# Patient Record
Sex: FEMALE | Race: WHITE | NOT HISPANIC OR LATINO | Employment: PART TIME | URBAN - METROPOLITAN AREA
[De-identification: names, ages, dates, MRNs, and addresses within clinical notes are randomized per-mention and may not be internally consistent; named-entity substitution may affect disease eponyms.]

---

## 2017-09-11 ENCOUNTER — HOSPITAL ENCOUNTER (EMERGENCY)
Facility: CLINIC | Age: 37
Discharge: HOME | End: 2017-09-11
Attending: FAMILY MEDICINE | Admitting: FAMILY MEDICINE
Payer: OTHER MISCELLANEOUS

## 2017-09-11 VITALS
DIASTOLIC BLOOD PRESSURE: 81 MMHG | BODY MASS INDEX: 29.79 KG/M2 | RESPIRATION RATE: 14 BRPM | SYSTOLIC BLOOD PRESSURE: 131 MMHG | TEMPERATURE: 97 F | WEIGHT: 179 LBS | OXYGEN SATURATION: 99 %
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
## 2017-09-11 DIAGNOSIS — S61.213A LACERATION OF LEFT MIDDLE FINGER WITHOUT FOREIGN BODY WITHOUT DAMAGE TO NAIL, INITIAL ENCOUNTER: ICD-10-CM

## 2017-09-11 PROCEDURE — 12001 RPR S/N/AX/GEN/TRNK 2.5CM/<: CPT | Performed by: FAMILY MEDICINE

## 2017-09-11 PROCEDURE — 12001 RPR S/N/AX/GEN/TRNK 2.5CM/<: CPT | Mod: Z6 | Performed by: FAMILY MEDICINE

## 2017-09-11 PROCEDURE — 25000125 ZZHC RX 250: Performed by: FAMILY MEDICINE

## 2017-09-11 PROCEDURE — 99282 EMERGENCY DEPT VISIT SF MDM: CPT | Mod: 25 | Performed by: FAMILY MEDICINE

## 2017-09-11 PROCEDURE — 99283 EMERGENCY DEPT VISIT LOW MDM: CPT | Performed by: FAMILY MEDICINE

## 2017-09-11 RX ORDER — LIDOCAINE HYDROCHLORIDE 10 MG/ML
10 INJECTION, SOLUTION INFILTRATION; PERINEURAL ONCE
Status: COMPLETED | OUTPATIENT
Start: 2017-09-11 | End: 2017-09-11

## 2017-09-11 RX ADMIN — LIDOCAINE HYDROCHLORIDE 10 ML: 10 INJECTION, SOLUTION INFILTRATION; PERINEURAL at 10:34

## 2017-09-11 NOTE — ED NOTES
Here laceration to third digit on left hand. Pt cut finger on a knife while working at at [employer]

## 2017-09-11 NOTE — ED AVS SNAPSHOT
[facility] Emergency Department    [address] [place] [address]    Phone:  [phone]    Fax:  [phone]                                       Christina Santana   MRN: [id number]    Department:  [facility] Emergency Department   Date of Visit:  9/11/2017           After Visit Summary Signature Page     I have received my discharge instructions, and my questions have been answered. I have discussed any challenges I see with this plan with the nurse or doctor.    ..........................................................................................................................................  Patient/Patient Representative Signature      ..........................................................................................................................................  Patient Representative Print Name and Relationship to Patient    ..................................................               ................................................  Date                                            Time    ..........................................................................................................................................  Reviewed by Signature/Title    ...................................................              ..............................................  Date                                                            Time

## 2017-09-11 NOTE — ED AVS SNAPSHOT
[facility] Emergency Department    [address] [place] [address]    Phone:  [phone]    Fax:  [phone]                                       Christina Santana   MRN: [id number]    Department:  [facility] Emergency Department   Date of Visit:  9/11/2017           Patient Information     Date Of Birth          1980        Your diagnoses for this visit were:     Laceration of left middle finger without foreign body without damage to nail, initial encounter        You were seen by Eulogio Meyers MD.      Follow-up Information     Follow up with [facility] Emergency Department.    Specialty:  EMERGENCY MEDICINE    Why:  If symptoms worsen    Contact information:    [address]   [place] [address] [phone]    Additional information:    From [address]: Exit at [address] on south side of [place]. Turn right on [address]. Turn left at stoplight on [address]. [facility] will be in view two blocks ahead        Discharge Instructions       Post-procedure notes from Dr Meyers:    1. We covered this with petroleum jelly and a dressing today. Please do not use any topical antibiotic on this. The skin has a balance of bacteria and yeast that is neccesary for skin health and healing, and we do not want to affect this. Please use vaseline and a band-aid on the repair to keep it soft as this heals.    2. Keep the area clean and dry for the next 24 hours.  After that it is okay to get the site wet from swimming, showering or bathing.  I recommend that you avoid using a hot tub for the next 4- 5 days.    3. The area around the stitches may start to get red, and that is not a sign of infection in most cases.  The skin is reacting to the stitches and the redness will go away once the stitches are taken out.    4. Plan to return to get the stitches out in 9 days.  You can have this done in a local [facility] clinic on the RN schedule,  or you can return to your regular physician or to the ER.    Skin takes a full twelve months to remodel, but usually after about three months you can see what you have for life.  Please treat the wound gently and keep it covered from the sun as it heals.    Tetanus status: your last tetanus was June 2012 and we did not update that today.    Thank you for choosing our Emergency Department for your care.     Sincerely,    Dr Beto Meyers M.D.          24 Hour Appointment Hotline       To make an appointment at any [facility] clinic, call [phone] ([phone]). If you don't have a family doctor or clinic, we will help you find one. [facility] clinics are conveniently located to serve the needs of you and your family.             Review of your medicines      Our records show that you are taking the medicines listed below. If these are incorrect, please call your family doctor or clinic.        Dose / Directions Last dose taken    LORazepam 1 MG tablet   Commonly known as:  ATIVAN   Dose:  0.5 mg   Quantity:  4 tablet        Take 0.5 tablets (0.5 mg) by mouth every 8 hours as needed for anxiety or other (or dizziness)   Refills:  0                Orders Needing Specimen Collection     None      Pending Results     No orders found from 9/9/2017 to 9/12/2017.            Pending Culture Results     No orders found from 9/9/2017 to 9/12/2017.            Pending Results Instructions     If you had any lab results that were not finalized at the time of your Discharge, you can call the ED Lab Result RN at [phone]. You will be contacted by this team for any positive Lab results or changes in treatment. The nurses are available 7 days a week from 10A to 6:30P.  You can leave a message 24 hours per day and they will return your call.        Thank you for choosing [facility]       Thank you for choosing [facility] for your care. Our goal is always to provide you with excellent care. Hearing back from our patients is one  "way we can continue to improve our services. Please take a few minutes to complete the written survey that you may receive in the mail after you visit with us. Thank you!        e-SENSharLoveThis Information     [employer] lets you send messages to your doctor, view your test results, renew your prescriptions, schedule appointments and more. To sign up, go to [email or web address] . Click on \"Log in\" on the left side of the screen, which will take you to the Welcome page. Then click on \"Sign up Now\" on the right side of the page.     You will be asked to enter the access code listed below, as well as some personal information. Please follow the directions to create your username and password.     Your access code is: [email or web address]  Expires: 12/10/2017 10:28 AM     Your access code will  in 90 days. If you need help or a new code, please call your [place] clinic or [phone].        Care EveryWhere ID     This is your Care EveryWhere ID. This could be used by other organizations to access your [place] medical records  [id number]        Equal Access to Services     LESLEY PEREA : Hadgillian Castillo, liz mohan, dede michel. So [facility] [phone].    ATENCIÓN: Si habla español, tiene a norman disposición servicios gratuitos de asistencia lingüística. Shelly al [phone].    We comply with applicable federal civil rights laws and [place] laws. We do not discriminate on the basis of race, color, national origin, age, disability sex, sexual orientation or gender identity.            After Visit Summary       This is your record. Keep this with you and show to your community pharmacist(s) and doctor(s) at your next visit.                  "

## 2017-09-11 NOTE — ED PROVIDER NOTES
"  History   No chief complaint on file.    The history is provided by the patient (Her employer is here with her.).     Christina Santana is a 37 year old female who is here with a laceration of her left finger.  She works at [employer] here in [place] and cut her left middle finger while cutting a bagel.  Her last tetanus shot was about 5 years ago.  She has no other concerns.    I have reviewed the Medications, Allergies, Past Medical and Surgical History, and Social History in the Epic system.    Allergies: No Known Allergies      No current facility-administered medications on file prior to encounter.   Current Outpatient Prescriptions on File Prior to Encounter:  LORazepam (ATIVAN) 1 MG tablet Take 0.5 tablets (0.5 mg) by mouth every 8 hours as needed for anxiety or other (or dizziness)       Patient Active Problem List   Diagnosis     S/P LEEP (status post loop electrosurgical excision procedure)       Past Surgical History:   Procedure Laterality Date     APPENDECTOMY         Social History   Substance Use Topics     Smoking status: Never Smoker     Smokeless tobacco: Never Used     Alcohol use No       Most Recent Immunizations   Administered Date(s) Administered     Tdap (Adacel,Boostrix) 04/11/2012       BMI: Estimated body mass index is 29.79 kg/(m^2) as calculated from the following:    Height as of 6/10/15: 1.651 m (5' 5\").    Weight as of this encounter: 81.2 kg (179 lb).      Review of Systems   Skin:        She has a laceration on the left middle finger   All other systems reviewed and are negative.      Physical Exam   BP: 131/81  Heart Rate: 79  Temp: 97  F (36.1  C)  Resp: 14  Weight: 81.2 kg (179 lb)  SpO2: 99 %    Physical Exam   Constitutional: She appears well-developed and well-nourished.   Pulmonary/Chest: No respiratory distress.   Skin:   She has a 1 cm laceration on the lateral edge of the left middle finger.   Psychiatric: She has a normal mood and affect. Her behavior is normal. Judgment " and thought content normal.   Nursing note and vitals reviewed.      ED Course  The laceration was measured to be 1 cm. in length and located on the lateral edge of the left middle finger.    We did review benefits and drawbacks, including pain, bleeding, infection and scar formation. We discussed oral antibiotics and I recommend none.      For analgesia, 3 mL of 1 % Lidocaine without epinephrine was infused at the base of the finger and around the wound.      After sufficient time, the wound was cleansed by the ED staff.     I inspected for foreign body and for tendon or vascular damage, and there is none.  Exam of NVM function prior to repair shows normal function. The wound edges appear even and intact, and did not need revision.    The wound was closed with a running suture of 4-0 Ethilon.     Exam of the NVM function after the repair showed normal function.    The wound was dressed with Vaseline and a dressing today.  Follow up instructions were reviewed, and a printed copy will be provided as well. Tetanus status: her last tetanus was June 2012 and this is a clean laceration. No tetanus update today.    The patient tolerated the procedure well.      Plan follow up in 9 days for suture removal.       ED Course     Procedures    Assessments & Plan (with Medical Decision Making)  Christina is a 37-year-old female who came in with her employer.  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  Her tetanus is up-to-date and we did not give her an updated tetanus shot.  The wound was repaired as noted above.  She was discharged from the ED.       I have reviewed the nursing notes.    I have reviewed the findings, diagnosis, plan and need for follow up with the patient.       New Prescriptions    No medications on file       Final diagnoses:   Laceration of left middle finger without foreign body without damage to nail, initial encounter       9/11/2017    [facility] EMERGENCY DEPARTMENT     Eulogio Meyers MD  09/11/17 [id number]

## 2017-09-11 NOTE — DISCHARGE INSTRUCTIONS
Post-procedure notes from Dr Meyers:    1. We covered this with petroleum jelly and a dressing today. Please do not use any topical antibiotic on this. The skin has a balance of bacteria and yeast that is neccesary for skin health and healing, and we do not want to affect this. Please use vaseline and a band-aid on the repair to keep it soft as this heals.    2. Keep the area clean and dry for the next 24 hours.  After that it is okay to get the site wet from swimming, showering or bathing.  I recommend that you avoid using a hot tub for the next 4- 5 days.    3. The area around the stitches may start to get red, and that is not a sign of infection in most cases.  The skin is reacting to the stitches and the redness will go away once the stitches are taken out.    4. Plan to return to get the stitches out in 9 days.  You can have this done in a local [place] clinic on the RN schedule, or you can return to your regular physician or to the ER.    Skin takes a full twelve months to remodel, but usually after about three months you can see what you have for life.  Please treat the wound gently and keep it covered from the sun as it heals.    Tetanus status: your last tetanus was June 2012 and we did not update that today.    Thank you for choosing our Emergency Department for your care.     Sincerely,    Dr Beto Meyers M.D.

## 2017-09-20 ENCOUNTER — ALLIED HEALTH/NURSE VISIT (OUTPATIENT)
Dept: FAMILY MEDICINE | Facility: CLINIC | Age: 37
End: 2017-09-20

## 2017-09-20 VITALS — TEMPERATURE: 97.3 F

## 2017-09-20 DIAGNOSIS — Z48.02 VISIT FOR SUTURE REMOVAL: Primary | ICD-10-CM

## 2017-09-20 PROCEDURE — 99207 ZZC NO CHARGE NURSE ONLY: CPT

## 2017-09-20 NOTE — MR AVS SNAPSHOT
"              After Visit Summary   2017    Christina Santana    MRN: [id number]           Patient Information     Date Of Birth          1980        Visit Information        Provider Department      2017 8:30 AM NL RN TEAM A, [facility]        Today's Diagnoses     Visit for suture removal    -  1       Follow-ups after your visit        Who to contact     If you have questions or need follow up information about today's clinic visit or your schedule please contact [facility] directly at [phone].  Normal or non-critical lab and imaging results will be communicated to you by MyChart, letter or phone within 4 business days after the clinic has received the results. If you do not hear from us within 7 days, please contact the clinic through [employer] or phone. If you have a critical or abnormal lab result, we will notify you by phone as soon as possible.  Submit refill requests through [employer] or call your pharmacy and they will forward the refill request to us. Please allow 3 business days for your refill to be completed.          Additional Information About Your Visit        MyChart Information     [employer] lets you send messages to your doctor, view your test results, renew your prescriptions, schedule appointments and more. To sign up, go to [email or web address] . Click on \"Log in\" on the left side of the screen, which will take you to the Welcome page. Then click on \"Sign up Now\" on the right side of the page.     You will be asked to enter the access code listed below, as well as some personal information. Please follow the directions to create your username and password.     Your access code is: [email or web address]  Expires: 12/10/2017 10:28 AM     Your access code will  in 90 days. If you need help or a new code, please call your [facility] or [phone].        Care EveryWhere ID     This is your Care EveryWhere ID. This could be used by other " organizations to access your [place] medical records  [id number]        Your Vitals Were     Temperature Last Period                97.3  F (36.3  C) (Temporal) 08/02/2017           Blood Pressure from Last 3 Encounters:   09/11/17 131/81   12/04/16 110/80   03/22/16 100/70    Weight from Last 3 Encounters:   09/11/17 179 lb (81.2 kg)   03/22/16 168 lb (76.2 kg)   06/10/15 179 lb 12.8 oz (81.6 kg)              Today, you had the following     No orders found for display       Primary Care Provider    None       No address on file        Equal Access to Services     LSELEY [facility]: Hadii acosta Castillo, liz mohan, connor kaalmada lucie, dede quintana . So [facility] [phone].    ATENCIÓN: Si habla español, tiene a norman disposición servicios gratuitos de asistencia lingüística. Llame al [phone].    We comply with applicable federal civil rights laws and [place] laws. We do not discriminate on the basis of race, color, national origin, age, disability sex, sexual orientation or gender identity.            Thank you!     Thank you for choosing [facility]  for your care. Our goal is always to provide you with excellent care. Hearing back from our patients is one way we can continue to improve our services. Please take a few minutes to complete the written survey that you may receive in the mail after your visit with us. Thank you!             Your Updated Medication List - Protect others around you: Learn how to safely use, store and throw away your medicines at www.disposemymeds.org.          This list is accurate as of: 9/20/17  9:00 AM.  Always use your most recent med list.                   Brand Name Dispense Instructions for use Diagnosis    LORazepam 1 MG tablet    ATIVAN    4 tablet    Take 0.5 tablets (0.5 mg) by mouth every 8 hours as needed for anxiety or other (or dizziness)

## 2017-09-20 NOTE — NURSING NOTE
".Christina Santana presents to the clinic today for  removal of sutures.  The patient has had the sutures in place for 9 days.    There has been no history of infection or drainage.    O: 1  Running suture is seen on the  Left middle finger.  The wound is healing well with no signs of infection.    Tetanus status is up to date.    A: Suture removal.    P:  All sutures were easily removed today.  Routine wound care discussed.  2 1/4\" X 1.5\" Steri-strips applied. Pt informed they will fall off themselves. NO need to come back in for removal. She may wash her hands and bathe with them off. IT is to offer a few extra days protection. The patient will follow up as needed. ........................MARIAJOSE Leal        "

## 2018-08-30 ENCOUNTER — APPOINTMENT (OUTPATIENT)
Dept: LAB | Facility: CLINIC | Age: 38
End: 2018-08-30
Payer: COMMERCIAL

## 2019-01-01 ENCOUNTER — HOSPITAL ENCOUNTER (EMERGENCY)
Facility: CLINIC | Age: 39
Discharge: HOME | End: 2019-01-02
Attending: FAMILY MEDICINE | Admitting: FAMILY MEDICINE
Payer: COMMERCIAL

## 2019-01-01 ENCOUNTER — APPOINTMENT (OUTPATIENT)
Dept: CT IMAGING | Facility: CLINIC | Age: 39
End: 2019-01-01
Attending: FAMILY MEDICINE
Payer: COMMERCIAL

## 2019-01-01 DIAGNOSIS — E86.0 DEHYDRATION: ICD-10-CM

## 2019-01-01 DIAGNOSIS — R50.9 FEVER, UNSPECIFIED FEVER CAUSE: ICD-10-CM

## 2019-01-01 LAB
ALBUMIN UR-MCNC: NEGATIVE MG/DL
ANION GAP SERPL CALCULATED.3IONS-SCNC: 11 MMOL/L (ref 3–14)
APPEARANCE UR: CLEAR
BILIRUB UR QL STRIP: ABNORMAL
BUN SERPL-MCNC: 6 MG/DL (ref 7–30)
CALCIUM SERPL-MCNC: 8.8 MG/DL (ref 8.5–10.1)
CHLORIDE SERPL-SCNC: 103 MMOL/L (ref 94–109)
CO2 SERPL-SCNC: 21 MMOL/L (ref 20–32)
COLOR UR AUTO: YELLOW
CREAT SERPL-MCNC: 0.68 MG/DL (ref 0.52–1.04)
CRP SERPL-MCNC: 106 MG/L (ref 0–8)
DIFFERENTIAL METHOD BLD: ABNORMAL
EOSINOPHIL # BLD AUTO: 0.2 10E9/L (ref 0–0.7)
EOSINOPHIL NFR BLD AUTO: 1 %
ERYTHROCYTE [DISTWIDTH] IN BLOOD BY AUTOMATED COUNT: 11.9 % (ref 10–15)
ERYTHROCYTE [SEDIMENTATION RATE] IN BLOOD BY WESTERGREN METHOD: 8 MM/H (ref 0–20)
FLUAV+FLUBV AG SPEC QL: NEGATIVE
FLUAV+FLUBV AG SPEC QL: NEGATIVE
GFR SERPL CREATININE-BSD FRML MDRD: >90 ML/MIN/{1.73_M2}
GLUCOSE SERPL-MCNC: 106 MG/DL (ref 70–99)
GLUCOSE UR STRIP-MCNC: NEGATIVE MG/DL
HCT VFR BLD AUTO: 40.7 % (ref 35–47)
HGB BLD-MCNC: 14 G/DL (ref 11.7–15.7)
HGB UR QL STRIP: ABNORMAL
KETONES UR STRIP-MCNC: 40 MG/DL
LACTATE BLD-SCNC: 2.7 MMOL/L (ref 0.7–2)
LEUKOCYTE ESTERASE UR QL STRIP: NEGATIVE
LYMPHOCYTES # BLD AUTO: 0.8 10E9/L (ref 0.8–5.3)
LYMPHOCYTES NFR BLD AUTO: 5 %
MCH RBC QN AUTO: 30.6 PG (ref 26.5–33)
MCHC RBC AUTO-ENTMCNC: 34.4 G/DL (ref 31.5–36.5)
MCV RBC AUTO: 89 FL (ref 78–100)
MONOCYTES # BLD AUTO: 0.6 10E9/L (ref 0–1.3)
MONOCYTES NFR BLD AUTO: 4 %
MUCOUS THREADS #/AREA URNS LPF: PRESENT /LPF
NEUTROPHILS # BLD AUTO: 13.5 10E9/L (ref 1.6–8.3)
NEUTROPHILS NFR BLD AUTO: 90 %
NITRATE UR QL: NEGATIVE
PH UR STRIP: 6 PH (ref 5–7)
PLATELET # BLD AUTO: 186 10E9/L (ref 150–450)
POTASSIUM SERPL-SCNC: 3.9 MMOL/L (ref 3.4–5.3)
RBC # BLD AUTO: 4.57 10E12/L (ref 3.8–5.2)
RBC #/AREA URNS AUTO: 4 /HPF (ref 0–2)
SODIUM SERPL-SCNC: 135 MMOL/L (ref 133–144)
SOURCE: ABNORMAL
SP GR UR STRIP: 1.02 (ref 1–1.03)
SPECIMEN SOURCE: NORMAL
UROBILINOGEN UR STRIP-MCNC: 0.2 MG/DL (ref 0–2)
WBC # BLD AUTO: 15.1 10E9/L (ref 4–11)
WBC #/AREA URNS AUTO: 2 /HPF (ref 0–5)

## 2019-01-01 PROCEDURE — 80076 HEPATIC FUNCTION PANEL: CPT | Performed by: FAMILY MEDICINE

## 2019-01-01 PROCEDURE — 99284 EMERGENCY DEPT VISIT MOD MDM: CPT | Mod: Z6 | Performed by: FAMILY MEDICINE

## 2019-01-01 PROCEDURE — 87040 BLOOD CULTURE FOR BACTERIA: CPT | Performed by: FAMILY MEDICINE

## 2019-01-01 PROCEDURE — 74177 CT ABD & PELVIS W/CONTRAST: CPT

## 2019-01-01 PROCEDURE — 85652 RBC SED RATE AUTOMATED: CPT | Performed by: FAMILY MEDICINE

## 2019-01-01 PROCEDURE — 25000128 H RX IP 250 OP 636: Performed by: FAMILY MEDICINE

## 2019-01-01 PROCEDURE — 86140 C-REACTIVE PROTEIN: CPT | Performed by: FAMILY MEDICINE

## 2019-01-01 PROCEDURE — 96375 TX/PRO/DX INJ NEW DRUG ADDON: CPT | Performed by: FAMILY MEDICINE

## 2019-01-01 PROCEDURE — 96361 HYDRATE IV INFUSION ADD-ON: CPT | Performed by: FAMILY MEDICINE

## 2019-01-01 PROCEDURE — 83605 ASSAY OF LACTIC ACID: CPT | Performed by: FAMILY MEDICINE

## 2019-01-01 PROCEDURE — 85025 COMPLETE CBC W/AUTO DIFF WBC: CPT | Performed by: FAMILY MEDICINE

## 2019-01-01 PROCEDURE — 96365 THER/PROPH/DIAG IV INF INIT: CPT | Performed by: FAMILY MEDICINE

## 2019-01-01 PROCEDURE — 99285 EMERGENCY DEPT VISIT HI MDM: CPT | Mod: 25 | Performed by: FAMILY MEDICINE

## 2019-01-01 PROCEDURE — 81001 URINALYSIS AUTO W/SCOPE: CPT | Performed by: FAMILY MEDICINE

## 2019-01-01 PROCEDURE — 25000125 ZZHC RX 250: Performed by: FAMILY MEDICINE

## 2019-01-01 PROCEDURE — 87804 INFLUENZA ASSAY W/OPTIC: CPT | Performed by: FAMILY MEDICINE

## 2019-01-01 PROCEDURE — 80048 BASIC METABOLIC PNL TOTAL CA: CPT | Performed by: FAMILY MEDICINE

## 2019-01-01 RX ORDER — IOPAMIDOL 755 MG/ML
200 INJECTION, SOLUTION INTRAVASCULAR ONCE
Status: COMPLETED | OUTPATIENT
Start: 2019-01-01 | End: 2019-01-01

## 2019-01-01 RX ORDER — ONDANSETRON 2 MG/ML
4 INJECTION INTRAMUSCULAR; INTRAVENOUS EVERY 30 MIN PRN
Status: DISCONTINUED | OUTPATIENT
Start: 2019-01-01 | End: 2019-01-02 | Stop reason: HOSPADM

## 2019-01-01 RX ORDER — KETOROLAC TROMETHAMINE 30 MG/ML
30 INJECTION, SOLUTION INTRAMUSCULAR; INTRAVENOUS ONCE
Status: COMPLETED | OUTPATIENT
Start: 2019-01-01 | End: 2019-01-01

## 2019-01-01 RX ADMIN — TAZOBACTAM SODIUM AND PIPERACILLIN SODIUM 4.5 G: 500; 4 INJECTION, SOLUTION INTRAVENOUS at 23:34

## 2019-01-01 RX ADMIN — IOPAMIDOL 88 ML: 755 INJECTION, SOLUTION INTRAVENOUS at 23:22

## 2019-01-01 RX ADMIN — ONDANSETRON 4 MG: 2 INJECTION INTRAMUSCULAR; INTRAVENOUS at 21:59

## 2019-01-01 RX ADMIN — SODIUM CHLORIDE 60 ML: 9 INJECTION, SOLUTION INTRAVENOUS at 23:22

## 2019-01-01 RX ADMIN — SODIUM CHLORIDE 1000 ML: 9 INJECTION, SOLUTION INTRAVENOUS at 22:00

## 2019-01-01 RX ADMIN — KETOROLAC TROMETHAMINE 30 MG: 30 INJECTION, SOLUTION INTRAMUSCULAR at 22:03

## 2019-01-01 RX ADMIN — SODIUM CHLORIDE 1000 ML: 9 INJECTION, SOLUTION INTRAVENOUS at 23:00

## 2019-01-02 VITALS
OXYGEN SATURATION: 98 % | WEIGHT: 180 LBS | HEART RATE: 100 BPM | TEMPERATURE: 100.5 F | SYSTOLIC BLOOD PRESSURE: 118 MMHG | DIASTOLIC BLOOD PRESSURE: 62 MMHG | BODY MASS INDEX: 29.95 KG/M2 | RESPIRATION RATE: 18 BRPM

## 2019-01-02 LAB
ALBUMIN SERPL-MCNC: 3.7 G/DL (ref 3.4–5)
ALP SERPL-CCNC: 58 U/L (ref 40–150)
ALT SERPL W P-5'-P-CCNC: 20 U/L (ref 0–50)
AST SERPL W P-5'-P-CCNC: 25 U/L (ref 0–45)
BILIRUB DIRECT SERPL-MCNC: 0.2 MG/DL (ref 0–0.2)
BILIRUB SERPL-MCNC: 0.8 MG/DL (ref 0.2–1.3)
PROT SERPL-MCNC: 7.6 G/DL (ref 6.8–8.8)

## 2019-01-02 NOTE — ED PROVIDER NOTES
[facility] ED Provider Note   Patient: Christina Santaan  MRN #:  [id number]  Date of Visit: January 1, 2019    CC:     Chief Complaint   Patient presents with     Dehydration     HPI:  Christina Santana is a 38 year old female who presented to the emergency department with acute onset fever, body aches, abdominal and low back pain.  Patient recently had repair of her hiatal hernia.  She had a Nissen procedure done at [facility] on December 10.  She states that she was hospitalized overnight, and was then discharged home.  She had been doing quite well, but since yesterday, she has not had much oral intake, has only voided a few times today, feels dehydrated, but also has a fever.  They did not have a working thermometer at home.  She is having some increase abdominal and low back pain.  Abdominal pain is rated 5/10 and back pain is rated 10/10.  She denies any significant sore throat, cough, shortness of breath, vomiting or diarrhea.  She had a formed bowel movement yesterday.  She has not noticed any rash or joint pains.  She has generalized body aches.    Problem List:  Patient Active Problem List    Diagnosis Date Noted     S/P LEEP (status post loop electrosurgical excision procedure) 04/22/2013     Priority: Medium     5/23/11 ASC-H  6/20/11 colp- no high grade lesion noted  8/10/12 ASC-H  4/22/13 pap HSIL  5/1/13 colp. BREEZY 2/3. Plan: LEEP  5/13/13 LEEP. BREEZY 2/3 with + margins  8/28/13 colp- WNL  12/27/13 pap ASCUS + HR HPV  1/29/14 colp- WNL  8/27/14 pap NIL.          Past Medical History:   Diagnosis Date     ASCUS with positive high risk HPV 12/27/13     HSIL (high grade squamous intraepithelial lesion) on Pap smear of cervix 4/22/13     Pap smear of cervix with ASCUS, cannot exclude HGSIL 5/23/11, 8/10/12,        MEDS:     LORazepam (ATIVAN) 1 MG tablet       ALLERGIES:  No Known Allergies    Past Surgical History:   Procedure Laterality  Date     APPENDECTOMY         Social History     Tobacco Use     Smoking status: Never Smoker     Smokeless tobacco: Never Used   Substance Use Topics     Alcohol use: No     Drug use: No         Review of Systems   Except as noted in HPI, all other systems were reviewed and are negative    Physical Exam     Vitals were reviewed  Patient Vitals for the past 12 hrs:   BP Temp Temp src Pulse Heart Rate Resp SpO2 Weight   01/01/19 2311 -- -- -- -- -- -- -- 81.6 kg (180 lb)   01/01/19 2300 115/68 -- -- 100 -- -- 95 % --   01/01/19 2230 121/69 -- -- 107 -- -- 96 % --   01/01/19 2206 117/85 -- -- 106 -- -- 96 % --   01/01/19 2123 133/84 100.5  F (38.1  C) Oral -- 120 19 96 % --     GENERAL APPEARANCE: Alert and oriented x3, no apparent distress  FACE: normal facies  EYES: Pupils are equal  HENT: normal external exam  NECK: no adenopathy or asymmetry  RESP: normal respiratory effort; clear breath sounds bilaterally  CV: regular rate and rhythm; no significant murmurs, gallops or rubs  ABD: soft, epigastric tenderness; no rebound or guarding; bowel sounds are normal  MS: no gross deformities noted; normal muscle tone.  EXT: No calf tenderness or pitting edema  SKIN: no worrisome rash  NEURO: no facial droop; no focal deficits, speech is normal  PSYCH: normal mood and affect      Available Lab/Imaging Results     Results for orders placed or performed during the hospital encounter of 01/01/19 (from the past 24 hour(s))   CBC with platelets differential   Result Value Ref Range    WBC 15.1 (H) 4.0 - 11.0 10e9/L    RBC Count 4.57 3.8 - 5.2 10e12/L    Hemoglobin 14.0 11.7 - 15.7 g/dL    Hematocrit 40.7 35.0 - 47.0 %    MCV 89 78 - 100 fl    MCH 30.6 26.5 - 33.0 pg    MCHC 34.4 31.5 - 36.5 g/dL    RDW 11.9 10.0 - 15.0 %    Platelet Count 186 150 - 450 10e9/L    Diff Method Automated Method     % Neutrophils 90.0 %    % Lymphocytes 5.0 %    % Monocytes 4.0 %    % Eosinophils 1.0 %    Absolute Neutrophil 13.5 (H) 1.6 - 8.3 10e9/L     Absolute Lymphocytes 0.8 0.8 - 5.3 10e9/L    Absolute Monocytes 0.6 0.0 - 1.3 10e9/L    Absolute Eosinophils 0.2 0.0 - 0.7 10e9/L   Basic metabolic panel   Result Value Ref Range    Sodium 135 133 - 144 mmol/L    Potassium 3.9 3.4 - 5.3 mmol/L    Chloride 103 94 - 109 mmol/L    Carbon Dioxide 21 20 - 32 mmol/L    Anion Gap 11 3 - 14 mmol/L    Glucose 106 (H) 70 - 99 mg/dL    Urea Nitrogen 6 (L) 7 - 30 mg/dL    Creatinine 0.68 0.52 - 1.04 mg/dL    GFR Estimate >90 >60 mL/min/[1.73_m2]    GFR Estimate If Black >90 >60 mL/min/[1.73_m2]    Calcium 8.8 8.5 - 10.1 mg/dL   CRP inflammation   Result Value Ref Range    CRP Inflammation 106.0 (H) 0.0 - 8.0 mg/L   Erythrocyte sedimentation rate auto   Result Value Ref Range    Sed Rate 8 0 - 20 mm/h   Lactic acid whole blood   Result Value Ref Range    Lactic Acid 2.7 (H) 0.7 - 2.0 mmol/L   Hepatic panel   Result Value Ref Range    Bilirubin Direct 0.2 0.0 - 0.2 mg/dL    Bilirubin Total 0.8 0.2 - 1.3 mg/dL    Albumin 3.7 3.4 - 5.0 g/dL    Protein Total 7.6 6.8 - 8.8 g/dL    Alkaline Phosphatase 58 40 - 150 U/L    ALT 20 0 - 50 U/L    AST 25 0 - 45 U/L   UA reflex to Microscopic   Result Value Ref Range    Color Urine Yellow     Appearance Urine Clear     Glucose Urine Negative NEG^Negative mg/dL    Bilirubin Urine Small (A) NEG^Negative    Ketones Urine 40 (A) NEG^Negative mg/dL    Specific Gravity Urine 1.020 1.003 - 1.035    Blood Urine Small (A) NEG^Negative    pH Urine 6.0 5.0 - 7.0 pH    Protein Albumin Urine Negative NEG^Negative mg/dL    Urobilinogen mg/dL 0.2 0.0 - 2.0 mg/dL    Nitrite Urine Negative NEG^Negative    Leukocyte Esterase Urine Negative NEG^Negative    Source Midstream Urine    Influenza A/B antigen   Result Value Ref Range    Influenza A/B Agn Specimen Nasopharyngeal     Influenza A Negative NEG^Negative    Influenza B Negative NEG^Negative   Urine Microscopic   Result Value Ref Range    WBC Urine 2 0 - 5 /HPF    RBC Urine 4 (H) 0 - 2 /HPF    Mucous  Urine Present (A) NEG^Negative /LPF   CT ABDOMEN PELVIS W CONTRAST    Narrative    CT ABDOMEN PELVIS W CONTRAST  1/1/2019 11:34 PM     HISTORY: Abdominal pain, fever, abscess suspected.    TECHNIQUE: CT abdomen and pelvis with 88 mL Isovue-370 IV. Radiation  dose for this scan was reduced using automated exposure control,  adjustment of the mA and/or kV according to patient size, or iterative  reconstruction technique.    COMPARISON: None.    FINDINGS:  Abdomen: There is dependent atelectasis at the lung bases. The heart  size is normal. Postoperative changes at the gastroesophageal  junction. There are multiple stones in the gallbladder. The liver,  spleen, pancreas, adrenal glands and kidneys are normal in appearance.  There is no abdominal or pelvic lymph node enlargement.    Pelvis: There is a collapsing left ovarian cyst measuring 1.7 cm. The  uterus and adnexa otherwise appear normal. There is no bowel  obstruction or inflammation. Trace free fluid in the pelvis. No free  intraperitoneal gas.      Impression    IMPRESSION:  1. No bowel obstruction or inflammation. No evidence of abscess.  2. Small collapsing left ovarian cyst.  3. Cholelithiasis.         The Lactic acid level is elevated due to Dehydration, at this time there is no sign of severe sepsis or septic shock.      Impression     Final diagnoses:   Fever   Dehydration         ED Course & Medical Decision Making   Christina Santana is a 38 year old female who presented to the emergency department with onset of fever, body aches, abdominal pain low back pain with decreased oral intake.  Patient reports onset of symptoms in the last 24 hours.  She has voided a few times, but feels dehydrated.  She did not have a thermometer to check her temp but had body aches as well as subjective fever.  Patient was seen shortly after arrival.  Temperature is 100.5, blood pressure 133/84, heart rate of 120, respiratory rate of 19 with 96% oxygen saturation.  Patient was  given IV fluids.  Laboratory workup reveals an elevated lactic acid level of 2.7.  White blood count came back at 15.1 with 90% neutrophils.  Hemoglobin is 14.0.  Basic metabolic panel is unremarkable.  Urinalysis reveals 40 ketones, but no nitrites or leukocyte esterase.  CRP is elevated at 106, but sed rate is 8.  Because of the elevated white blood count, lactic acid level, and CRP, CT scan of the abdomen with IV contrast was ordered to rule out possible infectious complications from her recent surgery.  CT scan reveals postoperative changes at the gastroesophageal junction.  There are multiple stones in the gallbladder, and a collapsing left ovarian cysts.  The patient has not been experiencing any signs of biliary colic.  Her pain improved with Toradol, and she received 2 L of normal saline.  She does not have any focal signs of infection related to her recent surgery or in the abdomen.  Her influenza screen is negative, but she may still have influenza due to the low sensitivity of influenza screening.  At this time, there does not appear to be a serious bacterial infection.  Patient appears clinically improved, and has an appointment with Dr. Harris tomorrow.  Patient was advised to closely monitor for any new or worsening symptoms and to return to the ED if this occurs.  Keep her appointment with her surgeon tomorrow.  Continue to drink plenty of fluids.                 Written after-visit summary and instructions were given at the time of discharge.    Follow up Plan:   Fan Harris MD  [facility]  [id number] [place] [id number]  [phone]    In 1 day        Discharge Medications: None       This note was dictated using electronic voice recognition software and although reviewed, may contain grammatical and spelling errors.        iJng Mullen MD  01/02/19 0043

## 2019-01-02 NOTE — DISCHARGE INSTRUCTIONS
Thank you for giving us the opportunity to see you.  We did not find a worrisome cause for your fever.    Your white blood count was elevated, but your influenza screen, urine, and CT scans were normal.    There is evidence of gallstones, but no obvious signs of inflammation or infection.  You may want to discuss this with Dr. Harris tomorrow.    Continue to drink plenty of fluids.  Take Tylenol as needed for fever.    Keep your appointment with Dr. Cifuentes tomorrow.    If you had lab work, cultures or imaging studies done during your stay, the final results may still be pending. We will call you if your plan of care needs to change.     After discharge, please closely monitor for any new or worsening symptoms. Return to the Emergency Department at any time if your symptoms worsen.

## 2019-01-02 NOTE — ED TRIAGE NOTES
Pt presents with concerns of dehydration.  Pt had hiatal hernia repair about 2 weeks ago.  Pt states for the last 28 hours, has not been able to drink or eat.  Pt feeling dehydrated.

## 2019-01-02 NOTE — ED NOTES
DATE:  1/1/2019   TIME OF RECEIPT FROM LAB:  2202  LAB TEST:  Lactic Acid  LAB VALUE:  2.7  RESULTS GIVEN WITH READ-BACK TO (PROVIDER):  Jing Mullen MD  TIME LAB VALUE REPORTED TO PROVIDER:   2202

## 2019-01-08 LAB
BACTERIA SPEC CULT: NO GROWTH
Lab: NORMAL
SPECIMEN SOURCE: NORMAL

## 2019-01-08 NOTE — RESULT ENCOUNTER NOTE
Final blood culture report is NEGATIVE.    No treatment or change in treatment per [facility] ED Lab Result protocol.

## 2019-01-18 ENCOUNTER — OFFICE VISIT (OUTPATIENT)
Dept: URGENT CARE | Facility: RETAIL CLINIC | Age: 39
End: 2019-01-18
Payer: COMMERCIAL

## 2019-01-18 VITALS — SYSTOLIC BLOOD PRESSURE: 119 MMHG | HEART RATE: 83 BPM | DIASTOLIC BLOOD PRESSURE: 87 MMHG | TEMPERATURE: 98.5 F

## 2019-01-18 DIAGNOSIS — J02.9 ACUTE PHARYNGITIS, UNSPECIFIED ETIOLOGY: Primary | ICD-10-CM

## 2019-01-18 LAB — S PYO AG THROAT QL IA.RAPID: NORMAL

## 2019-01-18 PROCEDURE — 87081 CULTURE SCREEN ONLY: CPT | Performed by: INTERNAL MEDICINE

## 2019-01-18 PROCEDURE — 99213 OFFICE O/P EST LOW 20 MIN: CPT | Performed by: INTERNAL MEDICINE

## 2019-01-18 PROCEDURE — 87880 STREP A ASSAY W/OPTIC: CPT | Mod: QW | Performed by: INTERNAL MEDICINE

## 2019-01-19 NOTE — PROGRESS NOTES
[facility] Care Progress Note        Chaparro Webster MD, MPH  01/18/2019        History:      Christina Santana is a pleasant 38 year old year old female with a chief complaint of nasal congestion and sore throat x 1 day.   No fever or chills.   No dyspnea or wheezing.   No smoking history.   No headache or neck pain.  No GI or  symptoms.   No MSK symptoms.         Assessment and Plan:        - RAPID STREP SCREEN: negaqtive  - BETA STREP GROUP A R/O CULTURE  URI:  Discussed supportive care with the patient  Advised to increase fluid intake and rest.  Patient was advised to use throat lozenges and gargle with salt water for symptomatic relief.  Tylenol 650 mg po for pain q 6 hours prn  F/u w PCP in 4-5 days, earlier if symptoms worsen.                   Physical Exam:      /87   Pulse 83   Temp 98.5  F (36.9  C) (Oral)      Constitutional: Patient is in no distress The patient is pleasant and cooperative.   HEENT: Head:  Head is atraumatic, normocephalic.    Eyes: Pupils are equal, round and reactive to light and accomodation.  Sclera is non-icteric. No conjunctival injection, or exudate noted. Extraocular motion is intact. Visual acuity is intact bilaterally.  Ears:  External acoustic canals are patent and clear.  There is no erythema and bulging( exudate)  of the ( R/L ) tympanic membrane(s ).   Nose:  Nasal congestion w/o drainage or mucosal ulceration is noted.  Throat:  Oral mucosa is moist.  No oral lesions are noted. Posterior pharyngeal hyperemia w/o exudate noted.     Neck Supple.  There is no cervical lymphadenopathy.  No nuchal rigidity noted.  There is no meningismus.     Cardiovascular: Heart is regular to rate and rhythm.  No murmur is noted.     Lungs: Clear in the anterior and posterior pulmonary fields.   Abdomen: Soft and non-tender.    Back No flank tenderness is noted.   Extremeties No edema, no calf tenderness.   Neuro: No focal deficit.   Skin No petechiae or purpura is  noted.  There is no rash.   Mood Normal              Data:      All new lab and imaging data was reviewed.   Results for orders placed or performed in visit on 01/18/19   RAPID STREP SCREEN   Result Value Ref Range    Rapid Strep A Screen NEG neg

## 2019-01-20 ENCOUNTER — TELEPHONE (OUTPATIENT)
Dept: URGENT CARE | Facility: RETAIL CLINIC | Age: 39
End: 2019-01-20

## 2019-01-20 DIAGNOSIS — J02.0 STREPTOCOCCAL PHARYNGITIS: Primary | ICD-10-CM

## 2019-01-20 LAB
BACTERIA SPEC CULT: ABNORMAL
BACTERIA SPEC CULT: ABNORMAL
SPECIMEN SOURCE: ABNORMAL

## 2019-01-20 RX ORDER — PENICILLIN V POTASSIUM 500 MG/1
500 TABLET, FILM COATED ORAL 2 TIMES DAILY
Qty: 20 TABLET | Refills: 0 | Status: SHIPPED | OUTPATIENT
Start: 2019-01-20 | End: 2019-01-30

## 2019-02-05 ENCOUNTER — TELEPHONE (OUTPATIENT)
Dept: FAMILY MEDICINE | Facility: CLINIC | Age: 39
End: 2019-02-05

## 2019-02-05 NOTE — TELEPHONE ENCOUNTER
": 1980  PHONE #'s: [phone] (home)     PRESENTING PROBLEM:  Slipped ane fell on the ice about a half hour ago ,and hit the back of her head.     NURSING ASSESSMENT  Description:  \" My  wanted me to call and talk with a nurse about what I should look for if I have a concussion.\"  Onset/duration:   Today, half an hour ago. Slipped outside at her mother's house.   Precip. factors:   As above.   Assoc. Sx:   Headache and swelling noted back of head. \" It's about the size of half a golf ball, if you cut it in half.\"   Improves/worsens Sx:   same  Pain scale (1-10)   4/10  Sx specific meds:  none  LMP/preg/breast feeding:   NA  Last exam/Tx:   Has NOT been seen for this.     RECOMMENDED DISPOSITION:  Home care advice - Seek Emergency care Immediately if any of the following occur:    * Confusion, disorientation, agitation, or change in vision.    *  Vomiting more than twice and feeling nauseated.     * Decreased level of consciousness or diffiiculty arousing.    * Numbness, tingling, or weakness in an arm or leg.     * Persistent vomiting, severe headache, speech problems, seizures, or      lethargy.    * If person does not recognize family members/caregivers.    * Persistent numbness and tingling    * Blood or clear drainage coming from ears or nose.    Home Care measures for Head Injury:  Apply ice to area of injury for 20 to 30 minutes every 2 hours while awake for swelling and discomfort.  Place a cloth barrier between the ice and the skin. It is normal to feel sleepy after an head injury. OK to sleep, but awaken every 2 hours for the first 24 hours to determine level of alertness and responsiveness. May take Tylenol for discomfort. Do NOT take aspiring or Ibuprofen as these will increase bleeding. Avoid and strenuous activity. Need to rest quietly today with head slightly elevated to decrease  Pressure in your head. Avoid alcohol, sleeping pills or sedatives during the first 24 hours after an " injury like this. Avoid heavy meals as can make you nauseated.     Call if further questions or concerns.  Will comply with recommendation: YES   If further questions/concerns or if Sx do not improve, worsen or new Sx develop, call your PCP or [facility] as soon as possible.    NOTES:  Disposition was determined by the first positive assessment question, therefore all previous assessment questions were negative.  Informed to check provider manual or call insurance company to assure coverage.    Guideline used:Head injury  Telephone Triage Protocols for Nurses, Fifth Edition, Karolina Hernandez RN

## 2019-02-05 NOTE — TELEPHONE ENCOUNTER
Reason for call:  Patient reporting a symptom    Symptom or request: fell on the ice and hit her head. Has a large lump on the back of her head and she feels like she is going to throw up.     Duration (how long have symptoms been present): today    Have you been treated for this before? No    Additional comments:     Phone Number patient can be reached at:  Home number on file [phone] (home)    Best Time:  any    Can we leave a detailed message on this number:  YES    Call taken on 2/5/2019 at 10:59 AM by Kristen Ordaz

## 2019-08-23 ENCOUNTER — OFFICE VISIT (OUTPATIENT)
Dept: URGENT CARE | Facility: RETAIL CLINIC | Age: 39
End: 2019-08-23

## 2019-08-23 VITALS
HEART RATE: 77 BPM | SYSTOLIC BLOOD PRESSURE: 110 MMHG | TEMPERATURE: 97.9 F | OXYGEN SATURATION: 98 % | DIASTOLIC BLOOD PRESSURE: 68 MMHG

## 2019-08-23 DIAGNOSIS — B02.8 HERPES ZOSTER WITH COMPLICATION: Primary | ICD-10-CM

## 2019-08-23 PROCEDURE — 99213 OFFICE O/P EST LOW 20 MIN: CPT | Performed by: NURSE PRACTITIONER

## 2019-08-23 RX ORDER — VALACYCLOVIR HYDROCHLORIDE 1 G/1
1000 TABLET, FILM COATED ORAL 3 TIMES DAILY
Qty: 21 TABLET | Refills: 0 | Status: SHIPPED | OUTPATIENT
Start: 2019-08-23 | End: 2020-12-28

## 2019-08-23 ASSESSMENT — ENCOUNTER SYMPTOMS
ABDOMINAL PAIN: 0
FEVER: 0
NAUSEA: 0
FATIGUE: 1
VOMITING: 0
CHILLS: 0
DIZZINESS: 0
ACTIVITY CHANGE: 0
PARESTHESIAS: 1
NERVOUS/ANXIOUS: 0
DIAPHORESIS: 0
WEAKNESS: 0
APPETITE CHANGE: 0
BACK PAIN: 1

## 2019-08-23 NOTE — PROGRESS NOTES
Chief Complaint   Patient presents with     Derm Problem     rash on left side goes around side it burns and hurts,x 1 week  has had shingles before she says it gabriele feels like that      SUBJECTIVE:  Christina Santana is a 39 year old female who presents to the clinic today with her daughter for a rash.  Onset of rash was 3 days ago with prodrome of achey, tingling, deep back pain starting 4 days ago.  Rash is gradual onset and worsening.   Location of the rash: left flank area wrapping around to abdomen  Quality/symptoms of rash: tingling, painful, dry, red vesicles, some with orange centers following dermatomal pattern  Associated symptoms include: nothing.  Symptoms are moderate and rash seems to be stable.  Previous history of a similar rash? Yes: had shingles 15 years ago  Treatment measures tried include:  none  Recent exposure history: none known  Patient denies new meds, pets, foods, soaps, detergents, lotions, or enviornmental contacts.    Past Medical History:   Diagnosis Date     ASCUS with positive high risk HPV 12/27/13     HSIL (high grade squamous intraepithelial lesion) on Pap smear of cervix 4/22/13     Pap smear of cervix with ASCUS, cannot exclude HGSIL 5/23/11, 8/10/12,        No current outpatient medications on file prior to visit.  No current facility-administered medications on file prior to visit.   Social History     Tobacco Use     Smoking status: Never Smoker     Smokeless tobacco: Never Used   Substance Use Topics     Alcohol use: No     No Known Allergies    Review of Systems   Constitutional: Positive for fatigue. Negative for activity change, appetite change, chills, diaphoresis and fever.   Gastrointestinal: Negative for abdominal pain, nausea and vomiting.   Musculoskeletal: Positive for back pain (deep achey, tingling pain prior to lesions).   Skin: Positive for rash.   Allergic/Immunologic: Negative for environmental allergies.   Neurological: Positive for paresthesias. Negative  for dizziness and weakness.   Psychiatric/Behavioral: The patient is not nervous/anxious (stressed lately).      EXAM:   /68   Pulse 77   Temp 97.9  F (36.6  C) (Oral)   SpO2 98%     Physical Exam   Constitutional: She is oriented to person, place, and time. She appears well-developed and well-nourished. No distress.   HENT:   Head: Normocephalic and atraumatic.   Eyes: Pupils are equal, round, and reactive to light. Conjunctivae and EOM are normal.   Neck: Normal range of motion. Neck supple.   Cardiovascular: Normal rate.   Pulmonary/Chest: Effort normal.   Abdominal: Soft. She exhibits no distension. There is no tenderness. There is no guarding.   Musculoskeletal: Normal range of motion.   Neurological: She is alert and oriented to person, place, and time.   Skin: Skin is warm and dry. Rash noted. She is not diaphoretic.   Patch of erythematous vesicles, yellow centers in some, none have ruptured, following dermatomal pattern along left lower back wrapping to abdomen. There is no edema or warmth.   Psychiatric: She has a normal mood and affect. Her behavior is normal.   Vitals reviewed.    ASSESSMENT:    ICD-10-CM    1. Herpes zoster with complication B02.8 valACYclovir (VALTREX) 1000 mg tablet     PLAN:  Patient Instructions   Likely shingles  Valtrex 1g three times daily for 7 days  Look for secondary bacterial infection - fevers, sweats, chills, worsening redness, swelling, thick drainage, upset stomach - ED if this occurs  Patient Education     Shingles  Shingles is a viral infection caused by the same virus that causes chicken pox. Anyone who has had chicken pox may get shingles later in life. The virus stays in the body, but remains asleep (dormant). Shingles often occurs in older persons or persons with lowered immunity. But it can affect anyone at any age.  Shingles starts as a tingling patch of skin on one side of the body. Small, painful blisters may then appear. The rash rarely spreads to  other parts of the body.  Exposure to shingles can't cause shingles. However, it can cause chicken pox in anyone who has not had chicken pox or has not been vaccinated. The contagious period ends when all blisters have crusted over, generally 1 to 2 weeks after the illness starts.  After the blisters heal, the affected skin may be sensitive or painful for weeks or months, gradually resolving over time. But, sometimes this can last longer and be permanent (called postherpetic neuralgia.)  Shingles vaccines are available. Vaccination can help prevent shingles or make it less painful. It is generally recommended for adults older than 50, even if you've had singles in the past. Talk with your healthcare provider about when to get vaccinated and which vaccine is best for you.  Home care    Medicines may be prescribed to help relieve pain. Take these medicines as directed. Ask your healthcare provider or pharmacist before using over-the-counter medicines for helping treat pain and itching.    In certain cases, antiviral medicines may be prescribed to reduce pain, shorten the illness, and prevent neuralgia. Take these medicines as directed.    Compresses made from a solution of cool water mixed with cornstarch or baking soda may help relieve pain and itching.     Gently wash skin daily with soap and water to help prevent infection. Be certain to rinse off all of the soap, which can be irritating.    Trim fingernails and try not to scratch. Scratching the sores may leave scars.    Stay home from work or school until all blisters have formed a crust and you are no longer contagious.  Follow-up care  Follow up with your healthcare provider, or as directed.  When to seek medical advice    Fever of 100.4 F (38 C) or higher, or as directed by your healthcare provider    Affected skin is on the face or neck and any of the following occur:  ? Headache  ? Eye pain  ? Changes in vision  ? Sores near the eye  ? Weakness of facial  muscles    Blisters occurring on new areas of the body    Pain, redness, or swelling of a joint    Signs of skin infection: colored drainage from the sores, warmth, increasing redness, fever, or increasing pain  Date Last Reviewed: 4/1/2018 2000-2018 [email or web address]. [address] [place] [id number]. All rights reserved. This information is not intended as a substitute for professional medical care. Always follow your healthcare professional's instructions.             Follow up with primary care provider with any problems, questions or concerns or if symptoms worsen or fail to improve. Patient agreed to plan and verbalized understanding.    J CARLOS Richard  [facility]

## 2019-08-23 NOTE — PATIENT INSTRUCTIONS
Likely shingles  Valtrex 1g three times daily for 7 days  Look for secondary bacterial infection - fevers, sweats, chills, worsening redness, swelling, thick drainage, upset stomach - ED if this occurs  Patient Education     Shingles  Shingles is a viral infection caused by the same virus that causes chicken pox. Anyone who has had chicken pox may get shingles later in life. The virus stays in the body, but remains asleep (dormant). Shingles often occurs in older persons or persons with lowered immunity. But it can affect anyone at any age.  Shingles starts as a tingling patch of skin on one side of the body. Small, painful blisters may then appear. The rash rarely spreads to other parts of the body.  Exposure to shingles can't cause shingles. However, it can cause chicken pox in anyone who has not had chicken pox or has not been vaccinated. The contagious period ends when all blisters have crusted over, generally 1 to 2 weeks after the illness starts.  After the blisters heal, the affected skin may be sensitive or painful for weeks or months, gradually resolving over time. But, sometimes this can last longer and be permanent (called postherpetic neuralgia.)  Shingles vaccines are available. Vaccination can help prevent shingles or make it less painful. It is generally recommended for adults older than 50, even if you've had singles in the past. Talk with your healthcare provider about when to get vaccinated and which vaccine is best for you.  Home care    Medicines may be prescribed to help relieve pain. Take these medicines as directed. Ask your healthcare provider or pharmacist before using over-the-counter medicines for helping treat pain and itching.    In certain cases, antiviral medicines may be prescribed to reduce pain, shorten the illness, and prevent neuralgia. Take these medicines as directed.    Compresses made from a solution of cool water mixed with cornstarch or baking soda may help relieve pain and  itching.     Gently wash skin daily with soap and water to help prevent infection. Be certain to rinse off all of the soap, which can be irritating.    Trim fingernails and try not to scratch. Scratching the sores may leave scars.    Stay home from work or school until all blisters have formed a crust and you are no longer contagious.  Follow-up care  Follow up with your healthcare provider, or as directed.  When to seek medical advice    Fever of 100.4 F (38 C) or higher, or as directed by your healthcare provider    Affected skin is on the face or neck and any of the following occur:  ? Headache  ? Eye pain  ? Changes in vision  ? Sores near the eye  ? Weakness of facial muscles    Blisters occurring on new areas of the body    Pain, redness, or swelling of a joint    Signs of skin infection: colored drainage from the sores, warmth, increasing redness, fever, or increasing pain  Date Last Reviewed: 4/1/2018 2000-2018 [employer]. [address] [place] [address], [place] [address]. All rights reserved. This information is not intended as a substitute for professional medical care. Always follow your healthcare professional's instructions.

## 2020-02-21 NOTE — PROGRESS NOTES
Subjective     Christina Santana is a 39 year old female who presents to clinic today for the following health issues:    HPI   Headache  Onset: since jan 1    Description:   Location: headband from forehead around   Character: throbbing pain, sharp pain, squeezing pain  Frequency:  Daily   Duration:  Most of the day long     Intensity: moderate    Progression of Symptoms:  worsening    Accompanying Signs & Symptoms:  Stiff neck: YES- tight  Neck or upper back pain: no   Fever: no   Sinus pressure: YES but not always   Nausea or vomiting: no   Dizziness: YES  Numbness: no   Weakness: YES- occasionally in arms  Visual changes: YES- sometimes     History:   Head trauma: YES- but not recently   Family history of migraines: no   Previous tests for headaches: no   Neurologist evaluations: no   Able to do daily activities: no   Wake with a headaches: YES  Do headaches wake you up: YES- 4 times   Daily pain medication use: YES  Work/school stressors/changes: no     Precipitating factors:   Does light make it worse: no   Does sound make it worse: no     Alleviating factors:  Does sleep help: no     Therapies Tried and outcome: otc     Patient states she has had headache that has waxed and wane in severity over past 2-3 mos. She is not able to sleep well.   She is a  at an [employer] company works about 32-40 hours per week. She is on phone (hands free) and at a sit stand desk. 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 Also calls [employer] for [place] [employer].     She occasionally takes aleve or motrin and is using peppermint.   Also having visual changes states she will have occasional blurred vision on right side mostly that will last 2 hours.   She had eye exam in Dec. 2019 this was normal. She works out with weights and runs she denies symptoms with running,   Also has dizziness that waxes and wanes does not have a time of onset and is not related to position  changes.         Patient Active Problem List   Diagnosis     S/P LEEP (status post loop electrosurgical excision procedure)     BREEZY III (cervical intraepithelial neoplasia grade III) with severe dysplasia     Encounter for insertion of mirena IUD     GERD with stricture     Astigmatism     Myopia     Past Surgical History:   Procedure Laterality Date     APPENDECTOMY         Social History     Tobacco Use     Smoking status: Never Smoker     Smokeless tobacco: Never Used   Substance Use Topics     Alcohol use: No     History reviewed. No pertinent family history.      Current Outpatient Medications   Medication Sig Dispense Refill     valACYclovir (VALTREX) 1000 mg tablet Take 1 tablet (1,000 mg) by mouth 3 times daily for 7 days (Patient not taking: Reported on 2/25/2020) 21 tablet 0     No Known Allergies  Recent Labs   Lab Test 01/01/19  2158 12/04/16  1417 10/28/14 02/06/12   A1C  --   --  5.1  --    ALT 20  --   --   --    CR 0.68 0.66  --  0.41   GFRESTIMATED >90 >90  Non African American GFR Calc    --  >60   GFRESTBLACK >90 >90  African American GFR Calc    --  >60   POTASSIUM 3.9 3.7  --  3.4   TSH  --   --   --  0.89      BP Readings from Last 3 Encounters:   02/25/20 116/72   08/23/19 110/68   01/18/19 119/87    Wt Readings from Last 3 Encounters:   02/25/20 77.6 kg (171 lb)   01/01/19 81.6 kg (180 lb)   09/11/17 81.2 kg (179 lb)                    Reviewed and updated as needed this visit by Provider         Review of Systems   ROS COMP: Constitutional, HEENT, cardiovascular, pulmonary, GI, , musculoskeletal, neuro, skin, endocrine and psych systems are negative, except as otherwise noted.      Objective    /72   Pulse 74   Temp 98.9  F (37.2  C) (Temporal)   Resp 16   Wt 77.6 kg (171 lb)   SpO2 100%   BMI 28.46 kg/m    Body mass index is 28.46 kg/m .  Physical Exam   GENERAL: healthy, alert and no distress  EYES: Eyes grossly normal to inspection, PERRL and conjunctivae and sclerae  normal  HENT: ear canals and TM's normal, nose and mouth without ulcers or lesions  NECK: no adenopathy, no asymmetry, masses, or scars and thyroid normal to palpation  RESP: lungs clear to auscultation - no rales, rhonchi or wheezes  CV: regular rate and rhythm, normal S1 S2, no S3 or S4, no murmur, click or rub, no peripheral edema and peripheral pulses strong  ABDOMEN: soft, nontender, no hepatosplenomegaly, no masses and bowel sounds normal  MS: no gross musculoskeletal defects noted, no edema  NEURO: Normal strength and tone, mentation intact and speech normal  NEURO: Normal strength and tone, sensory exam grossly normal, mentation intact, cranial nerves 2-12 intact, gait normal including heel/toe/tandem walking, gait normal and Romberg normal  BACK: no CVA tenderness, no paralumbar tenderness  PSYCH: mentation appears normal, affect normal/bright  LYMPH: no cervical, supraclavicular, axillary, or inguinal adenopathy    Assessment & Plan     1. Tension headache    - MR Brain w/o Contrast; Future  - NEUROLOGY ADULT REFERRAL  - CBC with platelets and differential  - TSH with free T4 reflex  - Comprehensive metabolic panel    2. Vision changes    - MR Brain w/o Contrast; Future  - NEUROLOGY ADULT REFERRAL  - CBC with platelets and differential  - TSH with free T4 reflex  - Comprehensive metabolic panel    3. Dizziness    - MR Brain w/o Contrast; Future  - NEUROLOGY ADULT REFERRAL  - CBC with platelets and differential  - TSH with free T4 reflex  - Comprehensive metabolic panel     Unclear etiology for symptoms of headache, dizziness, and vision changes she had completed normal neuro exam today and does not have headache today.   Recommend using aleve for headache. Recommend neurology for formal evaluation and imaging as ordered.       SHEYLA Barillas [facility]

## 2020-02-25 ENCOUNTER — OFFICE VISIT (OUTPATIENT)
Dept: FAMILY MEDICINE | Facility: OTHER | Age: 40
End: 2020-02-25
Payer: COMMERCIAL

## 2020-02-25 VITALS
OXYGEN SATURATION: 100 % | DIASTOLIC BLOOD PRESSURE: 72 MMHG | BODY MASS INDEX: 28.46 KG/M2 | HEART RATE: 74 BPM | WEIGHT: 171 LBS | RESPIRATION RATE: 16 BRPM | TEMPERATURE: 98.9 F | SYSTOLIC BLOOD PRESSURE: 116 MMHG

## 2020-02-25 DIAGNOSIS — R42 DIZZINESS: ICD-10-CM

## 2020-02-25 DIAGNOSIS — H53.9 VISION CHANGES: ICD-10-CM

## 2020-02-25 DIAGNOSIS — G44.209 TENSION HEADACHE: Primary | ICD-10-CM

## 2020-02-25 PROBLEM — K21.9 GERD WITH STRICTURE: Status: ACTIVE | Noted: 2020-02-25

## 2020-02-25 PROBLEM — Z30.430 ENCOUNTER FOR INSERTION OF MIRENA IUD: Status: ACTIVE | Noted: 2018-09-13

## 2020-02-25 PROBLEM — K22.2 GERD WITH STRICTURE: Status: ACTIVE | Noted: 2020-02-25

## 2020-02-25 LAB
ALBUMIN SERPL-MCNC: 3.7 G/DL (ref 3.4–5)
ALP SERPL-CCNC: 56 U/L (ref 40–150)
ALT SERPL W P-5'-P-CCNC: 22 U/L (ref 0–50)
ANION GAP SERPL CALCULATED.3IONS-SCNC: 3 MMOL/L (ref 3–14)
AST SERPL W P-5'-P-CCNC: 13 U/L (ref 0–45)
BASOPHILS # BLD AUTO: 0 10E9/L (ref 0–0.2)
BASOPHILS NFR BLD AUTO: 0.2 %
BILIRUB SERPL-MCNC: 0.2 MG/DL (ref 0.2–1.3)
BUN SERPL-MCNC: 13 MG/DL (ref 7–30)
CALCIUM SERPL-MCNC: 8.7 MG/DL (ref 8.5–10.1)
CHLORIDE SERPL-SCNC: 106 MMOL/L (ref 94–109)
CO2 SERPL-SCNC: 28 MMOL/L (ref 20–32)
CREAT SERPL-MCNC: 0.64 MG/DL (ref 0.52–1.04)
DIFFERENTIAL METHOD BLD: NORMAL
EOSINOPHIL # BLD AUTO: 0.3 10E9/L (ref 0–0.7)
EOSINOPHIL NFR BLD AUTO: 3.2 %
ERYTHROCYTE [DISTWIDTH] IN BLOOD BY AUTOMATED COUNT: 12.5 % (ref 10–15)
GFR SERPL CREATININE-BSD FRML MDRD: >90 ML/MIN/{1.73_M2}
GLUCOSE SERPL-MCNC: 88 MG/DL (ref 70–99)
HCT VFR BLD AUTO: 41.6 % (ref 35–47)
HGB BLD-MCNC: 13.5 G/DL (ref 11.7–15.7)
LYMPHOCYTES # BLD AUTO: 3.3 10E9/L (ref 0.8–5.3)
LYMPHOCYTES NFR BLD AUTO: 34.9 %
MCH RBC QN AUTO: 30.3 PG (ref 26.5–33)
MCHC RBC AUTO-ENTMCNC: 32.5 G/DL (ref 31.5–36.5)
MCV RBC AUTO: 93 FL (ref 78–100)
MONOCYTES # BLD AUTO: 0.8 10E9/L (ref 0–1.3)
MONOCYTES NFR BLD AUTO: 8.7 %
NEUTROPHILS # BLD AUTO: 5.1 10E9/L (ref 1.6–8.3)
NEUTROPHILS NFR BLD AUTO: 53 %
PLATELET # BLD AUTO: 210 10E9/L (ref 150–450)
POTASSIUM SERPL-SCNC: 4.2 MMOL/L (ref 3.4–5.3)
PROT SERPL-MCNC: 7.3 G/DL (ref 6.8–8.8)
RBC # BLD AUTO: 4.46 10E12/L (ref 3.8–5.2)
SODIUM SERPL-SCNC: 137 MMOL/L (ref 133–144)
TSH SERPL DL<=0.005 MIU/L-ACNC: 0.9 MU/L (ref 0.4–4)
WBC # BLD AUTO: 9.5 10E9/L (ref 4–11)

## 2020-02-25 PROCEDURE — 36415 COLL VENOUS BLD VENIPUNCTURE: CPT | Performed by: NURSE PRACTITIONER

## 2020-02-25 PROCEDURE — 80050 GENERAL HEALTH PANEL: CPT | Performed by: NURSE PRACTITIONER

## 2020-02-25 PROCEDURE — 99214 OFFICE O/P EST MOD 30 MIN: CPT | Performed by: NURSE PRACTITIONER

## 2020-02-26 ENCOUNTER — TELEPHONE (OUTPATIENT)
Dept: FAMILY MEDICINE | Facility: OTHER | Age: 40
End: 2020-02-26

## 2020-02-26 NOTE — TELEPHONE ENCOUNTER
Unless she can get in somewhere sooner we could help her get on wait list she can also call her insurance to see if there is other entities she can be seen through for neurology specialty.   Thank you  Gwendolyn Soto CNP

## 2020-02-26 NOTE — TELEPHONE ENCOUNTER
Please notify Christina MRI not authorized by her insurance company recommend she see neurologist and if they deem this is necessary may order at that time.     Thank you  Gwendolyn Soto CNP

## 2020-02-26 NOTE — TELEPHONE ENCOUNTER
----- Message from Karolina Spencer sent at 2/26/2020  8:49 AM CST -----  Regarding: denied authorization - requesting peer to peer review  Dr. Soto,    The authorization for procedure MR BRAIN W/O CONTRAST on date of service 2/28/2020 has been denied. We were unsuccessful in obtaining approval through clinical review. A [id number] review can be done by calling the insurance/third party authorization vendor with the following information:    Insurance: YANNICK Wood Vendor:  [employer]  Phone:  [phone]  Due:  2/28/2020    Patient ID: [id number]  Case/Ref #: N/A    Denial Reason: Request for Brain (Includes IACs, Pituitary) - MRI does not meet medical necessity criteria based on the information provided.     Please complete this as soon as you are able and let me know when it is done.    Thank you,    Karolina   [facility]

## 2020-02-26 NOTE — TELEPHONE ENCOUNTER
----- Message from SHEYLA Gage CNP sent at 2020 11:12 AM CST -----  Please advise Christina Santana,  1980, that her lab results were Thyroid hormone normal, normal metabolic panel; electrolytes, glucose, liver and kidney function, normal CBC no sign of anemia or infection. Continue with plan for neurology visit.   [phone] (home)   Thank you  Gwendolyn Soto CNP

## 2020-02-26 NOTE — TELEPHONE ENCOUNTER
Contacted patient and gave results to her, she states that her insurance is not covering the MRI as it was deemed  not medically necessary.  Should she wait until her appointment in April and see Neurology? Please advise.   Roma Schultz MA on 2/26/2020 at 2:42 PM

## 2020-02-26 NOTE — TELEPHONE ENCOUNTER
Patient was already notified and a phone encounter was sent to provider, this is duplicate. Will close encounter.  Roma Schultz MA on 2/26/2020 at 2:45 PM

## 2020-02-26 NOTE — RESULT ENCOUNTER NOTE
Please advise Christina Santana,  1980, that her lab results were Thyroid hormone normal, normal metabolic panel; electrolytes, glucose, liver and kidney function, normal CBC no sign of anemia or infection. Continue with plan for MRI and neurology visit.   [phone] (home)   Thank you  Gwendolyn Soto CNP

## 2020-02-27 NOTE — TELEPHONE ENCOUNTER
Spoke to patient, message below was given. She will call if she needs anything further.   Chloe Mijares CMA ([facility])

## 2020-03-02 NOTE — TELEPHONE ENCOUNTER
[employer] insurance calling to inform us that the PA for the MRI has been denied. If the provider would like to speak to a provider review agent inregards to this they can call [phone] .     James Us,

## 2020-03-04 ENCOUNTER — OFFICE VISIT (OUTPATIENT)
Dept: ALLERGY | Facility: OTHER | Age: 40
End: 2020-03-04
Payer: COMMERCIAL

## 2020-03-04 VITALS
BODY MASS INDEX: 27.32 KG/M2 | SYSTOLIC BLOOD PRESSURE: 102 MMHG | DIASTOLIC BLOOD PRESSURE: 72 MMHG | OXYGEN SATURATION: 99 % | HEART RATE: 74 BPM | HEIGHT: 66 IN | TEMPERATURE: 97.9 F | WEIGHT: 170 LBS

## 2020-03-04 DIAGNOSIS — J34.89 SINUS PRESSURE: ICD-10-CM

## 2020-03-04 DIAGNOSIS — J30.1 SEASONAL ALLERGIC RHINITIS DUE TO POLLEN: ICD-10-CM

## 2020-03-04 DIAGNOSIS — G44.209 TENSION-TYPE HEADACHE, NOT INTRACTABLE, UNSPECIFIED CHRONICITY PATTERN: ICD-10-CM

## 2020-03-04 DIAGNOSIS — J30.81 ALLERGIC RHINITIS DUE TO ANIMAL DANDER: ICD-10-CM

## 2020-03-04 DIAGNOSIS — J30.89 ALLERGIC RHINITIS DUE TO MOLD: ICD-10-CM

## 2020-03-04 DIAGNOSIS — J30.89 ALLERGIC RHINITIS DUE TO DUST MITE: Primary | ICD-10-CM

## 2020-03-04 PROCEDURE — 95004 PERQ TESTS W/ALRGNC XTRCS: CPT | Performed by: ALLERGY & IMMUNOLOGY

## 2020-03-04 PROCEDURE — 99204 OFFICE O/P NEW MOD 45 MIN: CPT | Mod: 25 | Performed by: ALLERGY & IMMUNOLOGY

## 2020-03-04 RX ORDER — TRIAMCINOLONE ACETONIDE 55 UG/1
2 SPRAY, METERED NASAL DAILY
Qty: 1 BOTTLE | Refills: 3 | Status: SHIPPED | OUTPATIENT
Start: 2020-03-04 | End: 2020-12-28

## 2020-03-04 ASSESSMENT — PAIN SCALES - GENERAL: PAINLEVEL: MODERATE PAIN (5)

## 2020-03-04 ASSESSMENT — MIFFLIN-ST. JEOR: SCORE: 1467.86

## 2020-03-04 NOTE — LETTER
3/4/2020         RE: Christina Santana  [address] [facility] [address]        Dear Colleague,    Thank you for referring your patient, Christina Santana, to the [facility]. Please see a copy of my visit note below.    Christina Santana is a 39 year old White female with previous medical history significant for headaches. Christina Santana is being seen today for evaluation of headaches.     Patient reports that over the course of the last 2 months she has developed headaches.  Headaches originate above her eyebrows and wrapped in a bandlike fashion around her skull.  This occurs most days.  Headaches tend to originate in between 8:30 AM and 10 AM.  They tend to resolve by 5:30 PM.  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  Potentially mold in the building.  She will develop tingling down her right arm.  She reports right arm weakness.  She has had intermittent dizzy spells which seem to be non-positional.  Intermittently she has vision changes with headaches.  She reports that she has been adequately hydrated.  An MRI was ordered but not obtained secondary to  insurance denial.  She has been referred to neurology but cannot get in until mid April.  She denies congestion, sneezing, postnasal drainage, ocular itching or watering.  She denies hives, lip angioedema, tongue angioedema.  She does take supplements.  She is on no daily medications.  None of the supplements are new.      ENVIRONMENTAL HISTORY: [place] is heated with a forced air and gas furnace. They do have central air conditioning. The patient's bedroom is furnished with carpeting in bedroom.  Pets inside the house include 1 cat(s). There is no history of cockroach or mice infestation. There is/are 0 smokers in the house.  The house does not have a damp basement.       Past Medical History:   Diagnosis Date     ASCUS with  positive high risk HPV 12/27/13     HSIL (high grade squamous intraepithelial lesion) on Pap smear of cervix 4/22/13     Pap smear of cervix with ASCUS, cannot exclude HGSIL 5/23/11, 8/10/12,      History reviewed. No pertinent family history.  Past Surgical History:   Procedure Laterality Date     APPENDECTOMY         REVIEW OF SYSTEMS:  General: negative for weight gain. negative for weight loss. positive negative for changes in sleep.   Ears: negative for fullness. negative for hearing loss. positive  for dizziness.   Nose: negative for snoring.negative for changes in smell. negative for drainage.   Eyes: negative for eye watering. negative for eye itching. negative for vision changes. negative for eye redness.  Throat: negative for hoarseness. negative for sore throat. negative for trouble swallowing.   Lungs: negative for shortness of breath.negative for wheezing. negative for sputum production.   Cardiovascular: negative for chest pain. negative for swelling of ankles. negative for fast or irregular heartbeat.   Gastrointestinal: negative for nausea. negative for heartburn. negative for acid reflux.   Musculoskeletal: negative for joint pain. negative for joint stiffness. negative for joint swelling.   Neurologic: negative for seizures. negative for fainting. negative for weakness.   Psychiatric: positive  for changes in mood. negative for anxiety.   Endocrine: positive  for cold intolerance. negative for heat intolerance. negative for tremors.   Lymphatic: negative for lower extremity swelling. negative for lymph node swelling.   Hematologic: negative for easy bruising. negative for easy bleeding.  Integumentary: negative for rash. negative for scaling. negative for nail changes.       Current Outpatient Medications:      triamcinolone (NASACORT) 55 MCG/ACT nasal aerosol, Spray 2 sprays into both nostrils daily, Disp: 1 Bottle, Rfl: 3     valACYclovir (VALTREX) 1000 mg tablet, Take 1 tablet (1,000 mg) by  mouth 3 times daily for 7 days (Patient not taking: Reported on 2/25/2020), Disp: 21 tablet, Rfl: 0  Immunization History   Administered Date(s) Administered     Tdap (Adacel,Boostrix) 04/11/2012     No Known Allergies      EXAM:   Constitutional:  Appears well-developed and well-nourished. No distress.   HEENT:   Head: Normocephalic.   No cobblestoning of posterior oropharynx.   Nasal tissue pink and normal appearing.  No rhinorrhea noted.    Eyes: Conjunctivae are non-erythematous   No maxillary or frontal sinus tenderness to palpation.   Cardiovascular: Normal rate, regular rhythm and normal heart sounds. Exam reveals no gallop and no friction rub.   No murmur heard.  Respiratory: Effort normal and breath sounds normal. No respiratory distress. No wheezes. No rales.   Musculoskeletal: Normal range of motion.   Neuro: Oriented to person, place, and time.  Muscle strength intact involving upper extremities and lower extremities.  Sensation to touch intact involving upper and lower extremities.  Cranial nerves intact.  Skin: Skin is warm and dry. No rash noted.   Psychiatric: Normal mood and affect.     Nursing note and vitals reviewed.      WORKUP:   ENVIRONMENTAL PERCUTANEOUS SKIN TESTING: ADULT  [place] [facility] 3/4/2020   Consent Y   Ordering Physician James   Interpreting Physician James   Testing Technician Tara SHAH   Location Back   Time start:  3:09 PM   Time End:  3:24 PM   Positive Control: Histatrol*ALK 1 mg/ml 4/25   Negative Control: 50% Glycerin 0   Cat Hair*ALK (10,000 BAU/ml) 6/30   AP Dog Hair/Dander (1:100 w/v) 5/25   Dust Mite p. 30,000 AU/ml 7/55   Dust Mite f. (30,000 AU/ml) 4/30   Herb (W/F in millimeters) 4/35   Fan Grass (100,000 BAU/mL) 7/40   Red [place] (W/F in millimeters) 0   Maple/Pharr (W/F in millimeters) 0   Hackberry (W/F in millimeters) 0   [place] (W/F in millimeters) 0   [place] *ALK (W/F in millimeters) 0   American Elm (W/F in millimeters) 0   [place] (W/F  "in millimeters) 0   Black [place] (W/F in millimeters) 0   Birch Mix (W/F in millimeters) 8/40   Custer (W/F in millimeters) 0   Oak (W/F in millimeters) 0   Cocklebur (W/F in millimeters) 0/15   Columbus (W/F in millimeters) 0   White Joseph (W/F in millimeters) 0   Careless (W/F in millimeters) 0   Nettle (W/F in millimeters) 0   English Plantain (W/F in millimeters) 0   Kochia (W/F in millimeters) 0   Lamb's Quarter (W/F in millimeters) 0   Marshelder (W/F in millimeters) 0   Ragweed Mix* ALK (W/F in millimeters) 5/38   Russian Thistle (W/F in millimeters) 0   Sagebrush/Mugwort (W/F in millimeters) 8/30   Sheep Sorrel (W/F in millimeters) 0   Feather Mix* ALK (W/F in millimeters) 0   Penicillium Mix (1:10 w/v) 0   Curvularia spicifera (1:10 w/v) 3/3   Epicoccum (1:10 w/v) 0   Aspergillus fumigatus (1:10 w/v): 0   Alternaria tenius (1:10 w/v) 4/20   H. Cladosporium (1:10 w/v) 0   Phoma herbarum (1:10 w/v) 0   Other (W/F in millimeters) dry \"calcium\" x2 (0, 0)   Other (W/F in millimeters) wet \"calcium\" x2 (0, 0)        ASSESSMENT/PLAN:  Problem List Items Addressed This Visit        Nervous and Auditory    Tension type headache       Respiratory    Sinus pressure     Some possible frontal pressure with headache extending like band around head.  No other nasal or ocular symptoms.  No tenderness to palpation of frontal or maxillary sinuses.  Headaches likely secondary to other etiology but patient wanted to rule out allergies.  Discussed with the patient that unlikely secondary to her symptoms.  Frontal pressure could be related with sinus pressure and therefore allergy testing agreed upon.  Additionally headaches were more significant while she was working.    Skin testing:  Positive for cat, dog, dust mite, grass, trees, weeds and molds.    -Allergen avoidance measures were discussed and literature provided.  -Trial of Nasacort 2 sprays per nostril daily.  -Discussed with patient that unclear if allergic inflammation " is contributing to her symptoms or just asymptomatic sensitization.  She will trial nasal corticosteroid for 1 month as noted.  - Agree with neurology evaluation.          Relevant Medications    triamcinolone (NASACORT) 55 MCG/ACT nasal aerosol    Other Relevant Orders    ALLERGY SKIN TESTS,ALLERGENS [61310] (Completed)    Allergic rhinitis due to dust mite - Primary    Relevant Medications    triamcinolone (NASACORT) 55 MCG/ACT nasal aerosol    Other Relevant Orders    ALLERGY SKIN TESTS,ALLERGENS [77748] (Completed)    Seasonal allergic rhinitis due to pollen    Relevant Medications    triamcinolone (NASACORT) 55 MCG/ACT nasal aerosol    Other Relevant Orders    ALLERGY SKIN TESTS,ALLERGENS [86148] (Completed)    Allergic rhinitis due to animal dander    Relevant Medications    triamcinolone (NASACORT) 55 MCG/ACT nasal aerosol    Other Relevant Orders    ALLERGY SKIN TESTS,ALLERGENS [46922] (Completed)    Allergic rhinitis due to mold    Relevant Medications    triamcinolone (NASACORT) 55 MCG/ACT nasal aerosol    Other Relevant Orders    ALLERGY SKIN TESTS,ALLERGENS [71210] (Completed)          Chart documentation with Dragon Voice recognition Software. Although reviewed after completion, some words and grammatical errors may remain.    Connor Ramirez DO FAAAAI  Allergy/Immunology  [place], [place]      Again, thank you for allowing me to participate in the care of your patient.        Sincerely,        Connor Ramirez DO

## 2020-03-04 NOTE — PATIENT INSTRUCTIONS
Allergy Staff Appt Hours Shot Hours Locations    Physician     Connor Ramirez DO       Support Staff     MARIAJOSE Coulter, REGIS  Tuesday:        [place] 7-4:20     Wednesday:        [place]: 7-5 Thursday:                    [place] 7-6:40     Friday:  [place]  7-2:40   [place]        Thursday: 1-5:50        Friday: 7-10:50     [place]        Tuesday: 7- 3:20        Wednesday: 7-4:20     [place] Monday: 7-4:20        Tuesday: 1-6:20         [facility]  [address] [place] [address]  Appt Line: [phone]  Allergy RN:  [phone]    [facility]  [address] [place] [address]  Appt Line: [phone]  Allergy RN:  [phone]       Important Scheduling Information  Aspirin Desensitization: Appt will last 2 clinic days. Please call the Allergy RN line for your clinic to schedule. Discontinue antihistamines 7 days prior to the appointment.     Food Challenges: Appt will last 3-4 hours. Please call the Allergy RN line for your clinic to schedule. Discontinue antihistamines 7 days prior to the appointment.     Penicillin Testing: Appt will last 2-3 hours. Please call the Allergy RN line for your clinic to schedule. Discontinue antihistamines 7 days prior to the appointment.     Skin Testing: Appt will about 40 minutes. Call the appointment line for your clinic to schedule. Discontinue antihistamines 7 days prior to the appointment.     Venom Testing: Appt will last 2-3 hours. Please call the Allergy RN line for your clinic to schedule. Discontinue antihistamines 7 days prior to the appointment.     Thank you for trusting us with your Allergy, Asthma, and Immunology care. Please feel free to contact us with any questions or concerns you may have.      - Nasacort 2 sprays/nostril daily.   AEROALLERGEN AVOIDANCE INSTRUCTIONS  MOLD  Indoors, mold season is year round. Outdoors, most mold prefer seasons with high humidity. Mold prefers damp, dark, warm places. Here  are some tips on how to avoid mold exposure.  1.  Keep humidity inside between 35-50% with air conditioning or dehumidifier. The humidity level can be checked with a meter from a hardware store.   2.  Clean surfaces where mold grows and dry wet areas.  3.  Avoid steam cleaning carpets and discard moldy belongings.  4.  Wear a mask when doing yard work and refrain from walking through uncut fields or playing in leaves.  5.  Minimize use of potted plants and do not keep them indoors.  6.  Consider an allergy cover for the pillow and mattress.  POLLEN  Pollens are the tiny airborne particles given off by trees, weeds, and grasses. They can be the cause of seasonal allergic rhinitis or hay fever symptoms, which include stuffy, itchy, runny nose, redness, swelling and itching of the eyes, and itching of the ears and throat. Here are some tips on how to avoid pollen exposure.  1. .Keep windows closed and use the air conditioner when possible.  2.  Avoid outside exposure in the early morning as pollen counts are highest at that time.  3.  Take a shower and wash hair each night.  4.  Consider wearing a mask when working in the yard and/or garden.  5.  Clean furnace filter monthly with HEPA filters. Consider a HEPA filter vacuum  which will prevent pollen from being reintroduced into the air.   DUST MITES  Dust mites can never be entirely eliminated in the house no matter how clean your house is. Dust mites are attracted to warm, moist areas and feed on dead skin flakes. Here are tips to minimize dust mites in your home.  1.  Encase pillows and mattress/box springs in zippered allergy covers.  2.  Wash bedding in hot water (at least 130 F) every 7-14 days.  3.  Avoid curtains, carpet, and upholstered furniture if possible.  4.  Use HEPA air filters and a HEPA filter vacuum . Change filters monthly. Vacuum weekly.  5.  Keep bedroom simple, avoiding clutter, so it can quickly be dusted.  6.  Cover heating vents  with vent filters.  7.  Keep stuffed toys in a closed container and wash or freeze regularly.  8.  Keep clothing in the closet with the door closed.  PETS  Pets present many problems for people with allergies. Dander from pets is very difficult to remove and also is a food source for dust mites.  1.  If possible, find the pet a new home.  2.  If not possible, keep the pet outdoors. Never allow the pet into the bedroom.  3.  Wash pet weekly in warm water.  4.  Encase mattresses, pillows, and box springs in allergen-proof covers.  5.  Use HEPA air filters and a HEPA filter vacuum . Change filters monthly.

## 2020-03-04 NOTE — ASSESSMENT & PLAN NOTE
Some possible frontal pressure with headache extending like band around head.  No other nasal or ocular symptoms.  No tenderness to palpation of frontal or maxillary sinuses.  Headaches likely secondary to other etiology but patient wanted to rule out allergies.  Discussed with the patient that unlikely secondary to her symptoms.  Frontal pressure could be related with sinus pressure and therefore allergy testing agreed upon.  Additionally headaches were more significant while she was working.    Skin testing:  Positive for cat, dog, dust mite, grass, trees, weeds and molds.    -Allergen avoidance measures were discussed and literature provided.  -Trial of Nasacort 2 sprays per nostril daily.  -Discussed with patient that unclear if allergic inflammation is contributing to her symptoms or just asymptomatic sensitization.  She will trial nasal corticosteroid for 1 month as noted.  - Agree with neurology evaluation.

## 2020-03-04 NOTE — PROGRESS NOTES
Christina Santana is a 39 year old White female with previous medical history significant for headaches. Christina Santana is being seen today for evaluation of headaches.     Patient reports that over the course of the last 2 months she has developed headaches.  Headaches originate above her eyebrows and wrapped in a bandlike fashion around her skull.  This occurs most days.  Headaches tend to originate in between 8:30 AM and 10 AM.  They tend to resolve by 5:30 PM.  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  Potentially mold in the building.  She will develop tingling down her right arm.  She reports right arm weakness.  She has had intermittent dizzy spells which seem to be non-positional.  Intermittently she has vision changes with headaches.  She reports that she has been adequately hydrated.  An MRI was ordered but not obtained secondary to  insurance denial.  She has been referred to neurology but cannot get in until mid April.  She denies congestion, sneezing, postnasal drainage, ocular itching or watering.  She denies hives, lip angioedema, tongue angioedema.  She does take supplements.  She is on no daily medications.  None of the supplements are new.      ENVIRONMENTAL HISTORY: [place] The home is heated with a forced air and gas furnace. They do have central air conditioning. The patient's bedroom is furnished with carpeting in bedroom.  Pets inside the house include 1 cat(s). There is no history of cockroach or mice infestation. There is/are 0 smokers in the house.  The house does not have a damp basement.       Past Medical History:   Diagnosis Date     ASCUS with positive high risk HPV 12/27/13     HSIL (high grade squamous intraepithelial lesion) on Pap smear of cervix 4/22/13     Pap smear of cervix with ASCUS, cannot exclude HGSIL 5/23/11, 8/10/12,      History reviewed. No pertinent family  history.  Past Surgical History:   Procedure Laterality Date     APPENDECTOMY         REVIEW OF SYSTEMS:  General: negative for weight gain. negative for weight loss. positive negative for changes in sleep.   Ears: negative for fullness. negative for hearing loss. positive  for dizziness.   Nose: negative for snoring.negative for changes in smell. negative for drainage.   Eyes: negative for eye watering. negative for eye itching. negative for vision changes. negative for eye redness.  Throat: negative for hoarseness. negative for sore throat. negative for trouble swallowing.   Lungs: negative for shortness of breath.negative for wheezing. negative for sputum production.   Cardiovascular: negative for chest pain. negative for swelling of ankles. negative for fast or irregular heartbeat.   Gastrointestinal: negative for nausea. negative for heartburn. negative for acid reflux.   Musculoskeletal: negative for joint pain. negative for joint stiffness. negative for joint swelling.   Neurologic: negative for seizures. negative for fainting. negative for weakness.   Psychiatric: positive  for changes in mood. negative for anxiety.   Endocrine: positive  for cold intolerance. negative for heat intolerance. negative for tremors.   Lymphatic: negative for lower extremity swelling. negative for lymph node swelling.   Hematologic: negative for easy bruising. negative for easy bleeding.  Integumentary: negative for rash. negative for scaling. negative for nail changes.       Current Outpatient Medications:      triamcinolone (NASACORT) 55 MCG/ACT nasal aerosol, Spray 2 sprays into both nostrils daily, Disp: 1 Bottle, Rfl: 3     valACYclovir (VALTREX) 1000 mg tablet, Take 1 tablet (1,000 mg) by mouth 3 times daily for 7 days (Patient not taking: Reported on 2/25/2020), Disp: 21 tablet, Rfl: 0  Immunization History   Administered Date(s) Administered     Tdap (Adacel,Boostrix) 04/11/2012     No Known Allergies      EXAM:    Constitutional:  Appears well-developed and well-nourished. No distress.   HEENT:   Head: Normocephalic.   No cobblestoning of posterior oropharynx.   Nasal tissue pink and normal appearing.  No rhinorrhea noted.    Eyes: Conjunctivae are non-erythematous   No maxillary or frontal sinus tenderness to palpation.   Cardiovascular: Normal rate, regular rhythm and normal heart sounds. Exam reveals no gallop and no friction rub.   No murmur heard.  Respiratory: Effort normal and breath sounds normal. No respiratory distress. No wheezes. No rales.   Musculoskeletal: Normal range of motion.   Neuro: Oriented to person, place, and time.  Muscle strength intact involving upper extremities and lower extremities.  Sensation to touch intact involving upper and lower extremities.  Cranial nerves intact.  Skin: Skin is warm and dry. No rash noted.   Psychiatric: Normal mood and affect.     Nursing note and vitals reviewed.      WORKUP:   ENVIRONMENTAL PERCUTANEOUS SKIN TESTING: ADULT  [place] [facility] 3/4/2020   Consent Y   Ordering Physician James   Interpreting Physician James   Testing Technician Tara SHAH   Location Back   Time start:  3:09 PM   Time End:  3:24 PM   Positive Control: Histatrol*ALK 1 mg/ml 4/25   Negative Control: 50% Glycerin 0   Cat Hair*ALK (10,000 BAU/ml) 6/30   AP Dog Hair/Dander (1:100 w/v) 5/25   Dust Mite p. 30,000 AU/ml 7/55   Dust Mite f. (30,000 AU/ml) 4/30   Herb (W/F in millimeters) 4/35   Fan Grass (100,000 BAU/mL) 7/40   Red Beryl (W/F in millimeters) 0   Maple/Armada (W/F in millimeters) 0   Hackberry (W/F in millimeters) 0   [place] (W/F in millimeters) 0   Lima *ALK (W/F in millimeters) 0   American Elm (W/F in millimeters) 0   Montrose (W/F in millimeters) 0   Black [place]olin[place] (W/F in millimeters) 0   Birch Mix (W/F in millimeters) 8/40   Baker (W/F in millimeters) 0   Oak (W/F in millimeters) 0   Cocklebur (W/F in millimeters) 0/15   [place] (W/F in millimeters) 0  "  White Joseph (W/F in millimeters) 0   Careless (W/F in millimeters) 0   Nettle (W/F in millimeters) 0   English Plantain (W/F in millimeters) 0   Kochia (W/F in millimeters) 0   Lamb's Quarter (W/F in millimeters) 0   Marshelder (W/F in millimeters) 0   Ragweed Mix* ALK (W/F in millimeters) 5/38   Russian Thistle (W/F in millimeters) 0   Sagebrush/Mugwort (W/F in millimeters) 8/30   Sheep Sorrel (W/F in millimeters) 0   Feather Mix* ALK (W/F in millimeters) 0   Penicillium Mix (1:10 w/v) 0   Curvularia spicifera (1:10 w/v) 3/3   Epicoccum (1:10 w/v) 0   Aspergillus fumigatus (1:10 w/v): 0   Alternaria tenius (1:10 w/v) 4/20   H. Cladosporium (1:10 w/v) 0   Phoma herbarum (1:10 w/v) 0   Other (W/F in millimeters) dry \"calcium\" x2 (0, 0)   Other (W/F in millimeters) wet \"calcium\" x2 (0, 0)        ASSESSMENT/PLAN:  Problem List Items Addressed This Visit        Nervous and Auditory    Tension type headache       Respiratory    Sinus pressure     Some possible frontal pressure with headache extending like band around head.  No other nasal or ocular symptoms.  No tenderness to palpation of frontal or maxillary sinuses.  Headaches likely secondary to other etiology but patient wanted to rule out allergies.  Discussed with the patient that unlikely secondary to her symptoms.  Frontal pressure could be related with sinus pressure and therefore allergy testing agreed upon.  Additionally headaches were more significant while she was working.    Skin testing:  Positive for cat, dog, dust mite, grass, trees, weeds and molds.    -Allergen avoidance measures were discussed and literature provided.  -Trial of Nasacort 2 sprays per nostril daily.  -Discussed with patient that unclear if allergic inflammation is contributing to her symptoms or just asymptomatic sensitization.  She will trial nasal corticosteroid for 1 month as noted.  - Agree with neurology evaluation.          Relevant Medications    triamcinolone (NASACORT) 55 " MCG/ACT nasal aerosol    Other Relevant Orders    ALLERGY SKIN TESTS,ALLERGENS [45813] (Completed)    Allergic rhinitis due to dust mite - Primary    Relevant Medications    triamcinolone (NASACORT) 55 MCG/ACT nasal aerosol    Other Relevant Orders    ALLERGY SKIN TESTS,ALLERGENS [14999] (Completed)    Seasonal allergic rhinitis due to pollen    Relevant Medications    triamcinolone (NASACORT) 55 MCG/ACT nasal aerosol    Other Relevant Orders    ALLERGY SKIN TESTS,ALLERGENS [27738] (Completed)    Allergic rhinitis due to animal dander    Relevant Medications    triamcinolone (NASACORT) 55 MCG/ACT nasal aerosol    Other Relevant Orders    ALLERGY SKIN TESTS,ALLERGENS [[id number]] (Completed)    Allergic rhinitis due to mold    Relevant Medications    triamcinolone (NASACORT) 55 MCG/ACT nasal aerosol    Other Relevant Orders    ALLERGY SKIN TESTS,ALLERGENS [15798] (Completed)          Chart documentation with Dragon Voice recognition Software. Although reviewed after completion, some words and grammatical errors may remain.    Connor Ramirez DO FAAAAI  Allergy/Immunology  [place], [place]

## 2020-03-15 ENCOUNTER — HEALTH MAINTENANCE LETTER (OUTPATIENT)
Age: 40
End: 2020-03-15

## 2020-04-01 ENCOUNTER — TELEPHONE (OUTPATIENT)
Dept: NEUROLOGY | Facility: CLINIC | Age: 40
End: 2020-04-01

## 2020-04-01 NOTE — TELEPHONE ENCOUNTER
I called patient to reschedule her New patient appt to Video appt Patient stated that she would like to reschedule her appt. Instead of doing a video appt- Rescheduled to Suma Wells LPN

## 2020-05-27 NOTE — TELEPHONE ENCOUNTER
I called pt and offered a video NP consult but pt declined again stating she wanted an in person appt.    Breana Wells LPN

## 2020-06-08 ENCOUNTER — THERAPY VISIT (OUTPATIENT)
Dept: CHIROPRACTIC MEDICINE | Facility: CLINIC | Age: 40
End: 2020-06-08
Payer: COMMERCIAL

## 2020-06-08 DIAGNOSIS — M62.838 MUSCLE SPASM: Primary | ICD-10-CM

## 2020-06-08 DIAGNOSIS — M54.2 CERVICALGIA: ICD-10-CM

## 2020-06-08 PROCEDURE — 99203 OFFICE O/P NEW LOW 30 MIN: CPT | Mod: 25 | Performed by: CHIROPRACTOR

## 2020-06-08 PROCEDURE — 97810 ACUP 1/> WO ESTIM 1ST 15 MIN: CPT | Performed by: CHIROPRACTOR
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
## 2020-06-15 ENCOUNTER — THERAPY VISIT (OUTPATIENT)
Dept: CHIROPRACTIC MEDICINE | Facility: CLINIC | Age: 40
End: 2020-06-15
Payer: COMMERCIAL

## 2020-06-15 DIAGNOSIS — M62.838 MUSCLE SPASM: ICD-10-CM

## 2020-06-15 DIAGNOSIS — M54.2 CERVICALGIA: Primary | ICD-10-CM

## 2020-06-15 PROCEDURE — 97810 ACUP 1/> WO ESTIM 1ST 15 MIN: CPT | Performed by: CHIROPRACTOR

## 2020-06-15 NOTE — PROGRESS NOTES
Visit #:  2 of 8 based on treatment plan 6/8/2020    Subjective:  Christina Santana is a 40 year old female who is seen in f/u up for:        Cervicalgia  Muscle spasm.     Since last visit on 6/8/2020,  Christina Santana reports the following changes: Patient presents and states that she has been doing good since her acupuncture. She hasn't been as tight and when she runs she isnt't as tight. Overall, she felt more calm. She notes that her pain is bilateral.        Objective:  The following was observed:    P: palpatory tenderness bilateral upper traps    A: static palpation demonstrates intersegmental asymmetry     R: motion palpation notes restricted motion    T: muscle spasm at level(s): Traps Bilaterally      Assessment:    Segmental spinal dysfunction/restrictions found at:  No adjustment given today - patient sees another chiropractor for adjusting, she is here for acupuncture only.     Diagnoses:      1. Cervicalgia    2. Muscle spasm        Patient's condition:  Patient had restrictions pre-manipulation and Patient had decreased motion prior to manipulation    Treatment effectiveness:  Post manipulation there is better intersegmental movement and Patient claims to feel looser post manipulation      Procedures:  CMT:  NO ADJUSTMENT TODAY.      Modalities:  01067: Acupuncture, for 15 minutes:  Points: upper back and neck protocol - GV20, GV16, LI4, SI15, GB36, GB20, GB39, anton points in upper traps    Therapeutic procedures:  None      Prognosis: Good    Progress towards Goals: Patient is making progress towards the goal of:  Decrease pain in upper back and neck.  Decrease hypertonicity of upper traps.   Decrease Neck Disability from 16% to 8% functional impairment.      Response to Treatment:   Patient tolerated treatment well today.       Recommendations:    Instructions:ice 20 minutes every other hour as needed    Follow-up:  Continue with chiropractor. Return in 1 week for acupuncture.

## 2020-07-01 ENCOUNTER — THERAPY VISIT (OUTPATIENT)
Dept: CHIROPRACTIC MEDICINE | Facility: CLINIC | Age: 40
End: 2020-07-01
Payer: COMMERCIAL

## 2020-07-01 ENCOUNTER — TELEPHONE (OUTPATIENT)
Dept: NEUROLOGY | Facility: CLINIC | Age: 40
End: 2020-07-01

## 2020-07-01 DIAGNOSIS — M54.2 CERVICALGIA: Primary | ICD-10-CM

## 2020-07-01 DIAGNOSIS — M62.838 MUSCLE SPASM: ICD-10-CM

## 2020-07-01 PROCEDURE — 97810 ACUP 1/> WO ESTIM 1ST 15 MIN: CPT | Performed by: CHIROPRACTOR

## 2020-07-01 NOTE — PROGRESS NOTES
Visit #:  3 of 8 based on treatment plan 6/8/2020    Subjective:  Christina Santana is a 40 year old female who is seen in f/u up for:        Cervicalgia  Muscle spasm.     Since last visit on 6/15/2020,  Christina Santana reports the following changes: Patient presents and states that she has been improving since her last treatment. She has a lot of overnight shifts coming up and she doesn't usually do those, so she is anxioux about that. She just got back from a road trip, so she was sleeping in different beds and feels the affect of that.        Objective:  The following was observed:    P: palpatory tenderness bilateral upper traps    A: static palpation demonstrates intersegmental asymmetry     R: motion palpation notes restricted motion    T: muscle spasm at level(s): Traps Bilaterally      Assessment:    Segmental spinal dysfunction/restrictions found at:  No adjustment given today - patient sees another chiropractor for adjusting, she is here for acupuncture only.     Diagnoses:      1. Cervicalgia    2. Muscle spasm        Patient's condition:  Patient had restrictions pre-manipulation and Patient had decreased motion prior to manipulation    Treatment effectiveness:  Post manipulation there is better intersegmental movement and Patient claims to feel looser post manipulation      Procedures:  CMT:  NO ADJUSTMENT TODAY.      Modalities:  33164: Acupuncture, for 15 minutes:  Points: upper back and neck protocol - GV20, GV16, LI4, SI15, GB36, GB20, GB39, anton points in upper traps    Therapeutic procedures:  None      Prognosis: Good    Progress towards Goals: Patient is making progress towards the goal of:  Decrease pain in upper back and neck.  Decrease hypertonicity of upper traps.   Decrease Neck Disability from 16% to 8% functional impairment.      Response to Treatment:   Patient tolerated treatment well today.       Recommendations:    Instructions:ice 20 minutes every other hour as needed    Follow-up:   Continue with chiropractor. Return in 1 week for acupuncture.

## 2020-07-01 NOTE — TELEPHONE ENCOUNTER
Ana Mariar attempted to reach the patient and reschedule the appointment with Dr. Da Silva on 7/14/2020, due to retiring 7/30/2020, to be scheduled with Dr. Burnett.    [facility] TO SCHEDULE WITH DR. ROSAMARIA Cam.    [facility]  Adult Med Spec/Surg Spec   [phone]

## 2020-07-15 ENCOUNTER — THERAPY VISIT (OUTPATIENT)
Dept: CHIROPRACTIC MEDICINE | Facility: CLINIC | Age: 40
End: 2020-07-15
Payer: COMMERCIAL

## 2020-07-15 DIAGNOSIS — M99.02 SEGMENTAL DYSFUNCTION OF THORACIC REGION: ICD-10-CM

## 2020-07-15 DIAGNOSIS — M99.01 SEGMENTAL DYSFUNCTION OF CERVICAL REGION: Primary | ICD-10-CM

## 2020-07-15 DIAGNOSIS — M54.2 CERVICALGIA: ICD-10-CM

## 2020-07-15 DIAGNOSIS — M62.838 MUSCLE SPASM: ICD-10-CM

## 2020-07-15 PROCEDURE — 97810 ACUP 1/> WO ESTIM 1ST 15 MIN: CPT | Performed by: CHIROPRACTOR

## 2020-07-15 PROCEDURE — 98940 CHIROPRACT MANJ 1-2 REGIONS: CPT | Mod: AT | Performed by: CHIROPRACTOR

## 2020-07-15 NOTE — PROGRESS NOTES
Visit #:  4 of 8 based on treatment plan 6/8/2020    Subjective:  Christina Santana is a 40 year old female who is seen in f/u up for:        Cervicalgia  Muscle spasm.     Since last visit on 7/1/2020,  Christina Santana reports the following changes: Patient presents and states that she has been doing overall okay but the last 2 days it has been really tense and tight. She feels like her relief is lasting longer with care. She is requesting to be adjusted today. She feels pain in her left upper back and cervical muscles. She rates her current pain 5/10.        Objective:  The following was observed:    P: palpatory tenderness bilateral upper traps    A: static palpation demonstrates intersegmental asymmetry     R: motion palpation notes restricted motion    T: muscle spasm at level(s): Traps Bilaterally      Assessment:    Segmental spinal dysfunction/restrictions found at:  Patient is requesting an adjustment today.   C5 LR, RRR  T1 RR, LRR  T3 LR, RRR  T7 E, Fr    Diagnoses:      1. Cervicalgia    2. Muscle spasm        Patient's condition:  Patient had restrictions pre-manipulation and Patient had decreased motion prior to manipulation    Treatment effectiveness:  Post manipulation there is better intersegmental movement and Patient claims to feel looser post manipulation      Procedures:  CMT:  Cervical: Diversified, C5, Supine  Thoracic: Diversified, T1, T3, T7, Prone      Modalities:  37128: Acupuncture, for 15 minutes:  Points: upper back and neck protocol - GV20, GV16, LI4, LI11, SI15, GB36, GB20, GB39, anton points in upper traps    Therapeutic procedures:  None      Prognosis: Good    Progress towards Goals: Patient is making progress towards the goal of:  Decrease pain in upper back and neck.  Decrease hypertonicity of upper traps.   Decrease Neck Disability from 16% to 8% functional impairment.      Response to Treatment:   Patient tolerated treatment well today.       Recommendations:    Instructions:ice  20 minutes every other hour as needed    Follow-up:  Continue with treatment plan. We did adjust patient today.

## 2020-08-04 ENCOUNTER — OFFICE VISIT (OUTPATIENT)
Dept: SURGERY | Facility: CLINIC | Age: 40
End: 2020-08-04
Payer: COMMERCIAL

## 2020-08-04 VITALS
BODY MASS INDEX: 26.92 KG/M2 | WEIGHT: 167.5 LBS | HEIGHT: 66 IN | SYSTOLIC BLOOD PRESSURE: 104 MMHG | DIASTOLIC BLOOD PRESSURE: 60 MMHG

## 2020-08-04 DIAGNOSIS — R10.11 RUQ PAIN: Primary | ICD-10-CM

## 2020-08-04 DIAGNOSIS — K80.20 CALCULUS OF GALLBLADDER WITHOUT CHOLECYSTITIS WITHOUT OBSTRUCTION: ICD-10-CM

## 2020-08-04 DIAGNOSIS — R11.0 NAUSEA: ICD-10-CM

## 2020-08-04 PROCEDURE — 99204 OFFICE O/P NEW MOD 45 MIN: CPT | Performed by: SURGERY

## 2020-08-04 ASSESSMENT — MIFFLIN-ST. JEOR: SCORE: 1451.53

## 2020-08-04 NOTE — LETTER
8/4/2020         RE: Christina Santana  [address] [facility] [address]        Dear Colleague,    Thank you for referring your patient, Christina Santana, to the [facility]. Please see a copy of my visit note below.    General Surgery Consultation    Christina Santana MRN# [id number]   Age: 40 year old YOB: 1980     Reason for consult: Abdomina pain with nausea                        Assessment and Plan:   I was asked to see this patient at the request self for evaluation of abdominal pain and nausea.  Christina Santana is a 40 year old female who presented with history, exam, laboratory and imaging most consistent with:        ICD-10-CM    1. RUQ pain  R10.11 US Abdomen Complete     Case Request: CHOLECYSTECTOMY, LAPAROSCOPIC     Case Request: CHOLECYSTECTOMY, LAPAROSCOPIC   2. Nausea  R11.0 US Abdomen Complete     Case Request: CHOLECYSTECTOMY, LAPAROSCOPIC     Case Request: CHOLECYSTECTOMY, LAPAROSCOPIC   3. Calculus of gallbladder without cholecystitis without obstruction  K80.20 Case Request: CHOLECYSTECTOMY, LAPAROSCOPIC     Case Request: CHOLECYSTECTOMY, LAPAROSCOPIC     Patient has the symptoms of biliary colic 2/2 chronic calculus cholecystitis.  It was noted patient has stones on CT abd/pel in 2019.      The patient was thoroughly counseled regarding their RUQ pain [R10.11].     The patient was informed that the proposed procedure or medical intervention involves removal of the gallbladder laparoscopically (with small incisions and camera) possibly open and does offer a very good likelihood of symptom relief.     The patient was made aware of the risks of the procedure, including but not limited to:    bleeding, conversion to open, bile leak, bile duct injury or other adjacent organ injury, retained gallstones, cardiac or pulmonary related complications and anesthesia complications. Also that difficulties may be encountered during recovery to include: wound or deep  intraabdominal infection, wound dehiscence, incisional hernia, bile leak or retained stone requiring ERCP.     In the course of the evaluation we did discuss other therapeutic options with the patient, including antibiotics and/or drainage. The risks and benefits of these options were also discussed which include but are not limited to: recurrent worsening episodes.     Also discussed were possible problems or difficulties the patient may encounter if treatment was not pursued at this time. These include: worsening episodes, cholangitis and/or pancreatitis.     The patient was informed that [facility], DO will be primarily responsible for the procedure. Assistance during the procedure and during hospitalization may also be provided by other physicians, nurses and technicians.     The patient was also informed that if exposure to the patient s blood or body fluids occurs during the procedure, HIV testing of the patient will occur unless they refuse at this time. Risk of blood transfusion is minimal.     The patient will be provided additional education resources by the support staff. If there are ever any questions regarding their diagnosis or the procedure, the patient is encouraged to ask.     All of the patient s or their legal representative s questions have been answered to their satisfaction and they have indicated a clear understanding of this discussion.   Christina expressed understanding of risks, benefits and alternatives and wished to proceed.     All findings, test results, and diagnosis were discussed with the patient. Christina  participated in the decision making process and agreed with the plan of care. Questions were sought and answered.             Chief Complaint:   RUQ pain and nausea     History is obtained from the patient         History of Present Illness:   This patient is a 40 year old  female with a significant past medical history of s/p Nissen for GERD who presents with RUQ and  nausea.    Was told she had gallbladder stone when she had her Nissen with Dr. Harris.  It hasn't been acting up until 3 months ago. RUQ pain and epigastric pain after eating - all sort of foods.  Sometimes has nausea but no vomiting.  Pain episodes usually lasts 1hr.  Pain gets worse after eating consistently.  Lap appy in 2005 and lap Nissen at [facility].  Had EGD by Steven in 2018.  ? Ulcers.  Never been a smoker.  No melena; no hematochezia. Had CT abd/pel in 2019 with noted cholelithiasis.  Never had fevers; no chills; no jaundice; no acolic stool.           Past Medical History:    has a past medical history of ASCUS with positive high risk HPV (12/27/13), HSIL (high grade squamous intraepithelial lesion) on Pap smear of cervix (4/22/13), and Pap smear of cervix with ASCUS, cannot exclude HGSIL (5/23/11, 8/10/12, ).          Past Surgical History:     Past Surgical History:   Procedure Laterality Date     APPENDECTOMY             Medications:   triamcinolone (NASACORT) 55 MCG/ACT nasal aerosol, Spray 2 sprays into both nostrils daily  valACYclovir (VALTREX) 1000 mg tablet, Take 1 tablet (1,000 mg) by mouth 3 times daily for 7 days (Patient not taking: Reported on 2/25/2020)    No current facility-administered medications on file prior to visit.         Allergies:    No Known Allergies         Social History:   Christina Santana  reports that she has never smoked. She has never used smokeless tobacco. She reports that she does not drink alcohol or use drugs.          Family History:   The patient has no family history of any bleeding, clotting or anesthesia problems.          Review of Systems:     Constitutional: Denies fever or chills   Eyes: Denies change in visual acuity   HENT: Denies nasal congestion or sore throat   Respiratory: Denies cough or shortness of breath   Cardiovascular: Denies chest pain or edema   GI: Denies vomiting, bloody stools or diarrhea; +abdominal pain, nausea,   : Denies dysuria  "  Musculoskeletal: Denies back pain or joint pain   Integument: Denies rash   Neurologic: Denies headache, focal weakness or sensory changes   Endocrine: Denies polyuria or polydipsia   Lymphatic: Denies swollen glands   Psychiatric: Denies depression or anxiety          Physical Exam:     Vitals: /60   Ht 1.676 m (5' 6\")   Wt 76 kg (167 lb 8 oz)   BMI 27.04 kg/m    BMI= Body mass index is 27.04 kg/m .  Constitutional: Awake, alert, no acute distress.  Eyes:  No scleral icterus.  Conjunctiva are without injection.  ENMT: Mucous membranes moist, dentition and gums are intact.   Neck: Soft, supple, trachea midline.    Endocrine: n/a  Lymphatic: There is no cervical, submandibular, or supraclavicular adenopathy.  Respiratory: No audible wheezes, no acute distress  Cardiovascular: Regular; S1, S2    Abdomen: Non-distended, non-tender, normoactive bowel sounds present, No masses, neg hernias, or flank tenderness. No hepatosplenomegaly.   Musculoskeletal: No spinal or CVA tenderness. Full range of motion in the upper and lower extremities.    Skin: No skin rashes or lesions to inspection.  No petechia.    Neurologic: Cranial nerves II through XII are grossly intact and symmetric.  Psychiatric: The patient is alert and oriented times 3.  The patient's affect is not blunted and mood is appropriate.          Data:   Vital Signs:  /60   Ht 1.676 m (5' 6\")   Wt 76 kg (167 lb 8 oz)   BMI 27.04 kg/m       WBC -   WBC   Date Value Ref Range Status   02/25/2020 9.5 4.0 - 11.0 10e9/L Final   ], HgB -   Hemoglobin   Date Value Ref Range Status   02/25/2020 13.5 11.7 - 15.7 g/dL Final   ]   Liver Function Studies -   Recent Labs   Lab Test 02/25/20  1426   PROTTOTAL 7.3   ALBUMIN 3.7   BILITOTAL 0.2   ALKPHOS 56   AST 13   ALT 22     No results found for this or any previous visit (from the past 744 hour(s)).     [facility] DO Jessica 8/4/2020 4:12 PM    Disclaimer: This note consists of words and symbols derived from " keyboarding and dictation using voice recognition software.  As a result, there may be errors that have gone undetected.  Please consider this when interpreting information found in this note.       Again, thank you for allowing me to participate in the care of your patient.        Sincerely,        Jemma Lopez MD

## 2020-08-04 NOTE — PROGRESS NOTES
General Surgery Consultation    Christina Santana MRN# [id number]   Age: 40 year old YOB: 1980     Reason for consult: Abdomina pain with nausea                        Assessment and Plan:   I was asked to see this patient at the request self for evaluation of abdominal pain and nausea.  Christina Santana is a 40 year old female who presented with history, exam, laboratory and imaging most consistent with:        ICD-10-CM    1. RUQ pain  R10.11 US Abdomen Complete     Case Request: CHOLECYSTECTOMY, LAPAROSCOPIC     Case Request: CHOLECYSTECTOMY, LAPAROSCOPIC   2. Nausea  R11.0 US Abdomen Complete     Case Request: CHOLECYSTECTOMY, LAPAROSCOPIC     Case Request: CHOLECYSTECTOMY, LAPAROSCOPIC   3. Calculus of gallbladder without cholecystitis without obstruction  K80.20 Case Request: CHOLECYSTECTOMY, LAPAROSCOPIC     Case Request: CHOLECYSTECTOMY, LAPAROSCOPIC     Patient has the symptoms of biliary colic 2/2 chronic calculus cholecystitis.  It was noted patient has stones on CT abd/pel in 2019.      The patient was thoroughly counseled regarding their RUQ pain [R10.11].     The patient was informed that the proposed procedure or medical intervention involves removal of the gallbladder laparoscopically (with small incisions and camera) possibly open and does offer a very good likelihood of symptom relief.     The patient was made aware of the risks of the procedure, including but not limited to:    bleeding, conversion to open, bile leak, bile duct injury or other adjacent organ injury, retained gallstones, cardiac or pulmonary related complications and anesthesia complications. Also that difficulties may be encountered during recovery to include: wound or deep intraabdominal infection, wound dehiscence, incisional hernia, bile leak or retained stone requiring ERCP.     In the course of the evaluation we did discuss other therapeutic options with the patient, including antibiotics and/or drainage. The  risks and benefits of these options were also discussed which include but are not limited to: recurrent worsening episodes.     Also discussed were possible problems or difficulties the patient may encounter if treatment was not pursued at this time. These include: worsening episodes, cholangitis and/or pancreatitis.     The [facility] was [facility] will be primarily responsible for the procedure. Assistance during the procedure and during hospitalization may also be provided by other physicians, nurses and technicians.     The patient was also informed that if exposure to the patient s blood or body fluids occurs during the procedure, HIV testing of the patient will occur unless they refuse at this time. Risk of blood transfusion is minimal.     The patient will be provided additional education resources by the support staff. If there are ever any questions regarding their diagnosis or the procedure, the patient is encouraged to ask.     All of the patient s or their legal representative s questions have been answered to their satisfaction and they have indicated a clear understanding of this discussion.   Christina expressed understanding of risks, benefits and alternatives and wished to proceed.     All findings, test results, and diagnosis were discussed with the patient. Christina  participated in the decision making process and agreed with the plan of care. Questions were sought and answered.             Chief Complaint:   RUQ pain and nausea     History is obtained from the patient         History of Present Illness:   This patient is a 40 year old  female with a significant past medical history of s/p Nissen for GERD who presents with RUQ and nausea.    Was told she had gallbladder stone when she had her Nissen with Dr. Harris.  It hasn't been acting up until 3 months ago. RUQ pain and epigastric pain after eating - all sort of foods.  Sometimes has nausea but no vomiting.  Pain  episodes usually lasts 1hr.  Pain gets worse after eating consistently.  [facility]  Had EGD by Steven in 2018.  ? Ulcers.  Never been a smoker.  No melena; no hematochezia. Had CT abd/pel in 2019 with noted cholelithiasis.  Never had fevers; no chills; no jaundice; no acolic stool.           Past Medical History:    has a past medical history of ASCUS with positive high risk HPV (12/27/13), HSIL (high grade squamous intraepithelial lesion) on Pap smear of cervix (4/22/13), and Pap smear of cervix with ASCUS, cannot exclude HGSIL (5/23/11, 8/10/12, ).          Past Surgical History:     Past Surgical History:   Procedure Laterality Date     APPENDECTOMY             Medications:   triamcinolone (NASACORT) 55 MCG/ACT nasal aerosol, Spray 2 sprays into both nostrils daily  valACYclovir (VALTREX) 1000 mg tablet, Take 1 tablet (1,000 mg) by mouth 3 times daily for 7 days (Patient not taking: Reported on 2/25/2020)    No current facility-administered medications on file prior to visit.         Allergies:    No Known Allergies         Social History:   Christina Santana  reports that she has never smoked. She has never used smokeless tobacco. She reports that she does not drink alcohol or use drugs.          Family History:   The patient has no family history of any bleeding, clotting or anesthesia problems.          Review of Systems:     Constitutional: Denies fever or chills   Eyes: Denies change in visual acuity   HENT: Denies nasal congestion or sore throat   Respiratory: Denies cough or shortness of breath   Cardiovascular: Denies chest pain or edema   GI: Denies vomiting, bloody stools or diarrhea; +abdominal pain, nausea,   : Denies dysuria   Musculoskeletal: Denies back pain or joint pain   Integument: Denies rash   Neurologic: Denies headache, focal weakness or sensory changes   Endocrine: Denies polyuria or polydipsia   Lymphatic: Denies swollen glands   Psychiatric: Denies  "depression or anxiety          Physical Exam:     Vitals: /60   Ht 1.676 m (5' 6\")   Wt 76 kg (167 lb 8 oz)   BMI 27.04 kg/m    BMI= Body mass index is 27.04 kg/m .  Constitutional: Awake, alert, no acute distress.  Eyes:  No scleral icterus.  Conjunctiva are without injection.  ENMT: Mucous membranes moist, dentition and gums are intact.   Neck: Soft, supple, trachea midline.    Endocrine: n/a  Lymphatic: There is no cervical, submandibular, or supraclavicular adenopathy.  Respiratory: No audible wheezes, no acute distress  Cardiovascular: Regular; S1, S2    Abdomen: Non-distended, non-tender, normoactive bowel sounds present, No masses, neg hernias, or flank tenderness. No hepatosplenomegaly.   Musculoskeletal: No spinal or CVA tenderness. Full range of motion in the upper and lower extremities.    Skin: No skin rashes or lesions to inspection.  No petechia.    Neurologic: Cranial nerves II through XII are grossly intact and symmetric.  Psychiatric: The patient is alert and oriented times 3.  The patient's affect is not blunted and mood is appropriate.          Data:   Vital Signs:  /60   Ht 1.676 m (5' 6\")   Wt 76 kg (167 lb 8 oz)   BMI 27.04 kg/m       WBC -   WBC   Date Value Ref Range Status   02/25/2020 9.5 4.0 - 11.0 10e9/L Final   ], HgB -   Hemoglobin   Date Value Ref Range Status   02/25/2020 13.5 11.7 - 15.7 g/dL Final   ]   Liver Function Studies -   Recent Labs   Lab Test 02/25/20  1426   PROTTOTAL 7.3   ALBUMIN 3.7   BILITOTAL 0.2   ALKPHOS 56   AST 13   ALT 22     No results found for this or any previous visit (from the past 744 hour(s)).     [facility] DO Jessica 8/4/2020 4:12 PM    Disclaimer: This note consists of words and symbols derived from keyboarding and dictation using voice recognition software.  As a result, there may be errors that have gone undetected.  Please consider this when interpreting information found in this note.       "

## 2020-08-07 ENCOUNTER — TELEPHONE (OUTPATIENT)
Dept: SURGERY | Facility: CLINIC | Age: 40
End: 2020-08-07

## 2020-08-07 ENCOUNTER — HOSPITAL ENCOUNTER (OUTPATIENT)
Facility: CLINIC | Age: 40
End: 2020-08-07
Attending: SURGERY | Admitting: SURGERY
Payer: COMMERCIAL

## 2020-08-07 DIAGNOSIS — Z11.59 ENCOUNTER FOR SCREENING FOR OTHER VIRAL DISEASES: Primary | ICD-10-CM

## 2020-08-07 DIAGNOSIS — K80.20 CALCULUS OF GALLBLADDER WITHOUT CHOLECYSTITIS WITHOUT OBSTRUCTION: ICD-10-CM

## 2020-08-07 DIAGNOSIS — R10.11 RUQ PAIN: ICD-10-CM

## 2020-08-07 DIAGNOSIS — R11.0 NAUSEA: ICD-10-CM

## 2020-08-07 NOTE — TELEPHONE ENCOUNTER
Type of surgery: CHOLECYSTECTOMY, LAPAROSCOPIC (N/A)   Location of surgery: [facility]  Date and time of surgery: 9/4  Surgeon: Jessica  Pre-Op Appt Date: 8/20  Post-Op Appt Date: 9/15   Packet sent out: Yes  Pre-cert/Authorization completed:  No  Date: na

## 2020-08-28 ENCOUNTER — THERAPY VISIT (OUTPATIENT)
Dept: CHIROPRACTIC MEDICINE | Facility: CLINIC | Age: 40
End: 2020-08-28
Payer: COMMERCIAL

## 2020-08-28 DIAGNOSIS — M62.838 MUSCLE SPASM: ICD-10-CM

## 2020-08-28 DIAGNOSIS — M54.2 CERVICALGIA: Primary | ICD-10-CM

## 2020-08-28 PROCEDURE — 97810 ACUP 1/> WO ESTIM 1ST 15 MIN: CPT | Performed by: CHIROPRACTOR

## 2020-08-28 NOTE — PROGRESS NOTES
Visit #:  5 of 8 based on treatment plan 6/8/2020    Subjective:  Christina Santana is a 40 year old female who is seen in f/u up for:        Cervicalgia  Muscle spasm.     Since last visit on 7/15/2020,  Christina Santana reports the following changes: Patient presents and states that she took a trip 2 weeks ago and got off schedule. She can tell that she hasn't been here for awhile, but it is tolerable. She rates her current pin 3-4/10. She has had a few headaches that are coming more frequently again.     She is here for acupuncture today, only.        Objective:  The following was observed:    P: palpatory tenderness bilateral upper traps    A: static palpation demonstrates intersegmental asymmetry     R: motion palpation notes restricted motion    T: muscle spasm at level(s): Traps Bilaterally      Assessment:    Segmental spinal dysfunction/restrictions found at:  NO CMT today.      Diagnoses:      1. Cervicalgia    2. Muscle spasm        Patient's condition:  Patient had restrictions pre-manipulation and Patient had decreased motion prior to manipulation    Treatment effectiveness:  Post manipulation there is better intersegmental movement and Patient claims to feel looser post manipulation      Procedures:  CMT:  NO CMT - patient has been adjusted at her other clinic.     Modalities:  21514: Acupuncture, for 15 minutes:  Points: upper back and neck protocol - GV20, GV16, LI4, LI11, SI15, GB36, GB20, GB39, anton points in upper traps    Therapeutic procedures:  None      Prognosis: Good    Progress towards Goals: Patient is making progress towards the goal of:  Decrease pain in upper back and neck.  Decrease hypertonicity of upper traps.   Decrease Neck Disability from 16% to 8% functional impairment.      Response to Treatment:   Patient tolerated treatment well today.       Recommendations:    Instructions:ice 20 minutes every other hour as needed    Follow-up:  Continue with treatment plan.

## 2020-09-11 ENCOUNTER — THERAPY VISIT (OUTPATIENT)
Dept: CHIROPRACTIC MEDICINE | Facility: CLINIC | Age: 40
End: 2020-09-11
Payer: COMMERCIAL

## 2020-09-11 DIAGNOSIS — M54.2 CERVICALGIA: Primary | ICD-10-CM

## 2020-09-11 DIAGNOSIS — M62.838 MUSCLE SPASM: ICD-10-CM

## 2020-09-11 PROCEDURE — 97810 ACUP 1/> WO ESTIM 1ST 15 MIN: CPT | Performed by: CHIROPRACTOR

## 2020-09-11 NOTE — PROGRESS NOTES
"Visit #:  6 of 8 based on treatment plan 6/8/2020    Subjective:  Christina Santana is a 40 year old female who is seen in f/u up for:        Cervicalgia  Muscle spasm.     Since last visit on 8/28/2020,  Christina Santana reports the following changes: Patient presents and states that she has been okay, but she feels like her neck is really right. She winces when she tries to rub things out. She feels like she may be \"tough\" today. She rates her current pain 1/10 without touching it, but it is 5/10 when she tries to push on the spots.     She is here for acupuncture today, only.        Objective:  The following was observed:    P: palpatory tenderness bilateral upper traps    A: static palpation demonstrates intersegmental asymmetry     R: motion palpation notes restricted motion    T: muscle spasm at level(s): Traps Bilaterally      Assessment:    Segmental spinal dysfunction/restrictions found at:  NO CMT today.      Diagnoses:      1. Cervicalgia    2. Muscle spasm        Patient's condition:  Patient had restrictions pre-manipulation and Patient had decreased motion prior to manipulation    Treatment effectiveness:  Post manipulation there is better intersegmental movement and Patient claims to feel looser post manipulation      Procedures:  CMT:  NO CMT - patient has been adjusted at her other clinic.     Modalities:  20669: Acupuncture, for 15 minutes:  Points: upper back and neck protocol - GV20, GV16, LI4, LI11, SI15, GB36, GB20, GB39, anton points in upper traps    Therapeutic procedures:  None      Prognosis: Good    Progress towards Goals: Patient is making progress towards the goal of:  Decrease pain in upper back and neck.  Decrease hypertonicity of upper traps.   Decrease Neck Disability from 16% to 8% functional impairment.      Response to Treatment:   Patient tolerated treatment well today.       Recommendations:    Instructions:ice 20 minutes every other hour as needed    Follow-up:  Continue with " treatment plan.

## 2020-09-18 ENCOUNTER — THERAPY VISIT (OUTPATIENT)
Dept: CHIROPRACTIC MEDICINE | Facility: CLINIC | Age: 40
End: 2020-09-18
Payer: COMMERCIAL

## 2020-09-18 DIAGNOSIS — M54.2 CERVICALGIA: ICD-10-CM

## 2020-09-18 DIAGNOSIS — M99.01 SEGMENTAL DYSFUNCTION OF CERVICAL REGION: Primary | ICD-10-CM

## 2020-09-18 DIAGNOSIS — M62.838 MUSCLE SPASM: ICD-10-CM

## 2020-09-18 PROCEDURE — 98940 CHIROPRACT MANJ 1-2 REGIONS: CPT | Mod: AT | Performed by: CHIROPRACTOR

## 2020-09-18 NOTE — PROGRESS NOTES
Visit #:  7 of 8 based on treatment plan 6/8/2020    Subjective:  Christina Santana is a 40 year old female who is seen in f/u up for:        Cervicalgia  Muscle spasm.     Since last visit on 9/11/2020,  Christina Santana reports the following changes: Patient presents and states that her neck and shoulders are tight and sore today. She feels like she needs to be adjusted before the weekend. Her neck is rated 4/10, and her shoulders 2/10.   Patient is requesting to be adjusted today and was worked in.        Objective:  The following was observed:    P: palpatory tenderness bilateral upper traps    A: static palpation demonstrates intersegmental asymmetry     R: motion palpation notes restricted motion    T: muscle spasm at level(s): Traps Bilaterally      Assessment:    Segmental spinal dysfunction/restrictions found at:  C1 RR, LRR  C2 LR, RRR  T1 RR, LRR  T5 E, FR  T10 E, FR      Diagnoses:      1. Cervicalgia    2. Muscle spasm        Patient's condition:  Patient had restrictions pre-manipulation and Patient had decreased motion prior to manipulation    Treatment effectiveness:  Post manipulation there is better intersegmental movement and Patient claims to feel looser post manipulation      Procedures:  CMT:  Diversified, Cervical/Thoracic    Modalities:  06892: Acupuncture, for 15 minutes:  Points: upper back and neck protocol - GV20, GV16, LI4, LI11, SI15, GB36, GB20, GB39, anton points in upper traps - NOT performed today    Therapeutic procedures:  MSTM to affected musculature, 5 min HYHPERVOLT    Prognosis: Good    Progress towards Goals: Patient is making progress towards the goal of:  Decrease pain in upper back and neck.  Decrease hypertonicity of upper traps.   Decrease Neck Disability from 16% to 8% functional impairment.      Response to Treatment:   Patient tolerated treatment well today.       Recommendations:    Instructions:ice 20 minutes every other hour as needed    Follow-up:  Continue with  treatment plan.

## 2020-10-23 ENCOUNTER — THERAPY VISIT (OUTPATIENT)
Dept: CHIROPRACTIC MEDICINE | Facility: CLINIC | Age: 40
End: 2020-10-23
Payer: COMMERCIAL

## 2020-10-23 DIAGNOSIS — M62.838 MUSCLE SPASM: ICD-10-CM

## 2020-10-23 DIAGNOSIS — M54.2 CERVICALGIA: ICD-10-CM

## 2020-10-23 DIAGNOSIS — M99.01 SEGMENTAL DYSFUNCTION OF CERVICAL REGION: Primary | ICD-10-CM

## 2020-10-23 PROCEDURE — 98940 CHIROPRACT MANJ 1-2 REGIONS: CPT | Mod: AT | Performed by: CHIROPRACTOR

## 2020-10-23 NOTE — PROGRESS NOTES
Visit #:  8 of 8 based on treatment plan 6/8/2020    Subjective:  Christina Santana is a 40 year old female who is seen in f/u up for:        Cervicalgia  Muscle spasm.     Since last visit on 9/18/2020,  Christina Santana reports the following changes: Patient presents and states that she chewed carlee yesterday and had pain in her left jaw this morning. When she woke up today, it felt swollen and she has had clicking and popping with opening and closing her mouth. She states that a bit ago, she popped her jaw and it felt better. Patient is requesting to be adjusted today and was worked in.        Objective:  The following was observed:    P: palpatory tenderness Left jaw    A: static palpation demonstrates intersegmental asymmetry     R: motion palpation notes restricted motion    T: muscle spasm at level(s): Left TMJ      Assessment:    Segmental spinal dysfunction/restrictions found at:  C1 LLF, RLLR  C2 RR, LRR  Activator to left TMJ    Diagnoses:      1. Cervicalgia    2. Muscle spasm        Patient's condition:  Patient had restrictions pre-manipulation and Patient had decreased motion prior to manipulation    Treatment effectiveness:  Post manipulation there is better intersegmental movement and Patient claims to feel looser post manipulation      Procedures:  CMT:  Diversified, Cervical/Thoracic  Activator to left TMJ    Modalities:  32607: Acupuncture, for 15 minutes:  Points: upper back and neck protocol - GV20, GV16, LI4, LI11, SI15, GB36, GB20, GB39, anton points in upper traps - NOT performed today    Therapeutic procedures:  MSTM to affected musculature, 5 min HYHPERVOLT  Internal pterygoind work with gloved hand on left side.     Prognosis: Good    Progress towards Goals: Patient is making progress towards the goal of:  Decrease pain in upper back and neck.  Decrease hypertonicity of upper traps.   Decrease Neck Disability from 16% to 8% functional impairment.      Response to Treatment:   Patient tolerated  treatment well today.       Recommendations:    Instructions:ice 20 minutes every other hour as needed    Follow-up:  Continue with treatment plan.

## 2020-11-11 ENCOUNTER — THERAPY VISIT (OUTPATIENT)
Dept: CHIROPRACTIC MEDICINE | Facility: CLINIC | Age: 40
End: 2020-11-11
Payer: COMMERCIAL

## 2020-11-11 DIAGNOSIS — M54.2 CERVICALGIA: ICD-10-CM

## 2020-11-11 DIAGNOSIS — M62.838 MUSCLE SPASM: ICD-10-CM

## 2020-11-11 DIAGNOSIS — M99.03 SEGMENTAL DYSFUNCTION OF LUMBAR REGION: ICD-10-CM

## 2020-11-11 DIAGNOSIS — M99.04 SEGMENTAL DYSFUNCTION OF SACRAL REGION: Primary | ICD-10-CM

## 2020-11-11 PROCEDURE — 98940 CHIROPRACT MANJ 1-2 REGIONS: CPT | Mod: AT | Performed by: CHIROPRACTOR

## 2020-11-11 PROCEDURE — 97810 ACUP 1/> WO ESTIM 1ST 15 MIN: CPT | Performed by: CHIROPRACTOR

## 2020-11-11 NOTE — PROGRESS NOTES
Visit #:  1 of 8 based on treatment plan 6/8/2020    Subjective:  Christina Santana is a 40 year old female who is seen in f/u up for:        Cervicalgia  Muscle spasm.     Since last visit on 10/23/2020,  Christina Santana reports the following changes: Patient presents and states that she would like acupuncture today. She has been feeling tight in her neck. She fell 3 weeks ago and her lower back has been hit or miss.        Objective:  The following was observed:    P: palpatory tenderness upper back and neck, lower back    A: static palpation demonstrates intersegmental asymmetry     R: motion palpation notes restricted motion    T: muscle spasm at level(s): lumbar paraspinals, traps      Assessment:    Segmental spinal dysfunction/restrictions found at:  L5 RR, LRR  Left SI posterior    Diagnoses:      1. Cervicalgia    2. Muscle spasm        Patient's condition:  Patient had restrictions pre-manipulation and Patient had decreased motion prior to manipulation    Treatment effectiveness:  Post manipulation there is better intersegmental movement and Patient claims to feel looser post manipulation      Procedures:  CMT:  Lumbar/Pelvis: Drop assist, L5, Left, Prone    Modalities:  98733: Acupuncture, for 15 minutes:  Points: upper back and neck protocol - GV20, GV16, LI4, LI11, SI15, GB36, GB20, GB39, anton points in upper traps     Therapeutic procedures:  MSTM to affected musculature, 5 min HYHPERVOLT       Prognosis: Good    Progress towards Goals: Patient is making progress towards the goal of:  Decrease pain in upper back and neck.  Decrease hypertonicity of upper traps.   Decrease Neck Disability from 16% to 8% functional impairment.      Response to Treatment:   Patient tolerated treatment well today.       Recommendations:    Instructions:ice 20 minutes every other hour as needed    Follow-up:  Continue with treatment plan.

## 2020-11-19 DIAGNOSIS — Z80.3 FAMILY HISTORY OF MALIGNANT NEOPLASM OF BREAST: Primary | ICD-10-CM

## 2020-12-18 NOTE — PROGRESS NOTES
"12/21/2020    Referring Provider: Leyda Guzman DO    Christina Santana is a 40 year old female who is being evaluated via a billable telephone visit.      The patient has been notified of following:   \"This telephone visit will be conducted via a call between you and your provider. We have found that certain health care needs can be provided without the need for an in-person physical exam.  This service lets us provide the care you need with a telephone conversation. If lab work is needed we can place an order for that and you can then stop by our lab to have the test done at a later time.    Telephone visits are billed at different rates depending on your insurance coverage.  Please reach out to your insurance provider with any questions. If during the course of the call the provider feels a telephone visit is not appropriate, you will not be charged for this service.\"    Patient has given verbal consent for telephone visit? YES  Will anyone else be joining your telephone visit? NO    Telephone-Visit Details  Type of service:  Telephone Visit  Telephone Start Time: 9:00  Telephone End Time (time telephone stopped): 10:00  Originating Location (pt. Location): Home  Distant Location (provider location):  [facility]  Mode of Communication:  Telephone    Presenting Information:   I spoke with Christina Santana today for a telephone telehealth visit for genetic counseling to discuss her family history of breast, ovarian/uterine and bladder cancer. We reviewed her personal and family history, discussed cancer screening recommendations, and went over available genetic testing options.    Personal History:  Christina is a 40 year old female. She does not have any personal history of cancer.      She had her first menstrual period at age 14, her first child at age 21, and is premenopausal.  Christina has her ovaries, fallopian tubes and uterus in place, and she has had ovarian cancer screening in her " past due to a history of ovarian cysts. She has a history of abnormal PAP smears; in 2017 atypia suggestive of low grade squamous intraepithelial lesion (BREEZY 1) was detected and in 2013 she had high grade squamous intraepithelial lesion (HSIL) encompassing moderate and severe dysplasia, CIN2-3/CIS. She reported that a quarter of her cervix was removed and since then her recent PAP smear (2019) has been normal. She reports about 2 years of oral contraceptive use and she has not had hormone replacement therapy.      She has not yet had clinical breast exams and mammograms. Christina has not had a colonoscopy. She had an upper GI in 2018 that found a medium-sized hiatal hernia and benign-appearing esophageal stenosis. She does not regularly do any other cancer screening at this time.     Family History: (Please see scanned pedigree for detailed family history information)    Christina has three sons (19, 13, 5) and three daughters (17, 11, 8). They have no cancer history.     She has a brother (37) and a sister (35) with no cancer history.    Her mother is 63 and has no cancer history. She does have a history of benign fibroids.     One maternal aunt had uterine/ovarian cancer in her 40's with chemotherapy as treatment. She had a hysterectomy in her 50's and is currently 69. She had a prior history of fibroids.     Another maternal aunt had uterine/ovarian cancer in her mid-to-late 40's (possibly early 50's) and had a hysterectomy; she is currently 60. She had a prior history of fibroids.     A third maternal aunt had a hysterectomy at 25 but it is uncertain if she had uterine or ovarian cancer.     Her maternal grandfather had bladder cancer in his 70's that spread to his kidneys and he passed away at 94. He had smoking history as a young man only.     There are no other relatives with known cancer history on her maternal side.    Her father is 63 and has no cancer history.     Her paternal aunt had breast cancer at 54 and  had a bilateral mastectomy; she is currently 58. She reportedly had genetic testing but Christina was unsure if she was positive for a gene mutation.     Her paternal grandmother had breast cancer after 50 and it recurred twice. She is currently 84.    There are no other relatives with known cancer history on her paternal side.    Her maternal ethnicity is Tristanian and [place] sor[place]. Her paternal ethnicity is [place]olan.  There is no known Ashkenazi Mormon ancestry on either side of her family. There is no reported consanguinity.    Discussion:    We reviewed the features of sporadic, familial, and hereditary cancers. In looking at Christina's family history, it is possible that a cancer susceptibility gene is present due to her maternal aunts' uterine/ovarian cancer and her grandfather's bladder cancer as well as her paternal aunt and grandmother's breast cancer.    We discussed the natural history and genetics of Hereditary Breast and Ovarian Cancer (HBOC), which is caused by a mutation in the BRCA1 or BRCA2 gene.  HBOC typically presents with multiple family members diagnosed with breast cancer before age 50 and/or ovarian cancer. Other cancer risks associated with HBOC include male breast cancer, prostate cancer, pancreatic cancer, and melanoma.     We also discussed Reed syndrome, which can be caused by a mutation in one of five genes:  MLH1, MSH2, MSH6, PMS2, and EPCAM. A single mutation in one of the Reed Syndrome genes increases the risk for several cancers, such as colon, endometrial, ovarian, and stomach.  Other cancers that can be seen in some families with Reed Syndrome include pancreatic, bladder, biliary tract, urothelial, small bowel, prostate, breast and brain cancers. Based on her personal and family history, Christina meets current National Comprehensive Cancer Network (NCCN) criteria for genetic testing of MLH1,MSH2, MSH6, PMS2, and EPCAM.     A detailed handout regarding BRCA1/2, Reed syndrome, and the  information we discussed will be provided to Christina in the mail and can be found in the after visit summary. Topics included: inheritance pattern, cancer risks, cancer screening recommendations, and also risks, benefits and limitations of testing.    We discussed that there are additional genes that could cause increased risk for breast, ovarian/uterine and bladder cancer. As many of these genes present with overlapping features in a family and accurate cancer risk cannot always be established based upon the pedigree analysis alone, it would be reasonable for Christina to consider panel genetic testing to analyze multiple genes at once.    We reviewed genetic testing options for the cancers seen in her family history that suggest a hereditary cause: an actionable high/moderate risk gene custom panel and an expanded high and moderate risk custom panel. Christina expressed an interest in learning as much information as possible from the testing and opted for an expanded panel.  Genetic testing is available for 77 genes associated with increased risk for many different cancers: CancerNext-Expanded (AIP, ALK, APC, CARINA, AXIN2, BAP1, BARD1, BLM, BMPR1A, BRCA1, BRCA2, BRIP1, CDC73, CDH1, CDK4, CDKN1B, CDKN2A, CHEK2, CTNNA1, DICER1, EPCAM, EGFR, EGLN1, FANCC, FH, FLCN, GALNT12, GREM1, HOXB13, KIF1B, KIT, LZTR1, MAX, MEN1, MET, MITF, MLH1, MRE11, MSH2, MSH3, MSH6, MUTYH, NBN, NF1, NF2, NTHL1, PALB2, PDGFRA, PHOX2B, PMS2, POLD1, POLE, POT1, VWJTX0Q, PTCH1, PTEN, RAD50, RAD51C, RAD51D, RB1, RECQL, RET, SDHA, SDHAF2, SDHB, SDHC, SDHD, SMAD4, SMARCA4, SMARCB1, SMARCE1, STK11, SUFU, [id number], TP53, TSC1, TSC2, VHL, and XRCC2).  We discussed that many of the genes in the CancerNext-Expanded panel are associated with specific hereditary cancer syndromes and have published management guidelines:  Hereditary Breast and Ovarian Cancer syndrome (BRCA1, BRCA2), Reed syndrome (MLH1, MSH2, MSH6, PMS2, EPCAM), Familial Adenomatous Polyposis  (APC), Hereditary Diffuse Gastric Cancer (CDH1), Familial Atypical Multiple Mole Melanoma syndrome (CDK4, CDKN2A), Juvenile Polyposis syndrome (BMPR1A, SMAD4), Cowden syndrome (PTEN), Li Fraumeni syndrome (TP53), Peutz-Jeghers syndrome (STK11), MUTYH Associated Polyposis (MUTYH), Hereditary Leiomyomatosis and Renal Cell Cancer (FH), Ssqk-Fxmg-Azqw (FLCN), Hereditary Papillary Renal Carcinoma (MET), Hereditary Paraganglioma and Pheochromocytoma syndrome (SDHA, SDHAF2, SDHB, SDHC, SDHD), Multiple Endocrine Neoplasia type 2 (RET), Tuberous sclerosis complex (TSC1, TSC2), Von Hippel-Lindau disease (VHL), Neurofibromatosis type 1 (NF1), Neurofibromatosis type 2 (NF2), Multiple Endocrine Neoplasia type 1 (MEN1), Multiple Endocrine Neoplasia type 4 (CDKN1B), Gorlin syndrome (SUFU, PTCH1), and Ferguson complex (XAPQI9W).  The CARINA, AXIN2, BARD1, BRIP1, CHEK2, GREM1, MSH3, NBN, NTHL1, PALB2, POLD1, POLE, RAD51C, and RAD51D genes are associated with increased cancer risk and have published management guidelines for certain cancers.    The remaining genes (AIP, ALK, BAP1, BLM, CDC73, CTNNA1, DICER1, EGFR, EGLN1, FANCC, GALNT12, HOXB13, KIF1B, KIT, LZTR1, MRE11, MAX, MITF, PDGFRA, PHOX2B, POT1, RAD50, RECQL, RB1, SMARCA4, SMARCB1, SMARCE1, [id number], and XRCC2) are associated with increased cancer risk and may allow us to make medical recommendations when mutations are identified.      Christina gave verbal consent virtually due to COVID-19 restrictions for genetic testing via [employer]. Turnaround time: 4-6 weeks.    Due to COVID-19 restrictions, we now can send saliva kits from [employer] to patients. They will receive a kit directly to their home with directions on how to collect a saliva sample. We discussed that the success of the testing depends on how well she follows the directions and that there is a small chance for sample failure. Christina verbalized understanding of this. Once the sample is collected, she will send  it to [facility] using the return envelope and prepaid shipping label.     Medical Management: For Christina, we reviewed that the information from genetic testing may determine:    additional cancer screening for which Christina may qualify (i.e., mammogram and breast MRI, more frequent colonoscopies, more frequent dermatologic exams, etc.),    options for risk reducing surgeries Christina could consider (i.e., bilateral mastectomy, surgery to remove her ovaries and/or uterus, etc.),      and targeted chemotherapies if she were to develop certain cancers in the future (i.e. immunotherapy for individuals with Reed syndrome, PARP inhibitors, etc.).     These recommendations and possible targeted chemotherapies will be discussed in detail once genetic testing is completed.     Plan:  1) Today Christina elected to proceed with genetic testing using the CancerNext-Expanded panel offered by [employer].  2) A copy of the consent form was emailed and after visit summary will be sent in the mail.   3) Once the saliva sample is received by [employer], results should be available in 4-6 weeks.   4) We will discuss Christina's results virtually due to COVID-19 restrictions.    Time spent on the telephone visit: 60 minutes    Jc Monzon MS, [facility] [place]  Licensed, certified genetic counselor  [phone]                Assessing Cancer Risk  Only about 5-10% of cancers are thought to be due to an inherited cancer susceptibility gene.    These families often have:    Several people with the same or related types of cancer    Cancers diagnosed at a young age (before age 50)    Individuals with more than one primary cancer    Multiple generations of the family affected with cancer    Some people may be candidates for genetic testing of more than one gene.  For these families, genetic testing using a cancer panel may be offered.  These panels will test different genes known to increase the risk for breast, ovarian, uterine, and/or other  cancers. All of the genes discussed below have published clinical management guidelines for individuals who are found to carry a mutation. The purpose of this handout is to serve as a brief summary of the genes analyzed by the panels used to inquire about hereditary breast and gynecologic cancer:  CARINA, BRCA1, BRCA2, BRIP1, CDH1, CHEK2, MLH1, MSH2, MSH6, PMS2, EPCAM, PTEN, PALB2, RAD51C, RAD51D, and TP53.  ______________________________________________________________________________  Hereditary Breast and Ovarian Cancer Syndrome   (BRCA1 and BRCA2)  A single mutation in one of the copies of BRCA1 or BRCA2 increases the risk for breast and ovarian cancer, among others.  The risk for pancreatic cancer and melanoma may also be slightly increased in some families.  The chart below shows the chance that someone with a BRCA mutation would develop cancer in his or her lifetime1,2,3,4.        A person s ethnic background is also important to consider, as individuals of Ashkenazi Buddhist ancestry have a higher chance of having a BRCA gene mutation.  There are three BRCA mutations that occur more frequently in this population.    Reed Syndrome   (MLH1, MSH2, MSH6, PMS2, and EPCAM)  Currently five genes are known to cause Reed Syndrome: MLH1, MSH2, MSH6, PMS2, and EPCAM.  A single mutation in one of the Reed Syndrome genes increases the risk for colon, endometrial, ovarian, and stomach cancers.  Other cancers that occur less commonly in Reed Syndrome include urinary tract, skin, and brain cancers.  The chart below shows the chance that a person with Reed syndrome would develop cancer in his or her lifetime5.      *Cancer risk varies depending on Reed syndrome gene found    Cowden Syndrome   (PTEN)  Cowden syndrome is a hereditary condition that increases the risk for breast, thyroid, endometrial, colon, and kidney cancer.  Cowden syndrome is caused by a mutation in the PTEN gene.  A single mutation in one of the copies of  PTEN causes Cowden syndrome and increases cancer risk.  The chart below shows the chance that someone with a PTEN mutation would develop cancer in their lifetime6,7.  Other benign features seen in some individuals with Cowden syndrome include benign skin lesions (facial papules, keratoses, lipomas), learning disability, autism, thyroid nodules, colon polyps, and larger head size.      *One recent study found breast cancer risk to be increased to 85%    Li-Fraumeni Syndrome   (TP53)  Li-Fraumeni Syndrome (LFS) is a cancer predisposition syndrome caused by a mutation in the TP53 gene. A single mutation in one of the copies of TP53 increases the risk for multiple cancers. Individuals with LFS are at an increased risk for developing cancer at a young age. The lifetime risk for development of a LFS-associated cancer is 50% by age 30 and 90% by age 60.   Core Cancers: Sarcomas, Breast, Brain, Lung, Leukemias/Lymphomas, Adrenocortical carcinomas  Other Cancers: Gastrointestinal, Thyroid, Skin, Genitourinary    Hereditary Diffuse Gastric Cancer   (CDH1)  Currently, one gene is known to cause hereditary diffuse gastric cancer (HDGC): CDH1.  Individuals with HDGC are at increased risk for diffuse gastric cancer and lobular breast cancer. Of people diagnosed with HDGC, 30-50% have a mutation in the CDH1 gene.  This suggests there are likely other genes that may cause HDGC that have not been identified yet.      Lifetime Cancer Risks    General Population HDGC    Diffuse Gastric  <1% ~80%   Breast 12% 39-52%         Additional Genes  CARINA  CARINA is a moderate-risk breast cancer gene. Women who have a mutation in CARINA can have between a 2-4 fold increased risk for breast cancer compared to the general population8. CARINA mutations have also been associated with increased risk for pancreatic cancer, however an estimate of this cancer risk is not well understood9. Individuals who inherit two CARINA mutations have a condition called  ataxia-telangiectasia (AT).  This rare autosomal recessive condition affects the nervous system and immune system, and is associated with progressive cerebellar ataxia beginning in childhood.  Individuals with ataxia-telangiectasia often have a weakened immune system and have an increased risk for childhood cancers.    PALB2  Mutations in PALB2 have been shown to increase the risk of breast cancer up to 33-58% in some families; where individuals fall within this risk range is dependent upon family [email or web address]. PALB2 mutations have also been associated with increased risk for pancreatic cancer, although this risk has not been quantified yet.  Individuals who inherit two PALB2 mutations--one from their mother and one from their father--have a condition called Fanconi Anemia.  This rare autosomal recessive condition is associated with short stature, developmental delay, bone marrow failure, and increased risk for childhood cancers.    CHEK2   CHEK2 is a moderate-risk breast cancer gene.  Women who have a mutation in CHEK2 have around a 2-fold increased risk for breast cancer compared to the general population, and this risk may be higher depending upon family history.11,12,13 Mutations in CHEK2 have also been shown to increase the risk of a number of other cancers, including colon and prostate, however these cancer risks are currently not well understood.    BRIP1, RAD51C and RAD51D  Mutations in BRIP1, RAD51C, and RAD51D have been shown to increase the risk of ovarian cancer and possibly female breast cancer as well14,15 .       Lifetime Cancer Risk    General Population BRIP1 RAD51C RAD51D   Ovarian 1-2% ~5-8% ~5-9% ~7-15%           Inheritance  All of the cancer syndromes reviewed above are inherited in an autosomal dominant pattern.  This means that if a parent has a mutation, each of his or her children will have a 50% chance of inheriting that same mutation.  Therefore, each child--male or female--would have a 50%  chance of being at increased risk for developing cancer.      Image obtained from Genetics Home Reference, 2013     Mutations in some genes can occur de rebecca, which means that a person s mutation occurred for the first time in them and was not inherited from a parent.  Now that they have the mutation, however, it can be passed on to future generations.    Genetic Testing  Genetic testing involves a blood test and will look at the genetic information in the CARINA, BRCA1, BRCA2, BRIP1, CDH1, CHEK2, MLH1, MSH2, MSH6, PMS2, EPCAM, PTEN, PALB2, RAD51C, RAD51D, and TP53 genes for any harmful mutations that are associated with increased cancer risk.  If possible, it is recommended that the person(s) who has had cancer be tested before other family members.  That person will give us the most useful information about whether or not a specific gene is associated with the cancer in the family.    Results  There are three possible results of genetic testing:    Positive--a harmful mutation was identified in one or more of the genes    Negative--no mutation was identified in any of the genes on this panel    Variant of unknown significance--a variation in one of the genes was identified, but it is unclear how this impacts cancer risk in the family    Advantages and Disadvantages   There are advantages and disadvantages to genetic testing.    Advantages    May clarify your cancer risk    Can help you make medical decisions    May explain the cancers in your family    May give useful information to your family members (if you share your results)    Disadvantages    Possible negative emotional impact of learning about inherited cancer risk    Uncertainty in interpreting a negative test result in some situations    Possible genetic discrimination concerns (see below)    Genetic Information Nondiscrimination Act (ADI)  ADI is a federal law that protects individuals from health insurance or employment discrimination based on a genetic  test result alone.  Although rare, there are currently no legal discrimination protections in terms of life insurance, long term care, or disability insurances.  Visit the National Human Genome Research [place] website to learn more.    Reducing Cancer Risk  All of the genes described above have nationally recognized cancer screening guidelines that would be recommended for individuals who test positive.  In addition to increased cancer screening, surgeries may be offered or recommended to reduce cancer risk.  Recommendations are based upon an individual s genetic test result as well as their personal and family history of cancer.    Questions to Think About Regarding Genetic Testing:    What effect will the test result have on me and my relationship with my family members if I have an inherited gene mutation?  If I don t have a gene mutation?    Should I share my test results, and how will my family react to this news, which may also affect them?    Are my children ready to learn new information that may one day affect their own health?    Hereditary Cancer Resources    FORCE: Facing Our Risk of Cancer Empowered facingourrisk.org   Bright Pink bebrightpink.org   Li-Fraumeni Syndrome Association lfsassociation.org   PTEN World PTENworld.com   No stomach for cancer, Inc. nostomachforcancer.org   Stomach cancer relief network Scrnet.org   Collaborative Group of the Americas on Inherited Colorectal Cancer (CGA) cgaicc.com    Cancer Care cancercare.org   American Cancer Society (ACS) cancer.org   National Cancer [place] (NCI) cancer.gov     Please call us if you have any questions or concerns.   Cancer Risk Management Program [phone] ([phone])  ? Candido Moreno, MS, [facility] [phone]  ? July Briones, MS, [facility]  [phone]  ? Emma Evans, MS, [facility]  [phone]  ? Stacie Stern, MS, [facility] [phone]  ? Belen Rao, MS, [facility] [phone]  ? Jc Monzon, MS, [facility]  [phone]  ? Raquel Diamond, MS,  [facility]  [phone]    References  1. Lv WEEMS, Anabelle PDP, Narod S, Risch CALIX, Rashid JE, [place]ul JL, Terri N, Araceli H, Willis O, Renita A, Milton B, Hayley P, Manjessie S, Erin DM, Vu N, Olah E, Maceknzie H, Pool E, Lubinski J, Gronwald J, Mars B, Tulinius H, Thorlacius S, Eerola H, Nevkajalna H, Rony K, Facundo OP. Average risks of breast and ovarian cancer associated with BRCA1 or BRCA2 mutations detected in case series unselected for family history: a combined analysis of 222 studies. Am J Hum Rukhsana. 2003;72:1117-30.  2. Sabi NAJERA, Laurel SÁNCHEZ, Dominique G.  BRCA1 and BRCA2 Hereditary Breast and Ovarian Cancer. Gene Reviews online. 2013.  3. Severiano YC, Iabn S, Adeel G, Arthur S. Breast cancer risk among male BRCA1 and BRCA2 mutation carriers. J Natl Cancer Inst. 2007;99:1811-4.  4. Fredi WILLSON, Theresa I, Bhupinder J, Jatin E, Marlene ER, Enzo F. Risk of breast cancer in male BRCA2 carriers. J Med Rukhsana. 2010;47:710-1.  5. National Comprehensive Cancer Network. Clinical practice guidelines in oncology, colorectal cancer screening. Available online (registration required). 2015.  6. Huber MH, Jeremy J, Lisa J, Lio MAZA, Orkeanu MS, Eng C. Lifetime cancer risks in individuals with germline PTEN mutations. Clin Cancer Res. 2012;18:400-7.  7. Daysi R. Cowden Syndrome: A Critical Review of the Clinical Literature. J Rukhsana . 2009:18:13-27.  8. Zach WEEMS, Lester MALLOY, Tresa S, Evangelina P, Davin T, Yamilet M, Timi B, Romelia H, Tegan R, Quinn K, Domi L, Fredi WILLSON, Erin MALLOY, Ruiz DF, Nneka MR, The Breast Cancer Susceptibility Collaboration (UK) & Loy NAJERA. CARINA mutations that cause ataxia-telangiectasia are breast cancer susceptibility alleles. Nature Genetics. 2006;38:873-875  9. Emmanuel N , Loi Y, Gayle J, Josef L, Alen GM , Amrit ML, Ben S, Romero AG, Hector S, Lanie ML, Becky J , Miri R, Sandoval SZ, Mauro JR, Oleg VE, Mark M, Vomervat B,  Manuela N, Shannon RH, Katiana KW, and Kalie AP. CARINA mutations in patients with hereditary pancreatic cancer. Cancer Discover. 2012;2:41-46  10. Lv WILSON, et al. Breast-Cancer Risk in Families with Mutations in PALB2. NEJM. 2014; 371(6):497-506.  11. CHEK2 Breast Cancer Case-Control Consortium. CHEK2*1100delC and susceptibility to breast cancer: A collaborative analysis involving 10,860 breast cancer cases and 9,065 controls from 10 studies. Am J Hum Rukhsana, 74 (2004), pp. 3257-5494  12. Satish T, Natty S, Jermain K, et al. Spectrum of Mutations in BRCA1, BRCA2, CHEK2, and TP53 in Families at High Risk of Breast Cancer. KANNAN. 2006;295(12):6818-5886.   13. Evangelina C, Be D, Paresh A, et al. Risk of breast cancer in women with a CHEK2 mutation with and without a family history of breast cancer. J Clin Oncol. 2011;29:4098-8694.  14. Song H, Karl E, Ram SJ, et al. Contribution of germline mutations in the RAD51B, RAD51C, and RAD51D genes to ovarian cancer in the population. J Clin Oncol. 2015;33(26):6267-6974. Doi:10.1200/JCO.2015.61.2408.  15. Michael T, Brock DF, Blas P, et al. Mutations in BRIP1 confer high risk of ovarian cancer. Rhona Rukhsana. 2011;43(11):0393-3690. doi:10.1038/ng.955.

## 2020-12-21 ENCOUNTER — VIRTUAL VISIT (OUTPATIENT)
Dept: ONCOLOGY | Facility: CLINIC | Age: 40
End: 2020-12-21
Attending: OBSTETRICS & GYNECOLOGY
Payer: COMMERCIAL

## 2020-12-21 ENCOUNTER — PRE VISIT (OUTPATIENT)
Dept: ONCOLOGY | Facility: CLINIC | Age: 40
End: 2020-12-21

## 2020-12-21 DIAGNOSIS — Z80.3 FAMILY HISTORY OF MALIGNANT NEOPLASM OF BREAST: ICD-10-CM

## 2020-12-21 DIAGNOSIS — Z80.41 FAMILY HISTORY OF OVARIAN CANCER: Primary | ICD-10-CM

## 2020-12-21 DIAGNOSIS — Z80.52 FAMILY HISTORY OF BLADDER CANCER: ICD-10-CM

## 2020-12-21 DIAGNOSIS — Z80.49 FAMILY HISTORY OF UTERINE CANCER: ICD-10-CM

## 2020-12-21 PROCEDURE — 96040 HC GENETIC COUNSELING, EACH 30 MINUTES: CPT | Mod: TEL | Performed by: GENETIC COUNSELOR, MS

## 2020-12-21 NOTE — LETTER
"    12/21/2020         RE: Christina Santana  [address]  [facility] [address]      12/21/2020    Referring Provider: Leyda Guzman DO    Christina Santana is a 40 year old female who is being evaluated via a billable telephone visit.      The patient has been notified of following:   \"This telephone visit will be conducted via a call between you and your provider. We have found that certain health care needs can be provided without the need for an in-person physical exam.  This service lets us provide the care you need with a telephone conversation. If lab work is needed we can place an order for that and you can then stop by our lab to have the test done at a later time.    Telephone visits are billed at different rates depending on your insurance coverage.  Please reach out to your insurance provider with any questions. If during the course of the call the provider feels a telephone visit is not appropriate, you will not be charged for this service.\"    Patient has given verbal consent for telephone visit? YES  Will anyone else be joining your telephone visit? NO    Telephone-Visit Details  Type of service:  Telephone Visit  Telephone Start Time: 9:00  Telephone End Time (time telephone stopped): 10:00  Originating Location (pt. Location): Home  Distant Location (provider location):  [facility]  Mode of Communication:  Telephone    Presenting Information:   I spoke with Christina Santana today for a telephone telehealth visit for genetic counseling to discuss her family history of breast, ovarian/uterine and bladder cancer. We reviewed her personal and family history, discussed cancer screening recommendations, and went over available genetic testing options.    Personal History:  Christina is a 40 year old female. She does not have any personal history of cancer.      She had her first menstrual period at age 14, her first child at age 21, and is premenopausal.  Christina has her ovaries, " fallopian tubes and uterus in place, and she has had ovarian cancer screening in her past due to a history of ovarian cysts. She has a history of abnormal PAP smears; in 2017 atypia suggestive of low grade squamous intraepithelial lesion (BREEZY 1) was detected and in 2013 she had high grade squamous intraepithelial lesion (HSIL) encompassing moderate and severe dysplasia, CIN2-3/CIS. She reported that a quarter of her cervix was removed and since then her recent PAP smear (2019) has been normal. She reports about 2 years of oral contraceptive use and she has not had hormone replacement therapy.      She has not yet had clinical breast exams and mammograms. Christina has not had a colonoscopy. She had an upper GI in 2018 that found a medium-sized hiatal hernia and benign-appearing esophageal stenosis. She does not regularly do any other cancer screening at this time.     Family History: (Please see scanned pedigree for detailed family history information)    Christina has three sons (19, 13, 5) and three daughters (17, 11, 8). They have no cancer history.     She has a brother (37) and a sister (35) with no cancer history.    Her mother is 63 and has no cancer history. She does have a history of benign fibroids.     One maternal aunt had uterine/ovarian cancer in her 40's with chemotherapy as treatment. She had a hysterectomy in her 50's and is currently 69. She had a prior history of fibroids.     Another maternal aunt had uterine/ovarian cancer in her mid-to-late 40's (possibly early 50's) and had a hysterectomy; she is currently 60. She had a prior history of fibroids.     A third maternal aunt had a hysterectomy at 25 but it is uncertain if she had uterine or ovarian cancer.     Her maternal grandfather had bladder cancer in his 70's that spread to his kidneys and he passed away at 94. He had smoking history as a young man only.     There are no other relatives with known cancer history on her maternal side.    Her  father is 63 and has no cancer history.     Her paternal aunt had breast cancer at 54 and had a bilateral mastectomy; she is currently 58. She reportedly had genetic testing but Christina was unsure if she was positive for a gene mutation.     Her paternal grandmother had breast cancer after 50 and it recurred twice. She is currently 84.    There are no other relatives with known cancer history on her paternal side.    Her maternal ethnicity is Kenyan and [place]ian. Her paternal ethnicity is Frisian.  There is no known Ashkenazi Baptism ancestry on either side of her family. There is no reported consanguinity.    Discussion:    We reviewed the features of sporadic, familial, and hereditary cancers. In looking at Christina's family history, it is possible that a cancer susceptibility gene is present due to her maternal aunts' uterine/ovarian cancer and her grandfather's bladder cancer as well as her paternal aunt and grandmother's breast cancer.    We discussed the natural history and genetics of Hereditary Breast and Ovarian Cancer (HBOC), which is caused by a mutation in the BRCA1 or BRCA2 gene.  HBOC typically presents with multiple family members diagnosed with breast cancer before age 50 and/or ovarian cancer. Other cancer risks associated with HBOC include male breast cancer, prostate cancer, pancreatic cancer, and melanoma.     We also discussed Reed syndrome, which can be caused by a mutation in one of five genes:  MLH1, MSH2, MSH6, PMS2, and EPCAM. A single mutation in one of the Reed Syndrome genes increases the risk for several cancers, such as colon, endometrial, ovarian, and stomach.  Other cancers that can be seen in some families with Reed Syndrome include pancreatic, bladder, biliary tract, urothelial, small bowel, prostate, breast and brain cancers. Based on her personal and family history, Christina meets current National Comprehensive Cancer Network (NCCN) criteria for genetic testing of MLH1,MSH2, MSH6,  PMS2, and EPCAM.     A detailed handout regarding BRCA1/2, Reed syndrome, and the information we discussed will be provided to Christina in the mail and can be found in the after visit summary. Topics included: inheritance pattern, cancer risks, cancer screening recommendations, and also risks, benefits and limitations of testing.    We discussed that there are additional genes that could cause increased risk for breast, ovarian/uterine and bladder cancer. As many of these genes present with overlapping features in a family and accurate cancer risk cannot always be established based upon the pedigree analysis alone, it would be reasonable for Christina to consider panel genetic testing to analyze multiple genes at once.    We reviewed genetic testing options for the cancers seen in her family history that suggest a hereditary cause: an actionable high/moderate risk gene custom panel and an expanded high and moderate risk custom panel. Christina expressed an interest in learning as much information as possible from the testing and opted for an expanded panel.  Genetic testing is available for 77 genes associated with increased risk for many different cancers: CancerNext-Expanded (AIP, ALK, APC, CARINA, AXIN2, BAP1, BARD1, BLM, BMPR1A, BRCA1, BRCA2, BRIP1, CDC73, CDH1, CDK4, CDKN1B, CDKN2A, CHEK2, CTNNA1, DICER1, EPCAM, EGFR, EGLN1, FANCC, FH, FLCN, GALNT12, GREM1, HOXB13, KIF1B, KIT, LZTR1, MAX, MEN1, MET, MITF, MLH1, MRE11, MSH2, MSH3, MSH6, MUTYH, NBN, NF1, NF2, NTHL1, PALB2, PDGFRA, PHOX2B, PMS2, POLD1, POLE, POT1, MNUXM7W, PTCH1, PTEN, RAD50, RAD51C, RAD51D, RB1, RECQL, RET, SDHA, SDHAF2, SDHB, SDHC, SDHD, SMAD4, SMARCA4, SMARCB1, SMARCE1, STK11, SUFU, WSMM076, TP53, TSC1, TSC2, VHL, and XRCC2).  We discussed that many of the genes in the CancerNext-Expanded panel are associated with specific hereditary cancer syndromes and have published management guidelines:  Hereditary Breast and Ovarian Cancer syndrome (BRCA1, BRCA2),  Reed syndrome (MLH1, MSH2, MSH6, PMS2, EPCAM), Familial Adenomatous Polyposis (APC), Hereditary Diffuse Gastric Cancer (CDH1), Familial Atypical Multiple Mole Melanoma syndrome (CDK4, CDKN2A), Juvenile Polyposis syndrome (BMPR1A, SMAD4), Cowden syndrome (PTEN), Li Fraumeni syndrome (TP53), Peutz-Jeghers syndrome (STK11), MUTYH Associated Polyposis (MUTYH), Hereditary Leiomyomatosis and Renal Cell Cancer (FH), Nbbb-Yxmv-Bclr (FLCN), Hereditary Papillary Renal Carcinoma (MET), Hereditary Paraganglioma and Pheochromocytoma syndrome (SDHA, SDHAF2, SDHB, SDHC, SDHD), Multiple Endocrine Neoplasia type 2 (RET), Tuberous sclerosis complex (TSC1, TSC2), Von Hippel-Lindau disease (VHL), Neurofibromatosis type 1 (NF1), Neurofibromatosis type 2 (NF2), Multiple Endocrine Neoplasia type 1 (MEN1), Multiple Endocrine Neoplasia type 4 (CDKN1B), Gorlin syndrome (SUFU, PTCH1), and Ferguson complex (BHBHY9U).  The CARINA, AXIN2, BARD1, BRIP1, CHEK2, GREM1, MSH3, NBN, NTHL1, PALB2, POLD1, POLE, RAD51C, and RAD51D genes are associated with increased cancer risk and have published management guidelines for certain cancers.    The remaining genes (AIP, ALK, BAP1, BLM, CDC73, CTNNA1, DICER1, EGFR, EGLN1, FANCC, GALNT12, HOXB13, KIF1B, KIT, LZTR1, MRE11, MAX, MITF, PDGFRA, PHOX2B, POT1, RAD50, RECQL, RB1, SMARCA4, SMARCB1, SMARCE1, HQIB507, and XRCC2) are associated with increased cancer risk and may allow us to make medical recommendations when mutations are identified.      Christina gave verbal consent virtually due to COVID-19 restrictions for genetic testing via [facility]. Turnaround time: 4-6 weeks.    Due to COVID-19 restrictions, we now can send saliva kits from [facility] to patients. They will receive a kit directly to their home with directions on how to collect a saliva sample. We discussed that the success of the testing depends on how well she follows the directions and that there is a small chance for sample failure. Christina  verbalized understanding of this. Once the sample is collected, she will send it to [facility] using the return envelope and prepaid shipping label.     Medical Management: For Christina, we reviewed that the information from genetic testing may determine:    additional cancer screening for which Christina may qualify (i.e., mammogram and breast MRI, more frequent colonoscopies, more frequent dermatologic exams, etc.),    options for risk reducing surgeries Christina could consider (i.e., bilateral mastectomy, surgery to remove her ovaries and/or uterus, etc.),      and targeted chemotherapies if she were to develop certain cancers in the future (i.e. immunotherapy for individuals with Reed syndrome, PARP inhibitors, etc.).     These recommendations and possible targeted chemotherapies will be discussed in detail once genetic testing is completed.     Plan:  1) Today Christina elected to proceed with genetic testing using the CancerNext-Expanded panel offered by [facility].  2) A copy of the consent form was emailed and after visit summary will be sent in the mail.   3) Once the saliva sample is received by [facility], results should be available in 4-6 weeks.   4) We will discuss Christina's results virtually due to COVID-19 restrictions.    Time spent on the telephone visit: 60 minutes    Jc Monzon MS, [facility]  Licensed, certified genetic counselor  [phone]                Assessing Cancer Risk  Only about 5-10% of cancers are thought to be due to an inherited cancer susceptibility gene.    These families often have:    Several people with the same or related types of cancer    Cancers diagnosed at a young age (before age 50)    Individuals with more than one primary cancer    Multiple generations of the family affected with cancer    Some people may be candidates for genetic testing of more than one gene.  For these families, genetic testing using a cancer panel may be offered.  These panels will test different  genes known to increase the risk for breast, ovarian, uterine, and/or other cancers. All of the genes discussed below have published clinical management guidelines for individuals who are found to carry a mutation. The purpose of this handout is to serve as a brief summary of the genes analyzed by the panels used to inquire about hereditary breast and gynecologic cancer:  CARINA, BRCA1, BRCA2, BRIP1, CDH1, CHEK2, MLH1, MSH2, MSH6, PMS2, EPCAM, PTEN, PALB2, RAD51C, RAD51D, and TP53.  ______________________________________________________________________________  Hereditary Breast and Ovarian Cancer Syndrome   (BRCA1 and BRCA2)  A single mutation in one of the copies of BRCA1 or BRCA2 increases the risk for breast and ovarian cancer, among others.  The risk for pancreatic cancer and melanoma may also be slightly increased in some families.  The chart below shows the chance that someone with a BRCA mutation would develop cancer in his or her lifetime1,2,3,4.        A person s ethnic background is also important to consider, as individuals of Ashkenazi Jew ancestry have a higher chance of having a BRCA gene mutation.  There are three BRCA mutations that occur more frequently in this population.    Reed Syndrome   (MLH1, MSH2, MSH6, PMS2, and EPCAM)  Currently five genes are known to cause Reed Syndrome: MLH1, MSH2, MSH6, PMS2, and EPCAM.  A single mutation in one of the Reed Syndrome genes increases the risk for colon, endometrial, ovarian, and stomach cancers.  Other cancers that occur less commonly in Reed Syndrome include urinary tract, skin, and brain cancers.  The chart below shows the chance that a person with Reed syndrome would develop cancer in his or her lifetime5.      *Cancer risk varies depending on Reed syndrome gene found    Cowden Syndrome   (PTEN)  Cowden syndrome is a hereditary condition that increases the risk for breast, thyroid, endometrial, colon, and kidney cancer.  Cowden syndrome is  caused by a mutation in the PTEN gene.  A single mutation in one of the copies of PTEN causes Cowden syndrome and increases cancer risk.  The chart below shows the chance that someone with a PTEN mutation would develop cancer in their lifetime6,7.  Other benign features seen in some individuals with Cowden syndrome include benign skin lesions (facial papules, keratoses, lipomas), learning disability, autism, thyroid nodules, colon polyps, and larger head size.      *One recent study found breast cancer risk to be increased to 85%    Li-Fraumeni Syndrome   (TP53)  Li-Fraumeni Syndrome (LFS) is a cancer predisposition syndrome caused by a mutation in the TP53 gene. A single mutation in one of the copies of TP53 increases the risk for multiple cancers. Individuals with LFS are at an increased risk for developing cancer at a young age. The lifetime risk for development of a LFS-associated cancer is 50% by age 30 and 90% by age 60.   Core Cancers: Sarcomas, Breast, Brain, Lung, Leukemias/Lymphomas, Adrenocortical carcinomas  Other Cancers: Gastrointestinal, Thyroid, Skin, Genitourinary    Hereditary Diffuse Gastric Cancer   (CDH1)  Currently, one gene is known to cause hereditary diffuse gastric cancer (HDGC): CDH1.  Individuals with HDGC are at increased risk for diffuse gastric cancer and lobular breast cancer. Of people diagnosed with HDGC, 30-50% have a mutation in the CDH1 gene.  This suggests there are likely other genes that may cause HDGC that have not been identified yet.      Lifetime Cancer Risks    General Population HDGC    Diffuse Gastric  <1% ~80%   Breast 12% 39-52%         Additional Genes  CARINA  CARINA is a moderate-risk breast cancer gene. Women who have a mutation in CARINA can have between a 2-4 fold increased risk for breast cancer compared to the general population8. CARINA mutations have also been associated with increased risk for pancreatic cancer, however an estimate of this cancer risk is not well  understood9. Individuals who inherit two CARINA mutations have a condition called ataxia-telangiectasia (AT).  This rare autosomal recessive condition affects the nervous system and immune system, and is associated with progressive cerebellar ataxia beginning in childhood.  Individuals with ataxia-telangiectasia often have a weakened immune system and have an increased risk for childhood cancers.    PALB2  Mutations in PALB2 have been shown to increase the risk of breast cancer up to 33-58% in some families; where individuals fall within this risk range is dependent upon family [id number]. PALB2 mutations have also been associated with increased risk for pancreatic cancer, although this risk has not been quantified yet.  Individuals who inherit two PALB2 mutations--one from their mother and one from their father--have a condition called Fanconi Anemia.  This rare autosomal recessive condition is associated with short stature, developmental delay, bone marrow failure, and increased risk for childhood cancers.    CHEK2   CHEK2 is a moderate-risk breast cancer gene.  Women who have a mutation in CHEK2 have around a 2-fold increased risk for breast cancer compared to the general population, and this risk may be higher depending upon family history.11,12,13 Mutations in CHEK2 have also been shown to increase the risk of a number of other cancers, including colon and prostate, however these cancer risks are currently not well understood.    BRIP1, RAD51C and RAD51D  Mutations in BRIP1, RAD51C, and RAD51D have been shown to increase the risk of ovarian cancer and possibly female breast cancer as well14,15 .       Lifetime Cancer Risk    General Population BRIP1 RAD51C RAD51D   Ovarian 1-2% ~5-8% ~5-9% ~7-15%           Inheritance  All of the cancer syndromes reviewed above are inherited in an autosomal dominant pattern.  This means that if a parent has a mutation, each of his or her children will have a 50% chance of  inheriting that same mutation.  Therefore, each child--male or female--would have a 50% chance of being at increased risk for developing cancer.      Image obtained from Genetics Home Reference, 2013     Mutations in some genes can occur de rebecca, which means that a person s mutation occurred for the first time in them and was not inherited from a parent.  Now that they have the mutation, however, it can be passed on to future generations.    Genetic Testing  Genetic testing involves a blood test and will look at the genetic information in the CARINA, BRCA1, BRCA2, BRIP1, CDH1, CHEK2, MLH1, MSH2, MSH6, PMS2, EPCAM, PTEN, PALB2, RAD51C, RAD51D, and TP53 genes for any harmful mutations that are associated with increased cancer risk.  If possible, it is recommended that the person(s) who has had cancer be tested before other family members.  That person will give us the most useful information about whether or not a specific gene is associated with the cancer in the family.    Results  There are three possible results of genetic testing:    Positive--a harmful mutation was identified in one or more of the genes    Negative--no mutation was identified in any of the genes on this panel    Variant of unknown significance--a variation in one of the genes was identified, but it is unclear how this impacts cancer risk in the family    Advantages and Disadvantages   There are advantages and disadvantages to genetic testing.    Advantages    May clarify your cancer risk    Can help you make medical decisions    May explain the cancers in your family    May give useful information to your family members (if you share your results)    Disadvantages    Possible negative emotional impact of learning about inherited cancer risk    Uncertainty in interpreting a negative test result in some situations    Possible genetic discrimination concerns (see below)    Genetic Information Nondiscrimination Act (ADI)  ADI is a federal law that  protects individuals from health insurance or employment discrimination based on a genetic test result alone.  Although rare, there are currently no legal discrimination protections in terms of life insurance, long term care, or disability insurances.  Visit the National [employer] [place] website to learn more.    Reducing Cancer Risk  All of the genes described above have nationally recognized cancer screening guidelines that would be recommended for individuals who test positive.  In addition to increased cancer screening, surgeries may be offered or recommended to reduce cancer risk.  Recommendations are based upon an individual s genetic test result as well as their personal and family history of cancer.    Questions to Think About Regarding Genetic Testing:    What effect will the test result have on me and my relationship with my family members if I have an inherited gene mutation?  If I don t have a gene mutation?    Should I share my test results, and how will my family react to this news, which may also affect them?    Are my children ready to learn new information that may one day affect their own health?    Hereditary Cancer Resources    FORCE: Facing Our Risk of Cancer Empowered facingourrisk.org   Bright Pink bebrightpink.org   Li-Fraumeni Syndrome Association lfsassociation.org   PTEN World PTENworld.[employer]   No stomach for cancer, Inc. nostomachforcancer.org   Stomach cancer relief network Scrnet.org   Collaborative Group of the Americas on Inherited Colorectal Cancer (CGA) cgaicc.com    Cancer Care cancercare.org   American Cancer Society (ACS) cancer.org   National Cancer [place] (NCI) cancer.gov     Please call us if you have any questions or concerns.   Cancer Risk Management Program [phone] ([phone])  ? Candido Moreno, MS, [facility] [phone]  ? July Briones, MS, [facility]  [phone]  ? Emma Evans, MS, [facility]  [phone]  ? Stacie Stern, MS, [facility] [phone]  ? Belen Rao,  MS, [facility] [phone]  ? Jc Monzon, MS, [facility]  [phone]  ? Raquel Diamond, MS, [facility]  [phone]    References  1. Anabelle Mead PDP, Jaimie S, Risvioleta CALIX, Rashid JE, Agnes JL, Terri N, Araceli H, Willis O, Renita A, Pasini B, Radimahesh P, Manjessie S, Erin LISA, Vu N, Asa E, Mackenzie H, Pool E, Omid J, Saskia J, Mars B, Tulinius H, Thorlacius S, Eerola H, Nevlizetlinna H, Rony K, Facundo OP. Average risks of breast and ovarian cancer associated with BRCA1 or BRCA2 mutations detected in case series unselected for family history: a combined analysis of 222 studies. Am J Hum Rukhsana. 2003;72:1117-30.  2. Sabi N, Laurel SÁNCHEZ, Dominique G.  BRCA1 and BRCA2 Hereditary Breast and Ovarian Cancer. Gene Reviews online. 2013.  3. Severiano YC, Iban S, Adeel G, Arthur S. Breast cancer risk among male BRCA1 and BRCA2 mutation carriers. J Natl Cancer Inst. 2007;99:1811-4.  4. Fredi WILLSON, Theresa I, Bhupinder J, Jatin E, Marlene ER, Enzo F. Risk of breast cancer in male BRCA2 carriers. J Med Rukhsana. 2010;47:710-1.  5. National Comprehensive Cancer Network. Clinical practice guidelines in oncology, colorectal cancer screening. Available online (registration required). 2015.  6. Mayo MH, Jeremy J, Lisa J, Lio MAZA, Harvey MS, Eng C. Lifetime cancer risks in individuals with germline PTEN mutations. Clin Cancer Res. 2012;18:400-7.  7. Daysi SHERIDAN. Cowden Syndrome: A Critical Review of the Clinical Literature. J Rukhsana . 2009:18:13-27.  8. Zach WEEMS, Lester MALLOY, Tresa S, Evangelina P, Chagtai T, Yamilet M, Timi B, Romelia H, Tegan R, Quinn K, Domi L, Fredi WILLSON, Erin MALLOY, Ruiz DF, Nneka MR, The Breast Cancer Susceptibility Collaboration (UK) & Loy N. CARINA mutations that cause ataxia-telangiectasia are breast cancer susceptibility alleles. Nature Genetics. 2006;38:873-875  9. Emmanuel N , Loi Y, Gayle J, Josef L, Alen VERA , Amrit ML, Ben S, Heather AG, Hector S, Lanie ML, Becky ROGERS ,  Miri R, Sandoval SZ, Mauro JR, Oleg VE, Mark M, Vogelstein B, Manuela N, Shannon RH, Katiana KW, and Kalie AP. CARINA mutations in patients with hereditary pancreatic cancer. Cancer Discover. 2012;2:41-46  10. Lv WILSON et al. Breast-Cancer Risk in Families with Mutations in PALB2. NEJM. 2014; 371(6):497-506.  11. CHEK2 Breast Cancer Case-Control Consortium. CHEK2*1100delC and susceptibility to breast cancer: A collaborative analysis involving 10,860 breast cancer cases and 9,065 controls from 10 studies. Am J Hum Rukhsana, 74 (2004), pp. 9149-0002  12. Satish T, Natty S, Jermain K, et al. Spectrum of Mutations in BRCA1, BRCA2, CHEK2, and TP53 in Families at High Risk of Breast Cancer. KANNAN. 2006;295(12):4575-7609.   13. Evangelina C, Be D, Paresh WEEMS, et al. Risk of breast cancer in women with a CHEK2 mutation with and without a family history of breast cancer. J Clin Oncol. 2011;29:1001-5791.  14. Yobani H, Karl E, Ram SJ, et al. Contribution of germline mutations in the RAD51B, RAD51C, and RAD51D genes to ovarian cancer in the population. J Clin Oncol. 2015;33(26):2536-3341. Doi:10.1200/JCO.2015.61.2408.  15. Michael T, Brock DF, Blas P, et al. Mutations in BRIP1 confer high risk of ovarian cancer. Rhona Rukhsana. 2011;43(11):2806-2587. doi:10.1038/ng.955.            H. Jc Monzon GC

## 2020-12-28 ENCOUNTER — OFFICE VISIT (OUTPATIENT)
Dept: INTERNAL MEDICINE | Facility: CLINIC | Age: 40
End: 2020-12-28
Payer: COMMERCIAL

## 2020-12-28 VITALS
SYSTOLIC BLOOD PRESSURE: 110 MMHG | HEART RATE: 86 BPM | BODY MASS INDEX: 28.89 KG/M2 | RESPIRATION RATE: 16 BRPM | TEMPERATURE: 98.2 F | OXYGEN SATURATION: 98 % | DIASTOLIC BLOOD PRESSURE: 70 MMHG | WEIGHT: 179 LBS

## 2020-12-28 DIAGNOSIS — R63.5 WEIGHT GAIN: Primary | ICD-10-CM

## 2020-12-28 DIAGNOSIS — R79.89 ELEVATED SERUM FREE T4 LEVEL: ICD-10-CM

## 2020-12-28 DIAGNOSIS — L65.9 HAIR LOSS: ICD-10-CM

## 2020-12-28 DIAGNOSIS — M25.50 MULTIPLE JOINT PAIN: ICD-10-CM

## 2020-12-28 PROCEDURE — 99214 OFFICE O/P EST MOD 30 MIN: CPT | Performed by: INTERNAL MEDICINE

## 2020-12-28 ASSESSMENT — PAIN SCALES - GENERAL: PAINLEVEL: SEVERE PAIN (6)

## 2020-12-28 NOTE — PROGRESS NOTES
Subjective     Christina Santana is a 40 year old female who presents to clinic today for the following health issues:    HPI         Chief Complaint   Patient presents with     Second Opinion     hair loss, all over body aches and gained 15 lbs since 10/14/20-had some lab work done in October       Always trained and always hurts, thought from training.  But wonders about something else    Gained 25 pounds since September 1st, not training but active and 10,000 steps a day.  Was seen at [facility] after a month and 10 pounds gained, losing some hair as well.  Labs were ok then.      Some bloating as well has done many different diets like gluten free, lactose free.      Did gyn consult and genetic testing for family history of gyn cancers.     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      Past Medical History:   Diagnosis Date     ASCUS with positive high risk HPV 12/27/13     HSIL (high grade squamous intraepithelial lesion) on Pap smear of cervix 4/22/13     Pap smear of cervix with ASCUS, cannot exclude HGSIL 5/23/11, 8/10/12,      Past Surgical History:   Procedure Laterality Date     APPENDECTOMY       AS REPAIR PARAESOPHAGEAL HIATAL HERNIA,LAPAROTOMY, W MESH       No current outpatient medications on file.     No current facility-administered medications for this visit.      Social History     Tobacco Use     Smoking status: Never Smoker     Smokeless tobacco: Never Used   Substance Use Topics     Alcohol use: No     Drug use: No         Review of Systems   CONSTITUTIONAL:POSITIVE  for weight gain  INTEGUMENTARY/SKIN: NEGATIVE for worrisome rashes, moles or lesions  EYES: NEGATIVE for vision changes or irritation  ENT/MOUTH: NEGATIVE for ear, mouth and throat problems  RESP: NEGATIVE for significant cough or SOB  BREAST: NEGATIVE for masses, tenderness or discharge  CV: NEGATIVE for chest pain, palpitations or peripheral edema  GI: NEGATIVE for nausea, abdominal pain, heartburn, or change in bowel habits  : NEGATIVE for  frequency, dysuria, or hematuria  MUSCULOSKELETAL:POSITIVE  for joint pain   NEURO: NEGATIVE for weakness, dizziness or paresthesias  ENDOCRINE: NEGATIVE for temperature intolerance, skin/hair changes  HEME: NEGATIVE for bleeding problems  PSYCHIATRIC: NEGATIVE for changes in mood or affect      Objective    /70   Pulse 86   Temp 98.2  F (36.8  C) (Temporal)   Resp 16   Wt 81.2 kg (179 lb)   SpO2 98%   BMI 28.89 kg/m    Body mass index is 28.89 kg/m .  Physical Exam   GENERAL: healthy, alert and no distress  NECK: no adenopathy, no asymmetry, masses, or scars and thyroid normal to palpation  RESP: lungs clear to auscultation - no rales, rhonchi or wheezes  CV: regular rate and rhythm, normal S1 S2, no S3 or S4, no murmur, click or rub, no peripheral edema and peripheral pulses strong  ABDOMEN: soft, nontender, no hepatosplenomegaly, no masses and bowel sounds normal  MS: no gross musculoskeletal defects noted, no edema  SKIN: no suspicious lesions or rashes  NEURO: Normal strength and tone, mentation intact and speech normal  PSYCH: mentation appears normal, affect normal/bright    No results found for this or any previous visit (from the past 24 hour(s)).        Assessment & Plan     Christina was seen today for second opinion.    Diagnoses and all orders for this visit:    Weight gain  -     TSH with free T4 reflex  -     T4, free  -     T3, Free  -     Lyme Disease Clementina with reflex to WB Serum  -     Anti Nuclear Clementina IgG by IFA with Reflex  -     CRP, inflammation  -     ESR: Erythrocyte sedimentation rate    Hair loss  -     TSH with free T4 reflex  -     T4, free  -     T3, Free  -     Lyme Disease Clementina with reflex to WB Serum  -     Anti Nuclear Clementina IgG by IFA with Reflex  -     CRP, inflammation  -     ESR: Erythrocyte sedimentation rate    Multiple joint pain  -     TSH with free T4 reflex  -     T4, free  -     T3, Free  -     Lyme Disease Clementina with reflex to WB Serum  -     Anti Nuclear Clementina IgG by  "IFA with Reflex  -     CRP, inflammation  -     ESR: Erythrocyte sedimentation rate      Patient who has multiple joint pains, hair loss, weight gain over the last 4 months of 25 pounds.  She used to be active with running marathons and triathlons.  She does have 6 children between 6 and 19 and is to do more time with them.  She works as a patient care rep here.  He does not smoke or drink.    We will do a lab work-up to look at her thyroid with a free T4 and T3 and TSH we will check for Lyme's disease BETTY CRP and sed rate.  I do not think she has psoriasis or psoriatic arthritis no rheumatoid arthritis.  We discussed that she may have some fibromyalgia with bilateral tenderness in her shoulders and hips exercise may be the best treatment eventually with an antidepressant to help as well such as Cymbalta we will start off with the labs and then consider other treatments.       BMI:   Estimated body mass index is 28.89 kg/m  as calculated from the following:    Height as of 8/4/20: 1.676 m (5' 6\").    Weight as of this encounter: 81.2 kg (179 lb).   Weight management plan: Discussed healthy diet and exercise guidelines             Return for Follow up this diagnosis, If not better with this diagnosis.    Alphonse Arellano MD  [facility]    "

## 2020-12-29 DIAGNOSIS — M25.50 MULTIPLE JOINT PAIN: ICD-10-CM

## 2020-12-29 DIAGNOSIS — R63.5 WEIGHT GAIN: ICD-10-CM

## 2020-12-29 DIAGNOSIS — L65.9 HAIR LOSS: ICD-10-CM

## 2020-12-29 LAB
CRP SERPL-MCNC: 5.4 MG/L (ref 0–8)
ERYTHROCYTE [SEDIMENTATION RATE] IN BLOOD BY WESTERGREN METHOD: 8 MM/H (ref 0–20)
T3FREE SERPL-MCNC: 4.6 PG/ML (ref 2.3–4.2)
T4 FREE SERPL-MCNC: 1.56 NG/DL (ref 0.76–1.46)
TSH SERPL DL<=0.005 MIU/L-ACNC: 0.63 MU/L (ref 0.4–4)

## 2020-12-29 PROCEDURE — 84439 ASSAY OF FREE THYROXINE: CPT | Performed by: INTERNAL MEDICINE

## 2020-12-29 PROCEDURE — 84481 FREE ASSAY (FT-3): CPT | Performed by: INTERNAL MEDICINE

## 2020-12-29 PROCEDURE — 85652 RBC SED RATE AUTOMATED: CPT | Performed by: INTERNAL MEDICINE

## 2020-12-29 PROCEDURE — 86038 ANTINUCLEAR ANTIBODIES: CPT | Performed by: INTERNAL MEDICINE

## 2020-12-29 PROCEDURE — 36415 COLL VENOUS BLD VENIPUNCTURE: CPT | Performed by: INTERNAL MEDICINE

## 2020-12-29 PROCEDURE — 84443 ASSAY THYROID STIM HORMONE: CPT | Performed by: INTERNAL MEDICINE

## 2020-12-29 PROCEDURE — 86140 C-REACTIVE PROTEIN: CPT | Performed by: INTERNAL MEDICINE

## 2020-12-29 PROCEDURE — 86618 LYME DISEASE ANTIBODY: CPT | Performed by: INTERNAL MEDICINE

## 2020-12-30 LAB
ANA SER QL IF: NEGATIVE
B BURGDOR IGG+IGM SER QL: 0.12 (ref 0–0.89)

## 2021-01-09 ENCOUNTER — HEALTH MAINTENANCE LETTER (OUTPATIENT)
Age: 41
End: 2021-01-09

## 2021-01-11 ENCOUNTER — THERAPY VISIT (OUTPATIENT)
Dept: CHIROPRACTIC MEDICINE | Facility: CLINIC | Age: 41
End: 2021-01-11
Payer: COMMERCIAL

## 2021-01-11 DIAGNOSIS — M62.838 MUSCLE SPASM: ICD-10-CM

## 2021-01-11 DIAGNOSIS — M99.02 SEGMENTAL DYSFUNCTION OF THORACIC REGION: ICD-10-CM

## 2021-01-11 DIAGNOSIS — M99.01 SEGMENTAL DYSFUNCTION OF CERVICAL REGION: Primary | ICD-10-CM

## 2021-01-11 DIAGNOSIS — Z80.3 FAMILY HISTORY OF MALIGNANT NEOPLASM OF BREAST: ICD-10-CM

## 2021-01-11 DIAGNOSIS — M99.03 SEGMENTAL DYSFUNCTION OF LUMBAR REGION: ICD-10-CM

## 2021-01-11 DIAGNOSIS — M54.2 CERVICALGIA: ICD-10-CM

## 2021-01-11 DIAGNOSIS — Z80.49 FAMILY HISTORY OF UTERINE CANCER: ICD-10-CM

## 2021-01-11 DIAGNOSIS — Z80.41 FAMILY HISTORY OF OVARIAN CANCER: ICD-10-CM

## 2021-01-11 DIAGNOSIS — M99.04 SEGMENTAL DYSFUNCTION OF SACRAL REGION: ICD-10-CM

## 2021-01-11 DIAGNOSIS — Z80.52 FAMILY HISTORY OF BLADDER CANCER: ICD-10-CM

## 2021-01-11 PROCEDURE — 98941 CHIROPRACT MANJ 3-4 REGIONS: CPT | Mod: AT | Performed by: CHIROPRACTOR

## 2021-01-11 NOTE — PROGRESS NOTES
Visit #:  2 of 8 based on treatment plan 6/8/2020    Subjective:  Christina Santana is a 40 year old female who is seen in f/u up for:        Cervicalgia  Muscle spasm.     Since last visit on 10/23/2020,  Christina Santana reports the following changes: Patient presents and states that she struggled this weekend with neck, lower back and shoulder pain. She did fall 2 weeks ago on some rocks in [place] while hiking. But also, her Dr. Thinks that she has Fibromyalgia, too. She just feels sore all over. She rates her current pain 6-7/10. Yesterday her pain was 12/10. She typically will alternate ChinaGel and EO with ibuprofen. She does use ice and heat, too, but yesterday nothing was helping. She is having repeat labs at the end of January and then they will make a plan then.        Objective:  The following was observed:    P: palpatory tenderness upper back and neck, lower back    A: static palpation demonstrates intersegmental asymmetry     R: motion palpation notes restricted motion    T: muscle spasm at level(s): lumbar paraspinals, traps      Assessment:    Segmental spinal dysfunction/restrictions found at:  C2 RR, LRR  C5 LR, RRR  T1 RR, LRR  T5 E, FR  T10 E, FR  L5 RR, LRR  Left SI posterior    Diagnoses:      1. Cervicalgia    2. Muscle spasm        Patient's condition:  Patient had restrictions pre-manipulation and Patient had decreased motion prior to manipulation    Treatment effectiveness:  Post manipulation there is better intersegmental movement and Patient claims to feel looser post manipulation      Procedures:  CMT:  Cervical: Diversified, C2, C5, Supine  Thoracic: Diversified, T1, T5, T10, Prone  Lumbar/Pelvis: Drop assist, L5, Left, Prone    Modalities:  72831: Acupuncture, for 15 minutes:  Points: upper back and neck protocol - GV20, GV16, LI4, LI11, SI15, GB36, GB20, GB39, anton points in upper traps - NOT performed today    Therapeutic procedures:  MSTM to affected musculature, 5 min HYHPERVOLT        Prognosis: Good    Progress towards Goals: Patient is making progress towards the goal of:  Decrease pain in upper back and neck.  Decrease hypertonicity of upper traps.   Decrease Neck Disability from 16% to 8% functional impairment.      Response to Treatment:   Patient tolerated treatment well today.       Recommendations:    Instructions:ice 20 minutes every other hour as needed    Follow-up:  Continue with treatment plan PRN as patient works here now. .

## 2021-01-29 DIAGNOSIS — L65.9 HAIR LOSS: ICD-10-CM

## 2021-01-29 DIAGNOSIS — R79.89 ELEVATED SERUM FREE T4 LEVEL: ICD-10-CM

## 2021-01-29 LAB
MISCELLANEOUS TEST: NORMAL
TSH SERPL DL<=0.005 MIU/L-ACNC: 0.78 MU/L (ref 0.4–4)

## 2021-01-29 PROCEDURE — 84443 ASSAY THYROID STIM HORMONE: CPT | Performed by: INTERNAL MEDICINE

## 2021-01-29 PROCEDURE — 36415 COLL VENOUS BLD VENIPUNCTURE: CPT | Performed by: INTERNAL MEDICINE

## 2021-02-08 ENCOUNTER — TELEPHONE (OUTPATIENT)
Dept: FAMILY MEDICINE | Facility: OTHER | Age: 41
End: 2021-02-08

## 2021-02-08 ENCOUNTER — OFFICE VISIT (OUTPATIENT)
Dept: OBGYN | Facility: CLINIC | Age: 41
End: 2021-02-08
Payer: COMMERCIAL

## 2021-02-08 VITALS
HEART RATE: 93 BPM | BODY MASS INDEX: 28.81 KG/M2 | WEIGHT: 172.9 LBS | SYSTOLIC BLOOD PRESSURE: 116 MMHG | DIASTOLIC BLOOD PRESSURE: 70 MMHG | OXYGEN SATURATION: 97 % | HEIGHT: 65 IN | TEMPERATURE: 98.1 F

## 2021-02-08 DIAGNOSIS — Z80.3 FAMILY HISTORY OF MALIGNANT NEOPLASM OF BREAST: ICD-10-CM

## 2021-02-08 DIAGNOSIS — Z15.09 LYNCH SYNDROME: Primary | ICD-10-CM

## 2021-02-08 PROCEDURE — 99204 OFFICE O/P NEW MOD 45 MIN: CPT | Mod: 25 | Performed by: OBSTETRICS & GYNECOLOGY

## 2021-02-08 PROCEDURE — 88305 TISSUE EXAM BY PATHOLOGIST: CPT | Performed by: PATHOLOGY

## 2021-02-08 PROCEDURE — 58100 BIOPSY OF UTERUS LINING: CPT | Performed by: OBSTETRICS & GYNECOLOGY

## 2021-02-08 ASSESSMENT — MIFFLIN-ST. JEOR: SCORE: 1465.76

## 2021-02-08 NOTE — PROGRESS NOTES
Clinic Note    CC:    Chief Complaint   Patient presents with     Consult     ovarian cyst      HPI:  Christina Santana is a 40 year old  who presents for new acute onset  colicky and intermittently severe left adnexal pain though constantly present, as well as some nausea, at times worse than unmedicated child birth. Present for a week. Outside Ultrasound showed a 4cm simple cyst on the left. No vaginal, bladder, nor bowel symptoms, no dyspareunia, no systemic concerns.     Recent genetic screening completed. She meets with genetics this Thursday to review results.  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     Pelvic Ultrasound (): Uterus 9.5x5.3x4.3, EMS normal 9mm, Rt ovary 3.2cm, Lt ovary 3.7cm with 4cm simple cyst. No significant free fluid.     Ob Hx:  s/p  x6  Gyn Hx: Patient's last menstrual period was 2021.     Last pap NIL, HPV- (), remote hx of ASCH, HPV+, colposcopy pathology CIN1   Last mammogram never, ordered today   Using vasectomy for birth control  PMH:   Past Medical History:   Diagnosis Date     ASCUS with positive high risk HPV 13     HSIL (high grade squamous intraepithelial lesion) on Pap smear of cervix 13     Pap smear of cervix with ASCUS, cannot exclude HGSIL 11, 8/10/12,      SurgHx:   Past Surgical History:   Procedure Laterality Date     APPENDECTOMY       AS REPAIR PARAESOPHAGEAL HIATAL HERNIA,LAPAROTOMY, W MESH       FamHx:   Family History   Problem Relation Age of Onset     Bladder Cancer Maternal Grandfather      Kidney Cancer Maternal Grandfather      Breast Cancer Paternal Grandmother      Ovarian Cancer Maternal Aunt      Ovarian Cancer Maternal Aunt       "Breast Cancer Paternal Aunt      SocHx:   Social History     Socioeconomic History     Marital status:      Spouse name: None     Number of children: None     Years of education: None     Highest education level: None   Occupational History     None   Social Needs     Financial resource strain: None     Food insecurity     Worry: None     Inability: None     Transportation needs     Medical: None     Non-medical: None   Tobacco Use     Smoking status: Never Smoker     Smokeless tobacco: Never Used   Substance and Sexual Activity     Alcohol use: No     Drug use: No     Sexual activity: Yes     Partners: Male     Birth control/protection: Male Surgical     Comment: partner vas   Lifestyle     Physical activity     Days per week: None     Minutes per session: None     Stress: None   Relationships     Social connections     Talks on phone: None     Gets together: None     Attends Yazidism service: None     Active member of club or organization: None     Attends meetings of clubs or organizations: None     Relationship status: None     Intimate partner violence     Fear of current or ex partner: None     Emotionally abused: None     Physically abused: None     Forced sexual activity: None   Other Topics Concern     Parent/sibling w/ CABG, MI or angioplasty before 65F 55M? Not Asked   Social History Narrative     None     Allergies:   Patient has no known allergies.  Current Medications:   No current outpatient medications on file.     ROS: 10 point ROS negative other than as noted in the HPI    EXAM:  Vitals:    02/08/21 0749   BP: 116/70   BP Location: Right arm   Patient Position: Chair   Cuff Size: Adult Regular   Pulse: 93   Temp: 98.1  F (36.7  C)   TempSrc: Temporal   SpO2: 97%   Weight: 78.4 kg (172 lb 14.4 oz)   Height: 1.66 m (5' 5.35\")    Body mass index is 28.46 kg/m .    Gen: Alert, oriented, appropriately interactive, NAD  CV: RRR, no murmurs, no extra heart sounds, 2+ peripheral pulses  Resp: CTAB, " good effort without distress   Breasts: no axillary adenopathy, no dominant masses, no skin changes, no nipple discharge, nontender  Abdomen: soft, non tender, non distended, no masses, no hernias. No inguinal lymphadenopathy.   Perineum: no lesions; normal appearing external genitalia, bartholins glands, urethra, skenes glands  Vagina: no masses or lesions or discharge, normally rugated.  Cervix: no masses or lesions or discharge   Anus: appears normal  Bimanual exam:   Nontender pelvic floor muscles  Urethra: nontender   Bladder: nontender and without massess, well supported   Uterus: midline, retroverted, small, mobile  no masses, non-tender  Adnexa: no masses or tenderness appreciated   No cervical motion tenderness  MSK: normal gait, symmetric movements UE & LE  Lower extremities: non-tender, no edema    ASSESSMENT/PLAN: Christina Santana is a 40 year old  who presents for new acute onset  colicky and intermittently severe left adnexal pain. Outside Ultrasound showed a 4cm simple cyst on the left. Also, recent genetic screening findings consistent with Reed syndrome diagnosis     Reed syndrome  - Reviewed genetic screening results, discussed implications of Reed syndrome, recommendations for prophylactic total hysterectomy with bilateral salpingo-oophorectomy once completed childbearing.   - Reviewed that this surgery is not urgent though given nearly 41, completed childbearing with  sterilized, and now acute ovarian symptoms, would be reasonable to proceed now, recommending a total laparoscopic hysterectomy, bilateral salpingo-oophorectomy.   - Endometrial biopsy today to rule out endometrial dysplasia vs malignancy preop.  - Will need to follow with primary for annual colonoscopy screening.   - Will need HRT following surgery, reviewed would recommend transdermal estrogen.  - Reviewed cervical cancer screening history, given no prior high grade dysplasia, will not need further screening.   -  "She will meet with her genetic counselor on Thursday to review findings in more detail, as well as receive recommendations. Will revisit the above plan if genetic counseling recommendations are different regarding prophylactic surgery.   - Reviewed risks of hysterectomy including risk of bleeding with possible need for blood transfusion, surgical site infection or wound healing complications, injury to surrounding structures including bladder, ureters, or bowel, with possible need for corrective procedures by general surgery or urology, possible need for extended hospitalization. We discussed anticipated post operative recovery including pelvic rest and no heavy lifting for six weeks. Reviewed risks postop of hernias as well as risk of vaginal cuff dehiscence. The patient understands and wished to proceed.    - ENDOMETRIAL BIOPSY W/O CERVICAL DILATION  - Case Request: HYSTERECTOMY, TOTAL, LAPAROSCOPIC, WITH SALPINGO-OOPHORECTOMY; Standing  - Case Request: HYSTERECTOMY, TOTAL, LAPAROSCOPIC, WITH SALPINGO-OOPHORECTOMY  - Surgical pathology exam    Family history of malignant neoplasm of breast  - normal breast exam today  - *MA Screening Digital Bilateral; Future    Dylon Alcantar MD   2/8/2021 8:11 AM      Endometrial Biopsy                                                                       Time Out - \"Pause for the Cause\"  Just before the procedure begins, through verbal and active participation of team members, verify:    Initials   Patient Name    JCB   Patient Date of Birth JCB   Procedure to be performed  JCB   Implants, special equipment or special requirements        JCB     Consent:  Written consent signed and scanned into medical record.    Indication: Reed syndrome    Using a Mackenzie speculum, the cervix was visualized. The cervix was prepped with Betadine.  A single tooth tenaculum was applied to the anterior lip of the cervix. The endometrial pipelle was advanced through the cervix without " difficulty and a sample collected. The tenaculum was removed from the cervix and the tenaculum site hemostatic.    EBL: minimal   Complications: none  Pathology: EMB sample was sent to pathology.  Tolerance of Procedure:  Patient tolerated the procedure well.     Patient was instructed to call if she experiences any heavy bleeding, severe cramping, or abnormal vaginal discharge.  May take ibuprofen 400-800 mg PO TID PRN for cramping.    Will notify patient of results.    Dylon Alcantar MD  OB/GYN  February 8, 2021, 4:37 PM

## 2021-02-08 NOTE — TELEPHONE ENCOUNTER
Type of surgery: HYSTERECTOMY, TOTAL, LAPAROSCOPIC, WITH SALPINGO-OOPHORECTOMY (N/A)   Location of surgery: [facility] OR  Date and time of surgery: 2/24  Surgeon: Katharine  Pre-Op Appt Date: 2/19  Post-Op Appt Date: 3/12   Packet sent out: Yes  Pre-cert/Authorization completed:  Not Applicable  Date: na

## 2021-02-09 DIAGNOSIS — Z11.59 ENCOUNTER FOR SCREENING FOR OTHER VIRAL DISEASES: Primary | ICD-10-CM

## 2021-02-09 NOTE — PROGRESS NOTES
2/11/2021    Referring Provider: Ledya Guzman DO    Presenting Information:   I spoke with Christina virtually today to discuss her genetic testing results. She submitted a saliva sample and we ordered CancerNext-Expanded testing from [facility]. This testing was done because of her family history of breast, ovarian/uterine and bladder cancer.     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 We discussed that likely pathogenic mutations are generally treated as pathogenic and this particular mutation is associated with a diagnosis of Reed Syndrome and increased risk for several cancers. We discussed the impact of this testing on Christina in detail.     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 This variant is called p.A417S (c.1249G>T). No other variants or mutations were found in the STK11 gene. Given the uncertain significance of this result, medical management decisions should NOT be made based on this test result alone.    Christina is negative for known, pathogenic mutations in the AIP, ALK, APC, CARINA, AXIN2, BAP1, BARD1, BLM, BMPR1A, BRCA1, BRCA2, BRIP1, CDC73, CDH1, CDK4, CDKN1B, CDKN2A, CHEK2, CTNNA1, DICER1, EPCAM, EGFR, EGLN1, FANCC, FH, FLCN, GALNT12, GREM1, HOXB13, KIF1B, KIT, LZTR1, MAX, MEN1, MET, MITF, MLH1, MRE11, MSH2, MSH3, MSH6, MUTYH, NBN, NF1, NF2, NTHL1, PALB2, PDGFRA, PHOX2B, POLD1, POLE, POT1, WDNLO8F, PTCH1, PTEN, RAD50, RAD51C, RAD51D, RB1, RECQL, RET, SDHA, SDHAF2, SDHB, SDHC, SDHD, SMAD4, SMARCA4, SMARCB1, SMARCE1, STK11, SUFU, YIDC483, TP53, TSC1, TSC2, VHL, and XRCC2 genes. No pathogenic mutations were found in any of the other 76 of 77 genes analyzed. This test involved sequencing and deletion/duplication  "analysis of all genes with the exceptions of VHL and XRCC2 (sequencing and deletion/duplication); EGFR, EGLN1, HOXB13, KIT, MITF, PDGFRA, POLD1 and POLE (sequencing only); EPCAM and GREM1 (deletion/duplication only).    Testing did not detect an identifiable mutation associated with Hereditary Breast and Ovarian Cancer syndrome (BRCA1, BRCA2), Familial Adenomatous Polyposis (APC), Hereditary Diffuse Gastric Cancer (CDH1), Familial Atypical Multiple Mole Melanoma syndrome (CDK4, CDKN2A), Juvenile Polyposis syndrome (BMPR1A, SMAD4), Cowden syndrome (PTEN), Li Fraumeni syndrome (TP53), Peutz-Jeghers syndrome (STK11), MUTYH Associated Polyposis (MUTYH),  Hereditary Leiomyomatosis and Renal Cell Cancer (FH), Izxq-Pkge-Hcjc (FLCN), Hereditary Papillary Renal Carcinoma (MET), Hereditary Paraganglioma and Pheochromocytoma syndrome (SDHA, SDHAF2, SDHB, SDHC, SDHD), Multiple Endocrine Neoplasia type 2 (RET), Tuberous sclerosis complex (TSC1, TSC2), Von Hippel-Lindau disease (VHL), Neurofibromatosis type 1 (NF1), Neurofibromatosis type 2 (NF1), Multiple Endocrine Neoplasia type 1 (MEN1), Multiple Endocrine Neoplasia type 4 (CDKN1B), Gorlin syndrome (SUFU, PTCH1), or Ferguson complex (NENUP5Y).    A copy of the test report can be found in the Laboratory tab, dated 1/11/2021, and named \"SEND OUTS MISC TEST\". The report is scanned in as a linked document.    Cancer Risks for the PMS2 mutation:   Individuals with mutations in the PMS2 gene have a:    8.7-20% lifetime risk of developing colon cancer. This is compared to 4.2% lifetime risk in the general population    Lifetime endometrial (uterine) cancer risk of 13-26%, compared to 3.1% in the general population.    Lifetime ovarian cancer risk of approximately 3%, compared to 1.3% in the general population.    Studies have shown that the risk for prostate cancer in men with PMS2 mutations is approximately 4.6-11.6%. This is compared to the general population risk of 11.6%. "     Additional risks are seen for renal pelvis and/or ureter (<1%-3.7%), bladder (<1%-2.4%), small bowel (0.1%-0.3%), pancreatic (<1%-1.6%), brain (0.6%-<1%), biliary tract (0.2%-<1%) and breast cancer (8.1%-12.8%). Some families also have increased risk for sebaceous neoplasms.    Several studies have also suggested that individuals with Reed syndrome may be at increased risk for other cancers, including stomach cancer, but additional research is needed to clarify these potential risks.    Cancer Screening and Prevention:  The following screening, per current National Comprehensive Cancer Network (NCCN) guidelines is recommended for individuals who have Reed syndrome due to a mutation in the PMS2 gene:    Colonoscopies starting at age 30-35 (or earlier based on family history), repeated every 1-2 years.    Women can consider hysterectomy due to the limitations in screening for endometrial cancer.     Timing of this surgery can be individualized based on family planning, family history, comorbidities, and the individual's gene mutation.      Screening for uterine cancer has not been shown to be beneficial in women with Reed syndrome, however screening via endometrial biopsy every 1-2 years can be considered. In post-menopausal women, transvaginal ultrasound may also be considered.     Women with Reed syndrome should be aware of the signs and symptoms of endometrial (uterine) cancer, such as abnormal uterine bleeding or postmenopausal bleeding, and should report these findings promptly to her physician, should they occur.     At this time, there is insufficient evidence to make a specific recommendations regarding removal of the ovaries and fallopian tubes in women with PMS2 mutations.     NCCN states that PMS2 carriers appear to have no greater than average risk for ovarian cancer and may consider deferring surveillance and may elect to not have an oophorectomy.     Of note, there is not an effective screening  method for ovarian cancer, but transvaginal ultrasound and a CA-125 blood test may be considered at the discretion of each individual's clinician.     Additionally, women should be aware of the signs and symptoms of ovarian cancer: pelvic/abdominal pain, bloating, increased abdominal girth, difficulty eating, early satiety, and urinary frequency or urgency. These symptoms, should be promptly reported to a physician.     A bilateral salpingo-oophorectomy (removal of both ovaries and fallopian tubes) may reduce the chance to develop ovarian cancer but the decision to have this procedure should be individualized and based on childbearing, menopause status, comorbidities, and family history.    Annual physical/neurological exams starting at 25-30.     Possible consideration of upper endoscopy (EGD) with extended duodenoscopy starting at age 40, repeated every 3-5 years. This recommendation is largely dependent on family history and other factors.    There is limited evidence to suggest a screening strategy for urothelial/urinary tract cancers. Annual urinalysis starting at age 30-35 can be considered. Consideration for screening is largely dependent upon family history, gender, and specific Reed syndrome mutation.    There is limited evidence to recommend early or more frequent screening for prostate cancer in men who have Reed syndrome. However, men with Reed syndrome are encouraged to follow prostate cancer screening as recommended in the NCCN Guidelines for Prostate Early Detection. This screening should be discussed with each individual's medical provider.    Pancreatic screening should be offered at 50 (or earlier based on family history) for those who have first and second degree relatives with pancreatic cancer from the same side of the family.     There are currently no specific screening recommendations for other potential Reed syndrome-related cancers such as breast cancer, hepatobiliary tract cancer, etc.  Screening for these and other cancers may be recommended based on family history.    Of note, some chemotherapies/immunotherapies for certain cancers may be more effective in individuals with Reed syndrome. Christina should discuss this with her physicians, if chemotherapy is indicated in the future.    Other screening based on Christina's personal and family history:    Based on her personal and family history, Christina has a 13.6% lifetime risk of developing breast cancer based on the CHATA 8 model.  Therefore, Christina does not meet current National Comprehensive Cancer Network (NCCN) guidelines for high risk breast screening, which is offered to women with a 20% lifetime risk or higher. However, it is still important for Christina to continue with routine breast screening under the care of her physicians. Breast cancer screening is generally recommended to begin approximately 10 years younger than the earliest age of breast cancer diagnosis in the family, or at age 40, whichever comes first.  In this family, screening may begin at age 40.  Christina is encouraged to discuss breast screening with her physicians.     Other population cancer screening options, such as those recommended by the American Cancer Society and the National Comprehensive Cancer Network (NCCN), are also appropriate for Christina and her family. These screening recommendations may change if there are changes to Christina's personal and/or family history of cancer. Final screening recommendations should be made by each individual's managing physician.    We discussed that Christina could participate in our Cancer Risk Management Program in which our nursing specialist provides an individual screening plan and assists with medical management. A referral was placed to see KAMRYN Mcclellan for this service.    Of note, the above information is based on our current understanding of Christina's genetic findings. Christina is encouraged to reach out to me regularly  regarding any pertinent updates to her personal and/or family history of cancer, as our understanding of the genetic findings in her family may change over time.     Implications for Family Members:  We reviewed that mutations in the PMS2 gene are inherited in an autosomal dominant pattern. This means that each of Christina's children have a 50% chance of inheriting the same mutation. Likewise, each of her siblings has a 50% risk of having the same mutation. Extended relatives may also carry this mutation, and she is encouraged to share this information with her family members on both sides of the family. I am happy to help her relatives connect with a genetic counselor in their area if they would like to discuss testing.    In rare situations in which both parents have a mutation in the PMS2 gene, their children each have a 25% risk for constitutional mismatch repair deficiency syndrome (CMMRD).  CMMRD is a disorder that causes cafe-au-lait spots and risk for childhood cancers. For individuals of childbearing age with PMS2 mutations, genetic counseling and genetic testing may be advised for their partners. This recommendation applies to Christina's children's father and her children.     Additional Testing Considerations:  Even though Christina's genetic testing result was positive, other relatives may carry a different gene mutation associated with breast, ovarian/uterine or bladder cancer. Genetic counseling is recommended for her paternal aunt who had breast cancer and her maternal relatives to discuss genetic testing options. If any of these relatives do pursue genetic testing, Christina is encouraged to contact me so that we may review the impact of their test results on her.    Support Resources:  I provided Christina with information regarding national and local support resources for Reed  syndrome.    ----------------------------------------------------------------------------------------------------------------------------------    Genetic Testing Result: Variant of Uncertain Significance (VUS) in the STK11 gene    STK11:p.A417S (c.1249G>T)    Interpretation:  We discussed several different interpretations of this inconclusive test result. It is not clear if this variant in the STK11 gene is associated with increased cancer risk.  1. This variant may be a benign change that does not increase cancer risk.  2. This variant may be a harmful mutation that causes Peutz-Jeghers syndrome.    Peutz-Jeghers syndrome (PJS) is a hereditary cancer syndrome caused by harmful or pathogenic mutations in the STK11 gene. Mutations in STK11 cause hamartomatous polyps in the gastrointestinal tract and freckles present in unusual places such as the hands, feet, neck, and lips. Individuals with Peutz-Jeghers syndrome have an increased risk for colon, breast, pancreatic, and other cancers.  Men are at risk for testicular tumors which can affect hormones in the body. Women are at risk for sex cord tumors of the ovaries and a rare aggressive type of cervical cancer.    The laboratory is working to determine if this variant is harmful or benign, and they will contact me if it is reclassified. If this variant is determined to be a benign change, there may be a different gene or combination of genes and environment that are associated with the cancers in this family.    It is also important to consider that her relatives may have had a mutation in one of the genes tested and she did not inherit it.      Inheritance:  We reviewed the autosomal dominant inheritance of this variant in the STK11 gene. We discussed that Christina has a 50% chance to pass this variant to each of her children. Likewise, each of her siblings has a 50% risk of having the same variant. Because it is unclear what, if any, risk is associated with this  "variant, clinical genetic testing for this variant alone is not recommended for relatives.    Family Studies:  The laboratory may offer additional research based testing for specific relatives in order to help reclassify this variant.    Screening:  Based on this inconclusive test result, it is important for Christina and her relatives to refer back to the family history for appropriate cancer screening.      See screening notes above    Summary:  We do not have an entire explanation for Christina's family history of cancer. While the PMS2 genetic change was identified, Christina may still be at risk for certain cancers due to family history, environmental factors, or other genetic causes not identified by this test.  Because of that, it is important that she continue with cancer screening based on her personal and family history as discussed above.    Genetic testing is rapidly advancing, and new cancer susceptibility genes will most likely be identified in the future. Therefore, I encouraged Christina to contact me annually or if there are changes in her personal or family history. This may change how we assess her cancer risk, screening, and the testing we would offer.    Of note, Christina was notified that I am leaving [place] after February 19. If she has questions or concerns she was encouraged to contact my colleague Candido Moreno as he will be at the [place] location.     Plan:  1.  I will send Christina a copy of her test results and support resources in the mail.  2.  I will provide a \"Dear Relative\" letter for Christina to share with her family members.  3.  She plans to follow up with her medical providers.  4.  She should contact Candido annually, or sooner if her family history changes.  5.  Candido Moreno will contact Christina if the laboratory informs me that this VUS has been reclassified.  This may change screening and testing recommendations for Christina and her relatives.  6.  A referral was placed for Christina to meet with " SHEYLA Mcclellan-CNS to discuss screening associated with a PMS2 mutation.    If Christina has additional questions, I encouraged her to contact Candido Moreno directly at [phone].     Time spent on the call: 30 minutes    Jc Monzon MS, [facility] [place]  Licensed, certified genetic counselor  [facility]  Cancer Risk Management Program  [phone]      STEPS FOR FREE FAMILY TESTING FOR THE PMS2 mutation    1. Have your relatives call their insurance providers to find out where they can see a genetic counselor. Sometimes insurance requires a referral from a primary care doctor to see a GC.   2. Once they find out where they can go, depending on if they need a referral or not, they can call the clinic and make an appointment with a genetic counselor. If they need a referral, then the primary care provider will notify the clinic where the GC is at and the clinic will call them for an appointment.   3. Have them bring a copy of your report to the visit (likely virtual, so even showing the GC the report through video or reading off the mutation name over the phone usually works). They will need the name of the mutation and the accession number. It should be within 90 days past the final report date on the upper right to qualify for free testing for family members.   a. Your relatives have until April 29, 2021 to qualify for free testing.   4. The GC will then order a single-site analysis test for the PMS2 gene mutation only. Relatives can only have free testing for the pathogenic mutations only (not variants of uncertain significance).   5. If they want a larger panel of genes, that will not be free and they may have to pay out of pocket depending on their insurance and deductible.   6. The GC then will likely send a saliva kit out from [employer] and then will contact them once results come in.              Resources for Reed Syndrome     Reed Syndrome International -  http://lynchcancers.com/   Reed Syndrome  International (LSI) provides support for individuals with Reed syndrome. This organization was founded by patients and health care providers who specialize in Reed syndrome. LSI strives to raise awareness, as well as educate others about Reed syndrome.     Hereditary Colon Cancer Takes Guts - http://www.hcctakesguts.org/peer-support  This is a non-profit organization that supports and connects individuals with hereditary colon cancer syndromes.  They also promote research and health care initiatives.     I Have Reed Syndrome - http://www.ihavelynchsyndrome.org/   The goal of this non-profit organization is to educate others and raise awareness about Reed syndrome in the medical community, as well as the public.      Reed Syndrome Screening Network - http://www.lynchscreening.net/   This organization was founded in 2011, with the goal to promote universal tumor screening for Reed syndrome for those with a diagnosis of colorectal and endometrial cancers.     [facility] - www.[employer]tw[employer]ities.org   [place] is a 501(c)3 nonprofit and the local affiliate of the Cancer Support Community, a network of more than 54 supportive, free and welcoming  clubhouses  where everyone living with cancer can come for social, emotional and psychological support. Clubs are healing environments where individuals learn from each other with guidance from licensed professionals.    [facility]: [place] [facility] - http://mnovarian.org/   MOCA was started in 1999 by a small group of ovarian cancer survivors who came together to fund ovarian cancer research, raise awareness of the disease, and provide support to women with ovarian cancer and their families.    Other References:    Genetics Home Reference - http://ghr.nlm.nih.gov/condition/reed-syndrome    American Cancer Society - www.cancer.org    How Do I Tell My Children? This article is about the BRCA gene for hereditary breast cancer, but  the theme of the article applies to Reed syndrome, too.  http://www.facingourrisk.org/understanding-brca-and-hboc/publications/newsletter/archives/2008winter/how-tell-children.php     National Society of Genetic Counselors: http://www.nsgc.org/               2/11/2021  Dear Relative:  The purpose of this letter is to inform you that your relative recently underwent genetic counseling and genetic testing due to family history of breast, ovarian/uterine and bladder cancer.  The testing done through [facility] identified a likely pathogenic mutation in the PMS2 gene.  Specifically, the mutation is called p.E705K (also known as c.2113G>A).  A mutation (or change in the genetic code) causes a specific gene to stop working properly.  Ultimately, individuals who have a mutation in the PMS2 gene have a diagnosis of Reed syndrome.    Reed syndrome is associated with several cancers. PMS2 mutations increase the lifetime risk of: colon cancer (8.7-20%), uterine cancer (13-26%), and other cancers.  There is a combined risk for stomach, ovarian, hepatobiliary tract, urinary tract, small bowel, brain, and pancreatic cancer is 6% by age 70.   Both men and women can have mutations in PMS2, and have a 50% chance of passing this mutation on to each of their children.   If individuals are found to have a mutation in the PMS2 gene, we would recommend increased cancer screening at younger ages.  Risk reducing surgeries are also available options that can prevent the development of certain cancers.   In rare situations in which both parents have a mutation in the PMS2 gene, their children each have a 25% risk for constitutional mismatch repair deficiency syndrome (CMMRD).  CMMRD is a disorder that causes cafe-au-lait spots (light brown spots on the skin) and childhood cancers.  For individuals of childbearing age with PMS2 mutations, genetic counseling and genetic testing may be advised for their partners.  I understand that this  may be surprising, unexpected, and even unsettling news.  Because this mutation has been identified in your family, you are at risk for having the same mutation.  As mentioned earlier, your children may also be at risk.    Scheduling a genetic counseling appointment does not mean you have to undergo genetic testing.  The decision to pursue such testing is a very personal one that is discussed in more detail during the session.  Much of cancer genetic counseling is providing valuable information to individuals who are impacted by genetic information such as this.    If you are interested in scheduling a genetic counseling appointment at [facility], please call [phone] to schedule an appointment. You can also find a genetic counselor close to you or at another health system at [place]  Sincerely,   Jc Monzon MS, [facility] [place]  Licensed, certified genetic counselor  [facility]  Cancer Risk Management Program

## 2021-02-10 LAB — COPATH REPORT: NORMAL

## 2021-02-11 ENCOUNTER — VIRTUAL VISIT (OUTPATIENT)
Dept: ONCOLOGY | Facility: CLINIC | Age: 41
End: 2021-02-11
Attending: GENETIC COUNSELOR, MS
Payer: COMMERCIAL

## 2021-02-11 ENCOUNTER — TELEPHONE (OUTPATIENT)
Dept: FAMILY MEDICINE | Facility: OTHER | Age: 41
End: 2021-02-11

## 2021-02-11 DIAGNOSIS — Z80.41 FAMILY HISTORY OF OVARIAN CANCER: ICD-10-CM

## 2021-02-11 DIAGNOSIS — Z80.49 FAMILY HISTORY OF UTERINE CANCER: ICD-10-CM

## 2021-02-11 DIAGNOSIS — Z80.3 FAMILY HISTORY OF MALIGNANT NEOPLASM OF BREAST: ICD-10-CM

## 2021-02-11 DIAGNOSIS — Z15.09 PMS2-RELATED LYNCH SYNDROME (HNPCC4): Primary | ICD-10-CM

## 2021-02-11 DIAGNOSIS — Z80.52 FAMILY HISTORY OF BLADDER CANCER: ICD-10-CM

## 2021-02-11 PROCEDURE — 999N000069 HC STATISTIC GENETIC COUNSELING, < 16 MIN: Mod: GT | Performed by: GENETIC COUNSELOR, MS

## 2021-02-11 PROCEDURE — 96040 HC GENETIC COUNSELING, EACH 30 MINUTES: CPT | Performed by: GENETIC COUNSELOR, MS

## 2021-02-11 NOTE — TELEPHONE ENCOUNTER
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 Per their recommendations, the hysterectomy is a good idea, though they were more on the fence about taking her ovaries out, stating likely some cancer risk reduction though not a huge amount. I would be happy to talk to her more prior to her surgery about whether to remove or retain her ovaries, or possibly remove one of them. We can just talk sometime in clinic or she could schedule a short visit, whatever she prefers.

## 2021-02-11 NOTE — TELEPHONE ENCOUNTER
Spoke with pt and relayed Dr. Alcantar's message below.   Pt would like to just pop down to clinic tomorrow, 2/12, to talk to Dr. Alcantar further about this.  I did let her know what time he will be at the [facility] and suggested if he is not at his desk to try again as he has a busy schedule.    Kirsty Corona, RN

## 2021-02-11 NOTE — LETTER
Cancer Risk Management  Program Locations    [facility]  [facility]  [facility]  [facility]  Mailing Address  Cancer Risk Management Program  [address]  [place] [address]    New patient appointments  [phone]  February 11, 2021    Christina Santana  [id number] [facility] [address]      Dear Christina,    It was a pleasure speaking with you on the phone on 2-11-21. Here is a copy of the progress note from our discussion. If you have any additional questions, please feel free to call.    Referring Provider: Leyda Guzman DO    Presenting Information:   I spoke with Christina virtually today to discuss her genetic testing results. She submitted a saliva sample and we ordered CancerNext-Expanded testing from [facility]. This testing was done because of her family history of breast, ovarian/uterine and bladder cancer.     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 We discussed that likely pathogenic mutations are generally treated as pathogenic and this particular mutation is associated with a diagnosis of Reed Syndrome and increased risk for several cancers. We discussed the impact of this testing on Christina in detail.     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 This variant is called p.A417S (c.1249G>T). No other variants or mutations were found in the STK11 gene. Given the uncertain significance of this result, medical management decisions should NOT be made based on this test result alone.        Christina is negative for known, pathogenic  mutations in the AIP, ALK, APC, CARINA, AXIN2, BAP1, BARD1, BLM, BMPR1A, BRCA1, BRCA2, BRIP1, CDC73, CDH1, CDK4, CDKN1B, CDKN2A, CHEK2, CTNNA1, DICER1, EPCAM, EGFR, EGLN1, FANCC, FH, FLCN, GALNT12, GREM1, HOXB13, KIF1B, KIT, LZTR1, MAX, MEN1, MET, MITF, MLH1, MRE11, MSH2, MSH3, MSH6, MUTYH, NBN, NF1, NF2, NTHL1, PALB2, PDGFRA, PHOX2B, POLD1, POLE, POT1, KJSIS2Z, PTCH1, PTEN, RAD50, RAD51C, RAD51D, RB1, RECQL, RET, SDHA, SDHAF2, SDHB, SDHC, SDHD, SMAD4, SMARCA4, SMARCB1, SMARCE1, STK11, SUFU, TYKR457, TP53, TSC1, TSC2, VHL, and XRCC2 genes. No pathogenic mutations were found in any of the other 76 of 77 genes analyzed. This test involved sequencing and deletion/duplication analysis of all genes with the exceptions of VHL and XRCC2 (sequencing and deletion/duplication); EGFR, EGLN1, HOXB13, KIT, MITF, PDGFRA, POLD1 and POLE (sequencing only); EPCAM and GREM1 (deletion/duplication only).    Testing did not detect an identifiable mutation associated with Hereditary Breast and Ovarian Cancer syndrome (BRCA1, BRCA2), Familial Adenomatous Polyposis (APC), Hereditary Diffuse Gastric Cancer (CDH1), Familial Atypical Multiple Mole Melanoma syndrome (CDK4, CDKN2A), Juvenile Polyposis syndrome (BMPR1A, SMAD4), Cowden syndrome (PTEN), Li Fraumeni syndrome (TP53), Peutz-Jeghers syndrome (STK11), MUTYH Associated Polyposis (MUTYH),  Hereditary Leiomyomatosis and Renal Cell Cancer (FH), Bxfs-Zzod-Pzpd (FLCN), Hereditary Papillary Renal Carcinoma (MET), Hereditary Paraganglioma and Pheochromocytoma syndrome (SDHA, SDHAF2, SDHB, SDHC, SDHD), Multiple Endocrine Neoplasia type 2 (RET), Tuberous sclerosis complex (TSC1, TSC2), Von Hippel-Lindau disease (VHL), Neurofibromatosis type 1 (NF1), Neurofibromatosis type 2 (NF1), Multiple Endocrine Neoplasia type 1 (MEN1), Multiple Endocrine Neoplasia type 4 (CDKN1B), Gorlin syndrome (SUFU, PTCH1), or Ferguson complex (MXYNC5U).    Cancer Risks for the PMS2 mutation:   Individuals with mutations in  the PMS2 gene have a:    8.7-20% lifetime risk of developing colon cancer. This is compared to 4.2% lifetime risk in the general population    Lifetime endometrial (uterine) cancer risk of 13-26%, compared to 3.1% in the general population.    Lifetime ovarian cancer risk of approximately 3%, compared to 1.3% in the general population.    Studies have shown that the risk for prostate cancer in men with PMS2 mutations is approximately 4.6-11.6%. This is compared to the general population risk of 11.6%.     Additional risks are seen for renal pelvis and/or ureter (<1%-3.7%), bladder (<1%-2.4%), small bowel (0.1%-0.3%), pancreatic (<1%-1.6%), brain (0.6%-<1%), biliary tract (0.2%-<1%) and breast cancer (8.1%-12.8%). Some families also have increased risk for sebaceous neoplasms.    Several studies have also suggested that individuals with Reed syndrome may be at increased risk for other cancers, including stomach cancer, but additional research is needed to clarify these potential risks.    Cancer Screening and Prevention:  The following screening, per current National Comprehensive Cancer Network (NCCN) guidelines is recommended for individuals who have Reed syndrome due to a mutation in the PMS2 gene:    Colonoscopies starting at age 30-35 (or earlier based on family history), repeated every 1-2 years.    Women can consider hysterectomy due to the limitations in screening for endometrial cancer.     Timing of this surgery can be individualized based on family planning, family history, comorbidities, and the individual's gene mutation.      Screening for uterine cancer has not been shown to be beneficial in women with Reed syndrome, however screening via endometrial biopsy every 1-2 years can be considered. In post-menopausal women, transvaginal ultrasound may also be considered.     Women with Reed syndrome should be aware of the signs and symptoms of endometrial (uterine) cancer, such as abnormal uterine  bleeding or postmenopausal bleeding, and should report these findings promptly to her physician, should they occur.     At this time, there is insufficient evidence to make a specific recommendations regarding removal of the ovaries and fallopian tubes in women with PMS2 mutations.     NCCN states that PMS2 carriers appear to have no greater than average risk for ovarian cancer and may consider deferring surveillance and may elect to not have an oophorectomy.     Of note, there is not an effective screening method for ovarian cancer, but transvaginal ultrasound and a CA-125 blood test may be considered at the discretion of each individual's clinician.     Additionally, women should be aware of the signs and symptoms of ovarian cancer: pelvic/abdominal pain, bloating, increased abdominal girth, difficulty eating, early satiety, and urinary frequency or urgency. These symptoms, should be promptly reported to a physician.     A bilateral salpingo-oophorectomy (removal of both ovaries and fallopian tubes) may reduce the chance to develop ovarian cancer but the decision to have this procedure should be individualized and based on childbearing, menopause status, comorbidities, and family history.    Annual physical/neurological exams starting at 25-30.     Possible consideration of upper endoscopy (EGD) with extended duodenoscopy starting at age 40, repeated every 3-5 years. This recommendation is largely dependent on family history and other factors.    There is limited evidence to suggest a screening strategy for urothelial/urinary tract cancers. Annual urinalysis starting at age 30-35 can be considered. Consideration for screening is largely dependent upon family history, gender, and specific Reed syndrome mutation.    There is limited evidence to recommend early or more frequent screening for prostate cancer in men who have Reed syndrome. However, men with Reed syndrome are encouraged to follow prostate cancer  screening as recommended in the NCCN Guidelines for Prostate Early Detection. This screening should be discussed with each individual's medical provider.    Pancreatic screening should be offered at 50 (or earlier based on family history) for those who have first and second degree relatives with pancreatic cancer from the same side of the family.     There are currently no specific screening recommendations for other potential Reed syndrome-related cancers such as breast cancer, hepatobiliary tract cancer, etc. Screening for these and other cancers may be recommended based on family history.    Of note, some chemotherapies/immunotherapies for certain cancers may be more effective in individuals with Reed syndrome. Christina should discuss this with her physicians, if chemotherapy is indicated in the future.    Other screening based on Christina's personal and family history:    Based on her personal and family history, Christina has a 13.6% lifetime risk of developing breast cancer based on the CHATA 8 model.  Therefore, Christina does not meet current National Comprehensive Cancer Network (NCCN) guidelines for high risk breast screening, which is offered to women with a 20% lifetime risk or higher. However, it is still important for Christina to continue with routine breast screening under the care of her physicians. Breast cancer screening is generally recommended to begin approximately 10 years younger than the earliest age of breast cancer diagnosis in the family, or at age 40, whichever comes first.  In this family, screening may begin at age 40.  Christina is encouraged to discuss breast screening with her physicians.     Other population cancer screening options, such as those recommended by the American Cancer Society and the National Comprehensive Cancer Network (NCCN), are also appropriate for Christina and her family. These screening recommendations may change if there are changes to Christina's personal and/or family history  of cancer. Final screening recommendations should be made by each individual's managing physician.    We discussed that Christina could participate in our Cancer Risk Management Program in which our nursing specialist provides an individual screening plan and assists with medical management. A referral was placed to see KAMRYN Mcclellan for this service.    Of note, the above information is based on our current understanding of Christina's genetic findings. Christina is encouraged to reach out to me regularly regarding any pertinent updates to her personal and/or family history of cancer, as our understanding of the genetic findings in her family may change over time.     Implications for Family Members:  We reviewed that mutations in the PMS2 gene are inherited in an autosomal dominant pattern. This means that each of Christina's children have a 50% chance of inheriting the same mutation. Likewise, each of her siblings has a 50% risk of having the same mutation. Extended relatives may also carry this mutation, and she is encouraged to share this information with her family members on both sides of the family. I am happy to help her relatives connect with a genetic counselor in their area if they would like to discuss testing.    In rare situations in which both parents have a mutation in the PMS2 gene, their children each have a 25% risk for constitutional mismatch repair deficiency syndrome (CMMRD).  CMMRD is a disorder that causes cafe-au-lait spots and risk for childhood cancers. For individuals of childbearing age with PMS2 mutations, genetic counseling and genetic testing may be advised for their partners. This recommendation applies to Christina's children's father and her children.     Additional Testing Considerations:  Even though Chrisitna's genetic testing result was positive, other relatives may carry a different gene mutation associated with breast, ovarian/uterine or bladder cancer. Genetic counseling is  recommended for her paternal aunt who had breast cancer and her maternal relatives to discuss genetic testing options. If any of these relatives do pursue genetic testing, Christina is encouraged to contact me so that we may review the impact of their test results on her.    Support Resources:  I provided Christina with information regarding national and local support resources for Reed syndrome.    ----------------------------------------------------------------------------------------------------------------------------------    Genetic Testing Result: Variant of Uncertain Significance (VUS) in the STK11 gene    STK11:p.A417S (c.1249G>T)    Interpretation:  We discussed several different interpretations of this inconclusive test result. It is not clear if this variant in the STK11 gene is associated with increased cancer risk.  1. This variant may be a benign change that does not increase cancer risk.  2. This variant may be a harmful mutation that causes Peutz-Jeghers syndrome.    Peutz-Jeghers syndrome (PJS) is a hereditary cancer syndrome caused by harmful or pathogenic mutations in the STK11 gene. Mutations in STK11 cause hamartomatous polyps in the gastrointestinal tract and freckles present in unusual places such as the hands, feet, neck, and lips. Individuals with Peutz-Jeghers syndrome have an increased risk for colon, breast, pancreatic, and other cancers.  Men are at risk for testicular tumors which can affect hormones in the body. Women are at risk for sex cord tumors of the ovaries and a rare aggressive type of cervical cancer.    The laboratory is working to determine if this variant is harmful or benign, and they will contact me if it is reclassified. If this variant is determined to be a benign change, there may be a different gene or combination of genes and environment that are associated with the cancers in this family.    It is also important to consider that her relatives may have had a mutation in  "one of the genes tested and she did not inherit it.      Inheritance:  We reviewed the autosomal dominant inheritance of this variant in the STK11 gene. We discussed that Christina has a 50% chance to pass this variant to each of her children. Likewise, each of her siblings has a 50% risk of having the same variant. Because it is unclear what, if any, risk is associated with this variant, clinical genetic testing for this variant alone is not recommended for relatives.    Family Studies:  The laboratory may offer additional research based testing for specific relatives in order to help reclassify this variant.    Screening:  Based on this inconclusive test result, it is important for Christina and her relatives to refer back to the family history for appropriate cancer screening.      See screening notes above    Summary:  We do not have an entire explanation for Christina's family history of cancer. While the PMS2 genetic change was identified, Chrisitna may still be at risk for certain cancers due to family history, environmental factors, or other genetic causes not identified by this test.  Because of that, it is important that she continue with cancer screening based on her personal and family history as discussed above.    Genetic testing is rapidly advancing, and new cancer susceptibility genes will most likely be identified in the future. Therefore, I encouraged Christina to contact me annually or if there are changes in her personal or family history. This may change how we assess her cancer risk, screening, and the testing we would offer.    Of note, Christina was notified that I am leaving [place] after February 19. If she has questions or concerns she was encouraged to contact my colleague Candido Moreno as he will be at the [place] location.     Plan:  1.  I will send Christina a copy of her test results and support resources in the mail.  2.  I will provide a \"Dear Relative\" letter for Christina to share with her family " members.  3.  She plans to follow up with her medical providers.  4.  She should contact Candido annually, or sooner if her family history changes.  5.  Candido Moreno will contact Christina if the laboratory informs me that this VUS has been reclassified.  This may change screening and testing recommendations for Christina and her relatives.  6.  A referral was placed for Christina to meet with KAMRYN Mcclellan to discuss screening associated with a PMS2 mutation.    If Christina has additional questions, I encouraged her to contact Candido Moreno directly at [phone].     Time spent on the call: 30 minutes    Jc Monzon MS, [facility]  Licensed, certified genetic counselor  [facility]  Cancer Risk Management Program  [phone]      STEPS FOR FREE FAMILY TESTING FOR THE PMS2 mutation    1. Have your relatives call their insurance providers to find out where they can see a genetic counselor. Sometimes insurance requires a referral from a primary care doctor to see a GC.   2. Once they find out where they can go, depending on if they need a referral or not, they can call the clinic and make an appointment with a genetic counselor. If they need a referral, then the primary care provider will notify the clinic where the GC is at and the clinic will call them for an appointment.   3. Have them bring a copy of your report to the visit (likely virtual, so even showing the GC the report through video or reading off the mutation name over the phone usually works). They will need the name of the mutation and the accession number. It should be within 90 days past the final report date on the upper right to qualify for free testing for family members.   a. Your relatives have until April 29, 2021 to qualify for free testing.   4. The GC will then order a single-site analysis test for the PMS2 gene mutation only. Relatives can only have free testing for the pathogenic mutations only (not variants of uncertain significance).    5. If they want a larger panel of genes, that will not be free and they may have to pay out of pocket depending on their insurance and deductible.   6. The GC then will likely send a saliva kit out from [facility] and then will contact them once results come in.                                                    Resources for Reed Syndrome     Reed Syndrome International -  http://[employer]/   Reed Syndrome International (LSI) provides support for individuals with Reed syndrome. This organization was founded by patients and health care providers who specialize in Reed syndrome. LSI strives to raise awareness, as well as educate others about Reed syndrome.     Hereditary Colon Cancer Takes Guts - http://www.hcctakesguts.org/peer-support  This is a non-profit organization that supports and connects individuals with hereditary colon cancer syndromes.  They also promote research and health care initiatives.     I Have Reed Syndrome - http://www.ihavelynchsyndrome.org/   The goal of this non-profit organization is to educate others and raise awareness about Reed syndrome in the medical community, as well as the public.      Reed Syndrome Screening Network - http://www.lynchscreening.net/   This organization was founded in 2011, with the goal to promote universal tumor screening for Reed syndrome for those with a diagnosis of colorectal and endometrial cancers.     [employer] - www."[employer]sclubtwincities.org   [employer] is a 501(c)3 nonprofit and the local affiliate of the Cancer Support Community, a network of more than 54 supportive, free and welcoming  clubhouses  where everyone living with cancer can come for social, emotional and psychological support. Clubs are healing environments where individuals learn from each other with guidance from licensed professionals.    MOC[facility]: [place] [facility] - http://mnovarian.org/   MOCA was started in 1999 by a small group of  ovarian cancer survivors who came together to fund ovarian cancer research, raise awareness of the disease, and provide support to women with ovarian cancer and their families.    Other References:    Genetics Home Reference - http://ghr.nlm.nih.gov/condition/patrick-syndrome    American Cancer Society - www.cancer.org    How Do I Tell My Children? This article is about the BRCA gene for hereditary breast cancer, but the theme of the article applies to Patrick syndrome, too.  http://www.facingourrisk.org/understanding-brca-and-hboc/publications/newsletter/archives/2008winter/how-tell-children.php     National Society of Genetic Counselors: http://www.nsgc.org/                           2/11/2021  Dear Relative:  The purpose of this letter is to inform you that your relative recently underwent genetic counseling and genetic testing due to family history of breast, ovarian/uterine and bladder cancer.  The testing done through [facility] identified a likely pathogenic mutation in the PMS2 gene.  Specifically, the mutation is called p.E705K (also known as c.2113G>A).  A mutation (or change in the genetic code) causes a specific gene to stop working properly.  Ultimately, individuals who have a mutation in the PMS2 gene have a diagnosis of Patrick syndrome.    Patrick syndrome is associated with several cancers. PMS2 mutations increase the lifetime risk of: colon cancer (8.7-20%), uterine cancer (13-26%), and other cancers.  There is a combined risk for stomach, ovarian, hepatobiliary tract, urinary tract, small bowel, brain, and pancreatic cancer is 6% by age 70.   Both men and women can have mutations in PMS2, and have a 50% chance of passing this mutation on to each of their children.   If individuals are found to have a mutation in the PMS2 gene, we would recommend increased cancer screening at younger ages.  Risk reducing surgeries are also available options that can prevent the development of certain cancers.   In rare  situations in which both parents have a mutation in the PMS2 gene, their children each have a 25% risk for constitutional mismatch repair deficiency syndrome (CMMRD).  CMMRD is a disorder that causes cafe-au-lait spots (light brown spots on the skin) and childhood cancers.  For individuals of childbearing age with PMS2 mutations, genetic counseling and genetic testing may be advised for their partners.  I understand that this may be surprising, unexpected, and even unsettling news.  Because this mutation has been identified in your family, you are at risk for having the same mutation.  As mentioned earlier, your children may also be at risk.    Scheduling a genetic counseling appointment does not mean you have to undergo genetic testing.  The decision to pursue such testing is a very personal one that is discussed in more detail during the session.  Much of cancer genetic counseling is providing valuable information to individuals who are impacted by genetic information such as this.    If you are interested in scheduling a genetic counseling appointment at [facility], please call [phone] to schedule an appointment. You can also find a genetic counselor close to you or at another health system at [employer]  Sincerely,   Jc Monzon MS, CGC  Licensed, certified genetic counselor   [facility]  Cancer Risk Management Program

## 2021-02-11 NOTE — Clinical Note
Please send a copy of this letter to the patient and include a copy of their genetic testing results: Order: [id number].     Thank you,  Jc Monzon MS, [facility]  Licensed, certified genetic counselor

## 2021-02-11 NOTE — TELEPHONE ENCOUNTER
Pt is scheduled for a TLH with salpingo-oophorectomy on 2/24/21.    Unable to reach patient via phone. Left message to call clinic back at [phone].     Kirsty Corona RN

## 2021-02-11 NOTE — LETTER
2/11/2021         RE: Christina SÁNCHEZ [facility]  [address] [facility] [address]      2/11/2021    Referring Provider: Leyda Guzman DO    Presenting Information:   I spoke with Christina virtually today to discuss her genetic testing results. She submitted a saliva sample and we ordered CancerNext-Expanded testing from [facility]. This testing was done because of her family history of breast, ovarian/uterine and bladder cancer.     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 We discussed that likely pathogenic mutations are generally treated as pathogenic and this particular mutation is associated with a diagnosis of Reed Syndrome and increased risk for several cancers. We discussed the impact of this testing on Christina in detail.     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 This variant is called p.A417S (c.1249G>T). No other variants or mutations were found in the STK11 gene. Given the uncertain significance of this result, medical management decisions should NOT be made based on this test result alone.    Christina is negative for known, pathogenic mutations in the AIP, ALK, APC, CARINA, AXIN2, BAP1, BARD1, BLM, BMPR1A, BRCA1, BRCA2, BRIP1, CDC73, CDH1, CDK4, CDKN1B, CDKN2A, CHEK2, CTNNA1, DICER1, EPCAM, EGFR, EGLN1, FANCC, FH, FLCN, GALNT12, GREM1, HOXB13, KIF1B, KIT, LZTR1, MAX, MEN1, MET, MITF, MLH1, MRE11, MSH2, MSH3, MSH6, MUTYH, NBN, NF1, NF2, NTHL1, PALB2, PDGFRA, PHOX2B, POLD1, POLE, POT1, VADXA8R, PTCH1, PTEN, RAD50, RAD51C, RAD51D, RB1, RECQL, RET, SDHA, SDHAF2, SDHB, SDHC, SDHD, SMAD4, SMARCA4, SMARCB1, SMARCE1, STK11, SUFU, MKTY973, TP53, TSC1, TSC2, VHL, and XRCC2 genes. No pathogenic mutations were found in any of the  "other 76 of 77 genes analyzed. This test involved sequencing and deletion/duplication analysis of all genes with the exceptions of VHL and XRCC2 (sequencing and deletion/duplication); EGFR, EGLN1, HOXB13, KIT, MITF, PDGFRA, POLD1 and POLE (sequencing only); EPCAM and GREM1 (deletion/duplication only).    Testing did not detect an identifiable mutation associated with Hereditary Breast and Ovarian Cancer syndrome (BRCA1, BRCA2), Familial Adenomatous Polyposis (APC), Hereditary Diffuse Gastric Cancer (CDH1), Familial Atypical Multiple Mole Melanoma syndrome (CDK4, CDKN2A), Juvenile Polyposis syndrome (BMPR1A, SMAD4), Cowden syndrome (PTEN), Li Fraumeni syndrome (TP53), Peutz-Jeghers syndrome (STK11), MUTYH Associated Polyposis (MUTYH),  Hereditary Leiomyomatosis and Renal Cell Cancer (FH), Totp-Pdtx-Ioro (FLCN), Hereditary Papillary Renal Carcinoma (MET), Hereditary Paraganglioma and Pheochromocytoma syndrome (SDHA, SDHAF2, SDHB, SDHC, SDHD), Multiple Endocrine Neoplasia type 2 (RET), Tuberous sclerosis complex (TSC1, TSC2), Von Hippel-Lindau disease (VHL), Neurofibromatosis type 1 (NF1), Neurofibromatosis type 2 (NF1), Multiple Endocrine Neoplasia type 1 (MEN1), Multiple Endocrine Neoplasia type 4 (CDKN1B), Gorlin syndrome (SUFU, PTCH1), or Ferguson complex (BQLWC4U).    A copy of the test report can be found in the Laboratory tab, dated 1/11/2021, and named \"SEND OUTS MISC TEST\". The report is scanned in as a linked document.    Cancer Risks for the PMS2 mutation:   Individuals with mutations in the PMS2 gene have a:    8.7-20% lifetime risk of developing colon cancer. This is compared to 4.2% lifetime risk in the general population    Lifetime endometrial (uterine) cancer risk of 13-26%, compared to 3.1% in the general population.    Lifetime ovarian cancer risk of approximately 3%, compared to 1.3% in the general population.    Studies have shown that the risk for prostate cancer in men with PMS2 mutations is " approximately 4.6-11.6%. This is compared to the general population risk of 11.6%.     Additional risks are seen for renal pelvis and/or ureter (<1%-3.7%), bladder (<1%-2.4%), small bowel (0.1%-0.3%), pancreatic (<1%-1.6%), brain (0.6%-<1%), biliary tract (0.2%-<1%) and breast cancer (8.1%-12.8%). Some families also have increased risk for sebaceous neoplasms.    Several studies have also suggested that individuals with Reed syndrome may be at increased risk for other cancers, including stomach cancer, but additional research is needed to clarify these potential risks.    Cancer Screening and Prevention:  The following screening, per current National Comprehensive Cancer Network (NCCN) guidelines is recommended for individuals who have Reed syndrome due to a mutation in the PMS2 gene:    Colonoscopies starting at age 30-35 (or earlier based on family history), repeated every 1-2 years.    Women can consider hysterectomy due to the limitations in screening for endometrial cancer.     Timing of this surgery can be individualized based on family planning, family history, comorbidities, and the individual's gene mutation.      Screening for uterine cancer has not been shown to be beneficial in women with Reed syndrome, however screening via endometrial biopsy every 1-2 years can be considered. In post-menopausal women, transvaginal ultrasound may also be considered.     Women with Reed syndrome should be aware of the signs and symptoms of endometrial (uterine) cancer, such as abnormal uterine bleeding or postmenopausal bleeding, and should report these findings promptly to her physician, should they occur.     At this time, there is insufficient evidence to make a specific recommendations regarding removal of the ovaries and fallopian tubes in women with PMS2 mutations.     NCCN states that PMS2 carriers appear to have no greater than average risk for ovarian cancer and may consider deferring surveillance and may  elect to not have an oophorectomy.     Of note, there is not an effective screening method for ovarian cancer, but transvaginal ultrasound and a CA-125 blood test may be considered at the discretion of each individual's clinician.     Additionally, women should be aware of the signs and symptoms of ovarian cancer: pelvic/abdominal pain, bloating, increased abdominal girth, difficulty eating, early satiety, and urinary frequency or urgency. These symptoms, should be promptly reported to a physician.     A bilateral salpingo-oophorectomy (removal of both ovaries and fallopian tubes) may reduce the chance to develop ovarian cancer but the decision to have this procedure should be individualized and based on childbearing, menopause status, comorbidities, and family history.    Annual physical/neurological exams starting at 25-30.     Possible consideration of upper endoscopy (EGD) with extended duodenoscopy starting at age 40, repeated every 3-5 years. This recommendation is largely dependent on family history and other factors.    There is limited evidence to suggest a screening strategy for urothelial/urinary tract cancers. Annual urinalysis starting at age 30-35 can be considered. Consideration for screening is largely dependent upon family history, gender, and specific Reed syndrome mutation.    There is limited evidence to recommend early or more frequent screening for prostate cancer in men who have Reed syndrome. However, men with Reed syndrome are encouraged to follow prostate cancer screening as recommended in the NCCN Guidelines for Prostate Early Detection. This screening should be discussed with each individual's medical provider.    Pancreatic screening should be offered at 50 (or earlier based on family history) for those who have first and second degree relatives with pancreatic cancer from the same side of the family.     There are currently no specific screening recommendations for other potential  Reed syndrome-related cancers such as breast cancer, hepatobiliary tract cancer, etc. Screening for these and other cancers may be recommended based on family history.    Of note, some chemotherapies/immunotherapies for certain cancers may be more effective in individuals with Reed syndrome. Christina should discuss this with her physicians, if chemotherapy is indicated in the future.    Other screening based on Christina's personal and family history:    Based on her personal and family history, Christina has a 13.6% lifetime risk of developing breast cancer based on the CHATA 8 model.  Therefore, Christina does not meet current National Comprehensive Cancer Network (NCCN) guidelines for high risk breast screening, which is offered to women with a 20% lifetime risk or higher. However, it is still important for Christina to continue with routine breast screening under the care of her physicians. Breast cancer screening is generally recommended to begin approximately 10 years younger than the earliest age of breast cancer diagnosis in the family, or at age 40, whichever comes first.  In this family, screening may begin at age 40.  Christina is encouraged to discuss breast screening with her physicians.     Other population cancer screening options, such as those recommended by the American Cancer Society and the National Comprehensive Cancer Network (NCCN), are also appropriate for Christina and her family. These screening recommendations may change if there are changes to Christina's personal and/or family history of cancer. Final screening recommendations should be made by each individual's managing physician.    We discussed that Christina could participate in our Cancer Risk Management Program in which our nursing specialist provides an individual screening plan and assists with medical management. A referral was placed to see KAMRYN Mcclellan for this service.    Of note, the above information is based on our current understanding  of Christina's genetic findings. Christina is encouraged to reach out to me regularly regarding any pertinent updates to her personal and/or family history of cancer, as our understanding of the genetic findings in her family may change over time.     Implications for Family Members:  We reviewed that mutations in the PMS2 gene are inherited in an autosomal dominant pattern. This means that each of Christina's children have a 50% chance of inheriting the same mutation. Likewise, each of her siblings has a 50% risk of having the same mutation. Extended relatives may also carry this mutation, and she is encouraged to share this information with her family members on both sides of the family. I am happy to help her relatives connect with a genetic counselor in their area if they would like to discuss testing.    In rare situations in which both parents have a mutation in the PMS2 gene, their children each have a 25% risk for constitutional mismatch repair deficiency syndrome (CMMRD).  CMMRD is a disorder that causes cafe-au-lait spots and risk for childhood cancers. For individuals of childbearing age with PMS2 mutations, genetic counseling and genetic testing may be advised for their partners. This recommendation applies to Christina's children's father and her children.     Additional Testing Considerations:  Even though Christina's genetic testing result was positive, other relatives may carry a different gene mutation associated with breast, ovarian/uterine or bladder cancer. Genetic counseling is recommended for her paternal aunt who had breast cancer and her maternal relatives to discuss genetic testing options. If any of these relatives do pursue genetic testing, Christina is encouraged to contact me so that we may review the impact of their test results on her.    Support Resources:  I provided Christina with information regarding national and local support resources for Reed  syndrome.    ----------------------------------------------------------------------------------------------------------------------------------    Genetic Testing Result: Variant of Uncertain Significance (VUS) in the STK11 gene    STK11:p.A417S (c.1249G>T)    Interpretation:  We discussed several different interpretations of this inconclusive test result. It is not clear if this variant in the STK11 gene is associated with increased cancer risk.  1. This variant may be a benign change that does not increase cancer risk.  2. This variant may be a harmful mutation that causes Peutz-Jeghers syndrome.    Peutz-Jeghers syndrome (PJS) is a hereditary cancer syndrome caused by harmful or pathogenic mutations in the STK11 gene. Mutations in STK11 cause hamartomatous polyps in the gastrointestinal tract and freckles present in unusual places such as the hands, feet, neck, and lips. Individuals with Peutz-Jeghers syndrome have an increased risk for colon, breast, pancreatic, and other cancers.  Men are at risk for testicular tumors which can affect hormones in the body. Women are at risk for sex cord tumors of the ovaries and a rare aggressive type of cervical cancer.    The laboratory is working to determine if this variant is harmful or benign, and they will contact me if it is reclassified. If this variant is determined to be a benign change, there may be a different gene or combination of genes and environment that are associated with the cancers in this family.    It is also important to consider that her relatives may have had a mutation in one of the genes tested and she did not inherit it.      Inheritance:  We reviewed the autosomal dominant inheritance of this variant in the STK11 gene. We discussed that Christina has a 50% chance to pass this variant to each of her children. Likewise, each of her siblings has a 50% risk of having the same variant. Because it is unclear what, if any, risk is associated with this  "variant, clinical genetic testing for this variant alone is not recommended for relatives.    Family Studies:  The laboratory may offer additional research based testing for specific relatives in order to help reclassify this variant.    Screening:  Based on this inconclusive test result, it is important for Christina and her relatives to refer back to the family history for appropriate cancer screening.      See screening notes above    Summary:  We do not have an entire explanation for Christina's family history of cancer. While the PMS2 genetic change was identified, Christina may still be at risk for certain cancers due to family history, environmental factors, or other genetic causes not identified by this test.  Because of that, it is important that she continue with cancer screening based on her personal and family history as discussed above.    Genetic testing is rapidly advancing, and new cancer susceptibility genes will most likely be identified in the future. Therefore, I encouraged Christina to contact me annually or if there are changes in her personal or family history. This may change how we assess her cancer risk, screening, and the testing we would offer.    Of note, Christina was notified that I am leaving [place] after February 19. If she has questions or concerns she was encouraged to contact my colleague Candido Moreno as he will be at the [place] location.     Plan:  1.  I will send Christina a copy of her test results and support resources in the mail.  2.  I will provide a \"Dear Relative\" letter for Christina to share with her family members.  3.  She plans to follow up with her medical providers.  4.  She should contact Candido annually, or sooner if her family history changes.  5.  Cnadido Moreno will contact Christina if the laboratory informs me that this VUS has been reclassified.  This may change screening and testing recommendations for Christina and her relatives.  6.  A referral was placed for Christina to meet with " SHEYLA Mcclellan-CNS to discuss screening associated with a PMS2 mutation.    If Christina has additional questions, I encouraged her to contact Candido Moreno directly at [phone].     Time spent on the call: 30 minutes    Jc Monzon MS, [facility] [place]  Licensed, certified genetic counselor  [facility]  Cancer Risk Management Program  [phone]      STEPS FOR FREE FAMILY TESTING FOR THE PMS2 mutation    1. Have your relatives call their insurance providers to find out where they can see a genetic counselor. Sometimes insurance requires a referral from a primary care doctor to see a GC.   2. Once they find out where they can go, depending on if they need a referral or not, they can call the clinic and make an appointment with a genetic counselor. If they need a referral, then the primary care provider will notify the clinic where the GC is at and the clinic will call them for an appointment.   3. Have them bring a copy of your report to the visit (likely virtual, so even showing the GC the report through video or reading off the mutation name over the phone usually works). They will need the name of the mutation and the accession number. It should be within 90 days past the final report date on the upper right to qualify for free testing for family members.   a. Your relatives have until April 29, 2021 to qualify for free testing.   4. The GC will then order a single-site analysis test for the PMS2 gene mutation only. Relatives can only have free testing for the pathogenic mutations only (not variants of uncertain significance).   5. If they want a larger panel of genes, that will not be free and they may have to pay out of pocket depending on their insurance and deductible.   6. The GC then will likely send a saliva kit out from [facility] and then will contact them once results come in.              Resources for Reed Syndrome     Reed Syndrome International -  http://lynchcancers.com/   Reed Syndrome  International (LSI) provides support for individuals with Reed syndrome. This organization was founded by patients and health care providers who specialize in Reed syndrome. LSI strives to raise awareness, as well as educate others about Reed syndrome.     Hereditary Colon Cancer Takes Guts - http://www.hcctakesguts.org/peer-support  This is a non-profit organization that supports and connects individuals with hereditary colon cancer syndromes.  They also promote research and health care initiatives.     I Have Reed Syndrome - http://www.ihavelynchsyndrome.org/   The goal of this non-profit organization is to educate others and raise awareness about Reed syndrome in the medical community, as well as the public.      Reed Syndrome Screening Network - http://www.lynchscreening.net/   This organization was founded in 2011, with the goal to promote universal tumor screening for Reed syndrome for those with a diagnosis of colorectal and endometrial cancers.     [employer] - www.Bridesidelubtwincities.org   [employer] is a 501(c)3 nonprofit and the local affiliate of the Cancer Support Community, a network of more than 54 supportive, free and welcoming  clubhouses  where everyone living with cancer can come for social, emotional and psychological support. Clubs are healing environments where individuals learn from each other with guidance from licensed professionals.    [facility]OC[facility]: [place] [facility] - http://mnovarian.org/   MOCA was started in 1999 by a small group of ovarian cancer survivors who came together to fund ovarian cancer research, raise awareness of the disease, and provide support to women with ovarian cancer and their families.    Other References:    Genetics Home Reference - http://ghr.nlm.nih.gov/condition/reed-syndrome    American Cancer Society - www.cancer.org    How Do I Tell My Children? This article is about the BRCA gene for hereditary breast cancer, but  the theme of the article applies to Reed syndrome, too.  http://www.facingourrisk.org/understanding-brca-and-hboc/publications/newsletter/archives/2008winter/how-tell-children.php     National Society of Genetic Counselors: http://www.nsgc.org/               2/11/2021  Dear Relative:  The purpose of this letter is to inform you that your relative recently underwent genetic counseling and genetic testing due to family history of breast, ovarian/uterine and bladder cancer.  The testing done through [facility] identified a likely pathogenic mutation in the PMS2 gene.  Specifically, the mutation is called p.E705K (also known as c.2113G>A).  A mutation (or change in the genetic code) causes a specific gene to stop working properly.  Ultimately, individuals who have a mutation in the PMS2 gene have a diagnosis of Reed syndrome.    Reed syndrome is associated with several cancers. PMS2 mutations increase the lifetime risk of: colon cancer (8.7-20%), uterine cancer (13-26%), and other cancers.  There is a combined risk for stomach, ovarian, hepatobiliary tract, urinary tract, small bowel, brain, and pancreatic cancer is 6% by age 70.   Both men and women can have mutations in PMS2, and have a 50% chance of passing this mutation on to each of their children.   If individuals are found to have a mutation in the PMS2 gene, we would recommend increased cancer screening at younger ages.  Risk reducing surgeries are also available options that can prevent the development of certain cancers.   In rare situations in which both parents have a mutation in the PMS2 gene, their children each have a 25% risk for constitutional mismatch repair deficiency syndrome (CMMRD).  CMMRD is a disorder that causes cafe-au-lait spots (light brown spots on the skin) and childhood cancers.  For individuals of childbearing age with PMS2 mutations, genetic counseling and genetic testing may be advised for their partners.  I understand that this  may be surprising, unexpected, and even unsettling news.  Because this mutation has been identified in your family, you are at risk for having the same mutation.  As mentioned earlier, your children may also be at risk.    Scheduling a genetic counseling appointment does not mean you have to undergo genetic testing.  The decision to pursue such testing is a very personal one that is discussed in more detail during the session.  Much of cancer genetic counseling is providing valuable information to individuals who are impacted by genetic information such as this.    If you are interested in scheduling a genetic counseling appointment at [facility], please call [phone] to schedule an appointment. You can also find a genetic counselor close to you or at another health system at [place]  Sincerely,   Jc Monzon MS, [facility] [place]  Licensed, certified genetic counselor  [facility]  Cancer Risk Management Program                  EVER Monzon GC

## 2021-02-12 ENCOUNTER — TELEPHONE (OUTPATIENT)
Dept: FAMILY MEDICINE | Facility: CLINIC | Age: 41
End: 2021-02-12

## 2021-02-12 DIAGNOSIS — Z15.09 LYNCH SYNDROME: Primary | ICD-10-CM

## 2021-02-12 NOTE — LETTER

## 2021-02-15 ENCOUNTER — TELEPHONE (OUTPATIENT)
Dept: ONCOLOGY | Facility: CLINIC | Age: 41
End: 2021-02-15

## 2021-02-15 DIAGNOSIS — R30.0 DYSURIA: Primary | ICD-10-CM

## 2021-02-15 NOTE — TELEPHONE ENCOUNTER
Oncology/Surgical Oncology Referral Request:     Specialty Requested: Medical Oncology     Referring Provider: ZORAN Monzon GC    Referring Clinic/Organization: [facility]    Records location: [facility]     Requested Provider (if specified): SHEYLA Mcclellan, CNS

## 2021-02-15 NOTE — TELEPHONE ENCOUNTER
Left  with hours and phone. Letter sent for follow up. Referral in [facility].

## 2021-02-19 ENCOUNTER — OFFICE VISIT (OUTPATIENT)
Dept: INTERNAL MEDICINE | Facility: CLINIC | Age: 41
End: 2021-02-19
Payer: COMMERCIAL

## 2021-02-19 VITALS
RESPIRATION RATE: 16 BRPM | DIASTOLIC BLOOD PRESSURE: 70 MMHG | OXYGEN SATURATION: 98 % | HEART RATE: 96 BPM | BODY MASS INDEX: 28.64 KG/M2 | SYSTOLIC BLOOD PRESSURE: 120 MMHG | WEIGHT: 174 LBS | TEMPERATURE: 98 F

## 2021-02-19 DIAGNOSIS — Z01.818 PREOP GENERAL PHYSICAL EXAM: Primary | ICD-10-CM

## 2021-02-19 DIAGNOSIS — Z15.09 LYNCH SYNDROME: ICD-10-CM

## 2021-02-19 DIAGNOSIS — N83.202 CYST OF OVARY, LEFT: ICD-10-CM

## 2021-02-19 DIAGNOSIS — R79.89 ELEVATED SERUM FREE T4 LEVEL: ICD-10-CM

## 2021-02-19 PROCEDURE — 99214 OFFICE O/P EST MOD 30 MIN: CPT | Performed by: INTERNAL MEDICINE

## 2021-02-19 ASSESSMENT — PAIN SCALES - GENERAL: PAINLEVEL: NO PAIN (0)

## 2021-02-19 NOTE — H&P (VIEW-ONLY)
[address] [address]  Phone: [phone]  Primary Provider: No Ref-Primary, Physician  Pre-op Performing Provider: OLIVIER MODI      PREOPERATIVE EVALUATION:  Today's date: 2/19/2021    Christina Santana is a 40 year old female who presents for a preoperative evaluation.    Surgical Information:  Surgery/Procedure: Total hysterectomy  Surgery Location: [facility]  Surgeon: Dr. Alcantar  Surgery Date: 2/24/21  Time of Surgery: 7:30 am  Where patient plans to recover: At home with family  Fax number for surgical facility: Note does not need to be faxed, will be available electronically in Epic.    Type of Anesthesia Anticipated: General    Assessment & Plan     The proposed surgical procedure is considered INTERMEDIATE risk.    Preop general physical exam  Patient is fine for her preop.  We will check her electrolytes and she has a CBC ordered by her surgeon on the day of surgery.  - **Basic metabolic panel FUTURE anytime; Future    Elevated serum free T4 level  History of elevated free T4 we will check her free T4 and TSH again.  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  Also discussed the need for colon cancer screening and risk for breast cancer.  She will be document oncology later in the spring.    Cyst of ovary, left  Possible cause of her abdominal pain having a cyst removed.      Risks and Recommendations:  The patient has the following additional risks and recommendations for perioperative complications:   - No identified additional risk factors other than previously addressed        RECOMMENDATION:  APPROVAL GIVEN to proceed with proposed procedure, without further diagnostic evaluation.    Review of external notes as documented above  gyn notes           Subjective     HPI related to upcoming procedure:  left ovarian cyst and abd pain and positive for Reed syndrome so will have a total hysterectomy.      No flowsheet data found.    Health Care Directive:  Patient does not have a Health Care Directive or Living Will: Discussed advance care planning with patient; however, patient declined at this time.    Preoperative Review of :  No narcotics    Status of Chronic Conditions:  See problem list for active medical problems.  Problems all longstanding and stable, except as noted/documented.  See ROS for pertinent symptoms related to these conditions.    Review of Systems  Constitutional, neuro, ENT, endocrine, pulmonary, cardiac, gastrointestinal, genitourinary, musculoskeletal, integument and psychiatric systems are negative, except as otherwise noted.    Patient Active Problem List    Diagnosis Date Noted     Reed syndrome 02/08/2021     Priority: Medium     Added automatically from request for surgery [id number]       RUQ pain 08/07/2020     Priority: Medium     Added automatically from request for surgery [id number]       Nausea 08/07/2020     Priority: Medium     Added automatically from request for surgery [id number]       Calculus of gallbladder without cholecystitis without obstruction 08/07/2020     Priority: Medium     Added automatically from request for surgery [id number]       Cervicalgia 06/08/2020     Priority: Medium     Muscle spasm 06/08/2020     Priority: Medium     Sinus pressure 03/04/2020     Priority: Medium     Tension type headache 03/04/2020     Priority: Medium     Allergic rhinitis due to dust mite 03/04/2020     Priority: Medium     Seasonal allergic rhinitis due to pollen 03/04/2020     Priority: Medium     Allergic rhinitis due to animal dander 03/04/2020     Priority: Medium     Allergic rhinitis due to mold 03/04/2020     Priority: Medium     GERD with stricture 02/25/2020     Priority: Medium     Encounter for insertion of mirena IUD 09/13/2018     Priority: Medium     S/P LEEP (status post  loop electrosurgical excision procedure) 04/22/2013     Priority: Medium     5/23/11 ASC-H  6/20/11 colp- no high grade lesion noted  8/10/12 ASC-H  4/22/13 pap HSIL  5/1/13 colp. BREEZY 2/3. Plan: LEEP  5/13/13 LEEP. BREEZY 2/3 with + margins  8/28/13 colp- WNL  12/27/13 pap ASCUS + HR HPV  1/29/14 colp- WNL  8/27/14 pap NIL.        BREEZY III (cervical intraepithelial neoplasia grade III) with severe dysplasia 01/01/2013     Priority: Medium     5/2011- ASC-H  6/2011- colposcopy- suspicious for HPV  8/2012- ASC-H  4/2013- HSIL  5/1/2013- colposcopy- BREEZY 2-3  5/13/2013 LEEP: BREEZY II-III, Positive Margins  8/2013- ECC- cervicitis  12/2013 ASCUS, HPV +  1/2014- colposcopy- negative  8/2014- NIL  7/2015- NIL/HPV negative   3/2017- NIL/HPV +  4/2017- colposcopy- suggestive of BREEZY I  4/2018- NIL/HPV negative  9/10/19 NIL/HPV negative     Plan: Pap/HPV due 09/2022    ASCCP recommends:  History of CIN2 - CIN3:  Pap and HPV every 3 years for 20 years.       Astigmatism 10/03/2003     Priority: Medium     Myopia 10/03/2003     Priority: Medium      Past Medical History:   Diagnosis Date     ASCUS with positive high risk HPV 12/27/13     HSIL (high grade squamous intraepithelial lesion) on Pap smear of cervix 4/22/13     Pap smear of cervix with ASCUS, cannot exclude HGSIL 5/23/11, 8/10/12,      Past Surgical History:   Procedure Laterality Date     APPENDECTOMY       AS REPAIR PARAESOPHAGEAL HIATAL HERNIA,LAPAROTOMY, W MESH       No current outpatient medications on file.       No Known Allergies     Social History     Tobacco Use     Smoking status: Never Smoker     Smokeless tobacco: Never Used   Substance Use Topics     Alcohol use: No       History   Drug Use No         Objective     /70   Pulse 96   Temp 98  F (36.7  C) (Temporal)   Resp 16   Wt 78.9 kg (174 lb)   LMP 02/03/2021   SpO2 98%   BMI 28.64 kg/m      Physical Exam    GENERAL APPEARANCE: healthy, alert and no distress     NECK: no adenopathy, no asymmetry,  masses, or scars and thyroid normal to palpation     RESP: lungs clear to auscultation - no rales, rhonchi or wheezes     CV: regular rates and rhythm, normal S1 S2, no S3 or S4 and no murmur, click or rub     MS: extremities normal- no gross deformities noted, no evidence of inflammation in joints, FROM in all extremities.     SKIN: no suspicious lesions or rashes     NEURO: Normal strength and tone, sensory exam grossly normal, mentation intact and speech normal     PSYCH: mentation appears normal. and affect normal/bright     LYMPHATICS: No cervical adenopathy    Recent Labs   Lab Test 02/25/20  1426   HGB 13.5         POTASSIUM 4.2   CR 0.64        Diagnostics:  Labs pending at this time.  Results will be reviewed when available.   No EKG required, no history of coronary heart disease, significant arrhythmia, peripheral arterial disease or other structural heart disease.    Revised Cardiac Risk Index (RCRI):  The patient has the following serious cardiovascular risks for perioperative complications:   - No serious cardiac risks = 0 points     RCRI Interpretation: 1 point: Class II (low risk - 0.9% complication rate)             Signed Electronically by: Alphonse Arellano MD  Copy of this evaluation report is provided to requesting physician.    [facility] Guidelines    Revised Cardiac Risk Index

## 2021-02-19 NOTE — PATIENT INSTRUCTIONS

## 2021-02-19 NOTE — PROGRESS NOTES
[address] [address]  Phone: [phone]  Primary Provider: No Ref-Primary, Physician  Pre-op Performing Provider: OLIVIER MODI      PREOPERATIVE EVALUATION:  Today's date: 2/19/2021    Christina Santana is a 40 year old female who presents for a preoperative evaluation.    Surgical Information:  Surgery/Procedure: Total hysterectomy  Surgery Location: [facility]  Surgeon: Dr. Alcantar  Surgery Date: 2/24/21  Time of Surgery: 7:30 am  Where patient plans to recover: At home with family  Fax number for surgical facility: Note does not need to be faxed, will be available electronically in Epic.    Type of Anesthesia Anticipated: General    Assessment & Plan     The proposed surgical procedure is considered INTERMEDIATE risk.    Preop general physical exam  Patient is fine for her preop.  We will check her electrolytes and she has a CBC ordered by her surgeon on the day of surgery.  - **Basic metabolic panel FUTURE anytime; Future    Elevated serum free T4 level  History of elevated free T4 we will check her free T4 and TSH again.  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  Also discussed the need for colon cancer screening and risk for breast cancer.  She will be document oncology later in the spring.    Cyst of ovary, left  Possible cause of her abdominal pain having a cyst removed.      Risks and Recommendations:  The patient has the following additional risks and recommendations for perioperative complications:   - No identified additional risk factors other than previously addressed        RECOMMENDATION:  APPROVAL GIVEN to proceed with proposed procedure, without further diagnostic evaluation.    Review of external notes as documented above  gyn notes           Subjective     HPI related to upcoming procedure:  left ovarian cyst and abd pain and positive for Reed syndrome so will have a total hysterectomy.      No flowsheet data found.    Health Care Directive:  Patient does not have a Health Care Directive or Living Will: Discussed advance care planning with patient; however, patient declined at this time.    Preoperative Review of :  No narcotics    Status of Chronic Conditions:  See problem list for active medical problems.  Problems all longstanding and stable, except as noted/documented.  See ROS for pertinent symptoms related to these conditions.    Review of Systems  Constitutional, neuro, ENT, endocrine, pulmonary, cardiac, gastrointestinal, genitourinary, musculoskeletal, integument and psychiatric systems are negative, except as otherwise noted.    Patient Active Problem List    Diagnosis Date Noted     Reed syndrome 02/08/2021     Priority: Medium     Added automatically from request for surgery [id number]       RUQ pain 08/07/2020     Priority: Medium     Added automatically from request for surgery [id number]       Nausea 08/07/2020     Priority: Medium     Added automatically from request for surgery [id number]       Calculus of gallbladder without cholecystitis without obstruction 08/07/2020     Priority: Medium     Added automatically from request for surgery [id number]       Cervicalgia 06/08/2020     Priority: Medium     Muscle spasm 06/08/2020     Priority: Medium     Sinus pressure 03/04/2020     Priority: Medium     Tension type headache 03/04/2020     Priority: Medium     Allergic rhinitis due to dust mite 03/04/2020     Priority: Medium     Seasonal allergic rhinitis due to pollen 03/04/2020     Priority: Medium     Allergic rhinitis due to animal dander 03/04/2020     Priority: Medium     Allergic rhinitis due to mold 03/04/2020     Priority: Medium     GERD with stricture 02/25/2020     Priority: Medium     Encounter for insertion of mirena IUD 09/13/2018     Priority: Medium     S/P LEEP (status post  loop electrosurgical excision procedure) 04/22/2013     Priority: Medium     5/23/11 ASC-H  6/20/11 colp- no high grade lesion noted  8/10/12 ASC-H  4/22/13 pap HSIL  5/1/13 colp. BREEZY 2/3. Plan: LEEP  5/13/13 LEEP. BREEZY 2/3 with + margins  8/28/13 colp- WNL  12/27/13 pap ASCUS + HR HPV  1/29/14 colp- WNL  8/27/14 pap NIL.        BREEZY III (cervical intraepithelial neoplasia grade III) with severe dysplasia 01/01/2013     Priority: Medium     5/2011- ASC-H  6/2011- colposcopy- suspicious for HPV  8/2012- ASC-H  4/2013- HSIL  5/1/2013- colposcopy- BREEZY 2-3  5/13/2013 LEEP: BREEZY II-III, Positive Margins  8/2013- ECC- cervicitis  12/2013 ASCUS, HPV +  1/2014- colposcopy- negative  8/2014- NIL  7/2015- NIL/HPV negative   3/2017- NIL/HPV +  4/2017- colposcopy- suggestive of BREEZY I  4/2018- NIL/HPV negative  9/10/19 NIL/HPV negative     Plan: Pap/HPV due 09/2022    ASCCP recommends:  History of CIN2 - CIN3:  Pap and HPV every 3 years for 20 years.       Astigmatism 10/03/2003     Priority: Medium     Myopia 10/03/2003     Priority: Medium      Past Medical History:   Diagnosis Date     ASCUS with positive high risk HPV 12/27/13     HSIL (high grade squamous intraepithelial lesion) on Pap smear of cervix 4/22/13     Pap smear of cervix with ASCUS, cannot exclude HGSIL 5/23/11, 8/10/12,      Past Surgical History:   Procedure Laterality Date     APPENDECTOMY       AS REPAIR PARAESOPHAGEAL HIATAL HERNIA,LAPAROTOMY, W MESH       No current outpatient medications on file.       No Known Allergies     Social History     Tobacco Use     Smoking status: Never Smoker     Smokeless tobacco: Never Used   Substance Use Topics     Alcohol use: No       History   Drug Use No         Objective     /70   Pulse 96   Temp 98  F (36.7  C) (Temporal)   Resp 16   Wt 78.9 kg (174 lb)   LMP 02/03/2021   SpO2 98%   BMI 28.64 kg/m      Physical Exam    GENERAL APPEARANCE: healthy, alert and no distress     NECK: no adenopathy, no asymmetry,  masses, or scars and thyroid normal to palpation     RESP: lungs clear to auscultation - no rales, rhonchi or wheezes     CV: regular rates and rhythm, normal S1 S2, no S3 or S4 and no murmur, click or rub     MS: extremities normal- no gross deformities noted, no evidence of inflammation in joints, FROM in all extremities.     SKIN: no suspicious lesions or rashes     NEURO: Normal strength and tone, sensory exam grossly normal, mentation intact and speech normal     PSYCH: mentation appears normal. and affect normal/bright     LYMPHATICS: No cervical adenopathy    Recent Labs   Lab Test 02/25/20  1426   HGB 13.5         POTASSIUM 4.2   CR 0.64        Diagnostics:  Labs pending at this time.  Results will be reviewed when available.   No EKG required, no history of coronary heart disease, significant arrhythmia, peripheral arterial disease or other structural heart disease.    Revised Cardiac Risk Index (RCRI):  The patient has the following serious cardiovascular risks for perioperative complications:   - No serious cardiac risks = 0 points     RCRI Interpretation: 1 point: Class II (low risk - 0.9% complication rate)             Signed Electronically by: Alphonse Arellano MD  Copy of this evaluation report is provided to requesting physician.    [facility] Guidelines    Revised Cardiac Risk Index

## 2021-02-20 DIAGNOSIS — Z11.59 ENCOUNTER FOR SCREENING FOR OTHER VIRAL DISEASES: ICD-10-CM

## 2021-02-20 LAB
SARS-COV-2 RNA RESP QL NAA+PROBE: NORMAL
SPECIMEN SOURCE: NORMAL

## 2021-02-20 PROCEDURE — U0005 INFEC AGEN DETEC AMPLI PROBE: HCPCS | Performed by: OBSTETRICS & GYNECOLOGY

## 2021-02-20 PROCEDURE — U0003 INFECTIOUS AGENT DETECTION BY NUCLEIC ACID (DNA OR RNA); SEVERE ACUTE RESPIRATORY SYNDROME CORONAVIRUS 2 (SARS-COV-2) (CORONAVIRUS DISEASE [COVID-19]), AMPLIFIED PROBE TECHNIQUE, MAKING USE OF HIGH THROUGHPUT TECHNOLOGIES AS DESCRIBED BY CMS-2020-01-R: HCPCS | Performed by: OBSTETRICS & GYNECOLOGY

## 2021-02-21 LAB
LABORATORY COMMENT REPORT: NORMAL
SARS-COV-2 RNA RESP QL NAA+PROBE: NEGATIVE
SPECIMEN SOURCE: NORMAL

## 2021-02-24 ENCOUNTER — ANESTHESIA EVENT (OUTPATIENT)
Dept: SURGERY | Facility: CLINIC | Age: 41
End: 2021-02-24
Payer: COMMERCIAL

## 2021-02-24 ENCOUNTER — HOSPITAL ENCOUNTER (OUTPATIENT)
Facility: CLINIC | Age: 41
Discharge: HOME | End: 2021-02-24
Attending: OBSTETRICS & GYNECOLOGY | Admitting: OBSTETRICS & GYNECOLOGY
Payer: COMMERCIAL

## 2021-02-24 ENCOUNTER — APPOINTMENT (OUTPATIENT)
Dept: LAB | Facility: CLINIC | Age: 41
End: 2021-02-24
Payer: COMMERCIAL

## 2021-02-24 ENCOUNTER — ANESTHESIA (OUTPATIENT)
Dept: SURGERY | Facility: CLINIC | Age: 41
End: 2021-02-24
Payer: COMMERCIAL

## 2021-02-24 VITALS
HEIGHT: 65 IN | OXYGEN SATURATION: 95 % | DIASTOLIC BLOOD PRESSURE: 74 MMHG | RESPIRATION RATE: 14 BRPM | WEIGHT: 174 LBS | TEMPERATURE: 97.7 F | SYSTOLIC BLOOD PRESSURE: 108 MMHG | BODY MASS INDEX: 28.99 KG/M2 | HEART RATE: 78 BPM

## 2021-02-24 DIAGNOSIS — Z01.818 PREOP GENERAL PHYSICAL EXAM: ICD-10-CM

## 2021-02-24 DIAGNOSIS — R30.0 DYSURIA: ICD-10-CM

## 2021-02-24 DIAGNOSIS — Z15.09 LYNCH SYNDROME: ICD-10-CM

## 2021-02-24 DIAGNOSIS — Z90.710 S/P HYSTERECTOMY: Primary | ICD-10-CM

## 2021-02-24 DIAGNOSIS — R79.89 ELEVATED SERUM FREE T4 LEVEL: ICD-10-CM

## 2021-02-24 LAB
ABO + RH BLD: NORMAL
ABO + RH BLD: NORMAL
ANION GAP SERPL CALCULATED.3IONS-SCNC: 5 MMOL/L (ref 3–14)
BLD GP AB SCN SERPL QL: NORMAL
BLOOD BANK CMNT PATIENT-IMP: NORMAL
BUN SERPL-MCNC: 13 MG/DL (ref 7–30)
CALCIUM SERPL-MCNC: 8.6 MG/DL (ref 8.5–10.1)
CHLORIDE SERPL-SCNC: 110 MMOL/L (ref 94–109)
CO2 SERPL-SCNC: 25 MMOL/L (ref 20–32)
CREAT SERPL-MCNC: 0.62 MG/DL (ref 0.52–1.04)
ERYTHROCYTE [DISTWIDTH] IN BLOOD BY AUTOMATED COUNT: 12.2 % (ref 10–15)
GFR SERPL CREATININE-BSD FRML MDRD: >90 ML/MIN/{1.73_M2}
GLUCOSE SERPL-MCNC: 90 MG/DL (ref 70–99)
HCG UR QL: NEGATIVE
HCT VFR BLD AUTO: 40.5 % (ref 35–47)
HGB BLD-MCNC: 13.7 G/DL (ref 11.7–15.7)
MCH RBC QN AUTO: 31.3 PG (ref 26.5–33)
MCHC RBC AUTO-ENTMCNC: 33.8 G/DL (ref 31.5–36.5)
MCV RBC AUTO: 93 FL (ref 78–100)
PLATELET # BLD AUTO: 204 10E9/L (ref 150–450)
POTASSIUM SERPL-SCNC: 4.8 MMOL/L (ref 3.4–5.3)
RBC # BLD AUTO: 4.38 10E12/L (ref 3.8–5.2)
SODIUM SERPL-SCNC: 140 MMOL/L (ref 133–144)
SPECIMEN EXP DATE BLD: NORMAL
T4 FREE SERPL-MCNC: 1.58 NG/DL (ref 0.76–1.46)
TSH SERPL DL<=0.005 MIU/L-ACNC: 1.33 MU/L (ref 0.4–4)
WBC # BLD AUTO: 7.4 10E9/L (ref 4–11)

## 2021-02-24 PROCEDURE — 250N000011 HC RX IP 250 OP 636: Performed by: OBSTETRICS & GYNECOLOGY

## 2021-02-24 PROCEDURE — 86901 BLOOD TYPING SEROLOGIC RH(D): CPT | Performed by: OBSTETRICS & GYNECOLOGY

## 2021-02-24 PROCEDURE — 84443 ASSAY THYROID STIM HORMONE: CPT | Performed by: INTERNAL MEDICINE

## 2021-02-24 PROCEDURE — 58571 TLH W/T/O 250 G OR LESS: CPT | Performed by: OBSTETRICS & GYNECOLOGY

## 2021-02-24 PROCEDURE — 84439 ASSAY OF FREE THYROXINE: CPT | Performed by: INTERNAL MEDICINE

## 2021-02-24 PROCEDURE — 80048 BASIC METABOLIC PNL TOTAL CA: CPT | Performed by: INTERNAL MEDICINE

## 2021-02-24 PROCEDURE — 710N000012 HC RECOVERY PHASE 2, PER MINUTE: Performed by: OBSTETRICS & GYNECOLOGY

## 2021-02-24 PROCEDURE — 36415 COLL VENOUS BLD VENIPUNCTURE: CPT | Performed by: OBSTETRICS & GYNECOLOGY

## 2021-02-24 PROCEDURE — 710N000010 HC RECOVERY PHASE 1, LEVEL 2, PER MIN: Performed by: OBSTETRICS & GYNECOLOGY

## 2021-02-24 PROCEDURE — 81025 URINE PREGNANCY TEST: CPT | Performed by: OBSTETRICS & GYNECOLOGY

## 2021-02-24 PROCEDURE — 999N000141 HC STATISTIC PRE-PROCEDURE NURSING ASSESSMENT: Performed by: OBSTETRICS & GYNECOLOGY

## 2021-02-24 PROCEDURE — 85027 COMPLETE CBC AUTOMATED: CPT | Performed by: OBSTETRICS & GYNECOLOGY

## 2021-02-24 PROCEDURE — 250N000011 HC RX IP 250 OP 636: Performed by: NURSE ANESTHETIST, CERTIFIED REGISTERED

## 2021-02-24 PROCEDURE — 250N000013 HC RX MED GY IP 250 OP 250 PS 637: Performed by: OBSTETRICS & GYNECOLOGY

## 2021-02-24 PROCEDURE — 250N000009 HC RX 250: Performed by: OBSTETRICS & GYNECOLOGY

## 2021-02-24 PROCEDURE — 360N000077 HC SURGERY LEVEL 4, PER MIN: Performed by: OBSTETRICS & GYNECOLOGY

## 2021-02-24 PROCEDURE — 370N000017 HC ANESTHESIA TECHNICAL FEE, PER MIN: Performed by: OBSTETRICS & GYNECOLOGY

## 2021-02-24 PROCEDURE — 250N000009 HC RX 250: Performed by: NURSE ANESTHETIST, CERTIFIED REGISTERED

## 2021-02-24 PROCEDURE — 258N000003 HC RX IP 258 OP 636: Performed by: NURSE ANESTHETIST, CERTIFIED REGISTERED

## 2021-02-24 PROCEDURE — 88307 TISSUE EXAM BY PATHOLOGIST: CPT | Mod: 26 | Performed by: PATHOLOGY

## 2021-02-24 PROCEDURE — 86900 BLOOD TYPING SEROLOGIC ABO: CPT | Performed by: OBSTETRICS & GYNECOLOGY

## 2021-02-24 PROCEDURE — 272N000001 HC OR GENERAL SUPPLY STERILE: Performed by: OBSTETRICS & GYNECOLOGY

## 2021-02-24 PROCEDURE — 250N000025 HC SEVOFLURANE, PER MIN: Performed by: OBSTETRICS & GYNECOLOGY

## 2021-02-24 PROCEDURE — 88307 TISSUE EXAM BY PATHOLOGIST: CPT | Mod: TC | Performed by: OBSTETRICS & GYNECOLOGY

## 2021-02-24 PROCEDURE — 86850 RBC ANTIBODY SCREEN: CPT | Performed by: OBSTETRICS & GYNECOLOGY

## 2021-02-24 RX ORDER — MEPERIDINE HYDROCHLORIDE 25 MG/ML
12.5 INJECTION INTRAMUSCULAR; INTRAVENOUS; SUBCUTANEOUS
Status: DISCONTINUED | OUTPATIENT
Start: 2021-02-24 | End: 2021-02-24 | Stop reason: HOSPADM

## 2021-02-24 RX ORDER — DEXAMETHASONE SODIUM PHOSPHATE 10 MG/ML
INJECTION, SOLUTION INTRAMUSCULAR; INTRAVENOUS PRN
Status: DISCONTINUED | OUTPATIENT
Start: 2021-02-24 | End: 2021-02-24

## 2021-02-24 RX ORDER — IBUPROFEN 800 MG/1
800 TABLET, FILM COATED ORAL ONCE
Status: DISCONTINUED | OUTPATIENT
Start: 2021-02-24 | End: 2021-02-24 | Stop reason: HOSPADM

## 2021-02-24 RX ORDER — LIDOCAINE 40 MG/G
CREAM TOPICAL
Status: DISCONTINUED | OUTPATIENT
Start: 2021-02-24 | End: 2021-02-24 | Stop reason: HOSPADM

## 2021-02-24 RX ORDER — HYDROCODONE BITARTRATE AND ACETAMINOPHEN 5; 325 MG/1; MG/1
1 TABLET ORAL EVERY 6 HOURS PRN
Qty: 12 TABLET | Refills: 0 | Status: SHIPPED | OUTPATIENT
Start: 2021-02-24 | End: 2021-02-27

## 2021-02-24 RX ORDER — POLYETHYLENE GLYCOL 3350 17 G/17G
1 POWDER, FOR SOLUTION ORAL DAILY
Qty: 507 G | Refills: 1 | Status: SHIPPED | OUTPATIENT
Start: 2021-02-24 | End: 2021-04-13

## 2021-02-24 RX ORDER — ESTRADIOL 0.1 MG/D
1 FILM, EXTENDED RELEASE TRANSDERMAL
Qty: 16 PATCH | Refills: 11 | Status: SHIPPED | OUTPATIENT
Start: 2021-02-25 | End: 2021-05-19 | Stop reason: DRUGHIGH

## 2021-02-24 RX ORDER — OXYCODONE HYDROCHLORIDE 5 MG/1
5 TABLET ORAL
Status: DISCONTINUED | OUTPATIENT
Start: 2021-02-24 | End: 2021-02-24 | Stop reason: HOSPADM

## 2021-02-24 RX ORDER — ACETAMINOPHEN 325 MG/1
975 TABLET ORAL ONCE
Status: DISCONTINUED | OUTPATIENT
Start: 2021-02-24 | End: 2021-02-24 | Stop reason: HOSPADM

## 2021-02-24 RX ORDER — FENTANYL CITRATE 50 UG/ML
INJECTION, SOLUTION INTRAMUSCULAR; INTRAVENOUS PRN
Status: DISCONTINUED | OUTPATIENT
Start: 2021-02-24 | End: 2021-02-24

## 2021-02-24 RX ORDER — ONDANSETRON 4 MG/1
4 TABLET, ORALLY DISINTEGRATING ORAL EVERY 30 MIN PRN
Status: DISCONTINUED | OUTPATIENT
Start: 2021-02-24 | End: 2021-02-24 | Stop reason: HOSPADM

## 2021-02-24 RX ORDER — ACETAMINOPHEN 325 MG/1
975 TABLET ORAL ONCE
Status: DISCONTINUED | OUTPATIENT
Start: 2021-02-24 | End: 2021-02-24

## 2021-02-24 RX ORDER — NALOXONE HYDROCHLORIDE 0.4 MG/ML
0.2 INJECTION, SOLUTION INTRAMUSCULAR; INTRAVENOUS; SUBCUTANEOUS
Status: DISCONTINUED | OUTPATIENT
Start: 2021-02-24 | End: 2021-02-24 | Stop reason: HOSPADM

## 2021-02-24 RX ORDER — FENTANYL CITRATE 50 UG/ML
25-50 INJECTION, SOLUTION INTRAMUSCULAR; INTRAVENOUS
Status: DISCONTINUED | OUTPATIENT
Start: 2021-02-24 | End: 2021-02-24 | Stop reason: HOSPADM

## 2021-02-24 RX ORDER — LIDOCAINE HYDROCHLORIDE 20 MG/ML
INJECTION, SOLUTION INFILTRATION; PERINEURAL PRN
Status: DISCONTINUED | OUTPATIENT
Start: 2021-02-24 | End: 2021-02-24

## 2021-02-24 RX ORDER — IBUPROFEN 800 MG/1
800 TABLET, FILM COATED ORAL EVERY 6 HOURS PRN
Qty: 30 TABLET | Refills: 0 | Status: SHIPPED | OUTPATIENT
Start: 2021-02-24 | End: 2021-04-13

## 2021-02-24 RX ORDER — KETOROLAC TROMETHAMINE 30 MG/ML
INJECTION, SOLUTION INTRAMUSCULAR; INTRAVENOUS PRN
Status: DISCONTINUED | OUTPATIENT
Start: 2021-02-24 | End: 2021-02-24

## 2021-02-24 RX ORDER — PROPOFOL 10 MG/ML
INJECTION, EMULSION INTRAVENOUS PRN
Status: DISCONTINUED | OUTPATIENT
Start: 2021-02-24 | End: 2021-02-24

## 2021-02-24 RX ORDER — HYDROMORPHONE HYDROCHLORIDE 1 MG/ML
.3-.5 INJECTION, SOLUTION INTRAMUSCULAR; INTRAVENOUS; SUBCUTANEOUS EVERY 10 MIN PRN
Status: DISCONTINUED | OUTPATIENT
Start: 2021-02-24 | End: 2021-02-24 | Stop reason: HOSPADM

## 2021-02-24 RX ORDER — SODIUM CHLORIDE, SODIUM LACTATE, POTASSIUM CHLORIDE, CALCIUM CHLORIDE 600; 310; 30; 20 MG/100ML; MG/100ML; MG/100ML; MG/100ML
INJECTION, SOLUTION INTRAVENOUS CONTINUOUS
Status: DISCONTINUED | OUTPATIENT
Start: 2021-02-24 | End: 2021-02-24 | Stop reason: HOSPADM

## 2021-02-24 RX ORDER — BUPIVACAINE HYDROCHLORIDE AND EPINEPHRINE 5; 5 MG/ML; UG/ML
INJECTION, SOLUTION EPIDURAL; INTRACAUDAL; PERINEURAL PRN
Status: DISCONTINUED | OUTPATIENT
Start: 2021-02-24 | End: 2021-02-24 | Stop reason: HOSPADM

## 2021-02-24 RX ORDER — CEFAZOLIN SODIUM 1 G/3ML
1 INJECTION, POWDER, FOR SOLUTION INTRAMUSCULAR; INTRAVENOUS SEE ADMIN INSTRUCTIONS
Status: DISCONTINUED | OUTPATIENT
Start: 2021-02-24 | End: 2021-02-24 | Stop reason: HOSPADM

## 2021-02-24 RX ORDER — ONDANSETRON 2 MG/ML
INJECTION INTRAMUSCULAR; INTRAVENOUS PRN
Status: DISCONTINUED | OUTPATIENT
Start: 2021-02-24 | End: 2021-02-24

## 2021-02-24 RX ORDER — ONDANSETRON 2 MG/ML
4 INJECTION INTRAMUSCULAR; INTRAVENOUS EVERY 30 MIN PRN
Status: DISCONTINUED | OUTPATIENT
Start: 2021-02-24 | End: 2021-02-24 | Stop reason: HOSPADM

## 2021-02-24 RX ORDER — ONDANSETRON 4 MG/1
4-8 TABLET, ORALLY DISINTEGRATING ORAL EVERY 8 HOURS PRN
Qty: 12 TABLET | Refills: 0 | Status: SHIPPED | OUTPATIENT
Start: 2021-02-24 | End: 2021-04-13

## 2021-02-24 RX ORDER — NALOXONE HYDROCHLORIDE 0.4 MG/ML
0.4 INJECTION, SOLUTION INTRAMUSCULAR; INTRAVENOUS; SUBCUTANEOUS
Status: DISCONTINUED | OUTPATIENT
Start: 2021-02-24 | End: 2021-02-24 | Stop reason: HOSPADM

## 2021-02-24 RX ORDER — PROPOFOL 10 MG/ML
INJECTION, EMULSION INTRAVENOUS
Status: COMPLETED | OUTPATIENT
Start: 2021-02-24 | End: 2021-02-24

## 2021-02-24 RX ORDER — CEFAZOLIN SODIUM 2 G/100ML
2 INJECTION, SOLUTION INTRAVENOUS
Status: COMPLETED | OUTPATIENT
Start: 2021-02-24 | End: 2021-02-24

## 2021-02-24 RX ORDER — OXYCODONE HYDROCHLORIDE 5 MG/1
5-10 TABLET ORAL EVERY 4 HOURS PRN
Qty: 12 TABLET | Refills: 0 | Status: SHIPPED | OUTPATIENT
Start: 2021-02-24 | End: 2021-02-24

## 2021-02-24 RX ADMIN — LIDOCAINE HYDROCHLORIDE 60 MG: 20 INJECTION, SOLUTION INFILTRATION; PERINEURAL at 07:34

## 2021-02-24 RX ADMIN — ONDANSETRON 4 MG: 2 INJECTION INTRAMUSCULAR; INTRAVENOUS at 08:56

## 2021-02-24 RX ADMIN — FENTANYL CITRATE 25 MCG: 50 INJECTION, SOLUTION INTRAMUSCULAR; INTRAVENOUS at 09:15

## 2021-02-24 RX ADMIN — ROCURONIUM BROMIDE 20 MG: 10 INJECTION INTRAVENOUS at 08:00

## 2021-02-24 RX ADMIN — MIDAZOLAM 1 MG: 1 INJECTION INTRAMUSCULAR; INTRAVENOUS at 07:29

## 2021-02-24 RX ADMIN — FENTANYL CITRATE 50 MCG: 50 INJECTION, SOLUTION INTRAMUSCULAR; INTRAVENOUS at 09:20

## 2021-02-24 RX ADMIN — FENTANYL CITRATE 50 MCG: 50 INJECTION, SOLUTION INTRAMUSCULAR; INTRAVENOUS at 07:26

## 2021-02-24 RX ADMIN — FENTANYL CITRATE 50 MCG: 50 INJECTION, SOLUTION INTRAMUSCULAR; INTRAVENOUS at 09:54

## 2021-02-24 RX ADMIN — KETOROLAC TROMETHAMINE 30 MG: 30 INJECTION, SOLUTION INTRAMUSCULAR at 09:07

## 2021-02-24 RX ADMIN — DEXAMETHASONE SODIUM PHOSPHATE 8 MG: 10 INJECTION, SOLUTION INTRAMUSCULAR; INTRAVENOUS at 07:35

## 2021-02-24 RX ADMIN — PROPOFOL 20 MG: 10 INJECTION, EMULSION INTRAVENOUS at 09:02

## 2021-02-24 RX ADMIN — ROCURONIUM BROMIDE 50 MG: 10 INJECTION INTRAVENOUS at 07:35

## 2021-02-24 RX ADMIN — HYDROMORPHONE HYDROCHLORIDE 0.25 MG: 1 INJECTION, SOLUTION INTRAMUSCULAR; INTRAVENOUS; SUBCUTANEOUS at 07:49

## 2021-02-24 RX ADMIN — FENTANYL CITRATE 50 MCG: 50 INJECTION, SOLUTION INTRAMUSCULAR; INTRAVENOUS at 07:33

## 2021-02-24 RX ADMIN — SUGAMMADEX 160 MG: 100 INJECTION, SOLUTION INTRAVENOUS at 09:07

## 2021-02-24 RX ADMIN — FENTANYL CITRATE 50 MCG: 50 INJECTION, SOLUTION INTRAMUSCULAR; INTRAVENOUS at 09:49

## 2021-02-24 RX ADMIN — PROPOFOL 180 MG: 10 INJECTION, EMULSION INTRAVENOUS at 07:37

## 2021-02-24 RX ADMIN — ONDANSETRON 4 MG: 2 INJECTION INTRAMUSCULAR; INTRAVENOUS at 11:27

## 2021-02-24 RX ADMIN — MIDAZOLAM 1 MG: 1 INJECTION INTRAMUSCULAR; INTRAVENOUS at 07:26

## 2021-02-24 RX ADMIN — ACETAMINOPHEN 975 MG: 160 SUSPENSION ORAL at 06:48

## 2021-02-24 RX ADMIN — CEFAZOLIN SODIUM 2 G: 2 INJECTION, SOLUTION INTRAVENOUS at 07:43

## 2021-02-24 RX ADMIN — PROPOFOL 180 MG: 10 INJECTION, EMULSION INTRAVENOUS at 07:34

## 2021-02-24 RX ADMIN — HYDROMORPHONE HYDROCHLORIDE 0.5 MG: 1 INJECTION, SOLUTION INTRAMUSCULAR; INTRAVENOUS; SUBCUTANEOUS at 09:38

## 2021-02-24 RX ADMIN — SODIUM CHLORIDE, POTASSIUM CHLORIDE, SODIUM LACTATE AND CALCIUM CHLORIDE: 600; 310; 30; 20 INJECTION, SOLUTION INTRAVENOUS at 07:28

## 2021-02-24 RX ADMIN — SODIUM CHLORIDE, POTASSIUM CHLORIDE, SODIUM LACTATE AND CALCIUM CHLORIDE: 600; 310; 30; 20 INJECTION, SOLUTION INTRAVENOUS at 08:50

## 2021-02-24 RX ADMIN — FENTANYL CITRATE 25 MCG: 50 INJECTION, SOLUTION INTRAMUSCULAR; INTRAVENOUS at 09:23

## 2021-02-24 RX ADMIN — HYDROMORPHONE HYDROCHLORIDE 0.25 MG: 1 INJECTION, SOLUTION INTRAMUSCULAR; INTRAVENOUS; SUBCUTANEOUS at 09:07

## 2021-02-24 ASSESSMENT — MIFFLIN-ST. JEOR: SCORE: 1465.14

## 2021-02-24 NOTE — TELEPHONE ENCOUNTER
RECORDS STATUS - ALL OTHER DIAGNOSIS      RECORDS RECEIVED FROM: [facility] IMG   DATE RECEIVED: 5/21/2021   NOTES STATUS DETAILS   OFFICE NOTE from referring provider     OFFICE NOTE from medical oncologist Complete [facility] 2/11/2021 Virtual Visit (Oncology) Nicolás- PMS2- related patrick syndrome (HNPCC4)     Epic 12/21/2020 Virtual Visit (Oncology Nicolás - Family Hx of ovarian cancer    OPERATIVE REPORT  [facility]- 2/24/2021  Admission (Surgery)   S/P Hysterectomy ; Patrick Syndrome    LABS     PATHOLOGY REPORTS Biopsy In Process (Internal)  Epic- 2/24/2021   ANYTHING RELATED TO DIAGNOSIS     GENONOMIC TESTING     TYPE:     IMAGING (NEED IMAGES & REPORT)     CT SCANS Complete CT abdomen Pelvis 1/1/2019    MRI     MAMMO     ULTRASOUND Complete- [facility] US Pelvis Complete ([facility]) 2/2/2021    PET       Action    Action Taken 2/24/2021 11:21am     I called Brittany to have IMG pushed to PACS. Ph: [phone] - [facility]     I called [facility] [phone] #3- they will push imaging and fax a report from 2000.

## 2021-02-24 NOTE — ANESTHESIA POSTPROCEDURE EVALUATION
Patient: Christina Santana    Procedure(s):  HYSTERECTOMY, TOTAL, LAPAROSCOPIC, WITH SALPINGO-OOPHORECTOMY    Diagnosis:Reed syndrome [Z15.09]  Diagnosis Additional Information: No value filed.    Anesthesia Type:  General    Note:  Disposition: Outpatient   Postop Pain Control: Uneventful            Sign Out: Well controlled pain   PONV: No   Neuro/Psych: Uneventful            Sign Out: Acceptable/Baseline neuro status   Airway/Respiratory: Uneventful            Sign Out: Acceptable/Baseline resp. status   CV/Hemodynamics: Uneventful            Sign Out: Acceptable CV status   Other NRE: NONE   DID A NON-ROUTINE EVENT OCCUR? No         Last vitals:  Vitals:    02/24/21 0955 02/24/21 1000 02/24/21 1005   BP: 96/54 90/52    Pulse: 83 75 87   Resp: 10 16 19   Temp: 97.7  F (36.5  C) 97.5  F (36.4  C) 97.7  F (36.5  C)   SpO2: 99% 99% 99%       Last vitals prior to Anesthesia Care Transfer:  CRNA VITALS  2/24/2021 0853 - 2/24/2021 0953      2/24/2021             Resp Rate (observed):  (!) 4          Electronically Signed By: SHEYLA Queen CRNA  February 24, 2021  10:45 AM

## 2021-02-24 NOTE — OP NOTE
Gynecologic Operative Note   hCristina Santana  [id number]  2/23/2021    Preoperative Diagnosis:   Reed syndrome  Small simple left ovarian cyst     Postoperative Diagnosis:   Same      Procedure:   Total laparoscopic hysterectomy with bilateral salpingo-oophorectomy     Surgeon: Dylon Alcantar MD    Anesthesia: general endotracheal     Complications: None      EBL: 25 mL   IVF: 1200 mL crystalloid   UOP: 50 mL clear urine     Specimen: Uterus, cervix, bilateral fallopian tubes and ovaries    Findings: Exam under anesthesia revealed small anteflexed mobile uterus, no adnexal masses.  Upon entry of the abdomen with the laparoscope , no entry trauma noted. Normal appearing uterus, tubes, ovaries. No adhesive disease. Ureters visualized at the end of the case with peristalsis bilaterally.  A survey of the upper abdomen showed normal anatomy.     Indications: Christina Santana is a 40 year old  with Reed syndrome and a simple though symptomatic ovarian cyst. She has completed childbearing, recommendations for prophylactic total hysterectomy with bilateral salpingo-oophorectomy. All risks, benefits and alternatives were discussed and written informed consent was obtained.     Procedure: The patient was taken to the operating room where general anesthesia was induced without difficulty. She was then examined under anesthesia with the above noted findings. She was then prepared and draped in the normal sterile fashion in the dorsal lithotomy position using yellow fin stirrups. Attention was turned to the vagina. A speculum was placed for visualization of the cervix and the anterior lip of the cervix was then grasped with a single-tooth tenaculum. A paracervical block was then placed with 20 ml of 0.5% marcaine with epi. The cervix was then serially dilated. A large Vcare was then placed through the cervix with balloon inflated, the tenaculum and speculum removed, and the Vcare cup advanced into the vagina over the  cervix.     Attention was then turned to the abdomen where a 5mm infra-umbilical skin incision was made. Entry was made directly using the visi-port and abdomen insufflated. Trocar removed and camera replace, noting no entry trauma. A second 5mm skin incision was then made in the LLQ and the trocar and sleeve advanced under direct visualization. A third skin incision, 10 mm, was made in the RLQ and the trocar and sleeve advanced under direct visualization.     A survey of the patient's abdomen and pelvis was made with the above noted findings. Attention was then turned to the pelvis where the left IP ligament was then grasped and elevated, with the ureter clearly identified inferior to the ligament, which was then transected using the Ligasure. The broad ligament was then ligated sequentially to the cornua and across the round. Monopolar cautery was then used to dissect the anterior leaf of the broad ligament to the bladder reflection and then to the midline. The same steps were there performed on the right. A bladder flap was mobilized and the peritoneum on the vesicouterine fold was incised to mobilize the bladder. The uterine arteries were then dissected, transected, and ligated using the Ligasure, and the pedicles were freed down to the level of the colpotomy ring using the monopolar scissors. The vagina was transected along the Vcare cup using monopolar scissors, resulting in separation of the uterus and attached tubes and ovaries. The uterus, cervix, tubes, and ovaries were then delivered through the vagina, and a pneumo-occluder was inserted to maintain pneumoperitoneum. The vaginal vault was closed with running Vlock using the Endostitch device. The abdomen was irrigated, and excellent hemostasis was noted. Ureters were then visualized again with peristalsis noted bilaterally.     A final look at the surgical sites showed excellent hemostasis and the 10 mm port was closed using the Luis Chelle. Then, the  abdomen was the desufflated and all trocars were then removed. The skin incisions were then closed using 4-0 vicryl in a subcuticular fashion followed by dermabond. The odom catheter was then removed.    The patient tolerated the procedure well. Sponge, lap and needle counts were correct. The patient was taken to the recovery area in stable condition.    Dylon Alcantar MD February 24, 2021 9:12 AM

## 2021-02-24 NOTE — ANESTHESIA PROCEDURE NOTES
Airway   Date/Time: 2/24/2021 7:37 AM   Patient location during procedure: OR  Staff -   Performed By: CRNA    Consent for Airway   Urgency: elective    Indications and Patient Condition  Indications for airway management: pasquale-procedural  Induction type:intravenousMask difficulty assessment: 1 - vent by mask    Final Airway Details  Final airway type: endotracheal airway  Successful airway:ETT - single  Endotracheal Airway Details   ETT size (mm): 7.0  Cuffed: yes  Successful intubation technique: direct laryngoscopy  Grade View of Cords: 1  Adjucts: stylet  Measured from: gums/teeth  Secured at (cm): 22  Secured with: silk tape  Bite block used: None    Post intubation assessment   Placement verified by: capnometry, equal breath sounds and chest rise   Number of attempts at approach: 1  Secured with:silk tape  Ease of procedure: easy  Dentition: Intact and Unchanged    Medications Administered  Propofol (DIPRIVAN) injection 10 mg/mL vial, 180 mg

## 2021-02-24 NOTE — ANESTHESIA PREPROCEDURE EVALUATION
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]   Procedure : Procedure(s):  HYSTERECTOMY, TOTAL, LAPAROSCOPIC, WITH SALPINGO-OOPHORECTOMY     Past Medical History:   Diagnosis Date     ASCUS with positive high risk HPV 13     HSIL (high grade squamous intraepithelial lesion) on Pap smear of cervix 13     Pap smear of cervix with ASCUS, cannot exclude HGSIL 11, 8/10/12,       Past Surgical History:   Procedure Laterality Date     APPENDECTOMY       AS REPAIR PARAESOPHAGEAL HIATAL HERNIA,LAPAROTOMY, W MESH        No Known Allergies   Social History     Tobacco Use     Smoking status: Never Smoker     Smokeless tobacco: Never Used   Substance Use Topics     Alcohol use: No      Wt Readings from Last 1 Encounters:   21 78.9 kg (174 lb)        Anesthesia Evaluation   Pt has had prior anesthetic. Type: General.    No history of anesthetic complications       ROS/MED HX  ENT/Pulmonary:  - neg pulmonary ROS     Neurologic:     (+) migraines,     Cardiovascular:  - neg cardiovascular ROS     METS/Exercise Tolerance:     Hematologic:  - neg hematologic  ROS     Musculoskeletal:  - neg musculoskeletal ROS     GI/Hepatic: Comment: GERD resolved with hiatal hernia procedure.      Renal/Genitourinary:  - neg Renal ROS     Endo:  - neg endo ROS     Psychiatric/Substance Use:  - neg psychiatric ROS     Infectious Disease:  - neg infectious disease ROS     Malignancy:  - neg malignancy ROS     Other:  - neg other ROS          Physical Exam    Airway  airway exam normal      Mallampati: I   TM distance: > 3 FB   Neck ROM: full   Mouth opening: > 3 cm    Respiratory Devices and Support         Dental  no notable dental history         Cardiovascular   cardiovascular exam normal       Rhythm and rate: regular and normal     Pulmonary   pulmonary exam normal        breath sounds clear to auscultation            OUTSIDE LABS:  CBC:   Lab Results   Component Value Date    WBC 9.5 02/25/2020    WBC 15.1 (H) 01/01/2019    HGB 13.5 02/25/2020    HGB 14.0 01/01/2019    HCT 41.6 02/25/2020    HCT 40.7 01/01/2019     02/25/2020     01/01/2019     BMP:   Lab Results   Component Value Date     02/25/2020     01/01/2019    POTASSIUM 4.2 02/25/2020    POTASSIUM 3.9 01/01/2019    CHLORIDE 106 02/25/2020    CHLORIDE 103 01/01/2019    CO2 28 02/25/2020    CO2 21 01/01/2019    BUN 13 02/25/2020    BUN 6 (L) 01/01/2019    CR 0.64 02/25/2020    CR 0.68 01/01/2019    GLC 88 02/25/2020     (H) 01/01/2019     COAGS: No results found for: PTT, INR, FIBR  POC: No results found for: BGM, HCG, HCGS  HEPATIC:   Lab Results   Component Value Date    ALBUMIN 3.7 02/25/2020    PROTTOTAL 7.3 02/25/2020    ALT 22 02/25/2020    AST 13 02/25/2020    ALKPHOS 56 02/25/2020    BILITOTAL 0.2 02/25/2020     OTHER:   Lab Results   Component Value Date    LACT 2.7 (H) 01/01/2019    A1C 5.1 10/28/2014    NERISSA 8.7 02/25/2020    TSH 0.78 01/29/2021    T4 1.56 (H) 12/29/2020    CRP 5.4 12/29/2020    SED 8 12/29/2020       Anesthesia Plan    ASA Status:  1   NPO Status:  NPO Appropriate    Anesthesia Type: General.     - Airway: ETT   Induction: Intravenous.   Maintenance: Balanced.        Consents    Anesthesia Plan(s) and associated risks, benefits, and realistic alternatives discussed. Questions answered and patient/representative(s) expressed understanding.     - Discussed with:  Patient      - Extended Intubation/Ventilatory Support Discussed: no Extended Intubation.      - Patient is DNR/DNI Status: No    Use of blood products discussed: Yes.     - Discussed with: Patient.     Postoperative Care    Pain management: IV analgesics.   PONV prophylaxis: Ondansetron (or other 5HT-3), Promethazine or metoclopramide     Comments:                SHEYLA Queen CRNA

## 2021-02-24 NOTE — ANESTHESIA CARE TRANSFER NOTE
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    Anesthesia Type:   General     Note:    Oropharynx: oropharynx clear of all foreign objects  Level of Consciousness: awake  Oxygen Supplementation: face mask    Independent Airway: airway patency satisfactory and stable  Dentition: dentition unchanged    Report to RN Given: handoff report given  Patient transferred to: PACU    Handoff Report: Identifed the Patient, Identified the Reponsible Provider, Reviewed the pertinent medical history, Discussed the surgical course, Reviewed Intra-OP anesthesia mangement and issues during anesthesia, Set expectations for post-procedure period and Allowed opportunity for questions and acknowledgement of understanding      Vitals: (Last set prior to Anesthesia Care Transfer)  CRNA VITALS  2/24/2021 0853 - 2/24/2021 0930      2/24/2021             Resp Rate (observed):  (!) 4        Electronically Signed By: SHEYLA Queen CRNA  February 24, 2021  9:30 AM

## 2021-02-26 LAB — COPATH REPORT: NORMAL

## 2021-03-01 DIAGNOSIS — F41.9 ANXIETY: Primary | ICD-10-CM

## 2021-03-01 RX ORDER — HYDROXYZINE HYDROCHLORIDE 25 MG/1
25-50 TABLET, FILM COATED ORAL
Qty: 30 TABLET | Refills: 1 | Status: SHIPPED | OUTPATIENT
Start: 2021-03-01 | End: 2021-05-19

## 2021-03-09 DIAGNOSIS — R21 RASH: Primary | ICD-10-CM

## 2021-03-09 RX ORDER — CLOBETASOL PROPIONATE 0.5 MG/G
CREAM TOPICAL 2 TIMES DAILY
Qty: 30 G | Refills: 1 | Status: SHIPPED | OUTPATIENT
Start: 2021-03-09 | End: 2021-05-19

## 2021-03-12 ENCOUNTER — OFFICE VISIT (OUTPATIENT)
Dept: OBGYN | Facility: CLINIC | Age: 41
End: 2021-03-12
Payer: COMMERCIAL

## 2021-03-12 VITALS
OXYGEN SATURATION: 98 % | WEIGHT: 171.3 LBS | HEART RATE: 89 BPM | TEMPERATURE: 98.7 F | DIASTOLIC BLOOD PRESSURE: 70 MMHG | BODY MASS INDEX: 28.51 KG/M2 | SYSTOLIC BLOOD PRESSURE: 110 MMHG

## 2021-03-12 DIAGNOSIS — Z98.890 POSTOPERATIVE STATE: Primary | ICD-10-CM

## 2021-03-12 PROCEDURE — 99024 POSTOP FOLLOW-UP VISIT: CPT | Performed by: OBSTETRICS & GYNECOLOGY

## 2021-03-14 NOTE — PROGRESS NOTES
S: Christina Santana is a 40 year old  here for post-operative visit following a Total laparoscopic hysterectomy with bilateral salpingo-oophorectomy for Reed syndrome on . Treated a few days ago with topical steroids for an itchy papular rash over her entire abdomen in the distribution of the surgical prep, with improvement of her rash and to port site concerns today. She does note a couple of days of increased vaginal spotting to bleeding. Was feeling great prior to the bleeding, but does not think that she overdid it. Otherwise, no bowel nor bladder symptoms, no other vaginal symptoms, no systemic symptoms.     Current Outpatient Medications   Medication Sig Dispense Refill     clobetasol (TEMOVATE) 0.05 % external cream Apply topically 2 times daily 30 g 1     estradiol (VIVELLE-DOT) 0.1 MG/24HR bi-weekly patch Place 1 patch onto the skin twice a week 16 patch 11     hydrOXYzine (ATARAX) 25 MG tablet Take 1-2 tablets (25-50 mg) by mouth nightly as needed for itching or anxiety 30 tablet 1     ibuprofen (ADVIL/MOTRIN) 800 MG tablet Take 1 tablet (800 mg) by mouth every 6 hours as needed for other (mild and/or inflammatory pain) 30 tablet 0     polyethylene glycol (MIRALAX) 17 GM/Dose powder Take 17 g (1 capful) by mouth daily 507 g 1     ondansetron (ZOFRAN-ODT) 4 MG ODT tab Take 1-2 tablets (4-8 mg) by mouth every 8 hours as needed for nausea (Patient not taking: Reported on 3/12/2021) 12 tablet 0      No Known Allergies    Past Medical History:   Diagnosis Date     ASCUS with positive high risk HPV 13     HSIL (high grade squamous intraepithelial lesion) on Pap smear of cervix 13     Pap smear of cervix with ASCUS, cannot exclude HGSIL 11, 8/10/12,      Past Surgical History:   Procedure Laterality Date     APPENDECTOMY       AS REPAIR PARAESOPHAGEAL HIATAL HERNIA,LAPAROTOMY, W MESH       LAPAROSCOPIC HYSTERECTOMY TOTAL, BILATERAL SALPINGO-OOPHORECTOMY, COMBINED N/A 2021     Procedure: HYSTERECTOMY, TOTAL, LAPAROSCOPIC, WITH SALPINGO-OOPHORECTOMY;  Surgeon: Dylon Monique MD;  Location: [facility]       O:   /70 (BP Location: Right arm, Patient Position: Chair, Cuff Size: Adult Regular)   Pulse 89   Temp 98.7  F (37.1  C) (Temporal)   Wt 77.7 kg (171 lb 4.8 oz)   LMP 02/03/2021   SpO2 98%   BMI 28.51 kg/m    Gen: Alert, oriented, appropriately interactive, NAD  CV: RRR  Resp: CTAB, good effort without distress   Abdomen: soft, non tender, non distended, minimal diffuse papules with erythema, no masses, no hernias. No inguinal lymphadenopathy.   Incision: port sites healing well, no erythema, induration, fluctuance, discharge, non tender  Perineum: no lesions; normal appearing external genitalia, bartholins glands, urethra, skenes glands  Vagina: cuff intact, no bleeding, no masses or lesions or discharge, normally rugated.  Bimanual exam: non tender, cuff intact, no masses, no fluctuance, no bleeding  MSK: normal gait, symmetric movements UE & LE  Lower extremities: non-tender, no edemar rashes     Labs:   Hemoglobin   Date Value Ref Range Status   02/24/2021 13.7 11.7 - 15.7 g/dL Final       Pathology:   Copath Report   Date Value Ref Range Status   02/24/2021   Final    Patient Name: DIONISIO PLASENCIA  MR#: [id number]  Specimen #:   Collected: 2/24/2021  Received: 2/24/2021  Reported: 2/26/2021 09:58  Ordering Phy(s): DYLON MONIQUE    For improved result formatting, select 'View Enhanced Report Format' under   Linked Documents section.    SPECIMEN(S):  Uterus, cervix, bilateral fallopian tubes and ovaries    FINAL DIAGNOSIS:  Uterus with bilateral fallopian tubes and ovaries, hysterectomy with   bilateral salpingo-oophorectomy:  -Histologically normal bilateral fallopian tubes, examined in their   entirety  -Benign bilateral ovaries with prominent cyst formation; no evidence of   atypia or malignancy; bilateral  ovaries examined in their  "entirety  -Histologically normal cervix and endocervix  -Benign secretory pattern endometrium without abnormalities, entire   endometrial lining examined  microscopically  -Normal myometrium    Electronically signed out by:    Mary Brand M.D.    CLINICAL HISTORY:  Reed syndrome    GROSS:  The specimen is received in formalin with the proper patient information,   labeled \"uterus, cervix, bilateral  fallopian tubes and ovaries\". The specimen consists of a 154.1 g, 11.1 x   7.1 x 5.6 cm hysterectomy specimen  with attached bilateral adnexa and a 3.5 x 3.0 cm cervix containing a 1.0   cm os. The right 5.1 x 0.5 cm and  left 5.5 x 0.5 cm fimbriated fallopian tubes each contain a stellate   lumen. The right 5.1 x 2.5 x 2.5 cm and  left 4.1 x 3.1 x 2.1 cm ovaries each contain multiple hemorrhagic corpus   lutea and multiple clear filled  cystic spaces, 0.2-0.5 cm. No excrescences are noted    The paracervical soft tissue is inked black. The uterus is bisected to   reveal a 3.7 x 1.1 cm trabeculated  endocervical canal leading to a 4.1 x 3.8 cm endometrial cavity. Further   sectioning through the endometrium  reveals a thickness averaging 0.3 cm. Myometrial thickness averages 2.0   cm. No masses are grossly appreciated.    Representative sections are submitted as follows:  A1-A2-right fallopian tube (entirely submitted)  A3-A16-right ovary (entirely submitted)  A17-A18-left fallopian tube (entirely submitted)  A19-A30-right ovary (entirely submitted)  N19-athcccar cervix  A32-posterior cervix  A33-A34-full-thickness anterior endomyometrium  Z41-D46-dxpzhhcx endometrium submitted from fundus to ABHI (entirely   submitted)  A41-A42-full-thickness posterior endomyometrium  A43-A47 posterior endometrium submitted from fundus to ABHI (entirely   submitted)    MICROSCOPIC:    A formal microscopic examination has been performed.    The technical component of this testing was completed at the [facility] " [facility], with the professional component performed   at the [facility]  [facility]atory, [address]  [address] ([phone])    CPT Codes:  A: 97118-HI1    COLLECTION SITE:  Client: [facility]  Location: [facility] (P)         A/P: Post op as above  Continue topical steroids until rash resolved  Reassurance given and exam finding reviewed regarding spotting  Reviewed postop restrictions    Dylon Alcantar MD  OB/GYN  March 14, 2021, 6:28 PM

## 2021-03-16 ENCOUNTER — HOSPITAL ENCOUNTER (OUTPATIENT)
Dept: MAMMOGRAPHY | Facility: CLINIC | Age: 41
Discharge: HOME | End: 2021-03-16
Attending: OBSTETRICS & GYNECOLOGY | Admitting: OBSTETRICS & GYNECOLOGY
Payer: COMMERCIAL

## 2021-03-16 DIAGNOSIS — Z80.3 FAMILY HISTORY OF MALIGNANT NEOPLASM OF BREAST: ICD-10-CM

## 2021-03-16 DIAGNOSIS — Z12.31 VISIT FOR SCREENING MAMMOGRAM: ICD-10-CM

## 2021-03-16 PROCEDURE — 77063 BREAST TOMOSYNTHESIS BI: CPT

## 2021-03-19 ENCOUNTER — HOSPITAL ENCOUNTER (OUTPATIENT)
Dept: ULTRASOUND IMAGING | Facility: CLINIC | Age: 41
Discharge: HOME | End: 2021-03-19
Attending: OBSTETRICS & GYNECOLOGY | Admitting: OBSTETRICS & GYNECOLOGY
Payer: COMMERCIAL

## 2021-03-19 DIAGNOSIS — R92.8 ABNORMAL MAMMOGRAM: ICD-10-CM

## 2021-03-19 PROCEDURE — 76642 ULTRASOUND BREAST LIMITED: CPT | Mod: LT

## 2021-03-29 NOTE — TELEPHONE ENCOUNTER
Date of procedure: 6/10/21  Colonoscopy  Surgeon: Dr. Galeas  Prep:Miralax  Packet:Colonoscopy/EGD instructions were sent to the patient in [facility].   Date: 3/29/2021      Surgery Scheduler

## 2021-04-13 ENCOUNTER — OFFICE VISIT (OUTPATIENT)
Dept: OBGYN | Facility: CLINIC | Age: 41
End: 2021-04-13
Payer: COMMERCIAL

## 2021-04-13 VITALS
HEART RATE: 79 BPM | BODY MASS INDEX: 28.77 KG/M2 | SYSTOLIC BLOOD PRESSURE: 110 MMHG | TEMPERATURE: 97.8 F | WEIGHT: 172.9 LBS | DIASTOLIC BLOOD PRESSURE: 86 MMHG

## 2021-04-13 DIAGNOSIS — Z90.710 S/P HYSTERECTOMY: ICD-10-CM

## 2021-04-13 DIAGNOSIS — Z98.890 POSTOPERATIVE STATE: Primary | ICD-10-CM

## 2021-04-13 PROCEDURE — 99024 POSTOP FOLLOW-UP VISIT: CPT | Performed by: OBSTETRICS & GYNECOLOGY

## 2021-04-13 RX ORDER — ESTRADIOL 0.05 MG/D
1 PATCH, EXTENDED RELEASE TRANSDERMAL
Qty: 24 PATCH | Refills: 0 | Status: SHIPPED | OUTPATIENT
Start: 2021-04-15 | End: 2021-08-10

## 2021-04-13 NOTE — PROGRESS NOTES
SUBJECTIVE:       HPI: Christina Santana is a 40 year old  is s/p  TLH/BSO on  for Reed syndrome. Since surgery, she has been doing well. She has not had any further vaginal bleeding. Requesting to decrease dose of estrogen patch, having unwanted SE (mood swings). +Hot flashes, night sweats. She has not been sexually active. She denies fevers/chills, cp/spb, n/v, dysuria, constipation or diarrhea.        Ob Hx:     Gyn Hx: Patient's last menstrual period was 2021.     Last pap was NIL, HPV- (), remote hx of ASCH, HPV+, colposcopy pathology CIN1 Next pap due NA         Today's PHQ-2 Score:   PHQ-2 (  [employer]) 6/10/2015   Q1: Little interest or pleasure in doing things 0   Q2: Feeling down, depressed or hopeless 0   PHQ-2 Score 0     Today's PHQ-9 Score: No flowsheet data found.  Today's GEORGETTE-7 Score: No flowsheet data found.    Problem list and histories reviewed & adjusted, as indicated.  Additional history: as documented.    Patient Active Problem List   Diagnosis     S/P LEEP (status post loop electrosurgical excision procedure)     BREEZY III (cervical intraepithelial neoplasia grade III) with severe dysplasia     Encounter for insertion of mirena IUD     GERD with stricture     Astigmatism     Myopia     Sinus pressure     Tension type headache     Allergic rhinitis due to dust mite     Seasonal allergic rhinitis due to pollen     Allergic rhinitis due to animal dander     Allergic rhinitis due to mold     Cervicalgia     Muscle spasm     RUQ pain     Nausea     Calculus of gallbladder without cholecystitis without obstruction     Reed syndrome     Past Surgical History:   Procedure Laterality Date     APPENDECTOMY       AS REPAIR PARAESOPHAGEAL HIATAL HERNIA,LAPAROTOMY, W MESH       LAPAROSCOPIC HYSTERECTOMY TOTAL, BILATERAL SALPINGO-OOPHORECTOMY, COMBINED N/A 2021    Procedure: HYSTERECTOMY, TOTAL, LAPAROSCOPIC, WITH SALPINGO-OOPHORECTOMY;  Surgeon: Dylon Alcantar MD;   Location:  OR      Social History     Tobacco Use     Smoking status: Never Smoker     Smokeless tobacco: Never Used   Substance Use Topics     Alcohol use: No      Problem (# of Occurrences) Relation (Name,Age of Onset)    Bladder Cancer (1) Maternal Grandfather    Breast Cancer (2) Paternal Grandmother, Paternal Aunt    Kidney Cancer (1) Maternal Grandfather    Ovarian Cancer (2) Maternal Aunt, Maternal Aunt            clobetasol (TEMOVATE) 0.05 % external cream, Apply topically 2 times daily  estradiol (VIVELLE-DOT) 0.1 MG/24HR bi-weekly patch, Place 1 patch onto the skin twice a week  hydrOXYzine (ATARAX) 25 MG tablet, Take 1-2 tablets (25-50 mg) by mouth nightly as needed for itching or anxiety    No current facility-administered medications on file prior to visit.     No Known Allergies    ROS:  10 Point review of systems negative other noted above in HPI    OBJECTIVE:     /86   Pulse 79   Temp 97.8  F (36.6  C) (Temporal)   Wt 78.4 kg (172 lb 14.4 oz)   LMP 02/03/2021   BMI 28.77 kg/m    Body mass index is 28.77 kg/m .    Gen: Alert, oriented, appropriately interactive, NAD  Chest: Symmetrical, unlabored breathing  Abdomen: soft, non tender, non distended, no masses, no hernias. No inguinal lymphadenopathy.   External genitalia: no lesions; normal appearing external genitalia, bartholins glands, urethra, skenes glands  Vagina: no masses or lesions or discharge, normally rugated. Vaginal cuff intact, without masses, bleeding or discharge. +green suture visible  Cervix: Surgically absent   Bimanual exam:   Nontender pelvic floor muscles  Urethra: nontender   Bladder: nontender and without massess, well supported   Uterus: Surgically absent  Adnexa: Surgically absent  MSK: normal gait, symmetric movements UE & LE  Lower extremities: non-tender, no edema      In-Clinic Test Results:  No results found for this or any previous visit (from the past 24 hour(s)).    Patient Name: DIONISIO PLASENCIA  MR#:  [id number]  Specimen #:   Collected: 2021  Received: 2021  Reported: 2021 09:58  Ordering Phy(s): HALLIE MONIQUE     For improved result formatting, select 'View Enhanced Report Format' under   Linked Documents section.     SPECIMEN(S):  Uterus, cervix, bilateral fallopian tubes and ovaries     FINAL DIAGNOSIS:  Uterus with bilateral fallopian tubes and ovaries, hysterectomy with   bilateral salpingo-oophorectomy:  -Histologically normal bilateral fallopian tubes, examined in their   entirety  -Benign bilateral ovaries with prominent cyst formation; no evidence of   atypia or malignancy; bilateral  ovaries examined in their entirety  -Histologically normal cervix and endocervix  -Benign secretory pattern endometrium without abnormalities, entire   endometrial lining examined  microscopically  -Normal myometrium     Electronically signed out by:     Mary Brand M.D.         ASSESSMENT/PLAN:                                                      Christina Santana is a 40 year old  s/p TLH/BSO on Reed syndrome for , final path benign      ICD-10-CM    1. Postoperative state  Z98.890 estradiol (VIVELLE-DOT) 0.05 MG/24HR bi-weekly patch   2. S/P hysterectomy  Z90.710 estradiol (VIVELLE-DOT) 0.05 MG/24HR bi-weekly patch       Doing well postoperatively. No further vaginal bleeding since surgery. Not yet sexually active.    Cleared for routine to normal activity, including intercourse.     Surgical menopause: Will decrease dose of Vivelle dot per request given significant mood SE. Concerning s/s reviewed, when to call clinic        Leyda Guzman DO  [facility]

## 2021-05-08 ENCOUNTER — HEALTH MAINTENANCE LETTER (OUTPATIENT)
Age: 41
End: 2021-05-08

## 2021-05-15 DIAGNOSIS — Z11.59 ENCOUNTER FOR SCREENING FOR OTHER VIRAL DISEASES: ICD-10-CM

## 2021-05-19 ENCOUNTER — MYC MEDICAL ADVICE (OUTPATIENT)
Dept: INTERNAL MEDICINE | Facility: CLINIC | Age: 41
End: 2021-05-19

## 2021-05-19 ENCOUNTER — HOSPITAL ENCOUNTER (EMERGENCY)
Facility: CLINIC | Age: 41
Discharge: HOME | End: 2021-05-19
Attending: PHYSICIAN ASSISTANT | Admitting: PHYSICIAN ASSISTANT
Payer: COMMERCIAL

## 2021-05-19 ENCOUNTER — APPOINTMENT (OUTPATIENT)
Dept: ULTRASOUND IMAGING | Facility: CLINIC | Age: 41
End: 2021-05-19
Attending: PHYSICIAN ASSISTANT
Payer: COMMERCIAL

## 2021-05-19 VITALS
RESPIRATION RATE: 14 BRPM | HEART RATE: 73 BPM | DIASTOLIC BLOOD PRESSURE: 93 MMHG | TEMPERATURE: 98.4 F | OXYGEN SATURATION: 100 % | SYSTOLIC BLOOD PRESSURE: 136 MMHG

## 2021-05-19 DIAGNOSIS — M77.42 METATARSALGIA OF LEFT FOOT: ICD-10-CM

## 2021-05-19 DIAGNOSIS — M79.89 SWELLING OF LEFT FOOT: ICD-10-CM

## 2021-05-19 PROCEDURE — 93971 EXTREMITY STUDY: CPT | Mod: LT

## 2021-05-19 PROCEDURE — 99283 EMERGENCY DEPT VISIT LOW MDM: CPT | Performed by: PHYSICIAN ASSISTANT

## 2021-05-19 PROCEDURE — 99284 EMERGENCY DEPT VISIT MOD MDM: CPT | Mod: 25 | Performed by: PHYSICIAN ASSISTANT

## 2021-05-19 NOTE — ED PROVIDER NOTES
History     Chief Complaint   Patient presents with     Foot Pain     swelling     HPI  Christina Santana is a 41 year old female who presents for evaluation of left dorsal foot tenderness and swelling starting 3 days ago without injury.  She started notice dorsal foot swelling over the first-third metatarsal area.  She started to elevate the foot and this did help.  She was experiencing some pain in the dorsal foot that extended up into the lower leg.  She denies any numbness or tingling.  No increased warmth or erythema to the swollen area.  Since onset, the swelling has decreased.  She is very concerned about DVT given that she had a DVT when she was 19 years old after being put on OCPs.  She was recently placed on estradiol status post hysterectomy 2/2021, and is concerned that she may be at increased risk for DVT currently.  She denies any clotting disorders.  Denies smoking.  She had an episode today in the clinic where she felt lightheaded and had a nurse check her blood pressure which was more elevated than it usually would be.  She does not remember the number.  No prior history of hypertension.  She denies any chest pain, palpitations, dyspnea, cough, fever, chills, or change in taste/smell sensation.  No low back pain.  Has not attempted anything other than elevation to treat her symptoms to this point.        Allergies:  Allergies   Allergen Reactions     Latex Shortness Of Breath, Swelling and Rash       Problem List:    Patient Active Problem List    Diagnosis Date Noted     Reed syndrome 02/08/2021     Priority: Medium     Added automatically from request for surgery [id number]       RUQ pain 08/07/2020     Priority: Medium     Added automatically from request for surgery [id number]       Nausea 08/07/2020     Priority: Medium     Added automatically from request for surgery [id number]       Calculus of gallbladder without cholecystitis without obstruction 08/07/2020     Priority: Medium     Added  automatically from request for surgery [id number]       Cervicalgia 06/08/2020     Priority: Medium     Muscle spasm 06/08/2020     Priority: Medium     Sinus pressure 03/04/2020     Priority: Medium     Tension type headache 03/04/2020     Priority: Medium     Allergic rhinitis due to dust mite 03/04/2020     Priority: Medium     Seasonal allergic rhinitis due to pollen 03/04/2020     Priority: Medium     Allergic rhinitis due to animal dander 03/04/2020     Priority: Medium     Allergic rhinitis due to mold 03/04/2020     Priority: Medium     GERD with stricture 02/25/2020     Priority: Medium     Encounter for insertion of mirena IUD 09/13/2018     Priority: Medium     S/P LEEP (status post loop electrosurgical excision procedure) 04/22/2013     Priority: Medium     5/23/11 ASC-H  6/20/11 colp- no high grade lesion noted  8/10/12 ASC-H  4/22/13 pap HSIL  5/1/13 colp. BREEZY 2/3. Plan: LEEP  5/13/13 LEEP. BREEZY 2/3 with + margins  8/28/13 colp- WNL  12/27/13 pap ASCUS + HR HPV  1/29/14 colp- WNL  8/27/14 pap NIL.        BREEZY III (cervical intraepithelial neoplasia grade III) with severe dysplasia 01/01/2013     Priority: Medium     5/2011- ASC-H  6/2011- colposcopy- suspicious for HPV  8/2012- ASC-H  4/2013- HSIL  5/1/2013- colposcopy- BREEZY 2-3  5/13/2013 LEEP: BREEZY II-III, Positive Margins  8/2013- ECC- cervicitis  12/2013 ASCUS, HPV +  1/2014- colposcopy- negative  8/2014- NIL  7/2015- NIL/HPV negative   3/2017- NIL/HPV +  4/2017- colposcopy- suggestive of BREEZY I  4/2018- NIL/HPV negative  9/10/19 NIL/HPV negative     Plan: Pap/HPV due 09/2022    [facility] recommends:  History of CIN2 - CIN3:  Pap and HPV every 3 years for 20 years.       Astigmatism 10/03/2003     Priority: Medium     Myopia 10/03/2003     Priority: Medium        Past Medical History:    Past Medical History:   Diagnosis Date     ASCUS with positive high risk HPV 12/27/13     HSIL (high grade squamous intraepithelial lesion) on Pap smear of cervix 4/22/13      Pap smear of cervix with ASCUS, cannot exclude HGSIL 5/23/11, 8/10/12,        Past Surgical History:    Past Surgical History:   Procedure Laterality Date     APPENDECTOMY       AS REPAIR PARAESOPHAGEAL HIATAL HERNIA,LAPAROTOMY, W MESH       LAPAROSCOPIC HYSTERECTOMY TOTAL, BILATERAL SALPINGO-OOPHORECTOMY, COMBINED N/A 2/24/2021    Procedure: HYSTERECTOMY, TOTAL, LAPAROSCOPIC, WITH SALPINGO-OOPHORECTOMY;  Surgeon: Dylon Alcantar MD;  Location:  OR       Family History:    Family History   Problem Relation Age of Onset     Bladder Cancer Maternal Grandfather      Kidney Cancer Maternal Grandfather      Breast Cancer Paternal Grandmother      Ovarian Cancer Maternal Aunt      Ovarian Cancer Maternal Aunt      Breast Cancer Paternal Aunt        Social History:  Marital Status:   [2]  Social History     Tobacco Use     Smoking status: Never Smoker     Smokeless tobacco: Never Used   Substance Use Topics     Alcohol use: Yes     Comment: occassional     Drug use: No        Medications:    estradiol (VIVELLE-DOT) 0.05 MG/24HR bi-weekly patch          Review of Systems   All other systems reviewed and are negative.      Physical Exam   BP: (!) 136/93  Pulse: 73  Temp: 98.4  F (36.9  C)  Resp: 14  SpO2: 100 %      Physical Exam  Vitals signs and nursing note reviewed.   Constitutional:       General: She is not in acute distress.     Appearance: She is not diaphoretic.   HENT:      Head: Normocephalic and atraumatic.      Right Ear: External ear normal.      Left Ear: External ear normal.      Nose: Nose normal.      Mouth/Throat:      Pharynx: No oropharyngeal exudate.   Eyes:      General: No scleral icterus.        Right eye: No discharge.         Left eye: No discharge.      Conjunctiva/sclera: Conjunctivae normal.      Pupils: Pupils are equal, round, and reactive to light.   Neck:      Musculoskeletal: Normal range of motion and neck supple.      Thyroid: No thyromegaly.   Cardiovascular:      Rate  and Rhythm: Normal rate and regular rhythm.      Heart sounds: Normal heart sounds. No murmur.   Pulmonary:      Effort: Pulmonary effort is normal. No respiratory distress.      Breath sounds: Normal breath sounds. No wheezing or rales.   Chest:      Chest wall: No tenderness.   Abdominal:      General: Bowel sounds are normal. There is no distension.      Palpations: Abdomen is soft. There is no mass.      Tenderness: There is no abdominal tenderness. There is no guarding or rebound.   Musculoskeletal:         General: No deformity.      Left knee: Normal. She exhibits normal range of motion, no swelling, no effusion, no ecchymosis, no deformity and no laceration. No tenderness found.      Left ankle: Normal. She exhibits normal range of motion, no swelling, no ecchymosis, no deformity, no laceration and normal pulse. No tenderness. No lateral malleolus, no medial malleolus, no head of 5th metatarsal and no proximal fibula tenderness found. Achilles tendon exhibits no pain, no defect and normal Batista's test results.      Left lower leg: Normal. She exhibits no tenderness, no bony tenderness, no swelling, no deformity and no laceration. No edema.      Left foot: Normal range of motion and normal capillary refill. Tenderness (Tenderness only with distal metatarsal head compression.  There is no palpable tenderness in the resting position.  No deformity.) present. No swelling, crepitus, deformity or laceration.   Lymphadenopathy:      Cervical: No cervical adenopathy.   Skin:     General: Skin is warm and dry.      Capillary Refill: Capillary refill takes less than 2 seconds.      Findings: No erythema or rash.   Neurological:      Mental Status: She is alert and oriented to person, place, and time.      Cranial Nerves: No cranial nerve deficit.   Psychiatric:         Behavior: Behavior normal.         Thought Content: Thought content normal.         ED Course        Procedures               Critical Care time:   none               No results found for this or any previous visit (from the past 24 hour(s)).    Medications - No data to display    Assessments & Plan (with Medical Decision Making)     Swelling of left foot  Metatarsalgia of left foot     41 year old female presents for evaluation of left foot swelling and discomfort with radiation up into the anterior lower leg.  See HPI for details.  Her greatest concern is if she has a DVT.  Denies any chest pain, palpitations, dyspnea, fever, or chills.  She denies any increased warmth or erythema to the swollen area.  Swelling is actually decreased since onset 3 days prior.  On exam blood pressure 136/93, temperature 90.4, pulse 73, respiration 14, oxygen saturation 100% on room air.  No obvious abnormality on inspection of the left lower extremity.  No palpable tenderness in the resting position.  With compression of the metatarsal heads she does have tenderness of the distal metatarsal first-third heads.  No other exam abnormalities.  Negative Homans' sign.  No edema identified.  Lower extremity ultrasound performed and this did not display any sign of DVT.  Discussed normal results with the patient.  Reassured.  Her greatest concern was that she potentially had an underlying DVT.  I am concerned more about metatarsalgia given the localized foot swelling with discomfort along with positive exam findings of metatarsal discomfort with compression of the metatarsal heads unique to the left foot.  She is wearing dress shoes a great majority of the days at work.  I encouraged her to use tennis shoes or something with proper arch support at least for the next 2 weeks to see if this makes a difference.  Ice as needed.  Elevation as needed.  Anti-inflammatories as needed.  Follow-up with podiatry if she continues to have issues with this.  Patient was in agreement and was suitable for discharge.     I have reviewed the nursing notes.    I have reviewed the findings, diagnosis,  plan and need for follow up with the patient.       Current Discharge Medication List          Final diagnoses:   Swelling of left foot   Metatarsalgia of left foot     Disclaimer: This note consists of symbols derived from keyboarding, dictation and/or voice recognition software. As a result, there may be errors in the script that have gone undetected. Please consider this when interpreting information found in this chart.      5/19/2021   [facility] EMERGENCY DEPT     Darell Ren PA-C  05/19/21 [id number]

## 2021-05-19 NOTE — TELEPHONE ENCOUNTER
S-(situation): left leg swelling, throbbing, aching up to knee that comes and goes, chest heaviness, feels its hard to breathe.  Bilateral temporal headache.    B-(background): had TLH W/BSO 12 weeks ago.     A-(assessment): Symptoms started 5/17, comes and goes. No redness or warmth on left leg, just swelling, throbbing, aching.     R-(recommendations): be evaluated in ED today.     Karina Estevez RN

## 2021-05-19 NOTE — ED TRIAGE NOTES
Here with left foot pain and swelling. States she has noticed swelling times 3 days. Worried about a blood clot

## 2021-05-20 SDOH — ECONOMIC STABILITY: TRANSPORTATION INSECURITY
IN THE PAST 12 MONTHS, HAS LACK OF TRANSPORTATION KEPT YOU FROM MEETINGS, WORK, OR FROM GETTING THINGS NEEDED FOR DAILY LIVING?: NOT ASKED

## 2021-05-20 SDOH — ECONOMIC STABILITY: FOOD INSECURITY: WITHIN THE PAST 12 MONTHS, THE FOOD YOU BOUGHT JUST DIDN'T LAST AND YOU DIDN'T HAVE MONEY TO GET MORE.: NOT ASKED

## 2021-05-20 SDOH — ECONOMIC STABILITY: INCOME INSECURITY: HOW HARD IS IT FOR YOU TO PAY FOR THE VERY BASICS LIKE FOOD, HOUSING, MEDICAL CARE, AND HEATING?: NOT ASKED

## 2021-05-20 SDOH — ECONOMIC STABILITY: FOOD INSECURITY: WITHIN THE PAST 12 MONTHS, YOU WORRIED THAT YOUR FOOD WOULD RUN OUT BEFORE YOU GOT MONEY TO BUY MORE.: NOT ASKED

## 2021-05-20 SDOH — ECONOMIC STABILITY: TRANSPORTATION INSECURITY
IN THE PAST 12 MONTHS, HAS THE LACK OF TRANSPORTATION KEPT YOU FROM MEDICAL APPOINTMENTS OR FROM GETTING MEDICATIONS?: NOT ASKED

## 2021-05-20 NOTE — PROGRESS NOTES
Oncology Risk Management Consultation:  Date on this visit: 2021    Christina Santana  is referred by Jc Monzon, Licensed Genetic Counselor,  for an oncology risk management consultation. 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    Primary Physician: Alphonse Arellano MD    History Of Present Illness:  Ms. Santana is a very pleasant, healthy 41 year old female who presents with PMS2+ associated Reed Syndrome.    Genetic Testin2021-  POSITIVE for a PMS2 likely pathogenic mutation. Specifically her mutation is called p.E705K (also known as c.2113G>A) identified using Cancer Next-Expanded testing from [facility].   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 This variant is called p.A417S (c.1249G>T). No other variants or mutations were found in the STK11 gene. Given the uncertain significance of this result, medical management decisions should NOT be made based on this test result alone.     Christina is negative for known, pathogenic mutations in the AIP, ALK, APC, CARINA, AXIN2, BAP1, BARD1, BLM, BMPR1A, BRCA1, BRCA2, BRIP1, CDC73, CDH1, CDK4, CDKN1B, CDKN2A, CHEK2, CTNNA1, DICER1, EPCAM, EGFR, EGLN1, FANCC, FH, FLCN, GALNT12, GREM1, HOXB13, KIF1B, KIT, LZTR1, MAX, MEN1, MET, MITF, MLH1, MRE11, MSH2, MSH3, MSH6, MUTYH, NBN, NF1, NF2, NTHL1, PALB2, PDGFRA, PHOX2B, POLD1, POLE, POT1, WWUZJ8S, PTCH1, PTEN, RAD50, RAD51C, RAD51D, RB1, RECQL, RET, SDHA, SDHAF2, SDHB, SDHC, SDHD, SMAD4, SMARCA4, SMARCB1, SMARCE1, STK11, SUFU, QWBG899, TP53, TSC1, TSC2, VHL, and XRCC2 genes. No pathogenic mutations were found in any of the other 76 of 77 genes analyzed. This test involved sequencing and deletion/duplication analysis of all genes with the exceptions of VHL and XRCC2 (sequencing and deletion/duplication); EGFR,  EGLN1, HOXB13, KIT, MITF, PDGFRA, POLD1 and POLE (sequencing only); EPCAM and GREM1 (deletion/duplication only).    History:    s/p  x6  2021- Total laparoscopic hysterectomy and bilateral salpingo oophorectomy (Dr. Dylon Alcantar), pathology benign  Currently using estradiol patch for post menopausal symptoms.     Screening history:  EGD 10/31/2018- Dysphagia by Dr Harris   1. LA Grade A (one or more mucosal breaks less than 5 mm, not extending between tops of 2 mucosal folds) esophagitis with no bleeding was found 34 to 36 cm from the incisors.  ESOPHAGUS, DISTAL, BIOPSY:   1. Esophageal eosinophilia (peak count of 10), see comment : The differential diagnosis primarily includes eosinophilic esophagitis (EoE), proton-pump inhibitor-responsive esophageal eosinophilia (PPI-REE) and gastroesophageal reflux disease (GERD) with more eosinophils than typical. ACG guidelines recommend further endoscopic evaluation with re-biopsy after a two month trial of high dose proton-pump inhibitor (PPI) therapy to further investigate these three possibilities  2. Negative for columnar mucosa  3. A medium-sized hiatal hernia was present.  The ampulla, duodenal bulb, first portion of the duodenum and second portion of the duodenum were normal.  One benign-appearing, intrinsic mild stenosis was found 36 to 37 cm from the incisors. tenosis measured 1 cm (in length).   The stenosis was traversed. A TTS dilator was passed through the scope. Dilation with a 12-13.5-15 mm balloon dilator was performed to 15 mm.   The dilation site was examined and showed moderate mucosal disruption.      Review of Systems:  GENERAL: No change in weight, sleep or appetite.  Normal energy.  No fever or chills  EYES: Negative for vision changes or eye problems  ENT: No problems with ears, nose or throat.  No difficulty swallowing.  RESP: No coughing, wheezing or shortness of breath  CV: No chest pains or palpitations  GI: Notes some occasional  heartburn and abdominal bloating.  Denies nausea, vomiting, abdominal pain, diarrhea, constipation or change in bowel habits. Reports daily bowel movements.   : No urinary frequency or dysuria, bladder or kidney problems  MUSCULOSKELETAL: No significant muscle or joint pains  NEUROLOGIC: No headaches, numbness, tingling, weakness, problems with balance or coordination  PSYCHIATRIC: No problems with anxiety, depression or mental health  HEME/IMMUNE/ALLERGY: No history of bleeding or clotting problems or anemia.  No allergies or immune system problems  ENDOCRINE: No history of thyroid disease, diabetes or other endocrine disorders  SKIN: No rashes,worrisome lesions or skin problems    Past Medical/Surgical History:  Past Medical History:   Diagnosis Date     ASCUS with positive high risk HPV 12/27/2013     HSIL (high grade squamous intraepithelial lesion) on Pap smear of cervix 04/22/2013     Pap smear of cervix with ASCUS, cannot exclude HGSIL 5/23/11, 8/10/12,      PMS2-related Reed syndrome (HNPCC4)      Past Surgical History:   Procedure Laterality Date     APPENDECTOMY       AS REPAIR PARAESOPHAGEAL HIATAL HERNIA,LAPAROTOMY, W MESH       LAPAROSCOPIC HYSTERECTOMY TOTAL, BILATERAL SALPINGO-OOPHORECTOMY, COMBINED N/A 2/24/2021    Procedure: HYSTERECTOMY, TOTAL, LAPAROSCOPIC, WITH SALPINGO-OOPHORECTOMY;  Surgeon: Dylon Alcantar MD;  Location: [facility]       Allergies:  Allergies as of 05/21/2021 - Reviewed 05/21/2021   Allergen Reaction Noted     Latex Shortness Of Breath, Swelling, and Rash 05/19/2021       Current Medications:  Current Outpatient Medications   Medication Sig Dispense Refill     estradiol (VIVELLE-DOT) 0.05 MG/24HR bi-weekly patch Place 1 patch onto the skin twice a week 24 patch 0        Family History:  Family History   Problem Relation Age of Onset     Bladder Cancer Maternal Grandfather 70        lived to be 94     Kidney Cancer Maternal Grandfather      Breast Cancer Paternal  Grandmother         postmenopausal     Ovarian Cancer Maternal Aunt 40        possible uterine cancer also; hx of fibroids     Ovarian Cancer Maternal Aunt 40        possible uterine cancer also; hx of fibroids     Breast Cancer Paternal Aunt 54     Fibroids Mother        Social History:  Social History     Socioeconomic History     Marital status:      Spouse name: Thai     Number of children: 6     Years of education: Not on file     Highest education level: Not on file   Occupational History     Not on file   Social Needs     Financial resource strain: Not on file     Food insecurity     Worry: Not on file     Inability: Not on file     Transportation needs     Medical: Not on file     Non-medical: Not on file   Tobacco Use     Smoking status: Never Smoker     Smokeless tobacco: Never Used   Substance and Sexual Activity     Alcohol use: Yes     Comment: occassional     Drug use: No     Sexual activity: Yes     Partners: Male     Birth control/protection: Male Surgical, Female Surgical     Comment: partner vas   Lifestyle     Physical activity     Days per week: Not on file     Minutes per session: Not on file     Stress: Not on file   Relationships     Social connections     Talks on phone: Not on file     Gets together: Not on file     Attends Mandaen service: Not on file     Active member of club or organization: Not on file     Attends meetings of clubs or organizations: Not on file     Relationship status: Not on file     Intimate partner violence     Fear of current or ex partner: Not on file     Emotionally abused: Not on file     Physically abused: Not on file     Forced sexual activity: Not on file   Other Topics Concern     Parent/sibling w/ CABG, MI or angioplasty before 65F 55M? Not Asked   Social History Narrative     Not on file       Physical Exam:  LMP 02/03/2021   GENERAL: Healthy, alert and no distress  EYES: Eyes grossly normal to inspection.  No discharge or erythema, or obvious  scleral/conjunctival abnormalities.  RESP: No audible wheeze, cough, or visible cyanosis.  No visible retractions or increased work of breathing.    SKIN: Visible skin clear. No significant rash, abnormal pigmentation or lesions.  NEURO: Cranial nerves grossly intact.  Mentation and speech appropriate for age.  PSYCH: Mentation appears normal, affect normal/bright, judgement and insight intact, normal speech and appearance well-groomed.       Laboratory/Imaging Studies  No results found for any visits on 05/21/21.    ASSESSMENT  We discussed the differences between cancer risk for the general population and those with PMS2+ mutations, according to the National Comprehensive Cancer Network.  We also discussed that inheriting a mutation does not mean that a person will develop cancer, but rather that they are at increased risk.       PMS2+ mutations are estimated to be prevalent in about 1:714 people in the general population, while mutations in any of the Reed  Syndrome genes is estimated to be prevalent in 1:279 people (Kamaljit et al 2017). The following is the table of risks outlined in NCCN Guidelines V.1.2021.    Site  Estimated Average Age of Presentation for PMS2+ carriers Cumulative Risk for Diagnosis Through Age 80 Cumulative Risk for Diagnosis Through Lifetime for people in the general population     Colorectal     61-66 years    8.7-20%    4.2%   Endometrial  49-50 years     13-26%    3.1%     Ovarian     51-59 years      3%    1.3%     Renal pelvis and/or ureter     No data   <1 % -3.7%   Less than 1%     Bladder     71 years  <1%-2.4 2.4%   Gastric  inadequate date  Inadequate data 0.9%   Small bowel  Single case - 69 years    0.1% -0.3%   0.3%     Pancreas    No data    <1%--1.6%   1.6%     Biliary tract    No data   0.2-<1%    0.2%     Prostate     No data    4.6%-11.6%    11.6%   Breast (female)    No data  8.1-12.8%    12.8%     Brain    40 years    0.6%-<1%    0.6%       We also discussed following  a  healthy lifestyle plan recommended by both NCCN and the American Cancer Society that can reduce the risk of cancer. She has concerns about her issues with abdominal bloating and would like to make some changes in her diet.  I am referring her to a Dietician to discuss this further.      1 Limit alcohol consumption to less than 1 drink per day (1 drink=5 oz.wine, 12 oz. Beer or 1.5 oz. 80-proof liquor) for women or 2 drinks per day for men.     2. Exercise per American Cancer Society guidelines of at least 150 minutes of moderate-intensity activity or 75 minutes of vigorous activity each week. (Or a combination of both.) Exercise should be spread  out over the week. Aim for 45-60 minutes per day.    3. Maintain a healthy weight with a Body Mass Index between 19-24.9.    4. Do not use tobacco products and limit exposure to passive smoke.    5. Avoid processed meats (ham, villareal, salami, hot dogs, sausages). Eat little, if any.     6. Limit red meat (pork, beef and lamb) to 12-18 oz per week or less.    7. Limit consumption of sugar sweetened drinks, fast foods, processed foods and foods high in starch or sugar content.    8. Eat about 90 grams (3 servings) of whole grain foods pr day. Whole grains offer Vitamin E, ligans, phytoestrogens, zinc, selenium, antioxidants, resistant starch and copper. Research shows that eating 3 servings of whole grains can reduce the risk for colon cancer about 17%.  Focus on vegetables, fruits, beans and plant based foods.    The [employer] Reed Syndrome group was recommended to the patient and the patient is in agreement to be contacted for meeting notices.     I look forward to working with her to manage her cancer risk and will monitor her screenings and follow up with her in one year.      INDIVIDUALIZED CANCER RISK MANAGEMENT PLAN:  Individualized Surveillance Plan for Reed Syndrome   (MSH6+ and PMS2+ mutation carriers)   Based on NCCN Guidelines Version 1.2020   Type of Screening  Recommendation Last Done Next Due   Colon Cancer Screening Colonoscopy at age 30-35 years or 2-5 years prior to the earliest colon cancer in the family if it is diagnosed prior to age 30.     Repeat every 1-2 years.    None to date 6/10/2021 with Dr. Latham in [place]   Endometrial and Ovarian Cancer Consider Prophylactic hysterectomy and bilateral salpingo-oophorectomy (BSO) can be considered by women who have completed childbearing.    Insufficient evidence exists to make specific recommendation for RRSO in MSH6+ and PMS2+ carriers. 2/24/2021- TLHBSO with Dr. Alcantar; pathology benign NA    Screening using  endometrial sampling is an option every 1-2 years; women should be aware that dysfunctional uterine bleeding warrants evaluation.    Data do not support ovarian cancer screening for Reed Syndrome. Annual transvaginal ultrasound and  tests may be considered at a clinician's discretion.     NA     NA   Gastric and small bowel cancer Selected individuals or families or those of  descent may consider EGD with extended duodenoscopy (to distal duodenum or into the jejunum) every 3-5 years beginning at age 40. No family history of upper GI cancer    10/2018- EGD noted GERD and Esophageal eosinophilia Review at next visit   Urothelial cancer Consider annual urinalysis at age 30-35 in MSH6+ families with family history of urothelial cancers None to date Ordered to be done soon   Pancreatic screening for MSH6+ with 1st or 2nd degree relatives with pancreatic cancer on Reed side of family Annual contrast-enhanced MRI/MRCP and/or EUS, with consideration of shorter screening intervals for individuals found to have worrisome abnormalities on screening.     Most small cystic lesions found on screening will not warrant biopsy, surgical resection, or any other intervention.     No family history of pancreatic cancer   NA; will review in future   Prostate Screening  Annual PSA test with general population  screening; may begin earlier if younger prostate cancers in the family     NA     NA   Central Nervous System cancer Annual physical/neurological examinations starting at age 25-30. 5/21/2021- Exam deferred. Denies symptoms   May 2022       There are data to suggest that aspirin may decrease the risk of colon cancer in Reed Syndrome.  However, optimal dose and duration of aspirin therapy is uncertain.    There have been suggestions that there is an increased risk for breast cancer in Reed Syndrome patients; however, there is not enough evidence to support increased screening above average risk breast cancer screening.     I spent a total of 52 minutes on the day of the visit. Please see the note for further information on patient assessment and treatment.    Radha Rodriguez, SHEYLA-CNS, OCN, AGN-BC  Clinical Nurse Specialist  Cancer Risk Management Program  [facility] [address]  [place] [address]    phone:  [phone]  Pager: [phone]  fax: [phone]    CC: MD Alphonse Weeks MD Kathryn Kauffman, DO

## 2021-05-20 NOTE — DISCHARGE INSTRUCTIONS
It was a pleasure working with you today!  I hope your condition improves rapidly!     Thankfully, we did not find any sign of deep vein thrombosis with your leg.  I am concerned about a condition called metatarsalgia.  Please wear properly fitting shoes with good arch supports at least for the next 2 weeks to see if we can get this to stabilize.  Ice your foot for any pain or swelling episodes.  Follow-up with podiatry, Dr. Heath, in the event that you continue to have foot pain and swelling.

## 2021-05-21 ENCOUNTER — VIRTUAL VISIT (OUTPATIENT)
Dept: ONCOLOGY | Facility: CLINIC | Age: 41
End: 2021-05-21
Attending: GENETIC COUNSELOR, MS
Payer: COMMERCIAL

## 2021-05-21 ENCOUNTER — PRE VISIT (OUTPATIENT)
Dept: ONCOLOGY | Facility: CLINIC | Age: 41
End: 2021-05-21

## 2021-05-21 DIAGNOSIS — Z15.09 PMS2-RELATED LYNCH SYNDROME (HNPCC4): Primary | ICD-10-CM

## 2021-05-21 DIAGNOSIS — R14.0 ABDOMINAL BLOATING: ICD-10-CM

## 2021-05-21 PROCEDURE — 99204 OFFICE O/P NEW MOD 45 MIN: CPT | Mod: 95 | Performed by: CLINICAL NURSE SPECIALIST

## 2021-05-21 PROCEDURE — 999N001193 HC VIDEO/TELEPHONE VISIT; NO CHARGE

## 2021-05-21 NOTE — PROGRESS NOTES
"Christina is a 41 year old who is being evaluated via a billable video visit.      How would you like to obtain your AVS? MyChart     If the video visit is dropped, the invitation should be resent by: Text to cell phone: [phone]     Will anyone else be joining your video visit? No          Vitals - Patient Reported  Weight (Patient Reported): 78 kg (172 lb)  Height (Patient Reported): 166.4 cm (5' 5.5\")  BMI (Based on Pt Reported Ht/Wt): 28.19  Pain Score: No Pain (0)    Marcello Barrios LPN    Video Start Time: 0950  Video-Visit Details    Type of service:  Video Visit    Video End Time:1016    Originating Location (pt. Location): Other [facility] where she works    Distant Location (provider location):  [facility] CANCER [facility]     Platform used for Video Visit: Diana  "

## 2021-05-21 NOTE — LETTER
2021         RE: Christina Santana  [address] [facility] [address]        Dear Colleague,    Thank you for referring your patient, Christina Santana, to the [facility]. Please see a copy of my visit note below.    Oncology Risk Management Consultation:  Date on this visit: 2021    Christina Santana  is referred by Jc Monzon, Licensed Genetic Counselor,  for an oncology risk management consultation. 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    Primary Physician: Alphonse Arellano MD    History Of Present Illness:  Ms. Santana is a very pleasant, healthy 41 year old female who presents with PMS2+ associated Reed Syndrome.    Genetic Testin2021-  POSITIVE for a PMS2 likely pathogenic mutation. Specifically her mutation is called p.E705K (also known as c.2113G>A) identified using Cancer Next-Expanded testing from [employer]   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 This variant is called p.A417S (c.1249G>T). No other variants or mutations were found in the STK11 gene. Given the uncertain significance of this result, medical management decisions should NOT be made based on this test result alone.     Christina is negative for known, pathogenic mutations in the AIP, ALK, APC, CARINA, AXIN2, BAP1, BARD1, BLM, BMPR1A, BRCA1, BRCA2, BRIP1, CDC73, CDH1, CDK4, CDKN1B, CDKN2A, CHEK2, CTNNA1, DICER1, EPCAM, EGFR, EGLN1, FANCC, FH, FLCN, GALNT12, GREM1, HOXB13, KIF1B, KIT, LZTR1, MAX, MEN1, MET, MITF, MLH1, MRE11, MSH2, MSH3, MSH6, MUTYH, NBN, NF1, NF2, NTHL1, PALB2, PDGFRA, PHOX2B, POLD1, POLE, POT1, VDFER7X, PTCH1, PTEN, RAD50, RAD51C, RAD51D, RB1, RECQL, RET, SDHA, SDHAF2, SDHB, SDHC, SDHD, SMAD4, SMARCA4, SMARCB1, SMARCE1, STK11, SUFU, BIRT153, TP53, TSC1, TSC2, VHL,  and XRCC2 genes. No pathogenic mutations were found in any of the other 76 of 77 genes analyzed. This test involved sequencing and deletion/duplication analysis of all genes with the exceptions of VHL and XRCC2 (sequencing and deletion/duplication); EGFR, EGLN1, HOXB13, KIT, MITF, PDGFRA, POLD1 and POLE (sequencing only); EPCAM and GREM1 (deletion/duplication only).    History:    s/p  x6  2021- Total laparoscopic hysterectomy and bilateral salpingo oophorectomy (Dr. Dylon Alcantar), pathology benign  Currently using estradiol patch for post menopausal symptoms.     Screening history:  EGD 10/31/2018- Dysphagia by Dr Harris   1. LA Grade A (one or more mucosal breaks less than 5 mm, not extending between tops of 2 mucosal folds) esophagitis with no bleeding was found 34 to 36 cm from the incisors.  ESOPHAGUS, DISTAL, BIOPSY:   1. Esophageal eosinophilia (peak count of 10), see comment : The differential diagnosis primarily includes eosinophilic esophagitis (EoE), proton-pump inhibitor-responsive esophageal eosinophilia (PPI-REE) and gastroesophageal reflux disease (GERD) with more eosinophils than typical. ACG guidelines recommend further endoscopic evaluation with re-biopsy after a two month trial of high dose proton-pump inhibitor (PPI) therapy to further investigate these three possibilities  2. Negative for columnar mucosa  3. A medium-sized hiatal hernia was present.  The ampulla, duodenal bulb, first portion of the duodenum and second portion of the duodenum were normal.  One benign-appearing, intrinsic mild stenosis was found 36 to 37 cm from the incisors. tenosis measured 1 cm (in length).   The stenosis was traversed. A TTS dilator was passed through the scope. Dilation with a 12-13.5-15 mm balloon dilator was performed to 15 mm.   The dilation site was examined and showed moderate mucosal disruption.      Review of Systems:  GENERAL: No change in weight, sleep or appetite.  Normal energy.   No fever or chills  EYES: Negative for vision changes or eye problems  ENT: No problems with ears, nose or throat.  No difficulty swallowing.  RESP: No coughing, wheezing or shortness of breath  CV: No chest pains or palpitations  GI: Notes some occasional heartburn and abdominal bloating.  Denies nausea, vomiting, abdominal pain, diarrhea, constipation or change in bowel habits. Reports daily bowel movements.   : No urinary frequency or dysuria, bladder or kidney problems  MUSCULOSKELETAL: No significant muscle or joint pains  NEUROLOGIC: No headaches, numbness, tingling, weakness, problems with balance or coordination  PSYCHIATRIC: No problems with anxiety, depression or mental health  HEME/IMMUNE/ALLERGY: No history of bleeding or clotting problems or anemia.  No allergies or immune system problems  ENDOCRINE: No history of thyroid disease, diabetes or other endocrine disorders  SKIN: No rashes,worrisome lesions or skin problems    Past Medical/Surgical History:  Past Medical History:   Diagnosis Date     ASCUS with positive high risk HPV 12/27/2013     HSIL (high grade squamous intraepithelial lesion) on Pap smear of cervix 04/22/2013     Pap smear of cervix with ASCUS, cannot exclude HGSIL 5/23/11, 8/10/12,      PMS2-related Reed syndrome (HNPCC4)      Past Surgical History:   Procedure Laterality Date     APPENDECTOMY       AS REPAIR PARAESOPHAGEAL HIATAL HERNIA,LAPAROTOMY, W MESH       LAPAROSCOPIC HYSTERECTOMY TOTAL, BILATERAL SALPINGO-OOPHORECTOMY, COMBINED N/A 2/24/2021    Procedure: HYSTERECTOMY, TOTAL, LAPAROSCOPIC, WITH SALPINGO-OOPHORECTOMY;  Surgeon: Dylon Alcantar MD;  Location: [facility] OR       Allergies:  Allergies as of 05/21/2021 - Reviewed 05/21/2021   Allergen Reaction Noted     Latex Shortness Of Breath, Swelling, and Rash 05/19/2021       Current Medications:  Current Outpatient Medications   Medication Sig Dispense Refill     estradiol (VIVELLE-DOT) 0.05 MG/24HR bi-weekly patch Place 1  patch onto the skin twice a week 24 patch 0        Family History:  Family History   Problem Relation Age of Onset     Bladder Cancer Maternal Grandfather 70        lived to be 94     Kidney Cancer Maternal Grandfather      Breast Cancer Paternal Grandmother         postmenopausal     Ovarian Cancer Maternal Aunt 40        possible uterine cancer also; hx of fibroids     Ovarian Cancer Maternal Aunt 40        possible uterine cancer also; hx of fibroids     Breast Cancer Paternal Aunt 54     Fibroids Mother        Social History:  Social History     Socioeconomic History     Marital status:      Spouse name: Thai     Number of children: 6     Years of education: Not on file     Highest education level: Not on file   Occupational History     Not on file   Social Needs     Financial resource strain: Not on file     Food insecurity     Worry: Not on file     Inability: Not on file     Transportation needs     Medical: Not on file     Non-medical: Not on file   Tobacco Use     Smoking status: Never Smoker     Smokeless tobacco: Never Used   Substance and Sexual Activity     Alcohol use: Yes     Comment: occassional     Drug use: No     Sexual activity: Yes     Partners: Male     Birth control/protection: Male Surgical, Female Surgical     Comment: partner vas   Lifestyle     Physical activity     Days per week: Not on file     Minutes per session: Not on file     Stress: Not on file   Relationships     Social connections     Talks on phone: Not on file     Gets together: Not on file     Attends Oriental orthodox service: Not on file     Active member of club or organization: Not on file     Attends meetings of clubs or organizations: Not on file     Relationship status: Not on file     Intimate partner violence     Fear of current or ex partner: Not on file     Emotionally abused: Not on file     Physically abused: Not on file     Forced sexual activity: Not on file   Other Topics Concern     Parent/sibling w/ CABG, MI  or angioplasty before 65F 55M? Not Asked   Social History Narrative     Not on file       Physical Exam:  LMP 02/03/2021   GENERAL: Healthy, alert and no distress  EYES: Eyes grossly normal to inspection.  No discharge or erythema, or obvious scleral/conjunctival abnormalities.  RESP: No audible wheeze, cough, or visible cyanosis.  No visible retractions or increased work of breathing.    SKIN: Visible skin clear. No significant rash, abnormal pigmentation or lesions.  NEURO: Cranial nerves grossly intact.  Mentation and speech appropriate for age.  PSYCH: Mentation appears normal, affect normal/bright, judgement and insight intact, normal speech and appearance well-groomed.       Laboratory/Imaging Studies  No results found for any visits on 05/21/21.    ASSESSMENT  We discussed the differences between cancer risk for the general population and those with PMS2+ mutations, according to the National Comprehensive Cancer Network.  We also discussed that inheriting a mutation does not mean that a person will develop cancer, but rather that they are at increased risk.       PMS2+ mutations are estimated to be prevalent in about 1:714 people in the general population, while mutations in any of the Reed  Syndrome genes is estimated to be prevalent in 1:279 people (Kamaljit et al 2017). The following is the table of risks outlined in NCCN Guidelines V.1.2021.    Site  Estimated Average Age of Presentation for PMS2+ carriers Cumulative Risk for Diagnosis Through Age 80 Cumulative Risk for Diagnosis Through Lifetime for people in the general population     Colorectal     61-66 years    8.7-20%    4.2%   Endometrial  49-50 years     13-26%    3.1%     Ovarian     51-59 years      3%    1.3%     Renal pelvis and/or ureter     No data   <1 % -3.7%   Less than 1%     Bladder     71 years  <1%-2.4 2.4%   Gastric  inadequate date  Inadequate data 0.9%   Small bowel  Single case - 69 years    0.1% -0.3%   0.3%     Pancreas    No data     <1%--1.6%   1.6%     Biliary tract    No data   0.2-<1%    0.2%     Prostate     No data    4.6%-11.6%    11.6%   Breast (female)    No data  8.1-12.8%    12.8%     Brain    40 years    0.6%-<1%    0.6%       We also discussed following  a healthy lifestyle plan recommended by both NCCN and the American Cancer Society that can reduce the risk of cancer. She has concerns about her issues with abdominal bloating and would like to make some changes in her diet.  I am referring her to a Dietician to discuss this further.      1 Limit alcohol consumption to less than 1 drink per day (1 drink=5 oz.wine, 12 oz. Beer or 1.5 oz. 80-proof liquor) for women or 2 drinks per day for men.     2. Exercise per American Cancer Society guidelines of at least 150 minutes of moderate-intensity activity or 75 minutes of vigorous activity each week. (Or a combination of both.) Exercise should be spread  out over the week. Aim for 45-60 minutes per day.    3. Maintain a healthy weight with a Body Mass Index between 19-24.9.    4. Do not use tobacco products and limit exposure to passive smoke.    5. Avoid processed meats (ham, villareal, salami, hot dogs, sausages). Eat little, if any.     6. Limit red meat (pork, beef and lamb) to 12-18 oz per week or less.    7. Limit consumption of sugar sweetened drinks, fast foods, processed foods and foods high in starch or sugar content.    8. Eat about 90 grams (3 servings) of whole grain foods pr day. Whole grains offer Vitamin E, ligans, phytoestrogens, zinc, selenium, antioxidants, resistant starch and copper. Research shows that eating 3 servings of whole grains can reduce the risk for colon cancer about 17%.  Focus on vegetables, fruits, beans and plant based foods.    The [employer] Reed Syndrome group was recommended to the patient and the patient is in agreement to be contacted for meeting notices.     I look forward to working with her to manage her cancer risk and will monitor her  screenings and follow up with her in one year.      INDIVIDUALIZED CANCER RISK MANAGEMENT PLAN:  Individualized Surveillance Plan for Reed Syndrome   (MSH6+ and PMS2+ mutation carriers)   Based on NCCN Guidelines Version 1.2020   Type of Screening Recommendation Last Done Next Due   Colon Cancer Screening Colonoscopy at age 30-35 years or 2-5 years prior to the earliest colon cancer in the family if it is diagnosed prior to age 30.     Repeat every 1-2 years.    None to date 6/10/2021 with Dr. Latham in [place]   Endometrial and Ovarian Cancer Consider Prophylactic hysterectomy and bilateral salpingo-oophorectomy (BSO) can be considered by women who have completed childbearing.    Insufficient evidence exists to make specific recommendation for RRSO in MSH6+ and PMS2+ carriers. 2/24/2021- TLHBSO with Dr. Alcantar; pathology benign NA    Screening using  endometrial sampling is an option every 1-2 years; women should be aware that dysfunctional uterine bleeding warrants evaluation.    Data do not support ovarian cancer screening for Reed Syndrome. Annual transvaginal ultrasound and  tests may be considered at a clinician's discretion.     NA     NA   Gastric and small bowel cancer Selected individuals or families or those of  descent may consider EGD with extended duodenoscopy (to distal duodenum or into the jejunum) every 3-5 years beginning at age 40. No family history of upper GI cancer    10/2018- EGD noted GERD and Esophageal eosinophilia Review at next visit   Urothelial cancer Consider annual urinalysis at age 30-35 in MSH6+ families with family history of urothelial cancers None to date Ordered to be done soon   Pancreatic screening for MSH6+ with 1st or 2nd degree relatives with pancreatic cancer on Reed side of family Annual contrast-enhanced MRI/MRCP and/or EUS, with consideration of shorter screening intervals for individuals found to have worrisome abnormalities on screening.     Most  "small cystic lesions found on screening will not warrant biopsy, surgical resection, or any other intervention.     No family history of pancreatic cancer   NA; will review in future   Prostate Screening  Annual PSA test with general population screening; may begin earlier if younger prostate cancers in the family     NA     NA   Central Nervous System cancer Annual physical/neurological examinations starting at age 25-30. 5/21/2021- Exam deferred. Denies symptoms   May 2022       There are data to suggest that aspirin may decrease the risk of colon cancer in Reed Syndrome.  However, optimal dose and duration of aspirin therapy is uncertain.    There have been suggestions that there is an increased risk for breast cancer in Reed Syndrome patients; however, there is not enough evidence to support increased screening above average risk breast cancer screening.     I spent a total of 52 minutes on the day of the visit. Please see the note for further information on patient assessment and treatment.    Radha Rodriguez, SHEYLA-CNS, OCN, AGN-BC  Clinical Nurse Specialist  Cancer Risk Management Program  [facility] [address]  [place] [address]    phone:  [phone]  Pager: [phone]  fax: [phone]    CC: MD Alphonse Weeks MD Kathryn Kauffman,                 Christina is a 41 year old who is being evaluated via a billable video visit.      How would you like to obtain your AVS? MyChart     If the video visit is dropped, the invitation should be resent by: Text to cell phone: [phone]     Will anyone else be joining your video visit? No          Vitals - Patient Reported  Weight (Patient Reported): 78 kg (172 lb)  Height (Patient Reported): 166.4 cm (5' 5.5\")  BMI (Based on Pt Reported Ht/Wt): 28.19  Pain Score: No Pain (0)    Marcello Barrios LPN    Video Start Time: 0950  Video-Visit Details    Type of service:  Video Visit    Video End " Time:1016    Originating Location (pt. Location): Other [facility] where she works    Distant Location (provider location):  [facility]CER [facility]     Platform used for Video Visit: Diana      Again, thank you for allowing me to participate in the care of your patient.        Sincerely,        SHEYLA Muller CNS

## 2021-05-27 DIAGNOSIS — Z15.09 PMS2-RELATED LYNCH SYNDROME (HNPCC4): ICD-10-CM

## 2021-05-27 LAB
ALBUMIN UR-MCNC: NEGATIVE MG/DL
APPEARANCE UR: CLEAR
BILIRUB UR QL STRIP: NEGATIVE
COLOR UR AUTO: YELLOW
GLUCOSE UR STRIP-MCNC: NEGATIVE MG/DL
HGB UR QL STRIP: NEGATIVE
KETONES UR STRIP-MCNC: NEGATIVE MG/DL
LEUKOCYTE ESTERASE UR QL STRIP: NEGATIVE
MUCOUS THREADS #/AREA URNS LPF: PRESENT /LPF
NITRATE UR QL: NEGATIVE
PH UR STRIP: 6 PH (ref 5–7)
RBC #/AREA URNS AUTO: <1 /HPF (ref 0–2)
SOURCE: ABNORMAL
SP GR UR STRIP: 1.02 (ref 1–1.03)
SQUAMOUS #/AREA URNS AUTO: <1 /HPF (ref 0–1)
UROBILINOGEN UR STRIP-MCNC: 0 MG/DL (ref 0–2)
WBC #/AREA URNS AUTO: 4 /HPF (ref 0–5)

## 2021-05-27 PROCEDURE — 81001 URINALYSIS AUTO W/SCOPE: CPT | Performed by: CLINICAL NURSE SPECIALIST

## 2021-06-07 DIAGNOSIS — Z11.59 ENCOUNTER FOR SCREENING FOR OTHER VIRAL DISEASES: ICD-10-CM

## 2021-06-07 LAB
LABORATORY COMMENT REPORT: NORMAL
SARS-COV-2 RNA RESP QL NAA+PROBE: NEGATIVE
SARS-COV-2 RNA RESP QL NAA+PROBE: NORMAL
SPECIMEN SOURCE: NORMAL
SPECIMEN SOURCE: NORMAL

## 2021-06-07 PROCEDURE — U0003 INFECTIOUS AGENT DETECTION BY NUCLEIC ACID (DNA OR RNA); SEVERE ACUTE RESPIRATORY SYNDROME CORONAVIRUS 2 (SARS-COV-2) (CORONAVIRUS DISEASE [COVID-19]), AMPLIFIED PROBE TECHNIQUE, MAKING USE OF HIGH THROUGHPUT TECHNOLOGIES AS DESCRIBED BY CMS-2020-01-R: HCPCS | Performed by: SURGERY

## 2021-06-07 PROCEDURE — U0005 INFEC AGEN DETEC AMPLI PROBE: HCPCS | Performed by: SURGERY

## 2021-06-08 ENCOUNTER — TELEPHONE (OUTPATIENT)
Dept: ONCOLOGY | Facility: CLINIC | Age: 41
End: 2021-06-08

## 2021-06-08 DIAGNOSIS — Z15.09 PMS2-RELATED LYNCH SYNDROME (HNPCC4): ICD-10-CM

## 2021-06-10 ENCOUNTER — ANESTHESIA EVENT (OUTPATIENT)
Dept: GASTROENTEROLOGY | Facility: CLINIC | Age: 41
End: 2021-06-10
Payer: COMMERCIAL

## 2021-06-10 ENCOUNTER — ANESTHESIA (OUTPATIENT)
Dept: GASTROENTEROLOGY | Facility: CLINIC | Age: 41
End: 2021-06-10
Payer: COMMERCIAL

## 2021-06-10 ENCOUNTER — HOSPITAL ENCOUNTER (OUTPATIENT)
Facility: CLINIC | Age: 41
Discharge: HOME | End: 2021-06-10
Attending: SURGERY | Admitting: SURGERY
Payer: COMMERCIAL

## 2021-06-10 VITALS
SYSTOLIC BLOOD PRESSURE: 114 MMHG | DIASTOLIC BLOOD PRESSURE: 89 MMHG | TEMPERATURE: 98.1 F | HEIGHT: 66 IN | BODY MASS INDEX: 27.97 KG/M2 | WEIGHT: 174 LBS | OXYGEN SATURATION: 98 % | RESPIRATION RATE: 16 BRPM | HEART RATE: 63 BPM

## 2021-06-10 LAB — COLONOSCOPY: NORMAL

## 2021-06-10 PROCEDURE — 45380 COLONOSCOPY AND BIOPSY: CPT | Performed by: SURGERY

## 2021-06-10 PROCEDURE — 258N000003 HC RX IP 258 OP 636: Performed by: NURSE ANESTHETIST, CERTIFIED REGISTERED

## 2021-06-10 PROCEDURE — 250N000009 HC RX 250: Performed by: NURSE ANESTHETIST, CERTIFIED REGISTERED

## 2021-06-10 PROCEDURE — 370N000017 HC ANESTHESIA TECHNICAL FEE, PER MIN: Performed by: SURGERY

## 2021-06-10 PROCEDURE — 88305 TISSUE EXAM BY PATHOLOGIST: CPT | Mod: TC | Performed by: SURGERY

## 2021-06-10 PROCEDURE — 88305 TISSUE EXAM BY PATHOLOGIST: CPT | Mod: 26 | Performed by: PATHOLOGY

## 2021-06-10 PROCEDURE — 250N000011 HC RX IP 250 OP 636: Performed by: NURSE ANESTHETIST, CERTIFIED REGISTERED

## 2021-06-10 PROCEDURE — 250N000009 HC RX 250: Performed by: SURGERY

## 2021-06-10 RX ORDER — LIDOCAINE 40 MG/G
CREAM TOPICAL
Status: DISCONTINUED | OUTPATIENT
Start: 2021-06-10 | End: 2021-06-10 | Stop reason: HOSPADM

## 2021-06-10 RX ORDER — PROPOFOL 10 MG/ML
INJECTION, EMULSION INTRAVENOUS PRN
Status: DISCONTINUED | OUTPATIENT
Start: 2021-06-10 | End: 2021-06-10

## 2021-06-10 RX ORDER — NALOXONE HYDROCHLORIDE 0.4 MG/ML
0.2 INJECTION, SOLUTION INTRAMUSCULAR; INTRAVENOUS; SUBCUTANEOUS
Status: CANCELLED | OUTPATIENT
Start: 2021-06-10

## 2021-06-10 RX ORDER — NALOXONE HYDROCHLORIDE 0.4 MG/ML
0.4 INJECTION, SOLUTION INTRAMUSCULAR; INTRAVENOUS; SUBCUTANEOUS
Status: CANCELLED | OUTPATIENT
Start: 2021-06-10

## 2021-06-10 RX ORDER — ONDANSETRON 4 MG/1
4 TABLET, ORALLY DISINTEGRATING ORAL EVERY 6 HOURS PRN
Status: CANCELLED | OUTPATIENT
Start: 2021-06-10

## 2021-06-10 RX ORDER — ONDANSETRON 2 MG/ML
4 INJECTION INTRAMUSCULAR; INTRAVENOUS
Status: DISCONTINUED | OUTPATIENT
Start: 2021-06-10 | End: 2021-06-10 | Stop reason: HOSPADM

## 2021-06-10 RX ORDER — FLUMAZENIL 0.1 MG/ML
0.2 INJECTION, SOLUTION INTRAVENOUS
Status: CANCELLED | OUTPATIENT
Start: 2021-06-10 | End: 2021-06-10

## 2021-06-10 RX ORDER — SODIUM CHLORIDE, SODIUM LACTATE, POTASSIUM CHLORIDE, CALCIUM CHLORIDE 600; 310; 30; 20 MG/100ML; MG/100ML; MG/100ML; MG/100ML
INJECTION, SOLUTION INTRAVENOUS CONTINUOUS
Status: DISCONTINUED | OUTPATIENT
Start: 2021-06-10 | End: 2021-06-10 | Stop reason: HOSPADM

## 2021-06-10 RX ORDER — ONDANSETRON 2 MG/ML
4 INJECTION INTRAMUSCULAR; INTRAVENOUS EVERY 6 HOURS PRN
Status: CANCELLED | OUTPATIENT
Start: 2021-06-10

## 2021-06-10 RX ORDER — PROCHLORPERAZINE MALEATE 5 MG
10 TABLET ORAL EVERY 6 HOURS PRN
Status: CANCELLED | OUTPATIENT
Start: 2021-06-10

## 2021-06-10 RX ORDER — LIDOCAINE HYDROCHLORIDE 10 MG/ML
INJECTION, SOLUTION INFILTRATION; PERINEURAL PRN
Status: DISCONTINUED | OUTPATIENT
Start: 2021-06-10 | End: 2021-06-10

## 2021-06-10 RX ADMIN — SODIUM CHLORIDE, POTASSIUM CHLORIDE, SODIUM LACTATE AND CALCIUM CHLORIDE: 600; 310; 30; 20 INJECTION, SOLUTION INTRAVENOUS at 07:12

## 2021-06-10 RX ADMIN — LIDOCAINE HYDROCHLORIDE 0.1 ML: 10 INJECTION, SOLUTION EPIDURAL; INFILTRATION; INTRACAUDAL; PERINEURAL at 07:12

## 2021-06-10 RX ADMIN — PROPOFOL 50 MG: 10 INJECTION, EMULSION INTRAVENOUS at 07:50

## 2021-06-10 RX ADMIN — PROPOFOL 100 MG: 10 INJECTION, EMULSION INTRAVENOUS at 07:43

## 2021-06-10 RX ADMIN — LIDOCAINE HYDROCHLORIDE 40 MG: 10 INJECTION, SOLUTION INFILTRATION; PERINEURAL at 07:43

## 2021-06-10 RX ADMIN — PROPOFOL 50 MG: 10 INJECTION, EMULSION INTRAVENOUS at 07:46

## 2021-06-10 RX ADMIN — PROPOFOL 50 MG: 10 INJECTION, EMULSION INTRAVENOUS at 07:48

## 2021-06-10 RX ADMIN — PROPOFOL 50 MG: 10 INJECTION, EMULSION INTRAVENOUS at 07:51

## 2021-06-10 RX ADMIN — PROPOFOL 50 MG: 10 INJECTION, EMULSION INTRAVENOUS at 07:52

## 2021-06-10 ASSESSMENT — MIFFLIN-ST. JEOR: SCORE: 1468.07

## 2021-06-10 NOTE — ANESTHESIA CARE TRANSFER NOTE
Patient: Christina Santana    Procedure(s):  COLONOSCOPY, WITH POLYPECTOMY    Diagnosis: Reed syndrome [Z15.09]  Diagnosis Additional Information: No value filed.    Anesthesia Type:   MAC     Note:    Oropharynx: oropharynx clear of all foreign objects  Level of Consciousness: awake  Oxygen Supplementation: room air    Independent Airway: airway patency satisfactory and stable  Dentition: dentition unchanged  Vital Signs Stable: post-procedure vital signs reviewed and stable  Report to RN Given: handoff report given  Patient transferred to: Phase II    Handoff Report: Identifed the Patient, Identified the Reponsible Provider, Reviewed the pertinent medical history, Discussed the surgical course, Reviewed Intra-OP anesthesia mangement and issues during anesthesia, Set expectations for post-procedure period and Allowed opportunity for questions and acknowledgement of understanding      Vitals: (Last set prior to Anesthesia Care Transfer)  CRNA VITALS  6/10/2021 0729 - 6/10/2021 0802      6/10/2021             Resp Rate (observed):  13        Electronically Signed By: SHEYLA Sharma CRNA  Suma 10, 2021  8:02 AM

## 2021-06-10 NOTE — LETTER
Christina SÁNCHEZ [facility]  [address] [facility] [address]    June 16, 2021      Dear Christina,  This letter is to inform you of the results of your pathology report from your colonoscopy.  Your pathology report was:  FINAL DIAGNOSIS:   Colon, rectum, polypectomy   - Hyperplastic polyp   - No evidence of neoplastic polyp or malignancy  This is a benign, non-concerning polyp. You should continue to have screening colonoscopies every 1-2 years.  If you have further questions please don t hesitate to call our clinic at [phone].   Sincerely,     Willi Galeas, DO

## 2021-06-10 NOTE — ANESTHESIA PREPROCEDURE EVALUATION
Anesthesia Pre-Procedure Evaluation    Patient: Christina Santana   MRN: [id number] : 1980        Preoperative Diagnosis: Reed syndrome [Z15.09]   Procedure : Procedure(s):  Colonoscopy with possible biopsy and/or polypectomy     Past Medical History:   Diagnosis Date     ASCUS with positive high risk HPV 2013     HSIL (high grade squamous intraepithelial lesion) on Pap smear of cervix 2013     Pap smear of cervix with ASCUS, cannot exclude HGSIL 11, 8/10/12,      PMS2-related Reed syndrome (HNPCC4)       Past Surgical History:   Procedure Laterality Date     APPENDECTOMY       AS REPAIR PARAESOPHAGEAL HIATAL HERNIA,LAPAROTOMY, W MESH       LAPAROSCOPIC HYSTERECTOMY TOTAL, BILATERAL SALPINGO-OOPHORECTOMY, COMBINED N/A 2021    Procedure: HYSTERECTOMY, TOTAL, LAPAROSCOPIC, WITH SALPINGO-OOPHORECTOMY;  Surgeon: Dylon Alcantar MD;  Location: [facility] OR      Allergies   Allergen Reactions     Latex Shortness Of Breath, Swelling and Rash      Social History     Tobacco Use     Smoking status: Never Smoker     Smokeless tobacco: Never Used   Substance Use Topics     Alcohol use: Yes     Comment: occassional      Wt Readings from Last 1 Encounters:   21 78.4 kg (172 lb 14.4 oz)        Anesthesia Evaluation   Pt has had prior anesthetic. Type: General.        ROS/MED HX  ENT/Pulmonary:  - neg pulmonary ROS     Neurologic:  - neg neurologic ROS     Cardiovascular:       METS/Exercise Tolerance:     Hematologic:  - neg hematologic  ROS     Musculoskeletal:  - neg musculoskeletal ROS     GI/Hepatic:     (+) GERD,     Renal/Genitourinary:  - neg Renal ROS     Endo:  - neg endo ROS     Psychiatric/Substance Use:  - neg psychiatric ROS     Infectious Disease:  - neg infectious disease ROS     Malignancy:  - neg malignancy ROS     Other:  - neg other ROS          Physical Exam    Airway  airway exam normal           Respiratory Devices and Support         Dental  no notable dental history          Cardiovascular   cardiovascular exam normal          Pulmonary   pulmonary exam normal                OUTSIDE LABS:  CBC:   Lab Results   Component Value Date    WBC 7.4 02/24/2021    WBC 9.5 02/25/2020    HGB 13.7 02/24/2021    HGB 13.5 02/25/2020    HCT 40.5 02/24/2021    HCT 41.6 02/25/2020     02/24/2021     02/25/2020     BMP:   Lab Results   Component Value Date     02/24/2021     02/25/2020    POTASSIUM 4.8 02/24/2021    POTASSIUM 4.2 02/25/2020    CHLORIDE 110 (H) 02/24/2021    CHLORIDE 106 02/25/2020    CO2 25 02/24/2021    CO2 28 02/25/2020    BUN 13 02/24/2021    BUN 13 02/25/2020    CR 0.62 02/24/2021    CR 0.64 02/25/2020    GLC 90 02/24/2021    GLC 88 02/25/2020     COAGS: No results found for: PTT, INR, FIBR  POC:   Lab Results   Component Value Date    HCG Negative 02/24/2021     HEPATIC:   Lab Results   Component Value Date    ALBUMIN 3.7 02/25/2020    PROTTOTAL 7.3 02/25/2020    ALT 22 02/25/2020    AST 13 02/25/2020    ALKPHOS 56 02/25/2020    BILITOTAL 0.2 02/25/2020     OTHER:   Lab Results   Component Value Date    LACT 2.7 (H) 01/01/2019    A1C 5.1 10/28/2014    NERISSA 8.6 02/24/2021    TSH 1.33 02/24/2021    T4 1.58 (H) 02/24/2021    CRP 5.4 12/29/2020    SED 8 12/29/2020       Anesthesia Plan    ASA Status:  2   NPO Status:  NPO Appropriate    Anesthesia Type: MAC.   Induction: Propofol.   Maintenance: TIVA.        Consents    Anesthesia Plan(s) and associated risks, benefits, and realistic alternatives discussed. Questions answered and patient/representative(s) expressed understanding.     - Discussed with:  Patient      - Extended Intubation/Ventilatory Support Discussed: No.      - Patient is DNR/DNI Status: No    Use of blood products discussed: No .     Postoperative Care    Pain management: IV analgesics.        Comments:                SHEYLA Sharma CRNA

## 2021-06-10 NOTE — H&P
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 Not taking blood thinning medications. No MI or CVA history. No issues with previous sedation. No recent acute illness.    Review Of Systems    Skin: negative  Ears/Nose/Throat: negative  Respiratory: No shortness of breath, dyspnea on exertion, cough, or hemoptysis  Cardiovascular: negative  Gastrointestinal: negative  Genitourinary: negative  Musculoskeletal: negative  Neurologic: negative  Hematologic/Lymphatic/Immunologic: negative  Endocrine: negative      Past Medical History:   Diagnosis Date     ASCUS with positive high risk HPV 12/27/2013     HSIL (high grade squamous intraepithelial lesion) on Pap smear of cervix 04/22/2013     Pap smear of cervix with ASCUS, cannot exclude HGSIL 5/23/11, 8/10/12,      PMS2-related Patrick syndrome (HNPCC4)        Past Surgical History:   Procedure Laterality Date     APPENDECTOMY       AS REPAIR PARAESOPHAGEAL HIATAL HERNIA,LAPAROTOMY, W MESH       LAPAROSCOPIC HYSTERECTOMY TOTAL, BILATERAL SALPINGO-OOPHORECTOMY, COMBINED N/A 2/24/2021    Procedure: HYSTERECTOMY, TOTAL, LAPAROSCOPIC, WITH SALPINGO-OOPHORECTOMY;  Surgeon: Dylon Alcantar MD;  Location: [facility]       Family History   Problem Relation Age of Onset     Bladder Cancer Maternal Grandfather 70        lived to be 94     Kidney Cancer Maternal Grandfather      Breast Cancer Paternal Grandmother         postmenopausal     Ovarian Cancer Maternal Aunt 40        possible uterine cancer also; hx of fibroids     Ovarian Cancer Maternal Aunt 40        possible uterine cancer also; hx of fibroids     Breast Cancer Paternal Aunt 54     Fibroids Mother        Social History     Socioeconomic History     Marital status:      Spouse name: Thai     Number of children: 6     Years of education: Not on file     Highest education level: Not on file   Occupational History     Not on file   Social Needs     Financial resource  "strain: Not on file     Food insecurity     Worry: Not on file     Inability: Not on file     Transportation needs     Medical: Not on file     Non-medical: Not on file   Tobacco Use     Smoking status: Never Smoker     Smokeless tobacco: Never Used   Substance and Sexual Activity     Alcohol use: Yes     Comment: occassional     Drug use: No     Sexual activity: Yes     Partners: Male     Birth control/protection: Male Surgical, Female Surgical     Comment: partner vas   Lifestyle     Physical activity     Days per week: Not on file     Minutes per session: Not on file     Stress: Not on file   Relationships     Social connections     Talks on phone: Not on file     Gets together: Not on file     Attends Oriental orthodox service: Not on file     Active member of club or organization: Not on file     Attends meetings of clubs or organizations: Not on file     Relationship status: Not on file     Intimate partner violence     Fear of current or ex partner: Not on file     Emotionally abused: Not on file     Physically abused: Not on file     Forced sexual activity: Not on file   Other Topics Concern     Parent/sibling w/ CABG, MI or angioplasty before 65F 55M? Not Asked   Social History Narrative     Not on file       No current outpatient medications on file.       Medications and history reviewed    Physical exam:  Vitals: /83   Temp 98.1  F (36.7  C) (Oral)   Resp 16   Ht 1.664 m (5' 5.5\")   Wt 78.9 kg (174 lb)   LMP 02/03/2021   SpO2 99%   BMI 28.51 kg/m    BMI= Body mass index is 28.51 kg/m .    Constitutional: Healthy, alert, non-distressed   Head: Normo-cephalic, atraumatic, no lesions, masses or tenderness   Cardiovascular: RRR, no new murmurs, +S1, +S2 heart sounds, no clicks, rubs or gallops   Respiratory: CTAB, no rales, rhonchi or wheezing, equal chest rise, good respiratory effort   Gastrointestinal: Soft, non-tender, non distended, no rebound rigidity or guarding, no masses or hernias palpated "   : Deferred  Musculoskeletal: Moves all extremities, normal  strength, no deformities noted   Skin: No suspicious lesions or rashes   Psychiatric: Mentation appears normal, affect appropriate   Hematologic/Lymphatic/Immunologic: Normal cervical and supraclavicular lymph nodes   Patient able to get up on table without difficulty.    Labs show:  No results found for this or any previous visit (from the past 24 hour(s)).    Assessment: Endoscopy  Plan: Pt cleared for anesthesia for proposed procedure.    Willi Galeas, DO

## 2021-06-10 NOTE — ANESTHESIA POSTPROCEDURE EVALUATION
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    Anesthesia Type:  MAC    Note:  Disposition: Outpatient   Postop Pain Control: Uneventful            Sign Out: Well controlled pain   PONV: No   Neuro/Psych: Uneventful            Sign Out: Acceptable/Baseline neuro status   Airway/Respiratory: Uneventful            Sign Out: Acceptable/Baseline resp. status   CV/Hemodynamics: Uneventful            Sign Out: Acceptable CV status; No obvious hypovolemia; No obvious fluid overload   Other NRE: NONE   DID A NON-ROUTINE EVENT OCCUR?            Last vitals:  Vitals:    06/10/21 0808 06/10/21 0815 06/10/21 0830   BP: (!) 109/92 104/70 114/89   Pulse: 83 73 63   Resp: 16 16 16   Temp:      SpO2: 97% 98%        Last vitals prior to Anesthesia Care Transfer:  CRNA VITALS  6/10/2021 0729 - 6/10/2021 0829      6/10/2021             Resp Rate (observed):  13          Electronically Signed By: SHEYLA Sharma CRNA  Suma 10, 2021  8:44 AM

## 2021-06-10 NOTE — DISCHARGE INSTRUCTIONS
[facility]    Home Care Following Endoscopy          Activity:    You have just undergone an endoscopic procedure usually performed with conscious sedation.  Do not work or operate machinery (including a car) for at least 12 hours.      I encourage you to walk and attempt to pass this air as soon as possible.    Diet:    Return to the diet you were on before your procedure but eat lightly for the first 12-24 hours.    Drink plenty of water.    Resume any regular medications unless otherwise advised by your physician.  Please begin any new medication prescribed as a result of your procedure as directed by your physician.     If you had any biopsy or polyp removed please refrain from aspirin or aspirin products for 2 days.  If on Coumadin please restart as instructed by your physician.   Pain:    You may take Tylenol as needed for pain.  Expected Recovery:    You can expect some mild abdominal fullness and/or discomfort due to the air used to inflate your intestinal tract. It is also normal to have a mild sore throat after upper endoscopy.    Call Your Physician if You Have:      After Colonoscopy:  o Worsening persisting abdominal pain which is worse with activity.  o Fevers (>101 degrees F), chills or shakes.  o Passage of continued blood with bowel movements.   Any questions or concerns about your recovery, please call [phone] or after hours [phone] Nurse Advice Line.    Follow-up Care:    You should receive a call or letter with your results within 1 week. Please call if you have not received a notification of your results.  If asked to return to clinic please make an appointment 1 week after your procedure.  Call [phone].

## 2021-06-11 ENCOUNTER — HOSPITAL ENCOUNTER (OUTPATIENT)
Dept: NUTRITION | Facility: CLINIC | Age: 41
Discharge: HOME | End: 2021-06-11
Attending: INTERNAL MEDICINE | Admitting: INTERNAL MEDICINE
Payer: COMMERCIAL

## 2021-06-11 LAB — COPATH REPORT: NORMAL

## 2021-06-11 PROCEDURE — 97802 MEDICAL NUTRITION INDIV IN: CPT | Mod: 95

## 2021-06-11 NOTE — PROGRESS NOTES
"[facility]  Medical Nutrition Therapy    Visit Type: Initial Assessment    Christina Santana, 41 year old referred by Radha Rodriguez CNS for MNT related to PMS2-related patrick syndrome, abdominal bloating with nutritional instruction- IBS       The patient has been notified of following:      \"This video visit will be conducted via a call between you and your physician/provider. We have found that certain health care needs can be provided without the need for an in-person physical exam.  This service lets us provide the care you need with a video conversation.     Video visits are billed at different rates depending on your insurance coverage.  Please reach out to your insurance provider with any questions.     If during the course of the call the physician/provider feels a video visit is not appropriate, you will not be charged for this service.\"     Patient has given verbal consent for Video visit? YES        Video-Visit Details     Type of service:  Video Visit    Originating Location (pt. Location): home     Distant Location (provider location):  [facility]      Platform used for Video Visit: ElectroCore      Nutrition Assessment:  Anthropometrics  Height:   Ht Readings from Last 1 Encounters:   06/10/21 1.664 m (5' 5.5\")         BMI:  28.51 kg/m^2   Weight Status:  Overweight BMI 25-29.9   Weight:   Wt Readings from Last 1 Encounters:   06/10/21 78.9 kg (174 lb)             IBW:  59.1 kg    IBW %: 133          Weight History:   Wt Readings from Last 8 Encounters:   06/10/21 78.9 kg (174 lb)   04/13/21 78.4 kg (172 lb 14.4 oz)   03/12/21 77.7 kg (171 lb 4.8 oz)   02/24/21 78.9 kg (174 lb)   02/19/21 78.9 kg (174 lb)   02/08/21 78.4 kg (172 lb 14.4 oz)   12/28/20 81.2 kg (179 lb)   08/04/20 76 kg (167 lb 8 oz)     Goal Weight: not specifically mention nor goal of visit  Goal for encounter: per pt, have options to reduce bloating     Nutrition History    PMH: "   Patient Active Problem List   Diagnosis     S/P LEEP (status post loop electrosurgical excision procedure)     BREEZY III (cervical intraepithelial neoplasia grade III) with severe dysplasia     Encounter for insertion of mirena IUD     GERD with stricture     Astigmatism     Myopia     Sinus pressure     Tension type headache     Allergic rhinitis due to dust mite     Seasonal allergic rhinitis due to pollen     Allergic rhinitis due to animal dander     Allergic rhinitis due to mold     Cervicalgia     Muscle spasm     RUQ pain     Nausea     Calculus of gallbladder without cholecystitis without obstruction     PMS2-related Reed syndrome (HNPCC4)     -recently muscle tested (not diagnosis confirming) for possible SIBO by kinesiologist      Labs: reviewed  -all WNL     Meds: reviewed  Current Outpatient Medications   Medication     estradiol (VIVELLE-DOT) 0.05 MG/24HR bi-weekly patch       Supplements: reviewed  -Omega 3  -probiotic (new strains for SIBO to start after colonoscopy yesterday)  -hormone balance powder (Nutridyn) - MVI + M + herbs. Only herb of question/concern- kudzu extract (liver toxicity)      Social/Environmental:   -average sleep per night: 6-7 hours (wakes 1-2 times every night related to temp or using bathroom to void)  -level of stress: 2/5 feels well managed and low right now. Uses movement to de-stress and unwind      Food Record:   -3 meals per day, 2 snacks per day  -Bfast: 2 eggs (varying formats) and veggies (assorted)  -Lunch:protein + veg + fruit often. Rarely PB&J on white or pizza rolss  -Dinner: protein + 2 veg + carb (rice example) sometimes side of fruit or dessert   -snacks: f/v with nut butter, afternoon snack has chocolate and is sweet (joe granola bar or muffin), strawberries and banana with chocolate chips for dessert  -beverages: 64 oz water, one coffee with cream and vanilla syrup in morning  -take out once per week    Nutritional Details:   -Food allergies: NKFA  (latex)  -Food sensitivities: none mentioned- and has been eliminated and investigated for dairy or gluten intolerance without resolution of bloating  -GI concerns: Bloating without N/V, pain, diarrhea or constipation. Healthy BM daily    -Appetite: often eats for habit and once a week more eager hunger  -Pace of eating: moderate- earlier in day faster, dinners slower  -Role of cooking: patient (role in house and does enjoy as well)  -has tried pinpointing foods causing bloating without identifying trends  -goes from hungry to very full quickly  -past GERD with hiatal hernia repair solving the problem  -does not use straws  -possibly a bit unde-rhydrated based on urine a few times of the day   -no foul smell, floating, undigested, stooling   -bloating does not change or shift up and down- can occur with water or with various foods and doesn't seem to lessen or improve       Physical Activity:  Days per week:2/3x week currently- was daily 4932-1145 early  Activity type: running previously marathons and triathalons with training including strength training and swimming also  Limitations: heat and until recently COVID and motivation        ASSESSED MALNUTRITION STATUS  % Weight Loss:  None noted  % Intake:  No decreased intake noted  Subcutaneous Fat Loss:  None observed  Loss of Muscle Mass:  None observed  Fluid Retention:  None noted    Malnutrition Diagnosis:  Patient does not meet two of the above criteria necessary for diagnosing malnutrition      Nutrition Diagnosis:  Predicted suboptimal nutrient intake related to GI distress-bloating as evidenced by pt report of bloating without ability to self-identify cause or lessen bloat, possible underlying food intolerance contributing to bloat        Nutrition Intervention:  Nutrition Prescription Summary: GI health, FODMAP elimination diet, gut transit, whole body health and gut response      Nutrition Education (Content):  -Educated pt on the Low FODMAP diet. Talked  about what FODMAPs are and what they do in the body.   -Educated pt on foods that contain Fermentable, Oligosaccharides (fructans-wheat, rye, garlic, onion, leeks, and artichokes and GOS- beans, lentils, soybeans, and nuts, including cashews), Disaccharides (lactose, milk sugar), Monosaccharides (excess fructose-certain fruits, honey, and high-fructose corn syrup), and Polyols (sorbitol, mannitol, maltitol, erythritol, xylitol, and isomalt, apricots, avocados, cherries, nectarines, peaches, and plums and mushrooms)  -Discussed carefully planning meals and making shopping lists. Educated pt on reading food labels- talked about if high-FODMAP ingredients are listed at the bottom of an ingredient list, the food may still be considered low-FODMAP and ok  -Recommended choosing low-FODMAP foods containing 3-4 grams of fiber per serving   -Discussed high and low FODMAPs from all food groups (handout provided)  -Discussed appropriate portion sizes of FODMAPs  -Recommended following the elimination phase (avoiding all FODMAPs) for 4 weeks, then introduce a high-FODMAP food group slowly. On the 1st day have 1-2 servings, then the 2nd day have larger portions of the same FODMAP food group. Then go back on the complete elimination diet for 3 days. After that, try another FODMAP group slowly (follow the same procedure for days 1 and 2). Do this pattern until all of the FODMAP groups have been tried. Groups that caused sx should be avoided.   -Provided a sample menu and a list of resources for recipes and books.  -Suggested limiting caffeine  -Discussed physical movement to reduce GI bloat/gas: yoga for digestion  -Encourage hot water and lemon in the morning  -Encourage epsom salt foot soak or bath to support GI health (Mg)  -Encourage stopping probiotic during elimination phase and further testing conformation for SIBO with treatment course to remove  -Discussed benefit to cortisol lowering workouts like stretching and yoga in  between more strenuous work out activity  -Educated pt on importance of adequate sleep and circadian rhythm- full body health  -Discussed overall gut integrity, transit time testing, and importance of long term goals to continue current level of fiber and F/V consumption for colon health and cancer prevention      Provided the following handouts: Low FODMAP nutrition therapy, Low FODMAP shopping list, food list, symptom tracker, lunches and recipes        Patient Engagement:   Assessed learning needs and learning preference: Yes.  Teaching Method(s) used: Booklet / Handout  Explanation    Nutrition Education (Application):  a)  Discussed current eating plans / recommended alternative food choices    b)  Patient verbalizes understanding of diet by agreeing to begin the low FODMAP diet elimination and following reintroduction steps     Anticipate fair-good compliance   Stage of Change:  preparation  Additional: pt has trialed many dietary and non dietary rationale to eliminate bloat. Pt is ready to start the FODMAP elimination diet however it can be a challenge to shift eating initially especially with a family to cook for. Pt eats healthfully now, but will need to shift some fruit and vegetable intake for success while maintaining fiber to support healthy GI function also knowing genetic goals for eating with colon risk      Nutrition Goals:  1) Begin Low FODMAP elimination diet    2) Schedule RD appointment just prior to challenge phase of diet to ensure success with testing food categories    3) Practice whole body health tips additionally: warm water with lemon upon waking, adequate sleep, yoga for cortisol balance and digestive health, adequate water intake of at least 64 oz (a bit more if urine is concentrated darker than pale yellow), epsom salt foot soak or baths weekly, bone broth to support gut mucosal lining     Nutrition Follow Up / Monitoring:   Progress towards goals will be monitored and evaluated per  protocol and Practice Guidelines, Food intake, Fluid/beverage intake, Food and Nutrition Knowledge/Skill, Biochemical data and Nutrition-focused physical findings    Nutrition Recommendations:  Patient to follow-up with RD in 4 weeks.  Patient has RD contact information to call/email if needed.      Start time: 1102  End time: 1152    Total Time Duration: 45 minutes        Ping Aguilar MBA, RD LD  Clinical Dietitian  [facility] office [phone]  on-call weekend pager [phone]

## 2021-06-16 ENCOUNTER — OFFICE VISIT (OUTPATIENT)
Dept: INTERNAL MEDICINE | Facility: CLINIC | Age: 41
End: 2021-06-16
Payer: COMMERCIAL

## 2021-06-16 VITALS
SYSTOLIC BLOOD PRESSURE: 126 MMHG | OXYGEN SATURATION: 99 % | WEIGHT: 177 LBS | BODY MASS INDEX: 29.01 KG/M2 | RESPIRATION RATE: 16 BRPM | DIASTOLIC BLOOD PRESSURE: 84 MMHG | HEART RATE: 92 BPM | TEMPERATURE: 97.6 F

## 2021-06-16 DIAGNOSIS — M25.552 HIP PAIN, LEFT: Primary | ICD-10-CM

## 2021-06-16 DIAGNOSIS — Z15.09 LYNCH SYNDROME: ICD-10-CM

## 2021-06-16 DIAGNOSIS — R79.89 ELEVATED SERUM FREE T4 LEVEL: ICD-10-CM

## 2021-06-16 DIAGNOSIS — M25.50 MULTIPLE JOINT PAIN: ICD-10-CM

## 2021-06-16 DIAGNOSIS — G44.209 TENSION-TYPE HEADACHE, NOT INTRACTABLE, UNSPECIFIED CHRONICITY PATTERN: ICD-10-CM

## 2021-06-16 DIAGNOSIS — Z15.09 LYNCH SYNDROME: Primary | ICD-10-CM

## 2021-06-16 LAB
ERYTHROCYTE [SEDIMENTATION RATE] IN BLOOD BY WESTERGREN METHOD: 7 MM/H (ref 0–20)
T4 FREE SERPL-MCNC: 1.36 NG/DL (ref 0.76–1.46)
TSH SERPL DL<=0.005 MIU/L-ACNC: 1.2 MU/L (ref 0.4–4)

## 2021-06-16 PROCEDURE — 86431 RHEUMATOID FACTOR QUANT: CPT | Performed by: INTERNAL MEDICINE

## 2021-06-16 PROCEDURE — 84443 ASSAY THYROID STIM HORMONE: CPT | Performed by: INTERNAL MEDICINE

## 2021-06-16 PROCEDURE — 84439 ASSAY OF FREE THYROXINE: CPT | Performed by: INTERNAL MEDICINE

## 2021-06-16 PROCEDURE — 99214 OFFICE O/P EST MOD 30 MIN: CPT | Performed by: INTERNAL MEDICINE

## 2021-06-16 PROCEDURE — 88112 CYTOPATH CELL ENHANCE TECH: CPT

## 2021-06-16 PROCEDURE — 86200 CCP ANTIBODY: CPT | Performed by: INTERNAL MEDICINE

## 2021-06-16 PROCEDURE — 36415 COLL VENOUS BLD VENIPUNCTURE: CPT | Performed by: INTERNAL MEDICINE

## 2021-06-16 PROCEDURE — 85652 RBC SED RATE AUTOMATED: CPT | Performed by: INTERNAL MEDICINE

## 2021-06-16 ASSESSMENT — ENCOUNTER SYMPTOMS
NECK STIFFNESS: 1
SLEEP DISTURBANCE: 1
NAUSEA: 1
WEAKNESS: 0
PALPITATIONS: 0
FATIGUE: 1
SHORTNESS OF BREATH: 0
HEADACHES: 1
ARTHRALGIAS: 1
ABDOMINAL PAIN: 0
WHEEZING: 0
PARESTHESIAS: 1
FLANK PAIN: 0
NECK PAIN: 1
COUGH: 0
DYSURIA: 0
DIFFICULTY URINATING: 0
BACK PAIN: 1

## 2021-06-16 ASSESSMENT — PAIN SCALES - GENERAL: PAINLEVEL: MODERATE PAIN (4)

## 2021-06-16 NOTE — PROGRESS NOTES
Assessment & Plan     (M25.552) Hip pain, left  (primary encounter diagnosis)  Plan: Chronic hip pain, worsening since January 2021. Exam unremarkable. Agrees to xray and rheumatoid labs today. Will consider referral to physical therapy. Most likely is arthritis wear and tear from years of running.     Rheumatoid factor, ESR: Erythrocyte         sedimentation rate, TSH with free T4 reflex,         T4, free, Cyclic Citrullinated Peptide Antibody        IgG, XR Hip Left 2-3 Views             (R79.89) Elevated serum free T4 level  Plan: TSH with free T4 reflex, T4, free        Agrees to recheck labs today. Recheck thyroid could be affected her fatigue and symptoms.     (Z15.09) Reed syndrome  Plan: Managed by [facility]. S/p hysterectomy 2/2021. Colonoscopy complete last week - normal, will need repeat every 1-2 years. Also needs annual urinalysis (complete 2021), annual physical/neurological examination.     (M25.50) Multiple joint pain  Plan: Pains primarily localized to bilateral neck, left hip, & left leg. Agreeable to rheumatoid screening labs today.     Rheumatoid factor, ESR: Erythrocyte         sedimentation rate, Cyclic Citrullinated         Peptide Antibody IgG    (G44.209) Tension-type headache, not intractable, unspecified chronicity pattern  Plan: Onset s/p hysterectomy. Prescribed estradiol 0.05mg transdermal patch biweekly change for estrogen replacement. Patch has been falling off frequently. Headaches likely due to hormonal imbalance. Recommend patient find new site for patch application to prevent unnecessary, repeated removal. Offered new prescription for imitrex - patient declined. Encouraged f/u with OB/Gyn to inquire about dose change related to headaches, hot flasehs.        FUTURE LABS:       - Complete labs and hip xray today as ordered    Return for If not better with this diagnosis.    Portion of the note was scribed by Manish Mercado. Then edited and updated by  myself. History, exam and medical decision making performed by myself.      Alphonse Arellano MD  [facility] ROSENDO Vasquez is a 41 year old who presents for the following health issues     HPI     Chief Complaint   Patient presents with     Pain     discuss pain in hips, legs and back     Headache     Pain: most notable concerns in the neck, lumbar back, legs/hip/knees (primarily L sided). Aching, persistent pain, occasional radiation down legs from back and from neck down arms. Notes some occasional numbness and tingling of the extremities. Pains have noticeable worsened since 1/2021. Baseline pain 4/10, 8/10 during exacerbations. Taking ibuprofen prn, helps some. Seeing chiropractor regularly. Denies bowel and bladder changes.     S/p hysterectomy 2/2021, noticed since end of March having trouble getting comfortable in bed, hard to get out of bed in the morning. Prescribed estradiol patch for estrogen replacement, falls off a lot.     HA: migraine-type headaches have returned since hysterectomy 2/2021; gets visual auras, nausea, vomiting; took imitrex previously prior to having children. Previously referred to neurology, insurance did not cover. Notes that estradiol patch helps with severity of migraines when stays on, does not change frequency.     Reed syndrome: colonoscopy last week - normal; s/p total hysterectomy 2/2021;     Son had covid in December, she wasn't tested.  Refuses COVID19 vaccine at this time.         Review of Systems   Constitutional: Positive for fatigue.   Respiratory: Negative for cough, shortness of breath and wheezing.    Cardiovascular: Negative for chest pain, palpitations and peripheral edema.   Gastrointestinal: Positive for nausea. Negative for abdominal pain.   Genitourinary: Negative for difficulty urinating, dysuria and flank pain.   Musculoskeletal: Positive for arthralgias, back pain, neck pain and neck stiffness.   Neurological: Positive for  headaches and paresthesias. Negative for syncope and weakness.   Psychiatric/Behavioral: Positive for sleep disturbance.            Objective    /84   Pulse 92   Temp 97.6  F (36.4  C) (Temporal)   Resp 16   Wt 80.3 kg (177 lb)   LMP 02/03/2021   SpO2 99%   BMI 29.01 kg/m    Body mass index is 29.01 kg/m .     Physical Exam  Vitals signs reviewed.   Constitutional:       Appearance: Normal appearance.   HENT:      Head: Normocephalic and atraumatic.   Neck:      Musculoskeletal: Normal range of motion and neck supple. Muscular tenderness present.   Cardiovascular:      Rate and Rhythm: Normal rate and regular rhythm.      Heart sounds: Normal heart sounds. No murmur. No friction rub. No gallop.    Pulmonary:      Effort: Pulmonary effort is normal.      Breath sounds: Normal breath sounds. No wheezing, rhonchi or rales.   Musculoskeletal: Normal range of motion.         General: No swelling or deformity.      Left hip: She exhibits tenderness. She exhibits normal range of motion, no swelling and no deformity.      Cervical back: She exhibits no bony tenderness.      Right lower leg: No edema.      Left lower leg: No edema.   Neurological:      General: No focal deficit present.      Mental Status: She is alert.   Psychiatric:         Mood and Affect: Mood normal.         Behavior: Behavior normal.         Reviewed labs from [facility] and colonoscopy results.

## 2021-06-17 LAB
CCP AB SER IA-ACNC: 7 U/ML
RHEUMATOID FACT SER NEPH-ACNC: <7 IU/ML (ref 0–20)

## 2021-06-18 ENCOUNTER — HOSPITAL ENCOUNTER (OUTPATIENT)
Dept: GENERAL RADIOLOGY | Facility: CLINIC | Age: 41
Discharge: HOME | End: 2021-06-18
Attending: INTERNAL MEDICINE | Admitting: INTERNAL MEDICINE
Payer: COMMERCIAL

## 2021-06-18 DIAGNOSIS — M25.552 HIP PAIN, LEFT: ICD-10-CM

## 2021-06-18 PROCEDURE — 73502 X-RAY EXAM HIP UNI 2-3 VIEWS: CPT

## 2021-06-21 LAB — COPATH REPORT: NORMAL

## 2021-08-05 ENCOUNTER — HOSPITAL ENCOUNTER (EMERGENCY)
Facility: CLINIC | Age: 41
Discharge: HOME | End: 2021-08-05
Attending: FAMILY MEDICINE | Admitting: FAMILY MEDICINE
Payer: COMMERCIAL

## 2021-08-05 ENCOUNTER — APPOINTMENT (OUTPATIENT)
Dept: CT IMAGING | Facility: CLINIC | Age: 41
End: 2021-08-05
Attending: FAMILY MEDICINE
Payer: COMMERCIAL

## 2021-08-05 VITALS
HEART RATE: 79 BPM | HEIGHT: 65 IN | WEIGHT: 178 LBS | RESPIRATION RATE: 15 BRPM | SYSTOLIC BLOOD PRESSURE: 128 MMHG | DIASTOLIC BLOOD PRESSURE: 81 MMHG | BODY MASS INDEX: 29.66 KG/M2

## 2021-08-05 DIAGNOSIS — R51.9 ACUTE NONINTRACTABLE HEADACHE, UNSPECIFIED HEADACHE TYPE: ICD-10-CM

## 2021-08-05 DIAGNOSIS — R03.0 ELEVATED BLOOD PRESSURE READING WITHOUT DIAGNOSIS OF HYPERTENSION: ICD-10-CM

## 2021-08-05 LAB
ALBUMIN SERPL-MCNC: 3.7 G/DL (ref 3.4–5)
ALP SERPL-CCNC: 63 U/L (ref 40–150)
ALT SERPL W P-5'-P-CCNC: 32 U/L (ref 0–50)
ANION GAP SERPL CALCULATED.3IONS-SCNC: 5 MMOL/L (ref 3–14)
AST SERPL W P-5'-P-CCNC: 17 U/L (ref 0–45)
BASOPHILS # BLD AUTO: 0 10E3/UL (ref 0–0.2)
BASOPHILS NFR BLD AUTO: 1 %
BILIRUB SERPL-MCNC: 0.2 MG/DL (ref 0.2–1.3)
BUN SERPL-MCNC: 10 MG/DL (ref 7–30)
CALCIUM SERPL-MCNC: 9 MG/DL (ref 8.5–10.1)
CHLORIDE BLD-SCNC: 107 MMOL/L (ref 94–109)
CO2 SERPL-SCNC: 28 MMOL/L (ref 20–32)
CREAT SERPL-MCNC: 0.74 MG/DL (ref 0.52–1.04)
CRP SERPL-MCNC: <2.9 MG/L (ref 0–8)
EOSINOPHIL # BLD AUTO: 0.4 10E3/UL (ref 0–0.7)
EOSINOPHIL NFR BLD AUTO: 5 %
ERYTHROCYTE [DISTWIDTH] IN BLOOD BY AUTOMATED COUNT: 12.1 % (ref 10–15)
GFR SERPL CREATININE-BSD FRML MDRD: >90 ML/MIN/1.73M2
GLUCOSE BLD-MCNC: 108 MG/DL (ref 70–99)
HCT VFR BLD AUTO: 39.8 % (ref 35–47)
HGB BLD-MCNC: 13 G/DL (ref 11.7–15.7)
HOLD SPECIMEN: NORMAL
HOLD SPECIMEN: NORMAL
IMM GRANULOCYTES # BLD: 0 10E3/UL
IMM GRANULOCYTES NFR BLD: 0 %
LIPASE SERPL-CCNC: 212 U/L (ref 73–393)
LYMPHOCYTES # BLD AUTO: 2.8 10E3/UL (ref 0.8–5.3)
LYMPHOCYTES NFR BLD AUTO: 39 %
MCH RBC QN AUTO: 29.9 PG (ref 26.5–33)
MCHC RBC AUTO-ENTMCNC: 32.7 G/DL (ref 31.5–36.5)
MCV RBC AUTO: 92 FL (ref 78–100)
MONOCYTES # BLD AUTO: 0.5 10E3/UL (ref 0–1.3)
MONOCYTES NFR BLD AUTO: 6 %
NEUTROPHILS # BLD AUTO: 3.4 10E3/UL (ref 1.6–8.3)
NEUTROPHILS NFR BLD AUTO: 49 %
NRBC # BLD AUTO: 0 10E3/UL
NRBC BLD AUTO-RTO: 0 /100
PLATELET # BLD AUTO: 209 10E3/UL (ref 150–450)
POTASSIUM BLD-SCNC: 3.8 MMOL/L (ref 3.4–5.3)
PROT SERPL-MCNC: 7 G/DL (ref 6.8–8.8)
RBC # BLD AUTO: 4.35 10E6/UL (ref 3.8–5.2)
SODIUM SERPL-SCNC: 140 MMOL/L (ref 133–144)
TROPONIN I SERPL-MCNC: <0.015 UG/L (ref 0–0.04)
WBC # BLD AUTO: 7.1 10E3/UL (ref 4–11)

## 2021-08-05 PROCEDURE — 250N000013 HC RX MED GY IP 250 OP 250 PS 637: Performed by: FAMILY MEDICINE

## 2021-08-05 PROCEDURE — 93005 ELECTROCARDIOGRAM TRACING: CPT | Performed by: FAMILY MEDICINE

## 2021-08-05 PROCEDURE — 82040 ASSAY OF SERUM ALBUMIN: CPT | Performed by: FAMILY MEDICINE

## 2021-08-05 PROCEDURE — 96360 HYDRATION IV INFUSION INIT: CPT | Performed by: FAMILY MEDICINE

## 2021-08-05 PROCEDURE — 99285 EMERGENCY DEPT VISIT HI MDM: CPT | Mod: 25 | Performed by: FAMILY MEDICINE

## 2021-08-05 PROCEDURE — 86140 C-REACTIVE PROTEIN: CPT | Performed by: FAMILY MEDICINE

## 2021-08-05 PROCEDURE — 84484 ASSAY OF TROPONIN QUANT: CPT | Performed by: FAMILY MEDICINE

## 2021-08-05 PROCEDURE — 93010 ELECTROCARDIOGRAM REPORT: CPT | Performed by: FAMILY MEDICINE

## 2021-08-05 PROCEDURE — 70450 CT HEAD/BRAIN W/O DYE: CPT

## 2021-08-05 PROCEDURE — 83690 ASSAY OF LIPASE: CPT | Performed by: FAMILY MEDICINE

## 2021-08-05 PROCEDURE — 36415 COLL VENOUS BLD VENIPUNCTURE: CPT | Performed by: FAMILY MEDICINE

## 2021-08-05 PROCEDURE — 85025 COMPLETE CBC W/AUTO DIFF WBC: CPT | Performed by: FAMILY MEDICINE

## 2021-08-05 PROCEDURE — 258N000003 HC RX IP 258 OP 636: Performed by: FAMILY MEDICINE

## 2021-08-05 RX ORDER — ACETAMINOPHEN 500 MG
1000 TABLET ORAL ONCE
Status: COMPLETED | OUTPATIENT
Start: 2021-08-05 | End: 2021-08-05

## 2021-08-05 RX ORDER — SODIUM CHLORIDE 9 MG/ML
INJECTION, SOLUTION INTRAVENOUS CONTINUOUS
Status: DISCONTINUED | OUTPATIENT
Start: 2021-08-05 | End: 2021-08-05 | Stop reason: HOSPADM

## 2021-08-05 RX ADMIN — ACETAMINOPHEN 1000 MG: 500 TABLET, FILM COATED ORAL at 16:18

## 2021-08-05 RX ADMIN — SODIUM CHLORIDE 1000 ML: 9 INJECTION, SOLUTION INTRAVENOUS at 16:17

## 2021-08-05 ASSESSMENT — ENCOUNTER SYMPTOMS
BRUISES/BLEEDS EASILY: 0
LIGHT-HEADEDNESS: 1
DIZZINESS: 0
DIAPHORESIS: 1
CHOKING: 0
CHEST TIGHTNESS: 0
APPETITE CHANGE: 0
HEADACHES: 1
UNEXPECTED WEIGHT CHANGE: 0
CHILLS: 0
FACIAL SWELLING: 0
PALPITATIONS: 0
PSYCHIATRIC NEGATIVE: 1
CARDIOVASCULAR NEGATIVE: 1
ACTIVITY CHANGE: 1
ENDOCRINE NEGATIVE: 1
FATIGUE: 1
GASTROINTESTINAL NEGATIVE: 1
WOUND: 0
SHORTNESS OF BREATH: 0
MUSCULOSKELETAL NEGATIVE: 1
FEVER: 0
EYES NEGATIVE: 1
COUGH: 0
HEMATOLOGIC/LYMPHATIC NEGATIVE: 1
RESPIRATORY NEGATIVE: 1

## 2021-08-05 ASSESSMENT — MIFFLIN-ST. JEOR: SCORE: 1478.28

## 2021-08-05 NOTE — LETTER
[facility]  Emergency Room  [address] [place]   [address]  Tel: [phone]   Fax: [phone]  2021    Christina Santana  [address] [facility] [address]  [phone] (home) [phone] (work)    : 1980          To Whom it May Concern:    Christina Santana was seen in our ER today 2021. I expect her condition to improve over the next 1-2 days.  She may return to work, without restriction, when improved. If not improved during the above expected time period,  then I have encouraged her to be rechecked in her clinic for further evaluation.      Please contact me for questions or concerns.    Sincerely,      Eulogio Seymour, DO  Physician  [facility] Emergency Room

## 2021-08-05 NOTE — ED TRIAGE NOTES
Had dizziness, feeling of not being able to follow directions clearly.  Had a diaphoretic episode yesterday as well while at work and BP was high.  Did lay down and BP returned to normal.  Was sleeping a lot after work yesterday.  Today around 1100 had a repeat of symptoms from yesterday all over again.  Complains of headache

## 2021-08-05 NOTE — DISCHARGE INSTRUCTIONS
Please keep a diary of your blood pressure over the next several weeks and bring that into your clinic when you are seen for recheck exam.  See the note for work.    Please return should you develop fever, sudden weakness to arm or leg or face muscles, severe headache, or worsening dizziness.

## 2021-08-05 NOTE — ED PROVIDER NOTES
History     Chief Complaint   Patient presents with     Dizziness     HPI  Christina Santana is a 41 year old female who presented to the emergency room today secondary to concerns about not feeling quite right.  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  She states it seemed like it took a little longer to think things through.  Just after noting that she had an episode where she became sweaty and felt unbalanced.  Her coworkers checked her blood pressure and was elevated more than typical at 140/110.  They had her lay down and rest for approximately 15 to 20 minutes and her blood pressure gradually normalized.  She eventually returned to home but was more fatigued than usual and slept for an hour after getting home which is not typical for her.  She was able to sleep through the night and awoke feeling some improved but about noon today she had increased symptoms once again feeling off mentally and then slightly off balance.  She states that she also developed a headache to the posterior aspect of her head that radiates up into her temporal areas bilaterally with a throbbing sensation.  She refuses offer of pain medication at the time of exam.  Patient denies shortness of breath, cough, abdominal pain, bloody stools or dark stools, pain with urination or burning, skin rash or bruising.  Her  states that she had a chiropractic treatment this morning at about 9:00 as well.      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     Priority: Medium     Added automatically from request for surgery [id number]       RUQ pain 08/07/2020     Priority: Medium     Added automatically from request for surgery [id number]       Nausea 08/07/2020     Priority: Medium     Added  automatically from request for surgery [id number]       Calculus of gallbladder without cholecystitis without obstruction 08/07/2020     Priority: Medium     Added automatically from request for surgery [id number]       Cervicalgia 06/08/2020     Priority: Medium     Muscle spasm 06/08/2020     Priority: Medium     Sinus pressure 03/04/2020     Priority: Medium     Tension type headache 03/04/2020     Priority: Medium     Allergic rhinitis due to dust mite 03/04/2020     Priority: Medium     Seasonal allergic rhinitis due to pollen 03/04/2020     Priority: Medium     Allergic rhinitis due to animal dander 03/04/2020     Priority: Medium     Allergic rhinitis due to mold 03/04/2020     Priority: Medium     GERD with stricture 02/25/2020     Priority: Medium     Encounter for insertion of mirena IUD 09/13/2018     Priority: Medium     S/P LEEP (status post loop electrosurgical excision procedure) 04/22/2013     Priority: Medium     5/23/11 ASC-H  6/20/11 colp- no high grade lesion noted  8/10/12 ASC-H  4/22/13 pap HSIL  5/1/13 colp. BREEZY 2/3. Plan: LEEP  5/13/13 LEEP. BREEZY 2/3 with + margins  8/28/13 colp- WNL  12/27/13 pap ASCUS + HR HPV  1/29/14 colp- WNL  8/27/14 pap NIL.        BREEZY III (cervical intraepithelial neoplasia grade III) with severe dysplasia 01/01/2013     Priority: Medium     5/2011- ASC-H  6/2011- colposcopy- suspicious for HPV  8/2012- ASC-H  4/2013- HSIL  5/1/2013- colposcopy- BREEZY 2-3  5/13/2013 LEEP: BREEZY II-III, Positive Margins  8/2013- ECC- cervicitis  12/2013 ASCUS, HPV +  1/2014- colposcopy- negative  8/2014- NIL  7/2015- NIL/HPV negative   3/2017- NIL/HPV +  4/2017- colposcopy- suggestive of BREEZY I  4/2018- NIL/HPV negative  9/10/19 NIL/HPV negative     Plan: Pap/HPV due 09/2022    ASCCP recommends:  History of CIN2 - CIN3:  Pap and HPV every 3 years for 20 years.    Formatting of this note might be different from the original.  5/2011- ASC-H  6/2011- colposcopy- suspicious for HPV  8/2012-  ASC-H  4/2013- HSIL  5/1/2013- colposcopy- BREEZY 2-3  5/13/2013 LEEP: BREEZY II-III, Positive Margins  8/2013- ECC- cervicitis  12/2013 ASCUS, HPV +  1/2014- colposcopy- negative  8/2014- NIL  7/2015- NIL/HPV negative   3/2017- NIL/HPV +  4/2017- colposcopy- suggestive of BREEZY I  4/2018- NIL/HPV negative  9/10/19 NIL/HPV negative     Plan: Pap/HPV due 09/2022    ASCCP recommends:  History of CIN2 - CIN3:  Pap and HPV every 3 years for 20 years.       Astigmatism 10/03/2003     Priority: Medium     Myopia 10/03/2003     Priority: Medium        Past Medical History:    Past Medical History:   Diagnosis Date     ASCUS with positive high risk HPV 12/27/2013     HSIL (high grade squamous intraepithelial lesion) on Pap smear of cervix 04/22/2013     Pap smear of cervix with ASCUS, cannot exclude HGSIL 5/23/11, 8/10/12,      PMS2-related Reed syndrome (HNPCC4)        Past Surgical History:    Past Surgical History:   Procedure Laterality Date     APPENDECTOMY       AS REPAIR PARAESOPHAGEAL HIATAL HERNIA,LAPAROTOMY, W MESH       COLONOSCOPY N/A 6/10/2021    Procedure: COLONOSCOPY, WITH POLYPECTOMY;  Surgeon: Willi Galeas DO;  Location:  GI     LAPAROSCOPIC HYSTERECTOMY TOTAL, BILATERAL SALPINGO-OOPHORECTOMY, COMBINED N/A 2/24/2021    Procedure: HYSTERECTOMY, TOTAL, LAPAROSCOPIC, WITH SALPINGO-OOPHORECTOMY;  Surgeon: Dylon Alcantar MD;  Location:  OR       Family History:    Family History   Problem Relation Age of Onset     Bladder Cancer Maternal Grandfather 70        lived to be 94     Kidney Cancer Maternal Grandfather      Breast Cancer Paternal Grandmother         postmenopausal     Ovarian Cancer Maternal Aunt 40        possible uterine cancer also; hx of fibroids     Ovarian Cancer Maternal Aunt 40        possible uterine cancer also; hx of fibroids     Breast Cancer Paternal Aunt 54     Fibroids Mother        Social History:  Marital Status:   [2]  Social History     Tobacco Use     Smoking  "status: Never Smoker     Smokeless tobacco: Never Used   Substance Use Topics     Alcohol use: Yes     Comment: occassional     Drug use: No        Medications:    acetaminophen (TYLENOL) 160 MG/5ML elixir  estradiol (VIVELLE-DOT) 0.05 MG/24HR bi-weekly patch      Review of Systems   Constitutional: Positive for activity change, diaphoresis and fatigue. Negative for appetite change, chills, fever and unexpected weight change.   HENT: Negative for congestion and facial swelling.    Eyes: Negative.  Negative for visual disturbance.   Respiratory: Negative.  Negative for cough, choking, chest tightness and shortness of breath.    Cardiovascular: Negative.  Negative for chest pain, palpitations (She has a smart watch and stated that at no time it alarmed her but abnormal heart rate.  She did look at her heart rate at the time of dizziness symptoms and it read 97 bpm.) and leg swelling.   Gastrointestinal: Negative.    Endocrine: Negative.    Genitourinary: Negative.    Musculoskeletal: Negative.    Skin: Negative.  Negative for rash and wound.   Allergic/Immunologic: Negative for environmental allergies, food allergies and immunocompromised state.   Neurological: Positive for light-headedness and headaches. Negative for dizziness (Denies sense of spinning.).   Hematological: Negative.  Does not bruise/bleed easily.   Psychiatric/Behavioral: Negative.    All other systems reviewed and are negative.      Physical Exam   BP: (!) 122/91  Pulse: 86  Resp: 15  Height: 165.1 cm (5' 5\")  Weight: 80.7 kg (178 lb)      Physical Exam  Constitutional:       General: She is in acute distress.      Appearance: She is not ill-appearing, toxic-appearing or diaphoretic.   HENT:      Head: Normocephalic and atraumatic.      Right Ear: Tympanic membrane, ear canal and external ear normal.      Left Ear: Tympanic membrane, ear canal and external ear normal.      Nose: Nose normal.      Mouth/Throat:      Mouth: Mucous membranes are moist. "      Pharynx: No oropharyngeal exudate or posterior oropharyngeal erythema.   Eyes:      General: No scleral icterus.     Extraocular Movements: Extraocular movements intact.      Conjunctiva/sclera: Conjunctivae normal.      Pupils: Pupils are equal, round, and reactive to light.   Cardiovascular:      Rate and Rhythm: Normal rate and regular rhythm.      Pulses: Normal pulses.      Heart sounds: No murmur heard.     Pulmonary:      Effort: Pulmonary effort is normal. No respiratory distress.      Breath sounds: No wheezing, rhonchi or rales.   Chest:      Chest wall: No tenderness.   Abdominal:      Palpations: There is no mass.      Tenderness: There is no abdominal tenderness. There is no guarding or rebound.      Hernia: No hernia is present.   Musculoskeletal:         General: No swelling.      Cervical back: Normal range of motion.      Right lower leg: No edema.      Left lower leg: No edema.   Skin:     Capillary Refill: Capillary refill takes less than 2 seconds.      Findings: No rash.   Neurological:      General: No focal deficit present.      Mental Status: She is alert and oriented to person, place, and time.   Psychiatric:         Mood and Affect: Mood normal.         Thought Content: Thought content normal.         Judgment: Judgment normal.         ED Course     ED Course as of Aug 05 1718   Thu Aug 05, 2021   1620 Patient notified of the results of her screening blood tests.  No specific reason identified for symptomatology to this point.  I discussed that we had not done a CT of her head as yet and given her headache and elevated blood pressure felt that this would be part of the work-up.  She did agree to undergo the CT scan.  I asked if she wanted anything for her headache pain and she still did not want anything but then later agreed to some oral Tylenol.        Procedures              EKG Interpretation:      Interpreted by Eulogio Seymour DO  Time reviewed: 3:12 PM  Symptoms at time  of EKG: Off balance   Rhythm: normal sinus   Rate: normal  Axis: normal  Ectopy: none  Conduction: normal  ST Segments/ T Waves: No ST-T wave changes  Q Waves: none  Comparison to prior: No old EKG available    Clinical Impression: normal EKG      Critical Care time:  none               Results for orders placed or performed during the hospital encounter of 08/05/21 (from the past 24 hour(s))   CBC with platelets differential    Narrative    The following orders were created for panel order CBC with platelets differential.  Procedure                               Abnormality         Status                     ---------                               -----------         ------                     CBC with platelets and d...[[id number]]                      Final result                 Please view results for these tests on the individual orders.   Troponin I   Result Value Ref Range    Troponin I <0.015 0.000 - 0.045 ug/L   Comprehensive metabolic panel   Result Value Ref Range    Sodium 140 133 - 144 mmol/L    Potassium 3.8 3.4 - 5.3 mmol/L    Chloride 107 94 - 109 mmol/L    Carbon Dioxide (CO2) 28 20 - 32 mmol/L    Anion Gap 5 3 - 14 mmol/L    Urea Nitrogen 10 7 - 30 mg/dL    Creatinine 0.74 0.52 - 1.04 mg/dL    Calcium 9.0 8.5 - 10.1 mg/dL    Glucose 108 (H) 70 - 99 mg/dL    Alkaline Phosphatase 63 40 - 150 U/L    AST 17 0 - 45 U/L    ALT 32 0 - 50 U/L    Protein Total 7.0 6.8 - 8.8 g/dL    Albumin 3.7 3.4 - 5.0 g/dL    Bilirubin Total 0.2 0.2 - 1.3 mg/dL    GFR Estimate >90 >60 mL/min/1.73m2   Lipase   Result Value Ref Range    Lipase 212 73 - 393 U/L   CRP inflammation   Result Value Ref Range    CRP Inflammation <2.9 0.0 - 8.0 mg/L   CBC with platelets and differential   Result Value Ref Range    WBC Count 7.1 4.0 - 11.0 10e3/uL    RBC Count 4.35 3.80 - 5.20 10e6/uL    Hemoglobin 13.0 11.7 - 15.7 g/dL    Hematocrit 39.8 35.0 - 47.0 %    MCV 92 78 - 100 fL    MCH 29.9 26.5 - 33.0 pg    MCHC 32.7 31.5 - 36.5 g/dL     RDW 12.1 10.0 - 15.0 %    Platelet Count 209 150 - 450 10e3/uL    % Neutrophils 49 %    % Lymphocytes 39 %    % Monocytes 6 %    % Eosinophils 5 %    % Basophils 1 %    % Immature Granulocytes 0 %    NRBCs per 100 WBC 0 <1 /100    Absolute Neutrophils 3.4 1.6 - 8.3 10e3/uL    Absolute Lymphocytes 2.8 0.8 - 5.3 10e3/uL    Absolute Monocytes 0.5 0.0 - 1.3 10e3/uL    Absolute Eosinophils 0.4 0.0 - 0.7 10e3/uL    Absolute Basophils 0.0 0.0 - 0.2 10e3/uL    Absolute Immature Granulocytes 0.0 <=0.0 10e3/uL    Absolute NRBCs 0.0 10e3/uL   [place] Draw    Narrative    The following orders were created for panel order [place] Draw.  Procedure                               Abnormality         Status                     ---------                               -----------         ------                     Extra Red Top Tube[[id number]]                               Final result               Extra Green Top (Lithium...[[id number]]                                                 Extra Purple Top Tube[[id number]]                                                         Please view results for these tests on the individual orders.   Extra Red Top Tube   Result Value Ref Range    Hold Specimen JIC    [place] Draw    Narrative    The following orders were created for panel order [place] Draw.  Procedure                               Abnormality         Status                     ---------                               -----------         ------                     Extra Blue Top Tube[[id number]]                              Final result                 Please view results for these tests on the individual orders.   Extra Blue Top Tube   Result Value Ref Range    Hold Specimen JIC    Head CT w/o contrast    Narrative    CT SCAN OF THE HEAD WITHOUT CONTRAST   8/5/2021 4:42 PM     HISTORY: Headache. Right-sided numbness.    TECHNIQUE:  Axial images of the head and coronal reformations without  IV contrast material. Radiation dose for  this scan was reduced using  automated exposure control, adjustment of the mA and/or kV according  to patient size, or iterative reconstruction technique.    COMPARISON: Head CT 12/4/2016.    FINDINGS: There is no evidence of intracranial hemorrhage, mass, acute  infarct or anomaly. The ventricles are normal in size, shape and  configuration. The brain parenchyma and subarachnoid spaces are  normal.     The visualized portions of the sinuses and mastoids appear normal. The  bony calvarium and bones of the skull base appear intact.       Impression    IMPRESSION:   No evidence of acute intracranial hemorrhage, mass, or  herniation.      ZANE YORK MD         SYSTEM ID:  [id number]       Medications   0.9% sodium chloride BOLUS (0 mLs Intravenous Stopped 8/5/21 1716)     Followed by   sodium chloride 0.9% infusion (has no administration in time range)   acetaminophen (TYLENOL) tablet 1,000 mg (1,000 mg Oral Given 8/5/21 1618)       Assessments & Plan (with Medical Decision Making)  Patient to the ER with concerns about episode of diaphoresis and elevated blood pressure associated with a sense of being slightly off balance but denied any spinning type sensation dizziness.  Patient also with headache symptoms.  Exam findings as noted above.  Screening exams for reassuring and generally negative.  Patient given acetaminophen with improvement in her headache symptoms.  Blood pressure today was mildly elevated.  Encourage follow-up in her clinic.  Discussed keeping a diary of her blood pressure.  I did give her information about DASH diet and helping to control elevated blood pressure.  Also gave her information about dizziness sensation and asked her to read and follow instructions and to return for increase or worsening symptoms if needed.  And discharged in the care of her .  I also gave her a note for her employer.     I have reviewed the nursing notes.    I have reviewed the findings, diagnosis, plan and need  for follow up with the patient.       New Prescriptions    ACETAMINOPHEN (TYLENOL) 160 MG/5ML ELIXIR    Take 31.5 mLs (1,000 mg) by mouth every 6 hours as needed for pain            I verbally discussed the findings of the evaluation today in the ER. I have verbally discussed with Christina the suggested treatment(s) as described in the discharge instructions and handouts. I have prescribed the above listed medications and instructed her on appropriate use of these medications.      I have verbally suggested she follow-up in her clinic or return to the ER for increased symptoms. See the follow-up recommendations documented  in the after visit summary in this visit's EPIC chart.    Final diagnoses:   Elevated blood pressure reading without diagnosis of hypertension   Acute nonintractable headache, unspecified headache type       8/5/2021   [facility] EMERGENCY DEPT     Eulogio Seymour,   08/05/21 1474

## 2021-08-06 ENCOUNTER — TELEPHONE (OUTPATIENT)
Dept: INTERNAL MEDICINE | Facility: CLINIC | Age: 41
End: 2021-08-06

## 2021-08-06 NOTE — TELEPHONE ENCOUNTER
Reason for call:  Other   Patient called regarding (reason for call): appointment  Additional comments: ed follow up with DR Arellano with in the next week for elevated bp    Phone number to reach patient:  Cell number on file:    Telephone Information:   Mobile [phone]       Best Time:  anytime    Can we leave a detailed message on this number?  YES    Travel screening: Negative

## 2021-08-09 NOTE — TELEPHONE ENCOUNTER
Pt states 4:30 today will not work. She is wondering if there is a different time or day that would work. Tracee Whitehead, CMA

## 2021-08-10 ENCOUNTER — OFFICE VISIT (OUTPATIENT)
Dept: INTERNAL MEDICINE | Facility: CLINIC | Age: 41
End: 2021-08-10
Payer: COMMERCIAL

## 2021-08-10 VITALS
DIASTOLIC BLOOD PRESSURE: 84 MMHG | WEIGHT: 183 LBS | RESPIRATION RATE: 16 BRPM | HEART RATE: 112 BPM | BODY MASS INDEX: 30.45 KG/M2 | OXYGEN SATURATION: 100 % | TEMPERATURE: 98 F | SYSTOLIC BLOOD PRESSURE: 124 MMHG

## 2021-08-10 DIAGNOSIS — B34.9 VIRAL SYNDROME: Primary | ICD-10-CM

## 2021-08-10 DIAGNOSIS — R03.0 ELEVATED BLOOD PRESSURE READING WITHOUT DIAGNOSIS OF HYPERTENSION: ICD-10-CM

## 2021-08-10 PROCEDURE — 99213 OFFICE O/P EST LOW 20 MIN: CPT | Performed by: INTERNAL MEDICINE

## 2021-08-10 ASSESSMENT — PAIN SCALES - GENERAL: PAINLEVEL: SEVERE PAIN (6)

## 2021-08-10 NOTE — PROGRESS NOTES
"    Assessment & Plan   Problem List Items Addressed This Visit     None      Visit Diagnoses     Viral syndrome    -  Primary    Elevated blood pressure reading without diagnosis of hypertension             Patient who had some atypical symptoms of slight dizziness, not feeling well may be a little confusion.  Probably had a viral syndrome last week was seen in the ER labs were normal, head CT was normal.  She had some elevated blood pressure but does not have hypertension.  Her blood pressure is normal today.  We reviewed her labs, probably a viral cause of this that she is recovered from patient is asymptomatic no further work-up needed.           BMI:   Estimated body mass index is 30.45 kg/m  as calculated from the following:    Height as of 8/5/21: 1.651 m (5' 5\").    Weight as of this encounter: 83 kg (183 lb).           No follow-ups on file.    Alphonse Arellano MD  [facility]    Raghav Vasquez is a 41 year old who presents for the following health issues     HPI     ED/UC Followup:    Facility:  [facility]  Date of visit: 8/5/21  Reason for visit: Elevated BP   Current Status: Follow up     Had a mild headache, felt off balance, felt off in some ways. bp was 145/110, felt faint.  Slept more that night, didn't work on Thursday, felt lightheaded and then anxious.  Then went to the ER Thursday afternoon.  Labs and head ct were ok.      No more dizziness since then. Yesterday felt a little anxious in her chest, bp was 125/94, no more headaches.     No fever, no cough.      Past Medical History:   Diagnosis Date     ASCUS with positive high risk HPV 12/27/2013     HSIL (high grade squamous intraepithelial lesion) on Pap smear of cervix 04/22/2013     Pap smear of cervix with ASCUS, cannot exclude HGSIL 5/23/11, 8/10/12,      PMS2-related Reed syndrome (HNPCC4)      No current outpatient medications on file.     No current facility-administered medications for this visit.     Social " History     Tobacco Use     Smoking status: Never Smoker     Smokeless tobacco: Never Used   Substance Use Topics     Alcohol use: Yes     Comment: occassional     Drug use: No         Review of Systems         Objective    /84   Pulse 112   Temp 98  F (36.7  C) (Temporal)   Resp 16   Wt 83 kg (183 lb)   LMP 02/03/2021   SpO2 100%   BMI 30.45 kg/m    Body mass index is 30.45 kg/m .  Physical Exam   No acute distress  Ears are clear  Neck is supple  Heart is regular  Lungs are clear  Negative [place] maneuvers today  Gait is stable.

## 2021-09-16 ENCOUNTER — HOSPITAL ENCOUNTER (OUTPATIENT)
Dept: MAMMOGRAPHY | Facility: CLINIC | Age: 41
Discharge: HOME | End: 2021-09-16
Attending: OBSTETRICS & GYNECOLOGY | Admitting: OBSTETRICS & GYNECOLOGY
Payer: COMMERCIAL

## 2021-09-16 DIAGNOSIS — R92.8 BI-RADS CATEGORY 3 MAMMOGRAM RESULT: ICD-10-CM

## 2021-09-16 PROCEDURE — 77061 BREAST TOMOSYNTHESIS UNI: CPT | Mod: LT

## 2021-09-20 ENCOUNTER — HOSPITAL ENCOUNTER (OUTPATIENT)
Dept: PHYSICAL THERAPY | Facility: CLINIC | Age: 41
Setting detail: THERAPIES SERIES
End: 2021-09-20
Attending: INTERNAL MEDICINE
Payer: COMMERCIAL

## 2021-09-20 PROCEDURE — 97530 THERAPEUTIC ACTIVITIES: CPT | Mod: GP | Performed by: PHYSICAL THERAPIST

## 2021-09-20 PROCEDURE — 97162 PT EVAL MOD COMPLEX 30 MIN: CPT | Mod: GP | Performed by: PHYSICAL THERAPIST
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
## 2021-09-29 ENCOUNTER — OFFICE VISIT (OUTPATIENT)
Dept: ORTHOPEDICS | Facility: CLINIC | Age: 41
End: 2021-09-29
Payer: COMMERCIAL

## 2021-09-29 ENCOUNTER — ANCILLARY PROCEDURE (OUTPATIENT)
Dept: GENERAL RADIOLOGY | Facility: CLINIC | Age: 41
End: 2021-09-29
Attending: ORTHOPAEDIC SURGERY
Payer: COMMERCIAL

## 2021-09-29 VITALS
DIASTOLIC BLOOD PRESSURE: 90 MMHG | HEIGHT: 65 IN | WEIGHT: 183 LBS | SYSTOLIC BLOOD PRESSURE: 120 MMHG | BODY MASS INDEX: 30.49 KG/M2

## 2021-09-29 DIAGNOSIS — M54.16 LUMBAR RADICULOPATHY: ICD-10-CM

## 2021-09-29 DIAGNOSIS — M25.552 LEFT HIP PAIN: ICD-10-CM

## 2021-09-29 DIAGNOSIS — Z11.59 ENCOUNTER FOR SCREENING FOR OTHER VIRAL DISEASES: ICD-10-CM

## 2021-09-29 DIAGNOSIS — M25.552 LEFT HIP PAIN: Primary | ICD-10-CM

## 2021-09-29 PROCEDURE — 99204 OFFICE O/P NEW MOD 45 MIN: CPT | Performed by: ORTHOPAEDIC SURGERY

## 2021-09-29 PROCEDURE — 73502 X-RAY EXAM HIP UNI 2-3 VIEWS: CPT | Mod: TC | Performed by: RADIOLOGY

## 2021-09-29 ASSESSMENT — PAIN SCALES - GENERAL: PAINLEVEL: EXTREME PAIN (8)

## 2021-09-29 ASSESSMENT — MIFFLIN-ST. JEOR: SCORE: 1500.96

## 2021-09-29 NOTE — LETTER
9/29/2021         RE: Christina Santana  [address] [facility] [address]        Dear Colleague,    Thank you for referring your patient, Christina Santana, to the [facility]. Please see a copy of my visit note below.    ORTHOPEDIC CONSULT      Chief Complaint: Christina Santana is a 41 year old female who is being seen for   Chief Complaint   Patient presents with     Musculoskeletal Problem     left hip pain     Consult       History of Present Illness:   Presents with left lateral hip pain.  However she reports at times it will radiate down her leg associate with numbness and tingling into her toes.  She has had pain for many years however last few months has been significantly exacerbated.  No specific injuries or trauma started it.  She stays active with running.  At times it hurts to sit other times it hurts the same and other times it hurts to lay.  She is attempted some acupuncture some chiropractic treatments.  She is on her second physical therapy appointment tomorrow.  At times the leg feels weak.        Patient's past medical, surgical, social and family histories reviewed.     Past Medical History:   Diagnosis Date     ASCUS with positive high risk HPV 12/27/2013     HSIL (high grade squamous intraepithelial lesion) on Pap smear of cervix 04/22/2013     Pap smear of cervix with ASCUS, cannot exclude HGSIL 5/23/11, 8/10/12,      PMS2-related Reed syndrome (HNPCC4)        Past Surgical History:   Procedure Laterality Date     APPENDECTOMY       AS REPAIR PARAESOPHAGEAL HIATAL HERNIA,LAPAROTOMY, W MESH       COLONOSCOPY N/A 6/10/2021    Procedure: COLONOSCOPY, WITH POLYPECTOMY;  Surgeon: Willi Galeas DO;  Location:  GI     LAPAROSCOPIC HYSTERECTOMY TOTAL, BILATERAL SALPINGO-OOPHORECTOMY, COMBINED N/A 2/24/2021    Procedure: HYSTERECTOMY, TOTAL, LAPAROSCOPIC, WITH SALPINGO-OOPHORECTOMY;  Surgeon: Dylon Alcantar MD;  Location:  OR       Medications:  No  "current outpatient medications on file prior to visit.  No current facility-administered medications on file prior to visit.      Allergies   Allergen Reactions     Latex Shortness Of Breath, Swelling and Rash       Social History     Occupational History     Not on file   Tobacco Use     Smoking status: Never Smoker     Smokeless tobacco: Never Used   Substance and Sexual Activity     Alcohol use: Yes     Comment: occassional     Drug use: No     Sexual activity: Yes     Partners: Male     Birth control/protection: Male Surgical, Female Surgical     Comment: partner vas       Family History   Problem Relation Age of Onset     Bladder Cancer Maternal Grandfather 70        lived to be 94     Kidney Cancer Maternal Grandfather      Breast Cancer Paternal Grandmother         postmenopausal     Ovarian Cancer Maternal Aunt 40        possible uterine cancer also; hx of fibroids     Ovarian Cancer Maternal Aunt 40        possible uterine cancer also; hx of fibroids     Breast Cancer Paternal Aunt 54     Fibroids Mother        REVIEW OF SYSTEMS  10 point review systems performed otherwise negative as noted as per history of present illness.    Physical Exam:  Vitals: BP (!) 120/90   Ht 1.651 m (5' 5\")   Wt 83 kg (183 lb)   LMP 02/03/2021   BMI 30.45 kg/m    BMI= Body mass index is 30.45 kg/m .  Constitutional: healthy, alert and no acute distress   Psychiatric: mentation appears normal and affect normal/bright  NEURO: no focal deficits  RESP: Normal with easy respirations and no use of accessory muscles to breathe, no audible wheezing or retractions  CV: LLE:  no edema         Regular rate and rhythm by palpation  SKIN: No erythema, rashes, excoriation, or breakdown. No evidence of infection.   JOINT/EXTREMITIES:left leg: Shows no gross deformity no focal atrophy.  He has no focal weakness with knee flexion extension or dorsiflexion plantarflexion of foot.  She does have some weakness with hip flexion and decreased " range of motion when compared to the contralateral side.  No pain with the majority of passive testing of the hip other than with deep hip flexion internal rotation in the supine position.  2+ dorsalis pedis pulse.  2+ DTRs to the patella and Achilles.     GAIT: not tested     Diagnostic Modalities:  left hip X-ray: No fracture, dislocation and or lesion. Normal alignment.  Joint space maintained no significant arthritis. No appreciable soft tissue abnormality.   Independent visualization of the images was performed.      Impression: left chronic hip pain-included the differential is labral pathology  Left-sided lumbar radiculopathy    Plan:  All of the above pertinent physical exam and imaging modalities findings was reviewed with Christina.    I reviewed her findings.  She is now had progressive pain for years much worse over the last few months.  It is impacting all activities including work.  It wakes her at night.  Associate with some the symptoms as numbness and tingling down to the toes.  I recommend an MRI of the lumbar spine given the symptoms being progressive neurologic findings.  Also recommend a left hip MRI arthrogram to evaluate the labrum given her pain with testing as well as preserved normal radiographs      Return to clinic to discuss test results, or sooner as needed for changes.  Re-x-ray on return: No    MICHELLE Bay to follow up with Primary Care provider regarding elevated blood pressure.  Stevenson/ARIELLE       Again, thank you for allowing me to participate in the care of your patient.        Sincerely,        Dany Fish, DO

## 2021-09-29 NOTE — PROGRESS NOTES
Christina to follow up with Primary Care provider regarding elevated blood pressure.  Stevenson/ARIELLE

## 2021-09-29 NOTE — PROGRESS NOTES
ORTHOPEDIC CONSULT      Chief Complaint: Christina Santana is a 41 year old female who is being seen for   Chief Complaint   Patient presents with     Musculoskeletal Problem     left hip pain     Consult       History of Present Illness:   Presents with left lateral hip pain.  However she reports at times it will radiate down her leg associate with numbness and tingling into her toes.  She has had pain for many years however last few months has been significantly exacerbated.  No specific injuries or trauma started it.  She stays active with running.  At times it hurts to sit other times it hurts the same and other times it hurts to lay.  She is attempted some acupuncture some chiropractic treatments.  She is on her second physical therapy appointment tomorrow.  At times the leg feels weak.        Patient's past medical, surgical, social and family histories reviewed.     Past Medical History:   Diagnosis Date     ASCUS with positive high risk HPV 12/27/2013     HSIL (high grade squamous intraepithelial lesion) on Pap smear of cervix 04/22/2013     Pap smear of cervix with ASCUS, cannot exclude HGSIL 5/23/11, 8/10/12,      PMS2-related Reed syndrome (HNPCC4)        Past Surgical History:   Procedure Laterality Date     APPENDECTOMY       AS REPAIR PARAESOPHAGEAL HIATAL HERNIA,LAPAROTOMY, W MESH       COLONOSCOPY N/A 6/10/2021    Procedure: COLONOSCOPY, WITH POLYPECTOMY;  Surgeon: Willi Galeas DO;  Location:  GI     LAPAROSCOPIC HYSTERECTOMY TOTAL, BILATERAL SALPINGO-OOPHORECTOMY, COMBINED N/A 2/24/2021    Procedure: HYSTERECTOMY, TOTAL, LAPAROSCOPIC, WITH SALPINGO-OOPHORECTOMY;  Surgeon: Dylon Alcantar MD;  Location:  OR       Medications:  No current outpatient medications on file prior to visit.  No current facility-administered medications on file prior to visit.      Allergies   Allergen Reactions     Latex Shortness Of Breath, Swelling and Rash       Social History     Occupational  "History     Not on file   Tobacco Use     Smoking status: Never Smoker     Smokeless tobacco: Never Used   Substance and Sexual Activity     Alcohol use: Yes     Comment: occassional     Drug use: No     Sexual activity: Yes     Partners: Male     Birth control/protection: Male Surgical, Female Surgical     Comment: partner vas       Family History   Problem Relation Age of Onset     Bladder Cancer Maternal Grandfather 70        lived to be 94     Kidney Cancer Maternal Grandfather      Breast Cancer Paternal Grandmother         postmenopausal     Ovarian Cancer Maternal Aunt 40        possible uterine cancer also; hx of fibroids     Ovarian Cancer Maternal Aunt 40        possible uterine cancer also; hx of fibroids     Breast Cancer Paternal Aunt 54     Fibroids Mother        REVIEW OF SYSTEMS  10 point review systems performed otherwise negative as noted as per history of present illness.    Physical Exam:  Vitals: BP (!) 120/90   Ht 1.651 m (5' 5\")   Wt 83 kg (183 lb)   LMP 02/03/2021   BMI 30.45 kg/m    BMI= Body mass index is 30.45 kg/m .  Constitutional: healthy, alert and no acute distress   Psychiatric: mentation appears normal and affect normal/bright  NEURO: no focal deficits  RESP: Normal with easy respirations and no use of accessory muscles to breathe, no audible wheezing or retractions  CV: LLE:  no edema         Regular rate and rhythm by palpation  SKIN: No erythema, rashes, excoriation, or breakdown. No evidence of infection.   JOINT/EXTREMITIES:left leg: Shows no gross deformity no focal atrophy.  He has no focal weakness with knee flexion extension or dorsiflexion plantarflexion of foot.  She does have some weakness with hip flexion and decreased range of motion when compared to the contralateral side.  No pain with the majority of passive testing of the hip other than with deep hip flexion internal rotation in the supine position.  2+ dorsalis pedis pulse.  2+ DTRs to the patella and " Achilles.     GAIT: not tested     Diagnostic Modalities:  left hip X-ray: No fracture, dislocation and or lesion. Normal alignment.  Joint space maintained no significant arthritis. No appreciable soft tissue abnormality.   Independent visualization of the images was performed.      Impression: left chronic hip pain-included the differential is labral pathology  Left-sided lumbar radiculopathy    Plan:  All of the above pertinent physical exam and imaging modalities findings was reviewed with Christina.    I reviewed her findings.  She is now had progressive pain for years much worse over the last few months.  It is impacting all activities including work.  It wakes her at night.  Associate with some the symptoms as numbness and tingling down to the toes.  I recommend an MRI of the lumbar spine given the symptoms being progressive neurologic findings.  Also recommend a left hip MRI arthrogram to evaluate the labrum given her pain with testing as well as preserved normal radiographs      Return to clinic to discuss test results, or sooner as needed for changes.  Re-x-ray on return: No    Erick Fish D.O.

## 2021-09-30 ENCOUNTER — HOSPITAL ENCOUNTER (OUTPATIENT)
Dept: PHYSICAL THERAPY | Facility: CLINIC | Age: 41
Setting detail: THERAPIES SERIES
End: 2021-09-30
Attending: INTERNAL MEDICINE
Payer: COMMERCIAL

## 2021-09-30 PROCEDURE — 97110 THERAPEUTIC EXERCISES: CPT | Mod: GP | Performed by: PHYSICAL THERAPIST

## 2021-10-05 ENCOUNTER — LAB (OUTPATIENT)
Dept: LAB | Facility: CLINIC | Age: 41
End: 2021-10-05
Payer: COMMERCIAL

## 2021-10-05 DIAGNOSIS — Z11.59 ENCOUNTER FOR SCREENING FOR OTHER VIRAL DISEASES: ICD-10-CM

## 2021-10-05 PROCEDURE — U0003 INFECTIOUS AGENT DETECTION BY NUCLEIC ACID (DNA OR RNA); SEVERE ACUTE RESPIRATORY SYNDROME CORONAVIRUS 2 (SARS-COV-2) (CORONAVIRUS DISEASE [COVID-19]), AMPLIFIED PROBE TECHNIQUE, MAKING USE OF HIGH THROUGHPUT TECHNOLOGIES AS DESCRIBED BY CMS-2020-01-R: HCPCS

## 2021-10-05 PROCEDURE — U0005 INFEC AGEN DETEC AMPLI PROBE: HCPCS

## 2021-10-06 LAB — SARS-COV-2 RNA RESP QL NAA+PROBE: NEGATIVE

## 2021-10-08 ENCOUNTER — HOSPITAL ENCOUNTER (OUTPATIENT)
Dept: MRI IMAGING | Facility: CLINIC | Age: 41
End: 2021-10-08
Attending: ORTHOPAEDIC SURGERY
Payer: COMMERCIAL

## 2021-10-08 ENCOUNTER — TELEPHONE (OUTPATIENT)
Dept: OBGYN | Facility: CLINIC | Age: 41
End: 2021-10-08

## 2021-10-08 ENCOUNTER — OFFICE VISIT (OUTPATIENT)
Dept: OBGYN | Facility: CLINIC | Age: 41
End: 2021-10-08
Payer: COMMERCIAL

## 2021-10-08 ENCOUNTER — PREP FOR PROCEDURE (OUTPATIENT)
Dept: OBGYN | Facility: CLINIC | Age: 41
End: 2021-10-08

## 2021-10-08 ENCOUNTER — HOSPITAL ENCOUNTER (OUTPATIENT)
Dept: PHYSICAL THERAPY | Facility: CLINIC | Age: 41
Setting detail: THERAPIES SERIES
End: 2021-10-08
Attending: INTERNAL MEDICINE
Payer: COMMERCIAL

## 2021-10-08 VITALS
HEART RATE: 96 BPM | WEIGHT: 182.6 LBS | BODY MASS INDEX: 30.39 KG/M2 | DIASTOLIC BLOOD PRESSURE: 74 MMHG | SYSTOLIC BLOOD PRESSURE: 110 MMHG

## 2021-10-08 DIAGNOSIS — Z11.59 ENCOUNTER FOR SCREENING FOR OTHER VIRAL DISEASES: ICD-10-CM

## 2021-10-08 DIAGNOSIS — N89.8 VAGINAL GRANULATION TISSUE: ICD-10-CM

## 2021-10-08 DIAGNOSIS — Z15.09 LYNCH SYNDROME: ICD-10-CM

## 2021-10-08 DIAGNOSIS — Z90.710 S/P HYSTERECTOMY: ICD-10-CM

## 2021-10-08 DIAGNOSIS — N93.9 VAGINAL BLEEDING: Primary | ICD-10-CM

## 2021-10-08 DIAGNOSIS — Z90.710 HISTORY OF HYSTERECTOMY: ICD-10-CM

## 2021-10-08 DIAGNOSIS — M54.16 LUMBAR RADICULOPATHY: ICD-10-CM

## 2021-10-08 PROCEDURE — 88175 CYTOPATH C/V AUTO FLUID REDO: CPT | Performed by: OBSTETRICS & GYNECOLOGY

## 2021-10-08 PROCEDURE — 87624 HPV HI-RISK TYP POOLED RSLT: CPT | Performed by: OBSTETRICS & GYNECOLOGY

## 2021-10-08 PROCEDURE — 97140 MANUAL THERAPY 1/> REGIONS: CPT | Mod: GP | Performed by: PHYSICAL THERAPIST

## 2021-10-08 PROCEDURE — 72148 MRI LUMBAR SPINE W/O DYE: CPT

## 2021-10-08 PROCEDURE — 57452 EXAM OF CERVIX W/SCOPE: CPT | Performed by: OBSTETRICS & GYNECOLOGY

## 2021-10-08 PROCEDURE — 97110 THERAPEUTIC EXERCISES: CPT | Mod: GP | Performed by: PHYSICAL THERAPIST

## 2021-10-08 NOTE — PROGRESS NOTES
Subjective  Christina Santana is a 41 year old female here for a pap and colposcopy.  Patient s/p TLH/BSO for Reed syndrome, history BREEZY 2-3.    Pap smear history:   2/2021: TLH/BSO, Reed Syndrome.   4/18, 9/19: NIL, HPV-   3/17: NIL, HPV pos. [place] BREEZY 1  7/15: NIL, HPV neg  8/14: NIL  5/13: LEEP BREEZY 2-3  4/13: HSIL, HR HPV    Patient's last menstrual period was 02/03/2021.  I discussed the history of HPV and the risk of dysplasia/cancer.  I explained the indications for the colposcopy and the procedure in detail.  I discussed the potential risks including bleeding, infection and cramping. I have recommend abstinence for 1 week and no vigorous activity for 48 hours.  Consent form was signed by patient and all questions were answered.    OBJECTIVE:  Vitals:    10/08/21 1246   BP: 110/74   Pulse: 96   Weight: 82.8 kg (182 lb 9.6 oz)      Patient alert, oriented, and in no acute distress. She was placed on the exam table in the dorsal position and a sterile speculum inserted into the vagina. The cervix was surgically absent.  Acetic acid was applied.  Lugol's solution was then applied. Colposcopy was performed in the usual fashion.  One biopsy taken from the attempted after injectiong of 5cc lidocaine with epi, however, secondary to patient discomfort, was unable to be completed. Given discomfort, procedure stopped. See procedure note and exam for further details.    ASSESSMENT:    Pap and colposcopy of the vagina with no biopsy for vaginal bleeding after hysterectomy, hx BREEZY III    PLAN:   Given discomfort during exam and biopsy attempt despite lidocaine, plan to perform EUA and biopsy under anesthesia in the OR.   Details of the procedure were discussed with the patient.  Risks include, but are not limited to, bleeding, infection, and injury to surrounding organs such as the bowel, urinary system, nerves, and blood vessels.  Injury may result in repair at the time of the surgery or in a separate procedure.  All  questions answered, and accepting these risks, the patient elects to proceed with the procedure.     Discussed symptoms to watch for, including bleeding through 1 pad or more per hour, heavy cramps or abdominal pain, fever, or purulent foul smelling discharge.  If these occur, then she will call or return for follow up.    Leyda Guzman DO    Physical Exam   GYN: Colposcopy/LEEP    Date/Time: 10/8/2021 12:47 PM  Performed by: Leyda Guzman DO  Authorized by: Leyda Guzman DO     Consent:     Consent obtained:  Written    Consent given by:  Patient    Procedural risks discussed:  Bleeding, damage to other organs, repeat procedure and infection    Patient questions answered: yes      Patient agrees, verbalizes understanding, and wants to proceed: yes      Educational handouts given: yes      Instructions and paperwork completed: yes    Universal protocol:     Patient states understanding of procedure being performed: yes      Relevant documents present and verified: yes      Test results available and properly labeled: yes      Required blood products, implants, devices, and special equipment available: yes    Pre-procedure:     Pre-procedure timeout performed: yes      Prepped with: acetic acid and Lugol    Indication:     [place] indications: vaginal bleeding, history hysterectomy and BREEZY 2-3.  Procedure:     Procedure: Colposcopy only      Under satisfactory analgesia the patient was prepped and draped in the dorsal lithotomy position: yes      [place] speculum was placed in the vagina: yes      Biopsy(s): yes      Specimen to pathology: yes    Post-procedure:     Findings comment:  Granulation-appearing tissue    Impression comment:  Granulation tissue present across vaginal cuff    Patient tolerance of procedure:  Patient tolerated the procedure well with no immediate complications

## 2021-10-12 LAB
BKR LAB AP GYN ADEQUACY: NORMAL
BKR LAB AP GYN INTERPRETATION: NORMAL
BKR LAB AP HPV REFLEX: NORMAL
BKR LAB AP PREVIOUS ABNL DX: NORMAL
BKR LAB AP PREVIOUS ABNORMAL: NORMAL
PATH REPORT.COMMENTS IMP SPEC: NORMAL
PATH REPORT.RELEVANT HX SPEC: NORMAL

## 2021-10-14 ENCOUNTER — HOSPITAL ENCOUNTER (OUTPATIENT)
Dept: PHYSICAL THERAPY | Facility: CLINIC | Age: 41
Setting detail: THERAPIES SERIES
End: 2021-10-14
Attending: INTERNAL MEDICINE
Payer: COMMERCIAL

## 2021-10-14 LAB
HUMAN PAPILLOMA VIRUS 16 DNA: NEGATIVE
HUMAN PAPILLOMA VIRUS 18 DNA: NEGATIVE
HUMAN PAPILLOMA VIRUS FINAL DIAGNOSIS: NORMAL
HUMAN PAPILLOMA VIRUS OTHER HR: NEGATIVE

## 2021-10-14 PROCEDURE — 97140 MANUAL THERAPY 1/> REGIONS: CPT | Mod: GP | Performed by: PHYSICAL THERAPIST

## 2021-10-15 ENCOUNTER — PATIENT OUTREACH (OUTPATIENT)
Dept: OBGYN | Facility: CLINIC | Age: 41
End: 2021-10-15
Payer: COMMERCIAL

## 2021-10-15 NOTE — TELEPHONE ENCOUNTER
10/8/21 NIL pap Neg HPV of vaginal cuff. Colpo completed for vaginal bleeding. Plan: Given discomfort during exam and biopsy attempt despite lidocaine, plan to perform EUA and biopsy under anesthesia in the OR.  12/10/21 appt

## 2021-10-15 NOTE — RESULT ENCOUNTER NOTE
Cc'd to provider as FYI only due to hx.  No response needed unless prefer a change in plan.    Normal vaginal pap/Neg HPV letter sent through [employer] results. Plan for follow up appt 12/10/21 for MARIELA hinds biopsy   Anca Kunz, RN, BSN  Pap Tracking Nurse

## 2021-10-20 ENCOUNTER — HOSPITAL ENCOUNTER (OUTPATIENT)
Dept: PHYSICAL THERAPY | Facility: CLINIC | Age: 41
Setting detail: THERAPIES SERIES
End: 2021-10-20
Attending: INTERNAL MEDICINE
Payer: COMMERCIAL

## 2021-10-20 ENCOUNTER — TELEPHONE (OUTPATIENT)
Dept: ORTHOPEDICS | Facility: CLINIC | Age: 41
End: 2021-10-20

## 2021-10-20 DIAGNOSIS — M25.552 LEFT HIP PAIN: Primary | ICD-10-CM

## 2021-10-20 PROCEDURE — 97140 MANUAL THERAPY 1/> REGIONS: CPT | Mod: GP | Performed by: PHYSICAL THERAPIST

## 2021-10-21 ENCOUNTER — HOSPITAL ENCOUNTER (OUTPATIENT)
Dept: MRI IMAGING | Facility: CLINIC | Age: 41
End: 2021-10-21
Attending: ORTHOPAEDIC SURGERY
Payer: COMMERCIAL

## 2021-10-21 ENCOUNTER — HOSPITAL ENCOUNTER (OUTPATIENT)
Dept: GENERAL RADIOLOGY | Facility: CLINIC | Age: 41
End: 2021-10-21
Attending: ORTHOPAEDIC SURGERY
Payer: COMMERCIAL

## 2021-10-21 DIAGNOSIS — M25.552 LEFT HIP PAIN: ICD-10-CM

## 2021-10-21 PROCEDURE — 77002 NEEDLE LOCALIZATION BY XRAY: CPT

## 2021-10-21 PROCEDURE — 27093 INJECTION FOR HIP X-RAY: CPT | Mod: LT

## 2021-10-21 PROCEDURE — 255N000002 HC RX 255 OP 636: Performed by: ORTHOPAEDIC SURGERY

## 2021-10-21 PROCEDURE — 73722 MRI JOINT OF LWR EXTR W/DYE: CPT | Mod: LT

## 2021-10-21 PROCEDURE — A9585 GADOBUTROL INJECTION: HCPCS | Performed by: ORTHOPAEDIC SURGERY

## 2021-10-21 PROCEDURE — 250N000009 HC RX 250: Performed by: ORTHOPAEDIC SURGERY

## 2021-10-21 RX ORDER — GADOBUTROL 604.72 MG/ML
0.1 INJECTION INTRAVENOUS ONCE
Status: COMPLETED | OUTPATIENT
Start: 2021-10-21 | End: 2021-10-21

## 2021-10-21 RX ORDER — LIDOCAINE HYDROCHLORIDE 10 MG/ML
5 INJECTION, SOLUTION EPIDURAL; INFILTRATION; INTRACAUDAL; PERINEURAL ONCE
Status: COMPLETED | OUTPATIENT
Start: 2021-10-21 | End: 2021-10-21

## 2021-10-21 RX ORDER — IOPAMIDOL 510 MG/ML
100 INJECTION, SOLUTION INTRAVASCULAR ONCE
Status: COMPLETED | OUTPATIENT
Start: 2021-10-21 | End: 2021-10-21

## 2021-10-21 RX ADMIN — IOPAMIDOL 6 ML: 510 INJECTION, SOLUTION INTRAVASCULAR at 08:27

## 2021-10-21 RX ADMIN — GADOBUTROL 0.06 ML: 604.72 INJECTION INTRAVENOUS at 08:27

## 2021-10-21 RX ADMIN — LIDOCAINE HYDROCHLORIDE 2 ML: 10 INJECTION, SOLUTION INFILTRATION; PERINEURAL at 08:27

## 2021-10-22 NOTE — TELEPHONE ENCOUNTER
Associated Diagnoses    Vaginal bleeding [N93.9]  - Primary       History of hysterectomy [Z90.710]       Vaginal granulation tissue [N89.8]           Source Order Set    Order Set Name Order ID    [id number]     Case Request: Case Info      Panel 1      Providers    Provider Role Service   Leyda Guzman DO Primary Obstetrics     Procedures    Procedure Laterality Anesthesia Region   EXAM UNDER ANESTHESIA, PELVIS Vaginal cuff biopsy N/A Monitor Anesthesia Care    Requested date:    Location:  OR   Patient class: Same Day Surgery      Pre-op diagnoses:  Vaginal bleeding    History of hysterectomy    Vaginal granulation tissue     Scheduling Instructions    Additional Instructions for the Case   Surgical Assistant: No   Multi Surgeon Case No   H&P:  Pre-op options: PCP   Post-op:  NA   Vendor: No   Surgical time needed: 20 Minutes   ERAS patient: No     SURGERY SCHEDULING AND PRECERTIFICATION    Medical Record Number: [id number]  Christina Santana  YOB: 1980   Phone: [phone] (home) [phone] (work)  Primary Provider: Alphonse Arellano    Reason for Admit:  ICD-10 CODE:   Pre-op diagnoses:  Vaginal bleeding    History of hysterectomy    Vaginal granulation tissue     Surgeon: Leyda Guzman DO  Surgical Procedure:   Procedure   EXAM UNDER ANESTHESIA, PELVIS Vaginal cuff biopsy     Date of Surgery 12/10 Time of Surgery 10:00 a.m.  Surgery to be performed at:  [facility]   Status: Outpatient  Type of Anesthesia Anticipated: MAC    Sterilization consent:  Not applicable to procedure being performed.    Pre-Op: On 12/6 with Dr Alphonse Arellano  at 7:45 a.m. [facility]  COVID testing:  [facility] 7:30 12/6  Post-Op:  NA    Pre-certification routed to [employer]:  Auto routes via Case Request    Surgery packet mailed to patient's home address: Yes  Patient instructed NPO 12 hours prior to surgery, arrival time prior to surgery per the nurse when she calls), must have a  .  Patient understood and agrees to the plan.      Requestor:  Feli Hilton     Location:  [place] [phone]

## 2021-10-23 ENCOUNTER — HEALTH MAINTENANCE LETTER (OUTPATIENT)
Age: 41
End: 2021-10-23

## 2021-10-27 ENCOUNTER — TELEPHONE (OUTPATIENT)
Dept: ORTHOPEDICS | Facility: OTHER | Age: 41
End: 2021-10-27

## 2021-10-27 DIAGNOSIS — M16.12 PRIMARY OSTEOARTHRITIS OF LEFT HIP: Primary | ICD-10-CM

## 2021-10-27 NOTE — TELEPHONE ENCOUNTER
Per Dr. Polina torres to order left hip intra articular steroid injection.    Please sign the order.    Pt notified via phone of how to schedule this.     Stevenson/ARIELLE

## 2021-10-28 ENCOUNTER — HOSPITAL ENCOUNTER (OUTPATIENT)
Dept: PHYSICAL THERAPY | Facility: CLINIC | Age: 41
Setting detail: THERAPIES SERIES
End: 2021-10-28
Attending: INTERNAL MEDICINE
Payer: COMMERCIAL

## 2021-10-28 DIAGNOSIS — Z11.59 ENCOUNTER FOR SCREENING FOR OTHER VIRAL DISEASES: ICD-10-CM

## 2021-10-28 PROCEDURE — 97140 MANUAL THERAPY 1/> REGIONS: CPT | Mod: GP | Performed by: PHYSICAL THERAPIST

## 2021-10-28 PROCEDURE — 97110 THERAPEUTIC EXERCISES: CPT | Mod: GP | Performed by: PHYSICAL THERAPIST

## 2021-11-05 ENCOUNTER — HOSPITAL ENCOUNTER (OUTPATIENT)
Dept: PHYSICAL THERAPY | Facility: CLINIC | Age: 41
Setting detail: THERAPIES SERIES
End: 2021-11-05
Attending: INTERNAL MEDICINE
Payer: COMMERCIAL

## 2021-11-05 PROCEDURE — 97140 MANUAL THERAPY 1/> REGIONS: CPT | Mod: GP | Performed by: PHYSICAL THERAPIST

## 2021-11-09 NOTE — PROGRESS NOTES
[facility]    OUTPATIENT PHYSICAL THERAPY ORTHOPEDIC EVALUATION  PLAN OF TREATMENT FOR OUTPATIENT REHABILITATION  (COMPLETE FOR INITIAL CLAIMS ONLY)  Patient's Last Name, First Name, M.I.  YOB: 1980  Christina Santana    Provider s Name:  [facility]   Medical Record No.  [id number]   Start of Care Date:  09/20/21   Onset Date:  05/21/21 (Has been going on for years)   Type:     _X__PT   ___OT   ___SLP Medical Diagnosis:  Hip pain, Left; multiple joint pain     PT Diagnosis:  Compensation patterns resulting in asymmetrical mobility and function, hypermobility, weaknes   Visits from SOC:  1      _________________________________________________________________________________  Plan of Treatment/Functional Goals:  balance training,manual therapy,neuromuscular re-education,ROM,strengthening,stretching           Goals  Goal Identifier: #1  Goal Description: Pt will demonstrate ability to perform 10 squats without compensation patterns and symptom free in the L hip in order to be more functional with lifting and sit/stand transfers.  Target Date: 11/14/21    Goal Identifier: #2  Goal Description: Pt will be able to complete an 8 hour work day with <2/10 pain in the LEs in order to return home without increasing pain.  Target Date: 11/14/21    Goal Identifier: #3  Goal Description: Pt will report >80% on the LEFS demonstrating improved functional mobility with less symptoms.  Target Date: 11/14/21     Therapy Frequency:  1 time/week  Predicted Duration of Therapy Intervention:  8 weeks    Tara Flannery, PT                 I CERTIFY THE NEED FOR THESE SERVICES FURNISHED UNDER        THIS PLAN OF TREATMENT AND WHILE UNDER MY CARE     (Physician co-signature of this document indicates review and certification of the therapy plan).                       Certification Date From:  09/20/21   Certification Date To:  11/14/21    Referring Provider:   Dr. Alphonse Arellano    Initial Assessment        See Epic Evaluation Start of Care Date: 09/20/21

## 2021-11-11 ENCOUNTER — HOSPITAL ENCOUNTER (OUTPATIENT)
Dept: PHYSICAL THERAPY | Facility: CLINIC | Age: 41
Setting detail: THERAPIES SERIES
End: 2021-11-11
Attending: INTERNAL MEDICINE
Payer: COMMERCIAL

## 2021-11-11 PROCEDURE — 97110 THERAPEUTIC EXERCISES: CPT | Mod: GP | Performed by: PHYSICAL THERAPIST

## 2021-11-11 PROCEDURE — 97140 MANUAL THERAPY 1/> REGIONS: CPT | Mod: GP | Performed by: PHYSICAL THERAPIST

## 2021-11-12 DIAGNOSIS — Z11.59 ENCOUNTER FOR SCREENING FOR OTHER VIRAL DISEASES: ICD-10-CM

## 2021-11-23 ENCOUNTER — HOSPITAL ENCOUNTER (OUTPATIENT)
Dept: PHYSICAL THERAPY | Facility: CLINIC | Age: 41
Setting detail: THERAPIES SERIES
End: 2021-11-23
Attending: INTERNAL MEDICINE
Payer: COMMERCIAL

## 2021-11-23 PROCEDURE — 97140 MANUAL THERAPY 1/> REGIONS: CPT | Mod: GP | Performed by: PHYSICAL THERAPIST

## 2021-12-02 ENCOUNTER — OFFICE VISIT (OUTPATIENT)
Dept: PODIATRY | Facility: CLINIC | Age: 41
End: 2021-12-02
Payer: COMMERCIAL

## 2021-12-02 ENCOUNTER — HOSPITAL ENCOUNTER (OUTPATIENT)
Dept: PHYSICAL THERAPY | Facility: CLINIC | Age: 41
Setting detail: THERAPIES SERIES
End: 2021-12-02
Attending: INTERNAL MEDICINE
Payer: COMMERCIAL

## 2021-12-02 VITALS
HEIGHT: 65 IN | WEIGHT: 185.5 LBS | SYSTOLIC BLOOD PRESSURE: 100 MMHG | TEMPERATURE: 97.3 F | DIASTOLIC BLOOD PRESSURE: 80 MMHG | BODY MASS INDEX: 30.91 KG/M2

## 2021-12-02 DIAGNOSIS — M20.41 HAMMERTOE OF RIGHT FOOT: Primary | ICD-10-CM

## 2021-12-02 DIAGNOSIS — L85.9 HYPERKERATOSIS: ICD-10-CM

## 2021-12-02 PROCEDURE — 99203 OFFICE O/P NEW LOW 30 MIN: CPT | Mod: 25 | Performed by: PODIATRIST

## 2021-12-02 PROCEDURE — 11055 PARING/CUTG B9 HYPRKER LES 1: CPT | Performed by: PODIATRIST

## 2021-12-02 PROCEDURE — 97140 MANUAL THERAPY 1/> REGIONS: CPT | Mod: GP | Performed by: PHYSICAL THERAPIST

## 2021-12-02 ASSESSMENT — PAIN SCALES - GENERAL: PAINLEVEL: MODERATE PAIN (4)

## 2021-12-02 ASSESSMENT — MIFFLIN-ST. JEOR: SCORE: 1512.3

## 2021-12-02 NOTE — PATIENT INSTRUCTIONS
Pedifix.com, or [employer].  They have many types of pads and splints.    This is an early hammertoe causing the knuckles of the 4-5th toes to rub together and cause or corn or calluos.  Can pad, sand them down or remove the knuckles.     Calluses, Corns, IPKs, Porokeratosis    When there is excessive friction or pressure on the skin, the body responds by making the skin thicker.  While this may protect the deeper structures, the thickened skin can take up more space and thus increase pressure over a bony prominence or become an open sore or skin ulcer as this skin becomes less flexible.    Flat, diffuse thickening are simple calluses and they are usually caused by friction.  Often these are the result of rubbing on a shoe or going barefoot.    Calluses with a central core between the toes are called corns.  These result from prominent joints on adjacent toes rubbing together.  Theses are a symptom of bone malalignment and will always recur unless the underlying bones are addressed surgically.    Most of these lesions can be kept comfortable with routine maintenance. This consists of filing them with a Ped Egg, callus file, or 120 grit sandpaper on a block, every day during your bath or shower.  Most people prefer battery operated johnson such as an Amope', Personal Pedi and Emjoi for regular use or heavy painful callouses.  Heavy creams or ointments can be applied 1-2 times every day to keep them soft. Toe spacers can be used for corns, gel pads can be used for other lesions on the bottom of the foot. If there is a deformity noted, such as a prominent bone, often this can be addressed to minimize recurrence. However, sometimes the pressure and lesion simply migrates to another spot after surgery, so it is not a guaranteed cure.     www.pedifix.[employer] is the best source for all sorts of foot pads and cushions    If you have severe callouses and cracking, you may apply heavy greasy cream or ointments that you scoop  up such as Cetaphil cream, Eucerin, Aquaphor or Vaseline.  Be sure to obtain cream or ointment in these brands and not lotion (lotion is water based and not durable enough for feet). For more aggressive help apply heavy creams or ointment under occlusive dressings such as Saran Wrap or Jelly Feet while sleeping.   Jelly Feet can be obtained at [email or web address] Technologieset.com.     To be successful with managing hyperkeratotic skin, you must manage hygiene daily.  At your bath or shower time is the easiest time to work on this.  There is no medical or surgical treatment that will absolutely eliminate many of these and symptoms.    Please call with any additional questions.

## 2021-12-02 NOTE — LETTER
12/2/2021         RE: Christina Santana  [address] [facility] [address]        Dear Colleague,    Thank you for referring your patient, Christina Santana, to the [facility]. Please see a copy of my visit note below.    HPI:  Christina Santana is a 41 year old female who is seen in consultation at the request of self.    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    ROS:  10 point ROS neg other than the symptoms noted above in the HPI.    Patient Active Problem List   Diagnosis     S/P LEEP (status post loop electrosurgical excision procedure)     BREEZY III (cervical intraepithelial neoplasia grade III) with severe dysplasia     Encounter for insertion of mirena IUD     GERD with stricture     Astigmatism     Myopia     Sinus pressure     Tension type headache     Allergic rhinitis due to dust mite     Seasonal allergic rhinitis due to pollen     Allergic rhinitis due to animal dander     Allergic rhinitis due to mold     Cervicalgia     Muscle spasm     RUQ pain     Nausea     Calculus of gallbladder without cholecystitis without obstruction     PMS2-related Reed syndrome (HNPCC4)       PAST MEDICAL HISTORY:   Past Medical History:   Diagnosis Date     ASCUS with positive high risk HPV 12/27/2013     HSIL (high grade squamous intraepithelial lesion) on Pap smear of cervix 04/22/2013     Pap smear of cervix with ASCUS, cannot exclude HGSIL 5/23/11, 8/10/12,      PMS2-related Reed syndrome (HNPCC4)         PAST SURGICAL HISTORY:   Past Surgical History:   Procedure Laterality Date     APPENDECTOMY       AS REPAIR PARAESOPHAGEAL HIATAL HERNIA,LAPAROTOMY, W MESH       COLONOSCOPY N/A 6/10/2021    Procedure: COLONOSCOPY, WITH POLYPECTOMY;  Surgeon: Willi Galeas DO;  Location:  GI     LAPAROSCOPIC HYSTERECTOMY TOTAL, BILATERAL SALPINGO-OOPHORECTOMY, COMBINED N/A 2/24/2021    Procedure:  "HYSTERECTOMY, TOTAL, LAPAROSCOPIC, WITH SALPINGO-OOPHORECTOMY;  Surgeon: Dylon Alcantar MD;  Location: [facility] OR        MEDICATIONS: No current outpatient medications on file.     ALLERGIES:    Allergies   Allergen Reactions     Latex Shortness Of Breath, Swelling and Rash        SOCIAL HISTORY:   Social History     Socioeconomic History     Marital status:      Spouse name: Tahi     Number of children: 6     Years of education: Not on file     Highest education level: Not on file   Occupational History     Not on file   Tobacco Use     Smoking status: Never Smoker     Smokeless tobacco: Never Used   Vaping Use     Vaping Use: Never used   Substance and Sexual Activity     Alcohol use: Yes     Comment: occassional     Drug use: No     Sexual activity: Yes     Partners: Male     Birth control/protection: Male Surgical, Female Surgical     Comment: partner vas   Other Topics Concern     Parent/sibling w/ CABG, MI or angioplasty before 65F 55M? Not Asked   Social History Narrative     Not on file     Social Determinants of Health     Financial Resource Strain: Not on file   Food Insecurity: Not on file   Transportation Needs: Not on file   Physical Activity: Not on file   Stress: Not on file   Social Connections: Not on file   Intimate Partner Violence: Not on file   Housing Stability: Not on file        FAMILY HISTORY:   Family History   Problem Relation Age of Onset     Bladder Cancer Maternal Grandfather 70        lived to be 94     Kidney Cancer Maternal Grandfather      Breast Cancer Paternal Grandmother         postmenopausal     Ovarian Cancer Maternal Aunt 40        possible uterine cancer also; hx of fibroids     Ovarian Cancer Maternal Aunt 40        possible uterine cancer also; hx of fibroids     Breast Cancer Paternal Aunt 54     Fibroids Mother         EXAM:Vitals: /80   Temp 97.3  F (36.3  C) (Temporal)   Ht 1.651 m (5' 5\")   Wt 84.1 kg (185 lb 8 oz)   LMP 02/03/2021   BMI 30.87 " kg/m    BMI= Body mass index is 30.87 kg/m .    General appearance: Patient is alert and fully cooperative with history & exam.  No sign of distress is noted during the visit.     Psychiatric: Affect is pleasant & appropriate.  Patient appears motivated to improve health.     Respiratory: Breathing is regular & unlabored while sitting.     HEENT: Hearing is intact to spoken word.  Speech is clear.  No gross evidence of visual impairment that would impact ambulation.     Vascular: DP & PT pulses are intact & regular bilaterally.  No significant edema or varicosities noted.  CFT and skin temperature is normal to both lower extremities.     Neurologic: Lower extremity sensation is intact to light touch.  No evidence of weakness or contracture in the lower extremities.  No evidence of neuropathy.    Dermatologic: Skin is intact to both lower extremities with adequate texture, turgor and tone about the integument.  Painful hyperkeratosis noted about the lateral aspect of the right fourth toe.    Musculoskeletal: Patient is ambulatory without assistive device or brace.  Mild semirigid contracture of the fourth and fifth toes on the right foot.           ASSESSMENT:       ICD-10-CM    1. Hammertoe of right foot  M20.41    2. Hyperkeratosis  L85.9         PLAN:  Reviewed patient's chart in [facility].      12/2/2021   I debrided the lesion sharply on the right fourth toe.  Discussed etiology of hammertoes and how increased pressure on these PIPJ causes hyperkeratosis.  Discussed at home debridement interdigital spacing appropriate shoe gear and arthroplasty of the joints.  She would like to attempt at home debridement on a daily basis spacers changes in shoe gear before considering arthroplasty.  All questions were answered written instructions dispensed follow up as needed.    Abraham Heath DPM          Again, thank you for allowing me to participate in the care of your patient.        Sincerely,        Abraham Heath,  TOLU

## 2021-12-02 NOTE — PROGRESS NOTES
HPI:  Christina Santana is a 41 year old female who is seen in consultation at the request of self.    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    ROS:  10 point ROS neg other than the symptoms noted above in the HPI.    Patient Active Problem List   Diagnosis     S/P LEEP (status post loop electrosurgical excision procedure)     BREEZY III (cervical intraepithelial neoplasia grade III) with severe dysplasia     Encounter for insertion of mirena IUD     GERD with stricture     Astigmatism     Myopia     Sinus pressure     Tension type headache     Allergic rhinitis due to dust mite     Seasonal allergic rhinitis due to pollen     Allergic rhinitis due to animal dander     Allergic rhinitis due to mold     Cervicalgia     Muscle spasm     RUQ pain     Nausea     Calculus of gallbladder without cholecystitis without obstruction     PMS2-related Reed syndrome (HNPCC4)       PAST MEDICAL HISTORY:   Past Medical History:   Diagnosis Date     ASCUS with positive high risk HPV 12/27/2013     HSIL (high grade squamous intraepithelial lesion) on Pap smear of cervix 04/22/2013     Pap smear of cervix with ASCUS, cannot exclude HGSIL 5/23/11, 8/10/12,      PMS2-related Reed syndrome (HNPCC4)         PAST SURGICAL HISTORY:   Past Surgical History:   Procedure Laterality Date     APPENDECTOMY       AS REPAIR PARAESOPHAGEAL HIATAL HERNIA,LAPAROTOMY, W MESH       COLONOSCOPY N/A 6/10/2021    Procedure: COLONOSCOPY, WITH POLYPECTOMY;  Surgeon: Willi Galeas DO;  Location:  GI     LAPAROSCOPIC HYSTERECTOMY TOTAL, BILATERAL SALPINGO-OOPHORECTOMY, COMBINED N/A 2/24/2021    Procedure: HYSTERECTOMY, TOTAL, LAPAROSCOPIC, WITH SALPINGO-OOPHORECTOMY;  Surgeon: Dylon Alcantar MD;  Location:  OR        MEDICATIONS: No current outpatient medications on file.     ALLERGIES:    Allergies   Allergen Reactions     Latex Shortness Of Breath,  "Swelling and Rash        SOCIAL HISTORY:   Social History     Socioeconomic History     Marital status:      Spouse name: Thai     Number of children: 6     Years of education: Not on file     Highest education level: Not on file   Occupational History     Not on file   Tobacco Use     Smoking status: Never Smoker     Smokeless tobacco: Never Used   Vaping Use     Vaping Use: Never used   Substance and Sexual Activity     Alcohol use: Yes     Comment: occassional     Drug use: No     Sexual activity: Yes     Partners: Male     Birth control/protection: Male Surgical, Female Surgical     Comment: partner vas   Other Topics Concern     Parent/sibling w/ CABG, MI or angioplasty before 65F 55M? Not Asked   Social History Narrative     Not on file     Social Determinants of Health     Financial Resource Strain: Not on file   Food Insecurity: Not on file   Transportation Needs: Not on file   Physical Activity: Not on file   Stress: Not on file   Social Connections: Not on file   Intimate Partner Violence: Not on file   Housing Stability: Not on file        FAMILY HISTORY:   Family History   Problem Relation Age of Onset     Bladder Cancer Maternal Grandfather 70        lived to be 94     Kidney Cancer Maternal Grandfather      Breast Cancer Paternal Grandmother         postmenopausal     Ovarian Cancer Maternal Aunt 40        possible uterine cancer also; hx of fibroids     Ovarian Cancer Maternal Aunt 40        possible uterine cancer also; hx of fibroids     Breast Cancer Paternal Aunt 54     Fibroids Mother         EXAM:Vitals: /80   Temp 97.3  F (36.3  C) (Temporal)   Ht 1.651 m (5' 5\")   Wt 84.1 kg (185 lb 8 oz)   LMP 02/03/2021   BMI 30.87 kg/m    BMI= Body mass index is 30.87 kg/m .    General appearance: Patient is alert and fully cooperative with history & exam.  No sign of distress is noted during the visit.     Psychiatric: Affect is pleasant & appropriate.  Patient appears motivated to " improve health.     Respiratory: Breathing is regular & unlabored while sitting.     HEENT: Hearing is intact to spoken word.  Speech is clear.  No gross evidence of visual impairment that would impact ambulation.     Vascular: DP & PT pulses are intact & regular bilaterally.  No significant edema or varicosities noted.  CFT and skin temperature is normal to both lower extremities.     Neurologic: Lower extremity sensation is intact to light touch.  No evidence of weakness or contracture in the lower extremities.  No evidence of neuropathy.    Dermatologic: Skin is intact to both lower extremities with adequate texture, turgor and tone about the integument.  Painful hyperkeratosis noted about the lateral aspect of the right fourth toe.    Musculoskeletal: Patient is ambulatory without assistive device or brace.  Mild semirigid contracture of the fourth and fifth toes on the right foot.           ASSESSMENT:       ICD-10-CM    1. Hammertoe of right foot  M20.41    2. Hyperkeratosis  L85.9         PLAN:  Reviewed patient's chart in [facility].      12/2/2021   I debrided the lesion sharply on the right fourth toe.  Discussed etiology of hammertoes and how increased pressure on these PIPJ causes hyperkeratosis.  Discussed at home debridement interdigital spacing appropriate shoe gear and arthroplasty of the joints.  She would like to attempt at home debridement on a daily basis spacers changes in shoe gear before considering arthroplasty.  All questions were answered written instructions dispensed follow up as needed.    Abraham Heath DPM

## 2021-12-06 ENCOUNTER — LAB (OUTPATIENT)
Dept: LAB | Facility: CLINIC | Age: 41
End: 2021-12-06
Payer: COMMERCIAL

## 2021-12-06 ENCOUNTER — OFFICE VISIT (OUTPATIENT)
Dept: INTERNAL MEDICINE | Facility: CLINIC | Age: 41
End: 2021-12-06
Payer: COMMERCIAL

## 2021-12-06 VITALS
SYSTOLIC BLOOD PRESSURE: 136 MMHG | DIASTOLIC BLOOD PRESSURE: 84 MMHG | OXYGEN SATURATION: 99 % | TEMPERATURE: 97.4 F | RESPIRATION RATE: 16 BRPM | BODY MASS INDEX: 30.95 KG/M2 | WEIGHT: 186 LBS | HEART RATE: 84 BPM

## 2021-12-06 DIAGNOSIS — Z01.818 PREOP GENERAL PHYSICAL EXAM: Primary | ICD-10-CM

## 2021-12-06 DIAGNOSIS — Z11.59 ENCOUNTER FOR SCREENING FOR OTHER VIRAL DISEASES: ICD-10-CM

## 2021-12-06 DIAGNOSIS — Z15.09 PMS2-RELATED LYNCH SYNDROME (HNPCC4): ICD-10-CM

## 2021-12-06 PROCEDURE — U0005 INFEC AGEN DETEC AMPLI PROBE: HCPCS

## 2021-12-06 PROCEDURE — U0003 INFECTIOUS AGENT DETECTION BY NUCLEIC ACID (DNA OR RNA); SEVERE ACUTE RESPIRATORY SYNDROME CORONAVIRUS 2 (SARS-COV-2) (CORONAVIRUS DISEASE [COVID-19]), AMPLIFIED PROBE TECHNIQUE, MAKING USE OF HIGH THROUGHPUT TECHNOLOGIES AS DESCRIBED BY CMS-2020-01-R: HCPCS

## 2021-12-06 PROCEDURE — 99214 OFFICE O/P EST MOD 30 MIN: CPT | Performed by: INTERNAL MEDICINE

## 2021-12-06 ASSESSMENT — PAIN SCALES - GENERAL: PAINLEVEL: NO PAIN (0)

## 2021-12-06 NOTE — PROGRESS NOTES
[address] [address]  Phone: [phone]  Primary Provider: Alphonse Arellano  Pre-op Performing Provider: ALPHONSE ARELLANO    PREOPERATIVE EVALUATION:  Today's date: 12/6/2021    Christina Santana is a 41 year old female who presents for a preoperative evaluation.    Surgical Information:  Surgery/Procedure: Vaginal cuff biopsy  Surgery Location: [facility]  Surgeon: Dr. Guzman  Surgery Date: 12/10/21  Time of Surgery: 10:00  Where patient plans to recover: At home with family  Fax number for surgical facility: Note does not need to be faxed, will be available electronically in Epic.    Type of Anesthesia Anticipated: MAC    Assessment & Plan     The proposed surgical procedure is considered INTERMEDIATE risk.    Problem List Items Addressed This Visit     PMS2-related Reed syndrome (HNPCC4)      Other Visit Diagnoses     Preop general physical exam    -  Primary        Patient is okay for surgery, this is a low risk procedure, she is going under MAC anesthesia in the operating room for a colposcopy and biopsy.  She is overall low risk, she is on no medications.  She had a normal EKG in August, labs in August were stable.  She does have Reed syndrome but otherwise is low risk for the procedure.    Risks and Recommendations:  The patient has the following additional risks and recommendations for perioperative complications:   - No identified additional risk factors other than previously addressed        RECOMMENDATION:  APPROVAL GIVEN to proceed with proposed procedure, without further diagnostic evaluation.                      Subjective     HPI related to upcoming procedure: Needs colposcopy and biopsy under anesthesia.     Preop Questions 12/6/2021   1. Have you ever had a heart attack or stroke? No   2. Have you ever had surgery on your heart or blood vessels, such as a stent placement, a coronary artery bypass, or surgery on an artery in your head, neck,  heart, or legs? No   3. Do you have chest pain with activity? No   4. Do you have a history of  heart failure? No   5. Do you currently have a cold, bronchitis or symptoms of other infection? No   6. Do you have a cough, shortness of breath, or wheezing? No   7. Do you or anyone in your family have previous history of blood clots? YES - mother had them,    8. Do you or does anyone in your family have a serious bleeding problem such as prolonged bleeding following surgeries or cuts? No   9. Have you ever had problems with anemia or been told to take iron pills? No   10. Have you had any abnormal blood loss such as black, tarry or bloody stools, or abnormal vaginal bleeding? No   11. Have you ever had a blood transfusion? No   12. Are you willing to have a blood transfusion if it is medically needed before, during, or after your surgery? Yes   13. Have you or any of your relatives ever had problems with anesthesia? No   14. Do you have sleep apnea, excessive snoring or daytime drowsiness? No   15. Do you have any artifical heart valves or other implanted medical devices like a pacemaker, defibrillator, or continuous glucose monitor? No   16. Do you have artificial joints? No   17. Are you allergic to latex? YES:    18. Is there any chance that you may be pregnant? No       Health Care Directive:  Patient does not have a Health Care Directive or Living Will: Discussed advance care planning with patient; however, patient declined at this time.    Preoperative Review of :   reviewed - no record of controlled substances prescribed.      Status of Chronic Conditions:  Reed syndrome, stable, status post hysterectomy.     Review of Systems  Constitutional, neuro, ENT, endocrine, pulmonary, cardiac, gastrointestinal, genitourinary, musculoskeletal, integument and psychiatric systems are negative, except as otherwise noted.    Patient Active Problem List    Diagnosis Date Noted     PMS2-related Reed syndrome (HNPCC4)  02/08/2021     Priority: Medium     Added automatically from request for surgery [id number]       RUQ pain 08/07/2020     Priority: Medium     Added automatically from request for surgery [id number]       Nausea 08/07/2020     Priority: Medium     Added automatically from request for surgery [id number]       Calculus of gallbladder without cholecystitis without obstruction 08/07/2020     Priority: Medium     Added automatically from request for surgery [id number]       Cervicalgia 06/08/2020     Priority: Medium     Muscle spasm 06/08/2020     Priority: Medium     Sinus pressure 03/04/2020     Priority: Medium     Tension type headache 03/04/2020     Priority: Medium     Allergic rhinitis due to dust mite 03/04/2020     Priority: Medium     Seasonal allergic rhinitis due to pollen 03/04/2020     Priority: Medium     Allergic rhinitis due to animal dander 03/04/2020     Priority: Medium     Allergic rhinitis due to mold 03/04/2020     Priority: Medium     GERD with stricture 02/25/2020     Priority: Medium     Encounter for insertion of mirena IUD 09/13/2018     Priority: Medium     S/P LEEP (status post loop electrosurgical excision procedure) 04/22/2013     Priority: Medium     5/23/11 ASC-H  6/20/11 colp- no high grade lesion noted  8/10/12 ASC-H  4/22/13 pap HSIL  5/1/13 colp. BREEZY 2/3. Plan: LEEP  5/13/13 LEEP. BREEZY 2/3 with + margins  8/28/13 colp- WNL  12/27/13 pap ASCUS + HR HPV  1/29/14 colp- WNL  8/27/14 pap NIL.        BREEZY III (cervical intraepithelial neoplasia grade III) with severe dysplasia 01/01/2013     Priority: Medium     5/2011- ASC-H  6/2011- colposcopy- suspicious for HPV  8/2012- ASC-H  4/2013- HSIL  5/1/2013- colposcopy- BREEZY 2-3  5/13/2013 LEEP: BREEZY II-III, Positive Margins  8/2013- ECC- cervicitis  12/2013 ASCUS, HPV +  1/2014- colposcopy- negative  8/2014- NIL  7/2015- NIL/HPV negative   3/2017- NIL/HPV +  4/2017- colposcopy- suggestive of BREEZY I  4/2018- NIL/HPV negative  9/10/19 NIL/HPV negative    2/24/21 TLH/BSO-benign pathology  10/8/21 NIL pap Neg HPV of vaginal cuff. Colpo completed for vaginal bleeding. Plan: Given discomfort during exam and biopsy attempt despite lidocaine, plan to perform EUA and biopsy under anesthesia in the OR.  12/10/21 appt    ASCCP recommends:  History of CIN2 - CIN3:  Pap and HPV every 3 years for 25 years.             Astigmatism 10/03/2003     Priority: Medium     Myopia 10/03/2003     Priority: Medium      Past Medical History:   Diagnosis Date     ASCUS with positive high risk HPV 12/27/2013     HSIL (high grade squamous intraepithelial lesion) on Pap smear of cervix 04/22/2013     Pap smear of cervix with ASCUS, cannot exclude HGSIL 5/23/11, 8/10/12,      PMS2-related Reed syndrome (HNPCC4)      Past Surgical History:   Procedure Laterality Date     APPENDECTOMY       AS REPAIR PARAESOPHAGEAL HIATAL HERNIA,LAPAROTOMY, W MESH       COLONOSCOPY N/A 6/10/2021    Procedure: COLONOSCOPY, WITH POLYPECTOMY;  Surgeon: Willi Galeas DO;  Location:  GI     LAPAROSCOPIC HYSTERECTOMY TOTAL, BILATERAL SALPINGO-OOPHORECTOMY, COMBINED N/A 2/24/2021    Procedure: HYSTERECTOMY, TOTAL, LAPAROSCOPIC, WITH SALPINGO-OOPHORECTOMY;  Surgeon: Dylon Alcantar MD;  Location:  OR     No current outpatient medications on file.       Allergies   Allergen Reactions     Latex Shortness Of Breath, Swelling and Rash        Social History     Tobacco Use     Smoking status: Never Smoker     Smokeless tobacco: Never Used   Substance Use Topics     Alcohol use: Yes     Comment: occassional       History   Drug Use No         Objective     /84   Pulse 84   Temp 97.4  F (36.3  C) (Temporal)   Resp 16   Wt 84.4 kg (186 lb)   LMP 02/03/2021   SpO2 99%   BMI 30.95 kg/m      Physical Exam    GENERAL APPEARANCE: healthy, alert and no distress     NECK: no adenopathy, no asymmetry, masses, or scars and thyroid normal to palpation     RESP: lungs clear to auscultation - no rales,  rhonchi or wheezes     CV: regular rates and rhythm, normal S1 S2, no S3 or S4 and no murmur, click or rub     MS: extremities normal- no gross deformities noted, no evidence of inflammation in joints, FROM in all extremities.     SKIN: no suspicious lesions or rashes     NEURO: Normal strength and tone, sensory exam grossly normal, mentation intact and speech normal     PSYCH: mentation appears normal. and affect normal/bright     LYMPHATICS: No cervical adenopathy    Recent Labs   Lab Test 08/05/21  1515 02/24/21  0641 02/24/21  0640   HGB 13.0  --  13.7     --  204    140  --    POTASSIUM 3.8 4.8  --    CR 0.74 0.62  --         Diagnostics:  No labs were ordered during this visit.   No EKG this visit, completed in the last 90 days.    Revised Cardiac Risk Index (RCRI):  The patient has the following serious cardiovascular risks for perioperative complications:   - No serious cardiac risks = 0 points     RCRI Interpretation: 0 points: Class I (very low risk - 0.4% complication rate)           Signed Electronically by: Alphonse Arellano MD  Copy of this evaluation report is provided to requesting physician.

## 2021-12-06 NOTE — H&P (VIEW-ONLY)
[address] [address]  Phone: [phone]  Primary Provider: Alphonse Arellano  Pre-op Performing Provider: ALPHONSE ARELLANO    PREOPERATIVE EVALUATION:  Today's date: 12/6/2021    Christina Santana is a 41 year old female who presents for a preoperative evaluation.    Surgical Information:  Surgery/Procedure: Vaginal cuff biopsy  Surgery Location: [facility]  Surgeon: Dr. Guzman  Surgery Date: 12/10/21  Time of Surgery: 10:00  Where patient plans to recover: At home with family  Fax number for surgical facility: Note does not need to be faxed, will be available electronically in Epic.    Type of Anesthesia Anticipated: MAC    Assessment & Plan     The proposed surgical procedure is considered INTERMEDIATE risk.    Problem List Items Addressed This Visit     PMS2-related Reed syndrome (HNPCC4)      Other Visit Diagnoses     Preop general physical exam    -  Primary        Patient is okay for surgery, this is a low risk procedure, she is going under MAC anesthesia in the operating room for a colposcopy and biopsy.  She is overall low risk, she is on no medications.  She had a normal EKG in August, labs in August were stable.  She does have Reed syndrome but otherwise is low risk for the procedure.    Risks and Recommendations:  The patient has the following additional risks and recommendations for perioperative complications:   - No identified additional risk factors other than previously addressed        RECOMMENDATION:  APPROVAL GIVEN to proceed with proposed procedure, without further diagnostic evaluation.                      Subjective     HPI related to upcoming procedure: Needs colposcopy and biopsy under anesthesia.     Preop Questions 12/6/2021   1. Have you ever had a heart attack or stroke? No   2. Have you ever had surgery on your heart or blood vessels, such as a stent placement, a coronary artery bypass, or surgery on an artery in your head, neck,  heart, or legs? No   3. Do you have chest pain with activity? No   4. Do you have a history of  heart failure? No   5. Do you currently have a cold, bronchitis or symptoms of other infection? No   6. Do you have a cough, shortness of breath, or wheezing? No   7. Do you or anyone in your family have previous history of blood clots? YES - mother had them,    8. Do you or does anyone in your family have a serious bleeding problem such as prolonged bleeding following surgeries or cuts? No   9. Have you ever had problems with anemia or been told to take iron pills? No   10. Have you had any abnormal blood loss such as black, tarry or bloody stools, or abnormal vaginal bleeding? No   11. Have you ever had a blood transfusion? No   12. Are you willing to have a blood transfusion if it is medically needed before, during, or after your surgery? Yes   13. Have you or any of your relatives ever had problems with anesthesia? No   14. Do you have sleep apnea, excessive snoring or daytime drowsiness? No   15. Do you have any artifical heart valves or other implanted medical devices like a pacemaker, defibrillator, or continuous glucose monitor? No   16. Do you have artificial joints? No   17. Are you allergic to latex? YES:    18. Is there any chance that you may be pregnant? No       Health Care Directive:  Patient does not have a Health Care Directive or Living Will: Discussed advance care planning with patient; however, patient declined at this time.    Preoperative Review of :   reviewed - no record of controlled substances prescribed.      Status of Chronic Conditions:  Reed syndrome, stable, status post hysterectomy.     Review of Systems  Constitutional, neuro, ENT, endocrine, pulmonary, cardiac, gastrointestinal, genitourinary, musculoskeletal, integument and psychiatric systems are negative, except as otherwise noted.    Patient Active Problem List    Diagnosis Date Noted     PMS2-related Reed syndrome (HNPCC4)  02/08/2021     Priority: Medium     Added automatically from request for surgery [id number]       RUQ pain 08/07/2020     Priority: Medium     Added automatically from request for surgery [id number]       Nausea 08/07/2020     Priority: Medium     Added automatically from request for surgery [id number]       Calculus of gallbladder without cholecystitis without obstruction 08/07/2020     Priority: Medium     Added automatically from request for surgery [id number]       Cervicalgia 06/08/2020     Priority: Medium     Muscle spasm 06/08/2020     Priority: Medium     Sinus pressure 03/04/2020     Priority: Medium     Tension type headache 03/04/2020     Priority: Medium     Allergic rhinitis due to dust mite 03/04/2020     Priority: Medium     Seasonal allergic rhinitis due to pollen 03/04/2020     Priority: Medium     Allergic rhinitis due to animal dander 03/04/2020     Priority: Medium     Allergic rhinitis due to mold 03/04/2020     Priority: Medium     GERD with stricture 02/25/2020     Priority: Medium     Encounter for insertion of mirena IUD 09/13/2018     Priority: Medium     S/P LEEP (status post loop electrosurgical excision procedure) 04/22/2013     Priority: Medium     5/23/11 ASC-H  6/20/11 colp- no high grade lesion noted  8/10/12 ASC-H  4/22/13 pap HSIL  5/1/13 colp. BREEZY 2/3. Plan: LEEP  5/13/13 LEEP. BREEZY 2/3 with + margins  8/28/13 colp- WNL  12/27/13 pap ASCUS + HR HPV  1/29/14 colp- WNL  8/27/14 pap NIL.        BREEZY III (cervical intraepithelial neoplasia grade III) with severe dysplasia 01/01/2013     Priority: Medium     5/2011- ASC-H  6/2011- colposcopy- suspicious for HPV  8/2012- ASC-H  4/2013- HSIL  5/1/2013- colposcopy- BREEZY 2-3  5/13/2013 LEEP: BREEZY II-III, Positive Margins  8/2013- ECC- cervicitis  12/2013 ASCUS, HPV +  1/2014- colposcopy- negative  8/2014- NIL  7/2015- NIL/HPV negative   3/2017- NIL/HPV +  4/2017- colposcopy- suggestive of BREEZY I  4/2018- NIL/HPV negative  9/10/19 NIL/HPV negative    2/24/21 TLH/BSO-benign pathology  10/8/21 NIL pap Neg HPV of vaginal cuff. Colpo completed for vaginal bleeding. Plan: Given discomfort during exam and biopsy attempt despite lidocaine, plan to perform EUA and biopsy under anesthesia in the OR.  12/10/21 appt    ASCCP recommends:  History of CIN2 - CIN3:  Pap and HPV every 3 years for 25 years.             Astigmatism 10/03/2003     Priority: Medium     Myopia 10/03/2003     Priority: Medium      Past Medical History:   Diagnosis Date     ASCUS with positive high risk HPV 12/27/2013     HSIL (high grade squamous intraepithelial lesion) on Pap smear of cervix 04/22/2013     Pap smear of cervix with ASCUS, cannot exclude HGSIL 5/23/11, 8/10/12,      PMS2-related Reed syndrome (HNPCC4)      Past Surgical History:   Procedure Laterality Date     APPENDECTOMY       AS REPAIR PARAESOPHAGEAL HIATAL HERNIA,LAPAROTOMY, W MESH       COLONOSCOPY N/A 6/10/2021    Procedure: COLONOSCOPY, WITH POLYPECTOMY;  Surgeon: Willi Galeas DO;  Location:  GI     LAPAROSCOPIC HYSTERECTOMY TOTAL, BILATERAL SALPINGO-OOPHORECTOMY, COMBINED N/A 2/24/2021    Procedure: HYSTERECTOMY, TOTAL, LAPAROSCOPIC, WITH SALPINGO-OOPHORECTOMY;  Surgeon: Dylon Alcantar MD;  Location:  OR     No current outpatient medications on file.       Allergies   Allergen Reactions     Latex Shortness Of Breath, Swelling and Rash        Social History     Tobacco Use     Smoking status: Never Smoker     Smokeless tobacco: Never Used   Substance Use Topics     Alcohol use: Yes     Comment: occassional       History   Drug Use No         Objective     /84   Pulse 84   Temp 97.4  F (36.3  C) (Temporal)   Resp 16   Wt 84.4 kg (186 lb)   LMP 02/03/2021   SpO2 99%   BMI 30.95 kg/m      Physical Exam    GENERAL APPEARANCE: healthy, alert and no distress     NECK: no adenopathy, no asymmetry, masses, or scars and thyroid normal to palpation     RESP: lungs clear to auscultation - no rales,  rhonchi or wheezes     CV: regular rates and rhythm, normal S1 S2, no S3 or S4 and no murmur, click or rub     MS: extremities normal- no gross deformities noted, no evidence of inflammation in joints, FROM in all extremities.     SKIN: no suspicious lesions or rashes     NEURO: Normal strength and tone, sensory exam grossly normal, mentation intact and speech normal     PSYCH: mentation appears normal. and affect normal/bright     LYMPHATICS: No cervical adenopathy    Recent Labs   Lab Test 08/05/21  1515 02/24/21  0641 02/24/21  0640   HGB 13.0  --  13.7     --  204    140  --    POTASSIUM 3.8 4.8  --    CR 0.74 0.62  --         Diagnostics:  No labs were ordered during this visit.   No EKG this visit, completed in the last 90 days.    Revised Cardiac Risk Index (RCRI):  The patient has the following serious cardiovascular risks for perioperative complications:   - No serious cardiac risks = 0 points     RCRI Interpretation: 0 points: Class I (very low risk - 0.4% complication rate)           Signed Electronically by: Alphonse Arellano MD  Copy of this evaluation report is provided to requesting physician.

## 2021-12-06 NOTE — PATIENT INSTRUCTIONS

## 2021-12-07 LAB — SARS-COV-2 RNA RESP QL NAA+PROBE: NEGATIVE

## 2021-12-09 NOTE — OP NOTE
HOSPITAL OPERATIVE NOTE  DATE/TIME OF SURGERY: 12/10/2021  PATIENT NAME: Christina Santana  MRN: [id number]   PATIENT : 1980       Preoperative Diagnosis: Granulation tissue at vaginal cuff, Reed syndrome, history RBEEZY II    Postoperative Diagnosis: same    Surgeon: Leyda Guzman DO    Procedure:  Exam under anesthesia, removal of granulation tissue    Anesthesia: MAC, local    EBL:   2 ml    Urine output:    NA    IVF:  600  ml    Specimen:  Granulation tissue    Findings: Pedunculated granulation-appearing tissue at vaginal cuff and 3 and 9 o'clock.    Complications:  None    Indication: Christina Santana is a 41 year old who presents for EUA and removal of granulation tissue for post-hysterectomy vaginal bleeding in the setting of a history of BREEZY II. Removal was attempted in the office however unable to be completed secondary to discomfort. Details of the procedure were discussed with the patient.  Risks include, but are not limited to, bleeding, infection, and injury to surrounding organs such as the bowel, urinary system, nerves, and blood vessels.  Injury may result in repair at the time of the surgery or in a separate procedure.  All questions answered, and accepting these risks, the patient elects to proceed with the procedure.     Procedure: The patient was taken to the OR and placed under MAC sedation without complication. She was placed in the dorsal lithotomy position with feet in boot stirrups and prepped and draped in sterile fashion. A medium-grave's speculum was placed in the vagina with visualization of the vaginal cuff. Findings as above. Using tischler, the tissue was carefully excised from the cuff. Bleeding was controlled with silver nitrate. Excellent hemostasis was observed, all instruments removed from the vagina and procedure ended.    The patient was awakened from anesthesia without difficulty and brought to PACU in stable condition. Instrument and sponge counts were correct  x2.    Leyda Guzman, DO

## 2021-12-10 ENCOUNTER — ANESTHESIA EVENT (OUTPATIENT)
Dept: SURGERY | Facility: CLINIC | Age: 41
End: 2021-12-10
Payer: COMMERCIAL

## 2021-12-10 ENCOUNTER — HOSPITAL ENCOUNTER (OUTPATIENT)
Facility: CLINIC | Age: 41
Discharge: HOME | End: 2021-12-10
Attending: OBSTETRICS & GYNECOLOGY | Admitting: OBSTETRICS & GYNECOLOGY
Payer: COMMERCIAL

## 2021-12-10 ENCOUNTER — HOSPITAL ENCOUNTER (OUTPATIENT)
Dept: PHYSICAL THERAPY | Facility: CLINIC | Age: 41
Setting detail: THERAPIES SERIES
End: 2021-12-10
Attending: INTERNAL MEDICINE
Payer: COMMERCIAL

## 2021-12-10 ENCOUNTER — ANESTHESIA (OUTPATIENT)
Dept: SURGERY | Facility: CLINIC | Age: 41
End: 2021-12-10
Payer: COMMERCIAL

## 2021-12-10 VITALS
HEART RATE: 64 BPM | HEIGHT: 65 IN | TEMPERATURE: 97.3 F | SYSTOLIC BLOOD PRESSURE: 112 MMHG | BODY MASS INDEX: 30.99 KG/M2 | RESPIRATION RATE: 14 BRPM | DIASTOLIC BLOOD PRESSURE: 75 MMHG | OXYGEN SATURATION: 95 % | WEIGHT: 186 LBS

## 2021-12-10 PROCEDURE — 97140 MANUAL THERAPY 1/> REGIONS: CPT | Mod: GP | Performed by: PHYSICAL THERAPIST

## 2021-12-10 PROCEDURE — 370N000017 HC ANESTHESIA TECHNICAL FEE, PER MIN: Performed by: OBSTETRICS & GYNECOLOGY

## 2021-12-10 PROCEDURE — 710N000012 HC RECOVERY PHASE 2, PER MINUTE: Performed by: OBSTETRICS & GYNECOLOGY

## 2021-12-10 PROCEDURE — 250N000009 HC RX 250: Performed by: OBSTETRICS & GYNECOLOGY

## 2021-12-10 PROCEDURE — 250N000009 HC RX 250: Performed by: NURSE ANESTHETIST, CERTIFIED REGISTERED

## 2021-12-10 PROCEDURE — 250N000013 HC RX MED GY IP 250 OP 250 PS 637: Performed by: OBSTETRICS & GYNECOLOGY

## 2021-12-10 PROCEDURE — 272N000001 HC OR GENERAL SUPPLY STERILE: Performed by: OBSTETRICS & GYNECOLOGY

## 2021-12-10 PROCEDURE — 88305 TISSUE EXAM BY PATHOLOGIST: CPT | Mod: TC | Performed by: OBSTETRICS & GYNECOLOGY

## 2021-12-10 PROCEDURE — 360N000075 HC SURGERY LEVEL 2, PER MIN: Performed by: OBSTETRICS & GYNECOLOGY

## 2021-12-10 PROCEDURE — 999N000141 HC STATISTIC PRE-PROCEDURE NURSING ASSESSMENT: Performed by: OBSTETRICS & GYNECOLOGY

## 2021-12-10 PROCEDURE — 258N000003 HC RX IP 258 OP 636: Performed by: NURSE ANESTHETIST, CERTIFIED REGISTERED

## 2021-12-10 PROCEDURE — 250N000011 HC RX IP 250 OP 636: Performed by: NURSE ANESTHETIST, CERTIFIED REGISTERED

## 2021-12-10 PROCEDURE — 58999 UNLISTED PX FML GENITAL SYS: CPT | Performed by: OBSTETRICS & GYNECOLOGY

## 2021-12-10 RX ORDER — FENTANYL CITRATE 50 UG/ML
INJECTION, SOLUTION INTRAMUSCULAR; INTRAVENOUS PRN
Status: DISCONTINUED | OUTPATIENT
Start: 2021-12-10 | End: 2021-12-10

## 2021-12-10 RX ORDER — KETOROLAC TROMETHAMINE 30 MG/ML
INJECTION, SOLUTION INTRAMUSCULAR; INTRAVENOUS PRN
Status: DISCONTINUED | OUTPATIENT
Start: 2021-12-10 | End: 2021-12-10

## 2021-12-10 RX ORDER — ACETAMINOPHEN 325 MG/1
975 TABLET ORAL ONCE
Status: COMPLETED | OUTPATIENT
Start: 2021-12-10 | End: 2021-12-10

## 2021-12-10 RX ORDER — ONDANSETRON 4 MG/1
4 TABLET, ORALLY DISINTEGRATING ORAL EVERY 30 MIN PRN
Status: DISCONTINUED | OUTPATIENT
Start: 2021-12-10 | End: 2021-12-10 | Stop reason: HOSPADM

## 2021-12-10 RX ORDER — NALOXONE HYDROCHLORIDE 0.4 MG/ML
0.4 INJECTION, SOLUTION INTRAMUSCULAR; INTRAVENOUS; SUBCUTANEOUS
Status: DISCONTINUED | OUTPATIENT
Start: 2021-12-10 | End: 2021-12-10 | Stop reason: HOSPADM

## 2021-12-10 RX ORDER — BUPIVACAINE HYDROCHLORIDE AND EPINEPHRINE 2.5; 5 MG/ML; UG/ML
INJECTION, SOLUTION INFILTRATION; PERINEURAL PRN
Status: DISCONTINUED | OUTPATIENT
Start: 2021-12-10 | End: 2021-12-10 | Stop reason: HOSPADM

## 2021-12-10 RX ORDER — LIDOCAINE 40 MG/G
CREAM TOPICAL
Status: DISCONTINUED | OUTPATIENT
Start: 2021-12-10 | End: 2021-12-10 | Stop reason: HOSPADM

## 2021-12-10 RX ORDER — SODIUM CHLORIDE, SODIUM LACTATE, POTASSIUM CHLORIDE, CALCIUM CHLORIDE 600; 310; 30; 20 MG/100ML; MG/100ML; MG/100ML; MG/100ML
INJECTION, SOLUTION INTRAVENOUS CONTINUOUS
Status: DISCONTINUED | OUTPATIENT
Start: 2021-12-10 | End: 2021-12-10 | Stop reason: HOSPADM

## 2021-12-10 RX ORDER — LIDOCAINE HYDROCHLORIDE 20 MG/ML
INJECTION, SOLUTION INFILTRATION; PERINEURAL PRN
Status: DISCONTINUED | OUTPATIENT
Start: 2021-12-10 | End: 2021-12-10

## 2021-12-10 RX ORDER — ONDANSETRON 2 MG/ML
4 INJECTION INTRAMUSCULAR; INTRAVENOUS EVERY 30 MIN PRN
Status: DISCONTINUED | OUTPATIENT
Start: 2021-12-10 | End: 2021-12-10 | Stop reason: HOSPADM

## 2021-12-10 RX ORDER — NALOXONE HYDROCHLORIDE 0.4 MG/ML
0.2 INJECTION, SOLUTION INTRAMUSCULAR; INTRAVENOUS; SUBCUTANEOUS
Status: DISCONTINUED | OUTPATIENT
Start: 2021-12-10 | End: 2021-12-10 | Stop reason: HOSPADM

## 2021-12-10 RX ORDER — PROPOFOL 10 MG/ML
INJECTION, EMULSION INTRAVENOUS PRN
Status: DISCONTINUED | OUTPATIENT
Start: 2021-12-10 | End: 2021-12-10

## 2021-12-10 RX ORDER — HYDROXYZINE HYDROCHLORIDE 25 MG/1
25 TABLET, FILM COATED ORAL
Status: DISCONTINUED | OUTPATIENT
Start: 2021-12-10 | End: 2021-12-10 | Stop reason: HOSPADM

## 2021-12-10 RX ORDER — IBUPROFEN 800 MG/1
800 TABLET, FILM COATED ORAL ONCE
Status: DISCONTINUED | OUTPATIENT
Start: 2021-12-10 | End: 2021-12-10 | Stop reason: HOSPADM

## 2021-12-10 RX ORDER — OXYCODONE HYDROCHLORIDE 5 MG/1
5 TABLET ORAL
Status: DISCONTINUED | OUTPATIENT
Start: 2021-12-10 | End: 2021-12-10 | Stop reason: HOSPADM

## 2021-12-10 RX ORDER — FENTANYL CITRATE 50 UG/ML
50 INJECTION, SOLUTION INTRAMUSCULAR; INTRAVENOUS
Status: DISCONTINUED | OUTPATIENT
Start: 2021-12-10 | End: 2021-12-10 | Stop reason: HOSPADM

## 2021-12-10 RX ORDER — ACETAMINOPHEN 325 MG/1
975 TABLET ORAL ONCE
Status: DISCONTINUED | OUTPATIENT
Start: 2021-12-10 | End: 2021-12-10 | Stop reason: HOSPADM

## 2021-12-10 RX ORDER — PROPOFOL 10 MG/ML
INJECTION, EMULSION INTRAVENOUS CONTINUOUS PRN
Status: DISCONTINUED | OUTPATIENT
Start: 2021-12-10 | End: 2021-12-10

## 2021-12-10 RX ORDER — ONDANSETRON 2 MG/ML
INJECTION INTRAMUSCULAR; INTRAVENOUS PRN
Status: DISCONTINUED | OUTPATIENT
Start: 2021-12-10 | End: 2021-12-10

## 2021-12-10 RX ADMIN — PROPOFOL 50 MG: 10 INJECTION, EMULSION INTRAVENOUS at 10:42

## 2021-12-10 RX ADMIN — FENTANYL CITRATE 25 MCG: 50 INJECTION, SOLUTION INTRAMUSCULAR; INTRAVENOUS at 10:41

## 2021-12-10 RX ADMIN — FENTANYL CITRATE 25 MCG: 50 INJECTION, SOLUTION INTRAMUSCULAR; INTRAVENOUS at 10:45

## 2021-12-10 RX ADMIN — SODIUM CHLORIDE, POTASSIUM CHLORIDE, SODIUM LACTATE AND CALCIUM CHLORIDE: 600; 310; 30; 20 INJECTION, SOLUTION INTRAVENOUS at 09:28

## 2021-12-10 RX ADMIN — PROPOFOL 200 MCG/KG/MIN: 10 INJECTION, EMULSION INTRAVENOUS at 10:42

## 2021-12-10 RX ADMIN — KETOROLAC TROMETHAMINE 30 MG: 30 INJECTION, SOLUTION INTRAMUSCULAR at 10:58

## 2021-12-10 RX ADMIN — MIDAZOLAM 1 MG: 1 INJECTION INTRAMUSCULAR; INTRAVENOUS at 10:35

## 2021-12-10 RX ADMIN — ACETAMINOPHEN 975 MG: 325 TABLET, FILM COATED ORAL at 09:14

## 2021-12-10 RX ADMIN — LIDOCAINE HYDROCHLORIDE 60 MG: 20 INJECTION, SOLUTION INFILTRATION; PERINEURAL at 10:45

## 2021-12-10 RX ADMIN — ONDANSETRON 4 MG: 2 INJECTION INTRAMUSCULAR; INTRAVENOUS at 10:58

## 2021-12-10 ASSESSMENT — LIFESTYLE VARIABLES: TOBACCO_USE: 1

## 2021-12-10 ASSESSMENT — MIFFLIN-ST. JEOR: SCORE: 1514.57

## 2021-12-10 NOTE — PROGRESS NOTES
[facility]    Outpatient Physical Therapy Progress Note  Patient: Christina Santana  : 1980    Beginning/End Dates of Reporting Period:  2021 to 12/10/2021    Referring Provider: Dr. Alphonse Arellano    Therapy Diagnosis: Compensation patterns resulting in asymmetrical mobility and function, hypermobility, weakness     Client Self Report: Modified a lot of things for kick boxing.  Feeling really good until yesterday, but did not do as much moving around and so that is why she was stiff.  States really improved and knows what she needs to do to get her symptoms to calm down and go away.    Objective Measurements:  Objective Measure: LEFS  Details:  = 88.75%  Objective Measure: Strength  Details: Improved TA firing, LE strength  Objective Measure: Palpation  Details: Tightness along L hip flexor and iliospoas.     Goals:  Goal Identifier #1   Goal Description Pt will demonstrate ability to perform 10 squats without compensation patterns and symptom free in the L hip in order to be more functional with lifting and sit/stand transfers.   Target Date 22   Date Met      Progress (detail required for progress note): 3/10, not able to perform with pain.     Goal Identifier #2   Goal Description Pt will be able to complete an 8 hour work day with <2/10 pain in the LEs in order to return home without increasing pain.   Target Date 21   Date Met  12/10/21   Progress (detail required for progress note): Agree that this is achieved.  <2/10 pain with working.     Goal Identifier #3   Goal Description Pt will report >80% on the LEFS demonstrating improved functional mobility with less symptoms.   Target Date 21   Date Met  12/10/21   Progress (detail required for progress note): 7180 = 88.75%     Plan:  Continue therapy per current plan of care.    Discharge:  No    Thank you for your  referral.    Tara Flannery, PT, DPT  [facility]  [phone]

## 2021-12-10 NOTE — ANESTHESIA POSTPROCEDURE EVALUATION
Patient: Christina Santana    Procedure: Procedure(s):  EXAM UNDER ANESTHESIA, PELVIS  Vaginal cuff biopsy       Diagnosis:Vaginal bleeding [N93.9]  History of hysterectomy [Z90.710]  Vaginal granulation tissue [N89.8]  Diagnosis Additional Information: No value filed.    Anesthesia Type:  MAC    Note:  Disposition: Outpatient   Postop Pain Control: Uneventful            Sign Out: Well controlled pain   PONV: No   Neuro/Psych: Uneventful            Sign Out: Acceptable/Baseline neuro status   Airway/Respiratory: Uneventful            Sign Out: Acceptable/Baseline resp. status   CV/Hemodynamics: Uneventful            Sign Out: Acceptable CV status   Other NRE: NONE   DID A NON-ROUTINE EVENT OCCUR? No    Event details/Postop Comments:  Pt was happy with anesthesia care.  No complications.  I will follow up with the pt if needed.           Last vitals:  Vitals Value Taken Time   /75 12/10/21 1145   Temp 97.52  F (36.4  C) 12/10/21 1149   Pulse 64 12/10/21 1145   Resp 14 12/10/21 1105   SpO2 96 % 12/10/21 1122   Vitals shown include unvalidated device data.    Electronically Signed By: SHEYLA Gómez CRNA  December 10, 2021  11:49 AM

## 2021-12-10 NOTE — DISCHARGE INSTRUCTIONS
[facility]  Discharge Instruction Following Hysteroscopy   Specialty Clinic: [phone]      You must be on pelvic rest for 2 weeks, this consists of no tampons, no douching and no intercourse.     If you have unusually heavy vaginal bleeding, severe nausea with vomiting, or increased pain, or fever, please contact Dr. Katharine Butler at [phone], or go to the emergency room if after hours.    Shower on post-op day 1. No baths/pools/hot tubs/lakes for  2 weeks.     When to contact your surgeon:  -Nausea/Vomiting  -Increasing intolerable pain  -Anything other you feel important.   [facility]-Day [facility]   Adult Discharge Orders & Instructions     For 24 hours after surgery    1. Get plenty of rest.  A responsible adult must stay with you for at least 24 hours after you leave the hospital.   2. Do not drive or use heavy equipment.  If you have weakness or tingling, don't drive or use heavy equipment until this feeling goes away.  3. Do not drink alcohol.  4. Avoid strenuous or risky activities.  Ask for help when climbing stairs.   5. You may feel lightheaded.  If so, sit for a few minutes before standing.  Have someone help you get up.   6. You may have a slight fever. Call the doctor if your fever is over 100 F (37.7 C) (taken under the tongue) or lasts longer than 24 hours.  7. You may have a dry mouth, a sore throat, muscle aches or trouble sleeping.  These should go away after 24 hours.  8. Do not make important or legal decisions.  We don t expect you to have any problems from the surgery or treatment you had today. Just in case, here s what to do if you have pain, upset stomach (nausea), bleeding or infection:  Pain:  Take medicines your doctor has prescribed or over-the-counter medicine they have suggested. Resting and using ice packs can help, too. For surgery on an arm or leg, raise it on a pillow to ease swelling. Call your doctor if these methods don t work.  Copyright  [employer], Licensed under CC4.0 [address]  Upset stomach (nausea):  Take anti-nausea medicine approved by your doctor. Drink clear liquids like apple juice, ginger ale, broth or 7-Up. Be sure to drink enough fluids. Rest can help, too. Move to normal foods when you re ready.   Bleeding:  In the first 24 hours, you may see a little blood on your dressing, about the size of a quarter. You don t need to worry about this much blood, but if the blood spot keeps getting bigger:    Put pressure on the wound if you can, AND    Call your doctor.  Copyright [employer], Licensed under CC4.0 [address]  Fever/Infection: Please call your doctor if you have any of these signs:    Redness    Swelling    Wound feels warm    Pain gets worse    Bad-smelling fluid leaks from wound    Fever or chills  Call your doctor for any of the followin.  It has been over 8 to 10 hours since surgery and you are still not able to urinate (pass water).    2.  Headache for over 24 hours.    3.  Numbness, tingling or weakness in your legs the day after surgery (if you had spinal anesthesia).    Nurse advice line: [phone]

## 2021-12-10 NOTE — ANESTHESIA PREPROCEDURE EVALUATION
Anesthesia Pre-Procedure Evaluation    Patient: Christina Santana   MRN: [id number] : 1980        Preoperative Diagnosis: Vaginal bleeding [N93.9]  History of hysterectomy [Z90.710]  Vaginal granulation tissue [N89.8]    Procedure : Procedure(s):  EXAM UNDER ANESTHESIA, PELVIS  Vaginal cuff biopsy          Past Medical History:   Diagnosis Date     ASCUS with positive high risk HPV 2013     HSIL (high grade squamous intraepithelial lesion) on Pap smear of cervix 2013     Pap smear of cervix with ASCUS, cannot exclude HGSIL 11, 8/10/12,      PMS2-related Reed syndrome (HNPCC4)       Past Surgical History:   Procedure Laterality Date     APPENDECTOMY       AS REPAIR PARAESOPHAGEAL HIATAL HERNIA,LAPAROTOMY, W MESH       COLONOSCOPY N/A 6/10/2021    Procedure: COLONOSCOPY, WITH POLYPECTOMY;  Surgeon: Willi Galeas DO;  Location: [facility]     LAPAROSCOPIC HYSTERECTOMY TOTAL, BILATERAL SALPINGO-OOPHORECTOMY, COMBINED N/A 2021    Procedure: HYSTERECTOMY, TOTAL, LAPAROSCOPIC, WITH SALPINGO-OOPHORECTOMY;  Surgeon: Dylon Alcantar MD;  Location: [facility]      Allergies   Allergen Reactions     Latex Shortness Of Breath, Swelling and Rash      Social History     Tobacco Use     Smoking status: Never Smoker     Smokeless tobacco: Never Used   Substance Use Topics     Alcohol use: Yes     Comment: occassional      Wt Readings from Last 1 Encounters:   21 84.4 kg (186 lb)        Anesthesia Evaluation   Pt has had prior anesthetic. Type: General and MAC.    No history of anesthetic complications       ROS/MED HX  ENT/Pulmonary:     (+) allergic rhinitis, tobacco use,     Neurologic:  - neg neurologic ROS     Cardiovascular:     (+) -----Previous cardiac testing   Echo: Date: Results:    Stress Test: Date: Results:    ECG Reviewed: Date: 21 Results:  SR  Cath: Date: Results:      METS/Exercise Tolerance:     Hematologic:  - neg hematologic  ROS     Musculoskeletal:  - neg  musculoskeletal ROS     GI/Hepatic:     (+) GERD, Asymptomatic on medication,     Renal/Genitourinary:  - neg Renal ROS     Endo:  - neg endo ROS     Psychiatric/Substance Use:  - neg psychiatric ROS     Infectious Disease:  - neg infectious disease ROS     Malignancy:  - neg malignancy ROS     Other:  - neg other ROS          Physical Exam    Airway  airway exam normal      Mallampati: II   TM distance: > 3 FB   Neck ROM: full   Mouth opening: > 3 cm    Respiratory Devices and Support         Dental  no notable dental history         Cardiovascular   cardiovascular exam normal       Rhythm and rate: regular and normal     Pulmonary   pulmonary exam normal        breath sounds clear to auscultation           OUTSIDE LABS:  CBC:   Lab Results   Component Value Date    WBC 7.1 08/05/2021    WBC 7.4 02/24/2021    HGB 13.0 08/05/2021    HGB 13.7 02/24/2021    HCT 39.8 08/05/2021    HCT 40.5 02/24/2021     08/05/2021     02/24/2021     BMP:   Lab Results   Component Value Date     08/05/2021     02/24/2021    POTASSIUM 3.8 08/05/2021    POTASSIUM 4.8 02/24/2021    CHLORIDE 107 08/05/2021    CHLORIDE 110 (H) 02/24/2021    CO2 28 08/05/2021    CO2 25 02/24/2021    BUN 10 08/05/2021    BUN 13 02/24/2021    CR 0.74 08/05/2021    CR 0.62 02/24/2021     (H) 08/05/2021    GLC 90 02/24/2021     COAGS: No results found for: PTT, INR, FIBR  POC:   Lab Results   Component Value Date    HCG Negative 02/24/2021     HEPATIC:   Lab Results   Component Value Date    ALBUMIN 3.7 08/05/2021    PROTTOTAL 7.0 08/05/2021    ALT 32 08/05/2021    AST 17 08/05/2021    ALKPHOS 63 08/05/2021    BILITOTAL 0.2 08/05/2021     OTHER:   Lab Results   Component Value Date    LACT 2.7 (H) 01/01/2019    A1C 5.1 10/28/2014    NERISSA 9.0 08/05/2021    LIPASE 212 08/05/2021    TSH 1.20 06/16/2021    T4 1.36 06/16/2021    CRP <2.9 08/05/2021    SED 7 06/16/2021       Anesthesia Plan    ASA Status:  2   NPO Status:  NPO  Appropriate    Anesthesia Type: MAC.     - Reason for MAC: immobility needed, straight local not clinically adequate   Induction: Intravenous.   Maintenance: TIVA.        Consents    Anesthesia Plan(s) and associated risks, benefits, and realistic alternatives discussed. Questions answered and patient/representative(s) expressed understanding.    - Discussed:     - Discussed with:  Patient      - Extended Intubation/Ventilatory Support Discussed: No.      - Patient is DNR/DNI Status: No    Use of blood products discussed: Yes.     - Discussed with: Patient.     - Consented: consented to blood products            Reason for refusal: other.     Postoperative Care    Pain management: IV analgesics, Oral pain medications, Multi-modal analgesia.   PONV prophylaxis: Ondansetron (or other 5HT-3), Background Propofol Infusion     Comments:    Other Comments: The risks and benefits of anesthesia, and the alternatives where applicable, have been discussed with the patient, and they wish to proceed.            SHEYLA Gómez CRNA

## 2021-12-15 LAB
PATH REPORT.COMMENTS IMP SPEC: NORMAL
PATH REPORT.COMMENTS IMP SPEC: NORMAL
PATH REPORT.FINAL DX SPEC: NORMAL
PATH REPORT.GROSS SPEC: NORMAL
PATH REPORT.MICROSCOPIC SPEC OTHER STN: NORMAL
PHOTO IMAGE: NORMAL

## 2021-12-15 PROCEDURE — 88305 TISSUE EXAM BY PATHOLOGIST: CPT | Mod: 26 | Performed by: PATHOLOGY

## 2021-12-17 ENCOUNTER — HOSPITAL ENCOUNTER (OUTPATIENT)
Dept: PHYSICAL THERAPY | Facility: CLINIC | Age: 41
Setting detail: THERAPIES SERIES
End: 2021-12-17
Attending: INTERNAL MEDICINE
Payer: COMMERCIAL

## 2021-12-17 PROCEDURE — 97140 MANUAL THERAPY 1/> REGIONS: CPT | Mod: GP | Performed by: PHYSICAL THERAPIST

## 2022-01-18 ENCOUNTER — LAB REQUISITION (OUTPATIENT)
Dept: LAB | Facility: CLINIC | Age: 42
End: 2022-01-18

## 2022-01-18 LAB — SARS-COV-2 RNA RESP QL NAA+PROBE: POSITIVE

## 2022-01-18 PROCEDURE — U0005 INFEC AGEN DETEC AMPLI PROBE: HCPCS | Performed by: INTERNAL MEDICINE

## 2022-02-15 ENCOUNTER — OFFICE VISIT (OUTPATIENT)
Dept: OBGYN | Facility: CLINIC | Age: 42
End: 2022-02-15
Payer: COMMERCIAL

## 2022-02-15 VITALS
WEIGHT: 185 LBS | HEART RATE: 72 BPM | SYSTOLIC BLOOD PRESSURE: 119 MMHG | DIASTOLIC BLOOD PRESSURE: 85 MMHG | OXYGEN SATURATION: 96 % | BODY MASS INDEX: 30.79 KG/M2

## 2022-02-15 DIAGNOSIS — Z15.09 LYNCH SYNDROME: ICD-10-CM

## 2022-02-15 DIAGNOSIS — Z98.890 POSTOPERATIVE STATE: Primary | ICD-10-CM

## 2022-02-15 DIAGNOSIS — Z90.710 S/P HYSTERECTOMY: ICD-10-CM

## 2022-02-15 DIAGNOSIS — R10.2 PELVIC PAIN IN FEMALE: ICD-10-CM

## 2022-02-15 PROCEDURE — 99213 OFFICE O/P EST LOW 20 MIN: CPT | Performed by: OBSTETRICS & GYNECOLOGY

## 2022-02-15 NOTE — PROGRESS NOTES
SUBJECTIVE:       HPI: Christina Santana is a 41 year old  is s/p EUA, granulation tissue excision on 12/10 for bleeding from vaginal cuff, granulation tissue. Since surgery, she has been doing well. She has had no further vaginal bleeding. She has been sexually active and has noted cramping following intercourse. This does not happen every time but this is frequently and has been going on since her hysterectomy last year.    Patient s/p TLH/BSO 2021 for Reed syndrome, history BREEZY 2-3.    Mammo 2021 birads 3 (left breast). F/u due 3/2022    ? Colonoscopies starting at age 30-35 (or earlier based on family history), repeated every 1-2 years.    Of note, there is not an effective screening method for ovarian cancer, but transvaginal ultrasound and a CA-125 blood test may be considered at the discretion of each individual's clinician.   ? Annual physical/neurological exams starting at 25-30.  ? Possible consideration of upper endoscopy (EGD) with extended duodenoscopy starting at age 40, repeated every 3-5 years. This recommendation is largely dependent on family history and other factors.  ? There is limited evidence to suggest a screening strategy for urothelial/urinary tract cancers. Annual urinalysis starting at age 30-35 can be considered. Consideration for screening is largely dependent upon family history, gender, and specific Reed syndrome mutation.  ? Pancreatic screening should be offered at 50 (or earlier based on family history) for those who have first and second degree relatives with pancreatic cancer from the same side of the family.   ? Based on her personal and family history, Christina has a 13.6% lifetime risk of developing breast cancer based on the CHATA 8 model.  Therefore, Christina does not meet current National Comprehensive Cancer Network (NCCN) guidelines for high risk breast screening, which is offered to women with a 20% lifetime risk or higher. However, it is still important for  Christina to continue with routine breast screening under the care of her physicians. Breast cancer screening is generally recommended to begin approximately 10 years younger than the earliest age of breast cancer diagnosis in the family, or at age 40, whichever comes first.  In this family, screening may begin at age 40.  Christina is encouraged to discuss breast screening with her physicians.       Ob Hx:       Gyn Hx: Patient's last menstrual period was 2021.     Last pap was 10/2021 nil hpv neg, BREEZY 2-3 2013. Next pap due 10/2022         Today's PHQ-2 Score:   PHQ-2 (  [employer]) 6/10/2015   Q1: Little interest or pleasure in doing things 0   Q2: Feeling down, depressed or hopeless 0   PHQ-2 Score 0     Today's PHQ-9 Score: No flowsheet data found.  Today's GEORGETTE-7 Score: No flowsheet data found.    Problem list and histories reviewed & adjusted, as indicated.  Additional history: as documented.    Patient Active Problem List   Diagnosis     S/P LEEP (status post loop electrosurgical excision procedure)     BREEZY III (cervical intraepithelial neoplasia grade III) with severe dysplasia     Encounter for insertion of mirena IUD     GERD with stricture     Astigmatism     Myopia     Sinus pressure     Tension type headache     Allergic rhinitis due to dust mite     Seasonal allergic rhinitis due to pollen     Allergic rhinitis due to animal dander     Allergic rhinitis due to mold     Cervicalgia     Muscle spasm     RUQ pain     Nausea     Calculus of gallbladder without cholecystitis without obstruction     PMS2-related Reed syndrome (HNPCC4)     Past Surgical History:   Procedure Laterality Date     APPENDECTOMY       AS REPAIR PARAESOPHAGEAL HIATAL HERNIA,LAPAROTOMY, W MESH       BIOPSY VULVA N/A 12/10/2021    Procedure: Vaginal cuff biopsy;  Surgeon: Leyda Guzman DO;  [facility]     COLONOSCOPY N/A 6/10/2021    Procedure: COLONOSCOPY, WITH POLYPECTOMY;  Surgeon: Willi Galeas DO;   [facility]     EXAM UNDER ANESTHESIA PELVIC N/A 12/10/2021    Procedure: EXAM UNDER ANESTHESIA, PELVIS;  Surgeon: Leyda Guzman DO;  [facility]     LAPAROSCOPIC HYSTERECTOMY TOTAL, BILATERAL SALPINGO-OOPHORECTOMY, COMBINED N/A 2/24/2021    Procedure: HYSTERECTOMY, TOTAL, LAPAROSCOPIC, WITH SALPINGO-OOPHORECTOMY;  Surgeon: Dylon Alcantar MD;  Location: [facility]      Social History     Tobacco Use     Smoking status: Never Smoker     Smokeless tobacco: Never Used   Substance Use Topics     Alcohol use: Yes     Comment: occassional      Problem (# of Occurrences) Relation (Name,Age of Onset)    Bladder Cancer (1) Maternal Grandfather (70): lived to be 94    Breast Cancer (2) Paternal Grandmother: postmenopausal, Paternal Aunt (54)    Fibroids (1) Mother    Kidney Cancer (1) Maternal Grandfather    Ovarian Cancer (2) Maternal Aunt (40): possible uterine cancer also; hx of fibroids, Maternal Aunt (40): possible uterine cancer also; hx of fibroids            No current outpatient medications on file prior to visit.  No current facility-administered medications on file prior to visit.    Allergies   Allergen Reactions     Latex Shortness Of Breath, Swelling and Rash       ROS:  10 Point review of systems negative other noted above in HPI    OBJECTIVE:     /85 (BP Location: Left arm, Patient Position: Sitting, Cuff Size: Adult Regular)   Pulse 72   Wt 83.9 kg (185 lb)   LMP 02/03/2021   SpO2 96%   BMI 30.79 kg/m    Body mass index is 30.79 kg/m .      Gen: Alert, oriented, appropriately interactive, NAD  Chest: Symmetrical, unlabored breathing  Abdomen: soft, non tender, non distended, no masses, no hernias. No inguinal lymphadenopathy.   Pelvic: Deferred with recent exam, declined today  MSK: normal gait, symmetric movements UE & LE  Lower extremities: non-tender, no edema      In-Clinic Test Results:  No results found for this or any previous visit (from the past 24 hour(s)).     Final  Diagnosis   A.  Vaginal cuff at 9:00, biopsies:  - Benign acutely and chronically inflamed granulation tissue.  - No vaginal tissue identified.     B.  Vaginal cuff at 3:00, biopsy:  - Benign acutely and chronically inflamed granulation tissue.  - A portion of benign vaginal tissue with normal mature squamous epithelium and benign stroma with focal moderate chronic inflammation.         ASSESSMENT/PLAN:                                                      Christina Santana is a 41 year old  s/p EUA, granulation tissue excision on 12/10 for bleeding from vaginal cuff, granulation tissue      ICD-10-CM    1. Postoperative state  Z98.890    2. S/P hysterectomy  Z90.710    3. Reed syndrome  Z15.09        Doing well postoperatively. No vaginal bleeding since surgery. Currently sexually active.    Cramping after intercourse since hysterectomy. S/p unremarkable pelvic exam in OR last month, declined in office today. Open to pelvic ultrasound to assess further given history of Reed Syndrome. Furthermore, discussed sexual habit changes, such as adequate lubrication/position changes, consideration of vaginal estrogen and pelvic PT. Patient agreeable to pelvic PT referral at this time. All questions answered.    Reed syndrome preventative recommendations reviewed and all questions answered. Declines annual CA-125 at this time, will order if concerning findings on ultrasound or future exams. Aware to follow with Primary care provider annually, colonoscopy every 1-2 years, and other recommendations as noted above. Mammogram due 3/2022, order already placed.    Follow-up as needed, or for pap in 8 months.    Leyda Guzman DO  [facility]

## 2022-02-22 ENCOUNTER — TELEPHONE (OUTPATIENT)
Dept: OBGYN | Facility: CLINIC | Age: 42
End: 2022-02-22
Payer: COMMERCIAL

## 2022-02-23 NOTE — TELEPHONE ENCOUNTER
"Leyda Guzman DO  [facility] Triage 1 hour ago (7:00 AM)     KK    There are two letters from genetic counseling on 2/11/21 in the media tab in her chart. Can an RN see if this is the letter(s) she is referring to and if she would like a copy mailed to her or sent via inter office mail to her in [place]?     Thank you!   Leyda Guzman DO        RN spoke w/pt and she confirms that the document in media for 2/11/21 AVS from Nicolás Greene, Genetics Counselor that includes the \"Dear Relative Letter\" at the bottom of the document/letter is what she needs.    Pt would like to  this printed letter at  specialty clinic tomorrow 2/24/22. RN printed and placed this document in interoffice envelope addressed to pt to be sent to [facility] for pt .    ENEIDA ElliottN RN    "

## 2022-02-23 NOTE — TELEPHONE ENCOUNTER
Message for  Leyda Guzman, DO    A year ago Christina got paperwork about Reed Syndrome from Dr Alcantar.   There was a letter in there that the family should be tested for Reed syndrome.   Christina would like to know if Leyda Guzman can send an email or upload to her [employer], a copy of the letter that was in that paperwork from Dr Alcantar about getting the family tested so she can get it printed.     She says she can be reached at her mobile # [phone] and you may leave a detailed message.

## 2022-03-15 NOTE — TELEPHONE ENCOUNTER
12/10/21 Vaginal cuff biopsies- negative.   1/19/22 Post op appt- cancelled   2/15/22 Appt- Pap due 10/8/22

## 2022-03-31 ENCOUNTER — MYC MEDICAL ADVICE (OUTPATIENT)
Dept: INTERNAL MEDICINE | Facility: CLINIC | Age: 42
End: 2022-03-31
Payer: COMMERCIAL

## 2022-03-31 DIAGNOSIS — Z15.09 PMS2-RELATED LYNCH SYNDROME (HNPCC4): Primary | ICD-10-CM

## 2022-04-06 ENCOUNTER — HOSPITAL ENCOUNTER (OUTPATIENT)
Dept: BONE DENSITY | Facility: CLINIC | Age: 42
Discharge: HOME | End: 2022-04-06
Attending: INTERNAL MEDICINE | Admitting: INTERNAL MEDICINE
Payer: COMMERCIAL

## 2022-04-06 DIAGNOSIS — Z15.09 PMS2-RELATED LYNCH SYNDROME (HNPCC4): ICD-10-CM

## 2022-04-06 PROCEDURE — 77080 DXA BONE DENSITY AXIAL: CPT

## 2022-04-13 ENCOUNTER — HOSPITAL ENCOUNTER (OUTPATIENT)
Dept: MAMMOGRAPHY | Facility: CLINIC | Age: 42
Discharge: HOME | End: 2022-04-13
Attending: FAMILY MEDICINE | Admitting: FAMILY MEDICINE
Payer: COMMERCIAL

## 2022-04-13 DIAGNOSIS — R92.8 BI-RADS CATEGORY 3 MAMMOGRAM RESULT: ICD-10-CM

## 2022-04-13 PROCEDURE — 77066 DX MAMMO INCL CAD BI: CPT

## 2022-04-19 ENCOUNTER — TELEPHONE (OUTPATIENT)
Dept: ONCOLOGY | Facility: CLINIC | Age: 42
End: 2022-04-19
Payer: COMMERCIAL

## 2022-04-19 NOTE — TELEPHONE ENCOUNTER
4/19/22 M to call [phone] opt5, opt2 with questions about rescheduled video visit with Radha Rodriguez (from 6/7/22 to 6/24/22). Radha was not available on 6/10/22 anymore. -Chi

## 2022-05-09 ENCOUNTER — OFFICE VISIT (OUTPATIENT)
Dept: FAMILY MEDICINE | Facility: CLINIC | Age: 42
End: 2022-05-09
Payer: COMMERCIAL

## 2022-05-09 VITALS
HEART RATE: 105 BPM | WEIGHT: 184 LBS | DIASTOLIC BLOOD PRESSURE: 80 MMHG | TEMPERATURE: 97.4 F | BODY MASS INDEX: 30.62 KG/M2 | SYSTOLIC BLOOD PRESSURE: 116 MMHG | OXYGEN SATURATION: 96 %

## 2022-05-09 DIAGNOSIS — R50.9 FEVER, UNSPECIFIED FEVER CAUSE: ICD-10-CM

## 2022-05-09 DIAGNOSIS — J02.0 STREP THROAT: Primary | ICD-10-CM

## 2022-05-09 DIAGNOSIS — J02.9 SORE THROAT: ICD-10-CM

## 2022-05-09 LAB
DEPRECATED S PYO AG THROAT QL EIA: POSITIVE
FLUAV AG SPEC QL IA: NEGATIVE
FLUBV AG SPEC QL IA: NEGATIVE
SARS-COV-2 RNA RESP QL NAA+PROBE: NEGATIVE

## 2022-05-09 PROCEDURE — 87804 INFLUENZA ASSAY W/OPTIC: CPT | Performed by: FAMILY MEDICINE

## 2022-05-09 PROCEDURE — 99213 OFFICE O/P EST LOW 20 MIN: CPT | Mod: CS | Performed by: FAMILY MEDICINE

## 2022-05-09 PROCEDURE — U0003 INFECTIOUS AGENT DETECTION BY NUCLEIC ACID (DNA OR RNA); SEVERE ACUTE RESPIRATORY SYNDROME CORONAVIRUS 2 (SARS-COV-2) (CORONAVIRUS DISEASE [COVID-19]), AMPLIFIED PROBE TECHNIQUE, MAKING USE OF HIGH THROUGHPUT TECHNOLOGIES AS DESCRIBED BY CMS-2020-01-R: HCPCS | Performed by: FAMILY MEDICINE

## 2022-05-09 PROCEDURE — U0005 INFEC AGEN DETEC AMPLI PROBE: HCPCS | Performed by: FAMILY MEDICINE

## 2022-05-09 PROCEDURE — 87880 STREP A ASSAY W/OPTIC: CPT | Performed by: FAMILY MEDICINE

## 2022-05-09 RX ORDER — PENICILLIN V POTASSIUM 500 MG/1
500 TABLET, FILM COATED ORAL 2 TIMES DAILY
Qty: 20 TABLET | Refills: 0 | Status: SHIPPED | OUTPATIENT
Start: 2022-05-09 | End: 2022-05-19

## 2022-05-09 ASSESSMENT — ENCOUNTER SYMPTOMS: SORE THROAT: 1

## 2022-05-09 ASSESSMENT — PAIN SCALES - GENERAL: PAINLEVEL: SEVERE PAIN (7)

## 2022-05-09 NOTE — RESULT ENCOUNTER NOTE
Christina,  Your results for strep were positive.  Glad we checked.  I sent your 10 day penicillin prescription over to the [facility] pharmacy.  Flu test was negative.  Please let me know if you have any questions.    Sincerely,  Dr. Salas

## 2022-05-09 NOTE — PROGRESS NOTES
"  Assessment & Plan     ASSESSMENT/ORDERS:    ICD-10-CM    1. Strep throat  J02.0 penicillin V (VEETID) 500 MG tablet   2. Sore throat  J02.9 Symptomatic; Yes; 5/6/2022 COVID-19 Virus (Coronavirus) by PCR     Streptococcus A Rapid Screen w/Reflex to PCR - Clinic Collect     Influenza A & B Antigen - Clinic Collect     Symptomatic; Yes; 5/6/2022 COVID-19 Virus (Coronavirus) by PCR Nose   3. Fever, unspecified fever cause  R50.9 Symptomatic; Yes; 5/6/2022 COVID-19 Virus (Coronavirus) by PCR     Streptococcus A Rapid Screen w/Reflex to PCR - Clinic Collect     Influenza A & B Antigen - Clinic Collect     Symptomatic; Yes; 5/6/2022 COVID-19 Virus (Coronavirus) by PCR Nose     PLAN:  1.  Antibiotic x10 days for strep.  Patient notified via MyChart as she did not wish to wait for result.   2. Continue Supportive cares:  Acetaminophen and/or ibuprofen as needed for pain and/or fever.  Monitor fluid intake.  Follow-up as needed or sooner if fevers, chills, nausea/vomitting, decreased activity, or decreased urination occur.               BMI:   Estimated body mass index is 30.62 kg/m  as calculated from the following:    Height as of 12/10/21: 1.651 m (5' 5\").    Weight as of this encounter: 83.5 kg (184 lb).           Return in about 3 days (around 5/12/2022) for recheck if symptoms worsen or fail to improve.    Rodney Salas MD  [facility] ROSENDO Vasquez is a 42 year old who presents for the following health issues     Pharyngitis     History of Present Illness       Migraines:   Since the patient's last clinic visit, headaches are: worsened  The patient is getting headaches:  Little to never  She is not able to do normal daily activities when she has a migraine.  The patient is taking the following rescue/relief medications:  Ibuprofen (Advil, Motrin)   Patient states \"I get no relief\" from the rescue/relief medications.   The patient is taking the following medications to prevent " migraines:  No medications to prevent migraines  In the past 4 weeks, the patient has gone to an Urgent Care or Emergency Room 0 times times due to headaches.    Reason for visit:  Strep test  Symptom onset:  1-3 days ago    She eats 4 or more servings of fruits and vegetables daily.She consumes 0 sweetened beverage(s) daily.She exercises with enough effort to increase her heart rate 30 to 60 minutes per day.  She exercises with enough effort to increase her heart rate 5 days per week.   She is taking medications regularly.       Acute Illness  Acute illness concerns: Sore throat  Onset/Duration: 3 days ago.   Symptoms:  Fever: YES- 101  Chills/Sweats: YES  Headache (location?): YES- front  Sinus Pressure: no  Conjunctivitis:  no  Ear Pain: no  Rhinorrhea: no  Congestion: no  Sore Throat: YES  Cough: no  Wheeze: no  Decreased Appetite: YES- due to sore thraot  Nausea: YES  Vomiting: no  Diarrhea: no  Dysuria/Freq.: no  Dysuria or Hematuria: no  Fatigue/Achiness: YES  Sick/Strep Exposure: no  Therapies tried and outcome: ibuprofen     Still feverish.  Last dose of ibuprofen was this AM.  Tested positive for COVID-19 110 days ago.    Review of Systems   HENT: Positive for sore throat.             Objective    /80   Pulse 105   Temp 97.4  F (36.3  C) (Temporal)   Wt 83.5 kg (184 lb)   LMP 02/03/2021   SpO2 96%   BMI 30.62 kg/m    Body mass index is 30.62 kg/m .  Physical Exam  Constitutional:       Appearance: She is well-developed.   HENT:      Head: Normocephalic.      Right Ear: Tympanic membrane, ear canal and external ear normal.      Left Ear: Tympanic membrane, ear canal and external ear normal.      Nose: No mucosal edema or rhinorrhea.      Mouth/Throat:      Mouth: Mucous membranes are moist. Mucous membranes are not dry.      Tongue: No lesions.      Pharynx: Uvula midline. Pharyngeal swelling and posterior oropharyngeal erythema present. No oropharyngeal exudate or uvula swelling.      Tonsils:  No tonsillar exudate.   Eyes:      General: Lids are normal.         Right eye: No discharge.         Left eye: No discharge.      Conjunctiva/sclera: Conjunctivae normal.   Neck:      Thyroid: No thyromegaly.   Cardiovascular:      Rate and Rhythm: Normal rate and regular rhythm.      Heart sounds: Normal heart sounds, S1 normal and S2 normal. No murmur heard.  Pulmonary:      Effort: Pulmonary effort is normal. No respiratory distress.      Breath sounds: Normal breath sounds. No wheezing, rhonchi or rales.   Musculoskeletal:      Cervical back: Normal range of motion and neck supple.   Lymphadenopathy:      Cervical: No cervical adenopathy.   Skin:     General: Skin is warm.      Findings: No erythema or rash.   Neurological:      Mental Status: She is alert.            Results for orders placed or performed in visit on 05/09/22 (from the past 24 hour(s))   Streptococcus A Rapid Screen w/Reflex to PCR - Clinic Collect    Specimen: Throat; Swab   Result Value Ref Range    Group A Strep antigen Positive (A) Negative   Influenza A & B Antigen - Clinic Collect    Specimen: Nasopharyngeal; Swab   Result Value Ref Range    Influenza A antigen Negative Negative    Influenza B antigen Negative Negative    Narrative    Test results must be correlated with clinical data. If necessary, results should be confirmed by a molecular assay or viral culture.

## 2022-05-09 NOTE — PATIENT INSTRUCTIONS

## 2022-06-04 ENCOUNTER — HEALTH MAINTENANCE LETTER (OUTPATIENT)
Age: 42
End: 2022-06-04

## 2022-06-13 ENCOUNTER — HOSPITAL ENCOUNTER (OUTPATIENT)
Dept: PHYSICAL THERAPY | Facility: CLINIC | Age: 42
Setting detail: THERAPIES SERIES
Discharge: HOME | End: 2022-06-13
Attending: OBSTETRICS & GYNECOLOGY
Payer: COMMERCIAL

## 2022-06-13 DIAGNOSIS — R10.2 PELVIC PAIN IN FEMALE: ICD-10-CM

## 2022-06-13 PROCEDURE — 97140 MANUAL THERAPY 1/> REGIONS: CPT | Mod: GP | Performed by: PHYSICAL THERAPIST

## 2022-06-13 PROCEDURE — 97162 PT EVAL MOD COMPLEX 30 MIN: CPT | Mod: GP | Performed by: PHYSICAL THERAPIST

## 2022-06-13 NOTE — PROGRESS NOTES
06/13/22 1200   General Information   Type of Visit Initial OP Ortho PT Evaluation   Start of Care Date 06/13/22   Referring Physician Dr Guzman   Orders Evaluate and Treat   Orders Comment pain after intercourse, s/p hysterectomy   Date of Order 02/15/22   Certification Required? No   Medical Diagnosis Pelvic pain in female (R10.2)   Surgical/Medical history reviewed Yes   Precautions/Limitations no known precautions/limitations   Weight-Bearing Status - LUE weight-bearing as tolerated   Weight-Bearing Status - RUE weight-bearing as tolerated   Weight-Bearing Status - LLE weight-bearing as tolerated   Weight-Bearing Status - RLE weight-bearing as tolerated   Body Part(s)   Body Part(s) Lumbar Spine/SI   Presentation and Etiology   Pertinent history of current problem (include personal factors and/or comorbidities that impact the POC) Chief complaint:  Pain in pelvis post intercourse, cramping, Intermittent hip pain (3% better from previous PT). 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 She had exam under anestheisa and granulation tissue removal 12/2021. Saw acupuncture which helped some 1-2x/week; thinks helped some when going; maintain gains. Denies UI unless on trampoline, daily regular BM. Occasional pain upon deep penetration.  Vaginal dryness occasionally but nothing recurrent. 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 Does have family history of breast cancer and did have genetic testing which confirmed Reed disease (increased risk of GI and reproductive cancers). Had PT for hip pain L>R in the last year; she does her hip stabilization ex. Doing a half marathon this week. History. 6 vaginal births, no csections. Hx of D&C due stillbirth of 20 week old, appendectomy d/t infection 2008. 2018, hiatal hernia repair. Rheumatology was unremarkable. Aggravating factors include: Sexual penetration, hip pain waxes and wanes can be worse with colder season. Alleviating/Easing factors include:  Lubricant (  Body Location Override (Optional - Billing Will Still Be Based On Selected Body Map Location If Applicable): Left preauricular Detail Level: Detailed no change), position changing (pain during a little better, and no change cramping), acupuncture. 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 One tearing  From baby one.  Patient takes no medication that helps or prevents the problem.  Patient is employed part time for [employer] in [place], currently working without restriction.   Pain is 0/10 at best (but rare only 1-2 day a month), average pain 3-4/10, 8/10 worst, and 4/10 currently. L hip , LLQ abdomen.  Goal for PT: Reduce pain, Figure out what to do about my problems.   Impairments A. Pain;D. Decreased ROM;E. Decreased flexibility;H. Impaired gait   Functional Limitations perform activities of daily living;perform desired leisure / sports activities   Symptom Location LLQ abdomen, hip pain, pelvic pain   How/Where did it occur From insidious onset   Onset date of current episode/exacerbation 02/15/22   Chronicity Chronic   Progression of symptoms since onset: Unchanged   Fall Risk Screen   Fall screen completed by PT   Have you fallen 2 or more times in the past year? No   Have you fallen and had an injury in the past year? No   Is patient a fall risk? No   Abuse Screen (yes response referral indicated)   Feels Unsafe at Home or Work/School no   Feels Threatened by Someone no   Does Anyone Try to Keep You From Having Contact with Others or Doing Things Outside Your Home? no   Physical Signs of Abuse Present no   Lumbar Spine/SI Objective Findings   Balance/Proprioception (Single Leg Stance) able, slight lateral lean noted B   Flexion ROM FF WFL   Extension ROM 25% limited midline low back discomfort   Right Side Bending ROM Full   Left Side Bending ROM Min limited to Left with tightness observed L SI mechanics   Lumbar ROM Comment PKB neural tension WFL   Pelvic Screen - stork/march, - FABIR/FADIR, - scour   Hip Screen Hip ROM is WFL with some pelvic girdle compensated SB with end range ER R, Some compensations at [place] with hip flexion, firmer end feel  "for piriformis sretch on L.   Posture forward head rounded shoulders   Specific Questions   Specific Questions Pregnancy;Pelvic Floor Dysfunction   Pelvic Floor Dysfunction Questions   Regular exercise Yes   Recent diet change? No   Can you stop the flow of urine when on the toilet?  Yes   Is the volume of urine passed usually  Medium   Do you empty your bladder frequently, before you experience the urge to pass urine?  No   Do you have \"triggers\" that make you feel you can't wait to go to the toilet?  No   Number of bladder infections last year?  0   Pelvic Floor Dysfunction Objective Findings   Power (MMT at Levator Ani) 2/2/NT/NT Difficulty recruiting and maintain contraction with tension noted in the lower abdomen which feels like cramping to her   Endurance (Up to 10 seconds as long as still 50% power) poor   Pelvic Floor Dysfunction Comments Patient with difficulty engaging pelvic more muscles with posterior pelvic tilt and lumbopelvic movement and compensation, some gluteal firing.  External  palpation anorectal triangle does engage PF better than anterior compartment.  Difficulty bulging or lengthening the pelvic floor; overall awareness of pelvic muscle contraction is poor.   Areas of Tightness Levators;Iliopsoas;Piriformis;Gluteals;Other   Flexibility comment Patient has tenderness in the left greater than right pelvic wall ischial coccygeus iliococcygeus piriformis ++ Obturator internus +++.  Some tenderness at the levator in insertion anteriorly adjacent to the bladder.  Bladder is left rotated in the sagittal plane with poor right rotation and right translation, inferior leaf of the mesenteric root is restricted with tension pulling into the left lower quadrant of abdomen and does recreate some of her tension in the left anterior thigh suspect pulling on the femoral nerve.  Mild tenderness in the right ischial coccygeus insertion adjacent to the coccyx otherwise the right pelvic wall is largely " Add 05721 Cpt? (Important Note: In 2017 The Use Of 68578 Is Being Tracked By Cms To Determine Future Global Period Reimbursement For Global Periods): no nontender.  Prone assessment of the sacrum is right side and with  coccyx bend left of midline and tight ajacent to NGA.  Difficulty with sacral left side bend.   Planned Therapy Interventions   Planned Therapy Interventions joint mobilization;stretching;strengthening;ROM;neuromuscular re-education;manual therapy   Planned Modality Interventions   Planned Modality Interventions Biofeedback   Clinical Impression   Criteria for Skilled Therapeutic Interventions Met yes, treatment indicated   PT Diagnosis Reduced soft tissue flexibility trigger points, reduced sacral and coccyx mobility, reduced pelvic muscle coordination and strength, myofascial mobility restrictions of the lower abdomen and pelvis   Influenced by the following impairments Palpation, range of motion, strength, tissue extensibility, pain   Functional limitations due to impairments Sitting, standing, walking, exercise routine   Clinical Presentation Evolving/Changing   Clinical Presentation Rationale PLOF vs current, motivated to improve   Clinical Decision Making (Complexity) Moderate complexity   Therapy Frequency 2 times/Week  (1-2x/weel)   Predicted Duration of Therapy Intervention (days/wks) 10 weeks   Risk & Benefits of therapy have been explained Yes   Patient, Family & other staff in agreement with plan of care Yes   Pelvic Health Informed Consent Statement Discussed with patient/guardian reason for referral regarding pelvic health needs and external/internal pelvic floor muscle examination.  Opportunity provided to ask questions and verbal consent for assessment and intervention was given.   Clinical Impression Comments Patient with muscle tension in the left greater than right pelvic muscle groups, mechanical coccygeal and sacral dysfunction, lower abdominal and pelvic fascial restrictions, poor pelvic floor muscle coordination adding to complaints of left lower quadrant pelvic pain, pelvic cramping post intercourse, and anterior thigh pain    Education Assessment   Preferred Learning Style Demonstration;Reading;Listening;Pictures/video   Barriers to Learning No barriers   ORTHO GOALS   PT Ortho Eval Goals 1;2;3   Ortho Goal 1   Goal Identifier Pelvic pain   Goal Description Patient will have greater than 50% improvement in post intercourse related pelvic pain in 8 weeks   Target Date 08/08/22   Ortho Goal 2   Goal Identifier Strength   Goal Description Patient improved pelvic muscle activation via PERF 3+/5/5/10 to better engage mms needed to support pelvis during exercise in 8 weeks   Target Date 08/08/22   Ortho Goal 3   Goal Identifier Coordination   Goal Description Patient will demonstrate normal muscle coordination via biofeedback short and long holds, and elimination sequencing is demonstration of improved pelvic muscle coordination in 7 weeks   Target Date 08/01/22   Total Evaluation Time   PT Eval, Moderate Complexity Minutes (90551) 61

## 2022-06-22 ENCOUNTER — HOSPITAL ENCOUNTER (OUTPATIENT)
Dept: PHYSICAL THERAPY | Facility: CLINIC | Age: 42
Setting detail: THERAPIES SERIES
Discharge: HOME | End: 2022-06-22
Attending: OBSTETRICS & GYNECOLOGY
Payer: COMMERCIAL

## 2022-06-22 PROCEDURE — 97112 NEUROMUSCULAR REEDUCATION: CPT | Mod: GP | Performed by: PHYSICAL THERAPIST

## 2022-06-22 PROCEDURE — 90913 BFB TRAINING EA ADDL 15 MIN: CPT | Mod: GP | Performed by: PHYSICAL THERAPIST

## 2022-06-22 PROCEDURE — 90912 BFB TRAINING 1ST 15 MIN: CPT | Mod: GP | Performed by: PHYSICAL THERAPIST

## 2022-06-28 ENCOUNTER — HOSPITAL ENCOUNTER (OUTPATIENT)
Dept: PHYSICAL THERAPY | Facility: CLINIC | Age: 42
Setting detail: THERAPIES SERIES
Discharge: HOME | End: 2022-06-28
Attending: OBSTETRICS & GYNECOLOGY
Payer: COMMERCIAL

## 2022-06-28 PROCEDURE — 90913 BFB TRAINING EA ADDL 15 MIN: CPT | Mod: GP | Performed by: PHYSICAL THERAPIST

## 2022-06-28 PROCEDURE — 90912 BFB TRAINING 1ST 15 MIN: CPT | Mod: GP | Performed by: PHYSICAL THERAPIST

## 2022-07-18 ENCOUNTER — HOSPITAL ENCOUNTER (OUTPATIENT)
Dept: PHYSICAL THERAPY | Facility: CLINIC | Age: 42
Setting detail: THERAPIES SERIES
Discharge: HOME | End: 2022-07-18
Attending: OBSTETRICS & GYNECOLOGY
Payer: COMMERCIAL

## 2022-07-18 PROCEDURE — 97140 MANUAL THERAPY 1/> REGIONS: CPT | Mod: GP | Performed by: PHYSICAL THERAPIST

## 2022-07-20 ENCOUNTER — VIRTUAL VISIT (OUTPATIENT)
Dept: ONCOLOGY | Facility: CLINIC | Age: 42
End: 2022-07-20
Attending: CLINICAL NURSE SPECIALIST
Payer: COMMERCIAL

## 2022-07-20 DIAGNOSIS — K21.9 GASTROESOPHAGEAL REFLUX DISEASE WITHOUT ESOPHAGITIS: ICD-10-CM

## 2022-07-20 DIAGNOSIS — Z15.09 PMS2-RELATED LYNCH SYNDROME (HNPCC4): Primary | ICD-10-CM

## 2022-07-20 DIAGNOSIS — R31.9 HEMATURIA, UNSPECIFIED TYPE: ICD-10-CM

## 2022-07-20 DIAGNOSIS — Z12.11 SPECIAL SCREENING FOR MALIGNANT NEOPLASMS, COLON: ICD-10-CM

## 2022-07-20 PROCEDURE — G0463 HOSPITAL OUTPT CLINIC VISIT: HCPCS | Mod: PN,RTG | Performed by: CLINICAL NURSE SPECIALIST

## 2022-07-20 PROCEDURE — 99215 OFFICE O/P EST HI 40 MIN: CPT | Mod: 95 | Performed by: CLINICAL NURSE SPECIALIST

## 2022-07-20 NOTE — LETTER
2022         RE: Christina Santana  [address] [facility] [address]        Dear Colleague,    Thank you for referring your patient, Christina Santana, to the [facility]. Please see a copy of my visit note below.    Oncology Risk Management Consultation:  Date on this visit: 2022    Christina Santana requires high risk screening and surveillance to reduce her risk of cancer secondary to  PMS2+ associated Reed Syndrome.     Primary Physician: Alphonse Arellano MD     History Of Present Illness:  Ms. Santana is a very pleasant, healthy 42 year old female who presents with PMS2+ associated Reed Syndrome.     Genetic Testin2021-  POSITIVE for a PMS2 likely pathogenic mutation. Specifically her mutation is called p.E705K (also known as c.2113G>A) identified using Cancer Next-Expanded testing from [facility].   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 This variant is called p.A417S (c.1249G>T). No other variants or mutations were found in the STK11 gene. Given the uncertain significance of this result, medical management decisions should NOT be made based on this test result alone.     Christina is negative for known, pathogenic mutations in the AIP, ALK, APC, CARINA, AXIN2, BAP1, BARD1, BLM, BMPR1A, BRCA1, BRCA2, BRIP1, CDC73, CDH1, CDK4, CDKN1B, CDKN2A, CHEK2, CTNNA1, DICER1, EPCAM, EGFR, EGLN1, FANCC, FH, FLCN, GALNT12, GREM1, HOXB13, KIF1B, KIT, LZTR1, MAX, MEN1, MET, MITF, MLH1, MRE11, MSH2, MSH3, MSH6, MUTYH, NBN, NF1, NF2, NTHL1, PALB2, PDGFRA, PHOX2B, POLD1, POLE, POT1, UYQHH7R, PTCH1, PTEN, RAD50, RAD51C, RAD51D, RB1, RECQL, RET, SDHA, SDHAF2, SDHB, SDHC, SDHD, SMAD4, SMARCA4, SMARCB1, SMARCE1, STK11, SUFU, PXFG638, TP53, TSC1, TSC2, VHL, and XRCC2 genes. No pathogenic mutations were found in any of the other 76 of 77 genes analyzed. This  test involved sequencing and deletion/duplication analysis of all genes with the exceptions of VHL and XRCC2 (sequencing and deletion/duplication); EGFR, EGLN1, HOXB13, KIT, MITF, PDGFRA, POLD1 and POLE (sequencing only); EPCAM and GREM1 (deletion/duplication only).     History:    s/p  x6  2021- Total laparoscopic hysterectomy and bilateral salpingo oophorectomy (Dr. Dylon Alcantar), pathology benign  Currently using estradiol patch for post menopausal symptoms.     12/10/2021- Vaginal cuff at 9:00, biopsies:  - Benign acutely and chronically inflamed granulation tissue.  - No vaginal tissue identified.     B.  Vaginal cuff at 3:00, biopsy:  - Benign acutely and chronically inflamed granulation tissue.  - A portion of benign vaginal tissue with normal mature squamous epithelium and benign stroma with focal moderate chronic inflammation.     Screening history:  EGD 10/31/2018- Dysphagia by Dr Harris   1. LA Grade A (one or more mucosal breaks less than 5 mm, not extending between tops of 2 mucosal folds) esophagitis with no bleeding was found 34 to 36 cm from the incisors.  ESOPHAGUS, DISTAL, BIOPSY:   1. Esophageal eosinophilia (peak count of 10), see comment : The differential diagnosis primarily includes eosinophilic esophagitis (EoE), proton-pump inhibitor-responsive esophageal eosinophilia (PPI-REE) and gastroesophageal reflux disease (GERD) with more eosinophils than typical. ACG guidelines recommend further endoscopic evaluation with re-biopsy after a two month trial of high dose proton-pump inhibitor (PPI) therapy to further investigate these three possibilities  2. Negative for columnar mucosa  3. A medium-sized hiatal hernia was present.  The ampulla, duodenal bulb, first portion of the duodenum and second portion of the duodenum were normal.  One benign-appearing, intrinsic mild stenosis was found 36 to 37 cm from the incisors. tenosis measured 1 cm (in length).   The stenosis was  traversed. A TTS dilator was passed through the scope. Dilation with a 12-13.5-15 mm balloon dilator was performed to 15 mm.   The dilation site was examined and showed moderate mucosal disruption.      6/10/2021- Colonoscopy ([place]), Hemorrhoids found on perianal exam.  One 2 mm hyperplastic polyp in the rectum    Review of Systems:  GENERAL: No change in weight, sleep or appetite.  Normal energy.  No fever or chills  EYES: Negative for vision changes or eye problems  ENT: No problems with ears, nose or throat.  No difficulty swallowing.   RESP: No coughing, wheezing or shortness of breath  CV: No chest pains or palpitations  GI: Notes some heartburn and reflux, which she is not sure is correlated with hiatal hernia No nausea, vomiting,  heartburn, abdominal pain, diarrhea, constipation or change in bowel habits  : No urinary frequency or dysuria, bladder or kidney problems  MUSCULOSKELETAL: Being treated with physical therapy for pelvic pain.  NEUROLOGIC: No headaches, numbness, tingling, weakness, problems with balance or coordination  PSYCHIATRIC: No problems with anxiety, depression or mental health  HEME/IMMUNE/ALLERGY: No history of bleeding or clotting problems or anemia.  No allergies or immune system problems  ENDOCRINE: No history of thyroid disease, diabetes or other endocrine disorders  SKIN: No rashes,worrisome lesions or skin problems    Past Medical/Surgical History:  Past Medical History:   Diagnosis Date     ASCUS with positive high risk HPV 12/27/2013     HSIL (high grade squamous intraepithelial lesion) on Pap smear of cervix 04/22/2013     Pap smear of cervix with ASCUS, cannot exclude HGSIL 5/23/11, 8/10/12,      PMS2-related Reed syndrome (HNPCC4)      Past Surgical History:   Procedure Laterality Date     APPENDECTOMY       AS REPAIR PARAESOPHAGEAL HIATAL HERNIA,LAPAROTOMY, W MESH       BIOPSY VULVA N/A 12/10/2021    Procedure: Vaginal cuff biopsy;  Surgeon: Leyda Guzman DO;   Location: [facility]     COLONOSCOPY N/A 6/10/2021    Procedure: COLONOSCOPY, WITH POLYPECTOMY;  Surgeon: Willi Galeas DO;  Location: [facility] GI     EXAM UNDER ANESTHESIA PELVIC N/A 12/10/2021    Procedure: EXAM UNDER ANESTHESIA, PELVIS;  Surgeon: Leyda Guzman DO;  Location: [facility] OR     LAPAROSCOPIC HYSTERECTOMY TOTAL, BILATERAL SALPINGO-OOPHORECTOMY, COMBINED N/A 2/24/2021    Procedure: HYSTERECTOMY, TOTAL, LAPAROSCOPIC, WITH SALPINGO-OOPHORECTOMY;  Surgeon: Dylon Alcantar MD;  Location: [facility] OR       Allergies:  Allergies as of 07/20/2022 - Reviewed 07/20/2022   Allergen Reaction Noted     Latex Shortness Of Breath, Swelling, and Rash 05/19/2021       Current Medications:  No current outpatient medications on file.        Family History:  Family History   Problem Relation Age of Onset     Fibroids Mother      Reed Syndrome Mother         PMS2+     Colon Polyps Mother         removed 5 feet of colon for unmanageable polyps     Bladder Cancer Maternal Grandfather 70        lived to be      Kidney Cancer Maternal Grandfather      Breast Cancer Paternal Grandmother         postmenopausal     Ovarian Cancer Maternal Aunt 40        possible uterine cancer also; hx of fibroids     Ovarian Cancer Maternal Aunt 40        possible uterine cancer also; hx of fibroids     Breast Cancer Paternal Aunt 54       Social History:  Social History     Socioeconomic History     Marital status:      Spouse name: Thai     Number of children: 6     Years of education: Not on file     Highest education level: Not on file   Occupational History     Employer: [employer]   Tobacco Use     Smoking status: Never Smoker     Smokeless tobacco: Never Used   Vaping Use     Vaping Use: Never used   Substance and Sexual Activity     Alcohol use: Yes     Comment: occassional     Drug use: No     Sexual activity: Yes     Partners: Male     Birth control/protection: Female Surgical   Other Topics Concern     Parent/sibling  w/ CABG, MI or angioplasty before 65F 55M? Not Asked   Social History Narrative     Not on file     Social Determinants of Health     Financial Resource Strain: Not on file   Food Insecurity: Not on file   Transportation Needs: Not on file   Physical Activity: Not on file   Stress: Not on file   Social Connections: Not on file   Intimate Partner Violence: Not on file   Housing Stability: Not on file       Physical Exam:  [facility] 02/03/2021   GENERAL: Healthy, alert and no distress  EYES: Eyes grossly normal to inspection.  No discharge or erythema, or obvious scleral/conjunctival abnormalities.  RESP: No audible wheeze, cough, or visible cyanosis.  No visible retractions or increased work of breathing.    SKIN: Visible skin clear. No significant rash, abnormal pigmentation or lesions.  NEURO: Cranial nerves grossly intact.  Mentation and speech appropriate for age.  PSYCH: Mentation appears normal, affect normal/bright, judgement and insight intact, normal speech and appearance well-groomed.      Laboratory/Imaging Studies  No results found for any visits on 07/20/22.    ASSESSMENT  We reviewed her interval history; her mother was recently tested for Reed Syndrome and was found to have Christina's genetic mutation. She was found to have innumerable colon polyps and had a hemicolectomy, with 5 feet of her colon removed. She has had a THBSO and will be followed at [place].      Christina has been dealing with pelvic pain since her hysterectomy and is not sure whether it is scar tissue and whether it requires a surgical intervention to improve. Despite intermittent pelvic pain, she ran [employer]'s half marathon in June and is planning to run another half marathon in October in [place]. She has good spirits and will make her appointments for an EGD/colonoscopy and labs at [facility] and then follow with me in one year.  We discussed that since she is having some issues with heartburn and has not had an EGD since 2018; we should add it  this year; if there are no findings she may consider repeating it in 4-5 years.  I encouraged her to keep her colonoscopies annually, though, as any precancerous lesions could be removed at that time. She will also schedule a lab visit for urine tests.       She is aware that PMS2+ mutations are estimated to be prevalent in about 1:714 people in the general population, while mutations in any of the Reed  Syndrome genes is estimated to be prevalent in 1:279 people (Kamaljit et al 2017). The following is the table of risks outlined in NCCN Guidelines.    Site  Estimated Average Age of Presentation for PMS2+ carriers Cumulative Risk for Diagnosis Through Age 80 Cumulative Risk for Diagnosis Through Lifetime for people in the general population     Colorectal     61-66 years    8.7-20%    4.2%   Endometrial  49-50 years     13-26%    3.1%     Ovarian     51-59 years      3%    1.3%     Renal pelvis and/or ureter     No data   <1 % -3.7%   Less than 1%     Bladder     71 years  <1%-2.4 2.4%   Gastric  inadequate date  Inadequate data 0.9%   Small bowel  Single case - 69 years    0.1% -0.3%   0.3%     Pancreas    No data    <1%--1.6%   1.6%     Biliary tract    No data   0.2-<1%    0.2%     Prostate     No data    4.6%-11.6%    11.6%   Breast (female)    No data  8.1-12.8%    12.8%     Brain    40 years    0.6%-<1%    0.6%       Individualized Surveillance Plan for Reed Syndrome   (MSH6+ and PMS2+ mutation carriers)   Based on NCCN Guidelines Version 1.2022   Type of Screening Recommendation Last Done Next Due   Colon Cancer Screening Colonoscopy at age 30-35 years or 2-5 years prior to the earliest colon cancer in the family if it is diagnosed prior to age 30.     Repeat every 1-2 years.  6/10/2021- Colonoscopy ([place]), Hemorrhoids found on perianal exam.  One 2 mm hyperplastic polyp in the rectum Due now in conjunction with EGD   Endometrial and Ovarian Cancer Consider Prophylactic hysterectomy and bilateral  salpingo-oophorectomy (BSO) can be considered by women who have completed childbearing.    Insufficient evidence exists to make specific recommendation for RRSO in MSH6+ and PMS2+ carriers.   2/24/2021- Total laparoscopic hysterectomy and bilateral salpingo oophorectomy (Dr. Dylon Alcantar), pathology benign NA    Screening using  endometrial sampling is an option every 1-2 years; women should be aware that dysfunctional uterine bleeding warrants evaluation.    Data do not support ovarian cancer screening for Reed Syndrome. Annual transvaginal ultrasound and  tests may be considered at a clinician's discretion.   NA   NA   Gastric and small bowel cancer Upper GI screening every 2-4 years, starting at age 30 for MSH6+ carriers    PMS2+ carriers - Selected individuals or families or those of  descent may consider EGD with extended duodenoscopy every 3-5 years beginning at age 40.   2018 - see above Will  address GERD and afford screening-with upcoming colonoscopy   Urothelial cancer Consider annual urinalysis at age 30-35 in MSH6+ families with family history of urothelial cancers Due now July 2023   Pancreatic screening for MSH6+ with 1st or 2nd degree relatives with pancreatic cancer on Reed side of family Annual contrast-enhanced MRI/MRCP and/or EUS, with consideration of shorter screening intervals for individuals found to have worrisome abnormalities on screening.     Most small cystic lesions found on screening will not warrant biopsy, surgical resection, or any other intervention.   No pancreatic cancer in family   Review at next visit   Prostate Screening  Annual PSA test with general population screening; may begin earlier if younger prostate cancers in the family NA NA   Central Nervous System cancer Annual physical/neurological examinations starting at age 25-30. 7/20/2022 July 2023       There are data to suggest that aspirin may decrease the risk of colon cancer in Reed Syndrome.  However,  optimal dose and duration of aspirin therapy is uncertain.    There have been suggestions that there is an increased risk for breast cancer in Reed Syndrome patients; however, there is not enough evidence to support increased screening above average risk breast cancer screening.     I spent a total of 52 minutes on the day of the visit. Please see the note for further information on patient assessment and treatment.    Radha Rodriguez, APRN-CNS, OCN, AGN-BC  Clinical Nurse Specialist  Cancer Risk Management Program  [facility]    CC: Alphonse Arellano MD

## 2022-07-20 NOTE — PROGRESS NOTES
Christina is a 42 year old who is being evaluated via a billable video visit.     Pt has pain in lower abdomin, GI region.     How would you like to obtain your AVS? MyChart  If the video visit is dropped, the invitation should be resent by: Text to cell phone: [phone]  Will anyone else be joining your video visit? No      Ronit Georges LUZ    Video-Visit Details    Video Start Time: 1545    Type of service:  Video Visit    Video End Time:4:11 PM    Originating Location (pt. 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     Primary Physician: Alphonse Arellano MD     History Of Present Illness:  Ms. Santana is a very pleasant, healthy 42 year old female who presents with PMS2+ associated Reed Syndrome.     Genetic Testin2021-  POSITIVE for a PMS2 likely pathogenic mutation. Specifically her mutation is called p.[id number] (also known as c.2113G>A) identified using Cancer Next-Expanded testing from [employer].   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 This variant is called p.A417S (c.1249G>T). No other variants or mutations were found in the STK11 gene. Given the uncertain significance of this result, medical management decisions should NOT be made based on this test result alone.     Christina is negative for known, pathogenic mutations in the AIP, ALK, APC, CARINA, AXIN2, BAP1, BARD1, BLM, BMPR1A, BRCA1, BRCA2, BRIP1, CDC73, CDH1, CDK4, CDKN1B, CDKN2A, CHEK2, CTNNA1, DICER1, EPCAM, EGFR, EGLN1, FANCC, FH, FLCN, GALNT12, GREM1, HOXB13, KIF1B, KIT,  LZTR1, MAX, MEN1, MET, MITF, MLH1, MRE11, MSH2, MSH3, MSH6, MUTYH, NBN, NF1, NF2, NTHL1, PALB2, PDGFRA, PHOX2B, POLD1, POLE, POT1, OFUEW9D, PTCH1, PTEN, RAD50, RAD51C, RAD51D, RB1, RECQL, RET, SDHA, SDHAF2, SDHB, SDHC, SDHD, SMAD4, SMARCA4, SMARCB1, SMARCE1, STK11, SUFU, LXVI987, TP53, TSC1, TSC2, VHL, and XRCC2 genes. No pathogenic mutations were found in any of the other 76 of 77 genes analyzed. This test involved sequencing and deletion/duplication analysis of all genes with the exceptions of VHL and XRCC2 (sequencing and deletion/duplication); EGFR, EGLN1, HOXB13, KIT, MITF, PDGFRA, POLD1 and POLE (sequencing only); EPCAM and GREM1 (deletion/duplication only).     History:    s/p  x6  2021- Total laparoscopic hysterectomy and bilateral salpingo oophorectomy (Dr. Dylon Alcantar), pathology benign  Currently using estradiol patch for post menopausal symptoms.     12/10/2021- Vaginal cuff at 9:00, biopsies:  - Benign acutely and chronically inflamed granulation tissue.  - No vaginal tissue identified.     B.  Vaginal cuff at 3:00, biopsy:  - Benign acutely and chronically inflamed granulation tissue.  - A portion of benign vaginal tissue with normal mature squamous epithelium and benign stroma with focal moderate chronic inflammation.     Screening history:  EGD 10/31/2018- Dysphagia by Dr Harris   1. LA Grade A (one or more mucosal breaks less than 5 mm, not extending between tops of 2 mucosal folds) esophagitis with no bleeding was found 34 to 36 cm from the incisors.  ESOPHAGUS, DISTAL, BIOPSY:   1. Esophageal eosinophilia (peak count of 10), see comment : The differential diagnosis primarily includes eosinophilic esophagitis (EoE), proton-pump inhibitor-responsive esophageal eosinophilia (PPI-REE) and gastroesophageal reflux disease (GERD) with more eosinophils than typical. ACG guidelines recommend further endoscopic evaluation with re-biopsy after a two month trial of high dose proton-pump  inhibitor (PPI) therapy to further investigate these three possibilities  2. Negative for columnar mucosa  3. A medium-sized hiatal hernia was present.  The ampulla, duodenal bulb, first portion of the duodenum and second portion of the duodenum were normal.  One benign-appearing, intrinsic mild stenosis was found 36 to 37 cm from the incisors. tenosis measured 1 cm (in length).   The stenosis was traversed. A TTS dilator was passed through the scope. Dilation with a 12-13.5-15 mm balloon dilator was performed to 15 mm.   The dilation site was examined and showed moderate mucosal disruption.      6/10/2021- Colonoscopy ([place]), Hemorrhoids found on perianal exam.  One 2 mm hyperplastic polyp in the rectum    Review of Systems:  GENERAL: No change in weight, sleep or appetite.  Normal energy.  No fever or chills  EYES: Negative for vision changes or eye problems  ENT: No problems with ears, nose or throat.  No difficulty swallowing.   RESP: No coughing, wheezing or shortness of breath  CV: No chest pains or palpitations  GI: Notes some heartburn and reflux, which she is not sure is correlated with hiatal hernia No nausea, vomiting,  heartburn, abdominal pain, diarrhea, constipation or change in bowel habits  : No urinary frequency or dysuria, bladder or kidney problems  MUSCULOSKELETAL: Being treated with physical therapy for pelvic pain.  NEUROLOGIC: No headaches, numbness, tingling, weakness, problems with balance or coordination  PSYCHIATRIC: No problems with anxiety, depression or mental health  HEME/IMMUNE/ALLERGY: No history of bleeding or clotting problems or anemia.  No allergies or immune system problems  ENDOCRINE: No history of thyroid disease, diabetes or other endocrine disorders  SKIN: No rashes,worrisome lesions or skin problems    Past Medical/Surgical History:  Past Medical History:   Diagnosis Date     ASCUS with positive high risk HPV 12/27/2013     HSIL (high grade squamous intraepithelial  lesion) on Pap smear of cervix 04/22/2013     Pap smear of cervix with ASCUS, cannot exclude HGSIL 5/23/11, 8/10/12,      PMS2-related Reed syndrome (HNPCC4)      Past Surgical History:   Procedure Laterality Date     APPENDECTOMY       AS REPAIR PARAESOPHAGEAL HIATAL HERNIA,LAPAROTOMY, W MESH       BIOPSY VULVA N/A 12/10/2021    Procedure: Vaginal cuff biopsy;  Surgeon: Leyda Guzman DO;  Location: [facility] OR     COLONOSCOPY N/A 6/10/2021    Procedure: COLONOSCOPY, WITH POLYPECTOMY;  Surgeon: Willi Galeas DO;  Location: [facility] GI     EXAM UNDER ANESTHESIA PELVIC N/A 12/10/2021    Procedure: EXAM UNDER ANESTHESIA, PELVIS;  Surgeon: Leyda Guzman DO;  Location: [facility] OR     LAPAROSCOPIC HYSTERECTOMY TOTAL, BILATERAL SALPINGO-OOPHORECTOMY, COMBINED N/A 2/24/2021    Procedure: HYSTERECTOMY, TOTAL, LAPAROSCOPIC, WITH SALPINGO-OOPHORECTOMY;  Surgeon: Dylon Alcantar MD;  Location: [facility] OR       Allergies:  Allergies as of 07/20/2022 - Reviewed 07/20/2022   Allergen Reaction Noted     Latex Shortness Of Breath, Swelling, and Rash 05/19/2021       Current Medications:  No current outpatient medications on file.        Family History:  Family History   Problem Relation Age of Onset     Fibroids Mother      Reed Syndrome Mother         PMS2+     Colon Polyps Mother         removed 5 feet of colon for unmanageable polyps     Bladder Cancer Maternal Grandfather 70        lived to be 94     Kidney Cancer Maternal Grandfather      Breast Cancer Paternal Grandmother         postmenopausal     Ovarian Cancer Maternal Aunt 40        possible uterine cancer also; hx of fibroids     Ovarian Cancer Maternal Aunt 40        possible uterine cancer also; hx of fibroids     Breast Cancer Paternal Aunt 54       Social History:  Social History     Socioeconomic History     Marital status:      Spouse name: Thai     Number of children: 6     Years of education: Not on file     Highest education level: Not on file    Occupational History     Employer: [employer]   Tobacco Use     Smoking status: Never Smoker     Smokeless tobacco: Never Used   Vaping Use     Vaping Use: Never used   Substance and Sexual Activity     Alcohol use: Yes     Comment: occassional     Drug use: No     Sexual activity: Yes     Partners: Male     Birth control/protection: Female Surgical   Other Topics Concern     Parent/sibling w/ CABG, MI or angioplasty before 65F 55M? Not Asked   Social History Narrative     Not on file     Social Determinants of Health     Financial Resource Strain: Not on file   Food Insecurity: Not on file   Transportation Needs: Not on file   Physical Activity: Not on file   Stress: Not on file   Social Connections: Not on file   Intimate Partner Violence: Not on file   Housing Stability: Not on file       Physical Exam:  LMP 02/03/2021   GENERAL: Healthy, alert and no distress  EYES: Eyes grossly normal to inspection.  No discharge or erythema, or obvious scleral/conjunctival abnormalities.  RESP: No audible wheeze, cough, or visible cyanosis.  No visible retractions or increased work of breathing.    SKIN: Visible skin clear. No significant rash, abnormal pigmentation or lesions.  NEURO: Cranial nerves grossly intact.  Mentation and speech appropriate for age.  PSYCH: Mentation appears normal, affect normal/bright, judgement and insight intact, normal speech and appearance well-groomed.      Laboratory/Imaging Studies  No results found for any visits on 07/20/22.    ASSESSMENT  We reviewed her interval history; her mother was recently tested for Reed Syndrome and was found to have Christina's genetic mutation. She was found to have innumerable colon polyps and had a hemicolectomy, with 5 feet of her colon removed. She has had a THBSO and will be followed at [facility].      Christina has been dealing with pelvic pain since her hysterectomy and is not sure whether it is scar tissue and whether it requires a surgical intervention to  improve. Despite intermittent pelvic pain, she ran [employer]'s half marathon in June and is planning to run another half marathon in October in [place]. She has good spirits and will make her appointments for an EGD/colonoscopy and labs at [facility] and then follow with me in one year.  We discussed that since she is having some issues with heartburn and has not had an EGD since 2018; we should add it this year; if there are no findings she may consider repeating it in 4-5 years.  I encouraged her to keep her colonoscopies annually, though, as any precancerous lesions could be removed at that time. She will also schedule a lab visit for urine tests.       She is aware that PMS2+ mutations are estimated to be prevalent in about 1:714 people in the general population, while mutations in any of the Reed  Syndrome genes is estimated to be prevalent in 1:279 people (Kamaljit et al 2017). The following is the table of risks outlined in NCCN Guidelines.    Site  Estimated Average Age of Presentation for PMS2+ carriers Cumulative Risk for Diagnosis Through Age 80 Cumulative Risk for Diagnosis Through Lifetime for people in the general population     Colorectal     61-66 years    8.7-20%    4.2%   Endometrial  49-50 years     13-26%    3.1%     Ovarian     51-59 years      3%    1.3%     Renal pelvis and/or ureter     No data   <1 % -3.7%   Less than 1%     Bladder     71 years  <1%-2.4 2.4%   Gastric  inadequate date  Inadequate data 0.9%   Small bowel  Single case - 69 years    0.1% -0.3%   0.3%     Pancreas    No data    <1%--1.6%   1.6%     Biliary tract    No data   0.2-<1%    0.2%     Prostate     No data    4.6%-11.6%    11.6%   Breast (female)    No data  8.1-12.8%    12.8%     Brain    40 years    0.6%-<1%    0.6%       Individualized Surveillance Plan for Reed Syndrome   (MSH6+ and PMS2+ mutation carriers)   Based on NCCN Guidelines Version 1.2022   Type of Screening Recommendation Last Done Next Due   Colon Cancer  Screening Colonoscopy at age 30-35 years or 2-5 years prior to the earliest colon cancer in the family if it is diagnosed prior to age 30.     Repeat every 1-2 years.  6/10/2021- Colonoscopy ([place]), Hemorrhoids found on perianal exam.  One 2 mm hyperplastic polyp in the rectum Due now in conjunction with EGD   Endometrial and Ovarian Cancer Consider Prophylactic hysterectomy and bilateral salpingo-oophorectomy (BSO) can be considered by women who have completed childbearing.    Insufficient evidence exists to make specific recommendation for RRSO in MSH6+ and PMS2+ carriers.   2/24/2021- Total laparoscopic hysterectomy and bilateral salpingo oophorectomy (Dr. Dylon Alcantar), pathology benign NA    Screening using  endometrial sampling is an option every 1-2 years; women should be aware that dysfunctional uterine bleeding warrants evaluation.    Data do not support ovarian cancer screening for Reed Syndrome. Annual transvaginal ultrasound and  tests may be considered at a clinician's discretion.   NA   NA   Gastric and small bowel cancer Upper GI screening every 2-4 years, starting at age 30 for MSH6+ carriers    PMS2+ carriers - Selected individuals or families or those of  descent may consider EGD with extended duodenoscopy every 3-5 years beginning at age 40.   2018 - see above Will  address GERD and afford screening-with upcoming colonoscopy   Urothelial cancer Consider annual urinalysis at age 30-35 in MSH6+ families with family history of urothelial cancers Due now July 2023   Pancreatic screening for MSH6+ with 1st or 2nd degree relatives with pancreatic cancer on Reed side of family Annual contrast-enhanced MRI/MRCP and/or EUS, with consideration of shorter screening intervals for individuals found to have worrisome abnormalities on screening.     Most small cystic lesions found on screening will not warrant biopsy, surgical resection, or any other intervention.   No pancreatic cancer in  family   Review at next visit   Prostate Screening  Annual PSA test with general population screening; may begin earlier if younger prostate cancers in the family NA NA   Central Nervous System cancer Annual physical/neurological examinations starting at age 25-30. 7/20/2022 July 2023       There are data to suggest that aspirin may decrease the risk of colon cancer in Reed Syndrome.  However, optimal dose and duration of aspirin therapy is uncertain.    There have been suggestions that there is an increased risk for breast cancer in Reed Syndrome patients; however, there is not enough evidence to support increased screening above average risk breast cancer screening.     I spent a total of 52 minutes on the day of the visit. Please see the note for further information on patient assessment and treatment.    Radha Rodriguez, APRN-CNS, OCN, AGN-BC  Clinical Nurse Specialist  Cancer Risk Management Program  [facility]    CC: Alphonse Arellano MD

## 2022-07-20 NOTE — PATIENT INSTRUCTIONS
Individualized Surveillance Plan for Reed Syndrome   (MSH6+ and PMS2+ mutation carriers)   Based on NCCN Guidelines Version 1.2022   Type of Screening Recommendation Last Done Next Due   Colon Cancer Screening Colonoscopy at age 30-35 years or 2-5 years prior to the earliest colon cancer in the family if it is diagnosed prior to age 30.     Repeat every 1-2 years.  6/10/2021- Colonoscopy ([place]), Hemorrhoids found on perianal exam.  One 2 mm hyperplastic polyp in the rectum Due now in conjunction with EGD   Endometrial and Ovarian Cancer Consider Prophylactic hysterectomy and bilateral salpingo-oophorectomy (BSO) can be considered by women who have completed childbearing.    Insufficient evidence exists to make specific recommendation for RRSO in MSH6+ and PMS2+ carriers.   2/24/2021- Total laparoscopic hysterectomy and bilateral salpingo oophorectomy (Dr. Dylon Alcantar), pathology benign NA    Screening using  endometrial sampling is an option every 1-2 years; women should be aware that dysfunctional uterine bleeding warrants evaluation.    Data do not support ovarian cancer screening for Reed Syndrome. Annual transvaginal ultrasound and  tests may be considered at a clinician's discretion.   NA   NA   Gastric and small bowel cancer Upper GI screening every 2-4 years, starting at age 30 for MSH6+ carriers    PMS2+ carriers - Selected individuals or families or those of  descent may consider EGD with extended duodenoscopy every 3-5 years beginning at age 40.   2018 Will  to address GERD and afford screening-with upcoming colonoscopy   Urothelial cancer Consider annual urinalysis at age 30-35 in MSH6+ families with family history of urothelial cancers Due now July 2023   Pancreatic screening for MSH6+ with 1st or 2nd degree relatives with pancreatic cancer on Reed side of family Annual contrast-enhanced MRI/MRCP and/or EUS, with consideration of shorter screening intervals for individuals found to  have worrisome abnormalities on screening.     Most small cystic lesions found on screening will not warrant biopsy, surgical resection, or any other intervention.   No pancreatic cancer in family   Review at next visit   Prostate Screening  Annual PSA test with general population screening; may begin earlier if younger prostate cancers in the family NA NA   Central Nervous System cancer Annual physical/neurological examinations starting at age 25-30. 7/20/2022 July 2023       There are data to suggest that aspirin may decrease the risk of colon cancer in Reed Syndrome.  However, optimal dose and duration of aspirin therapy is uncertain.    There have been suggestions that there is an increased risk for breast cancer in Reed Syndrome patients; however, there is not enough evidence to support increased screening above average risk breast cancer screening.

## 2022-07-21 ENCOUNTER — DOCUMENTATION ONLY (OUTPATIENT)
Dept: ONCOLOGY | Facility: CLINIC | Age: 42
End: 2022-07-21

## 2022-07-21 ENCOUNTER — HOSPITAL ENCOUNTER (OUTPATIENT)
Dept: PHYSICAL THERAPY | Facility: CLINIC | Age: 42
Setting detail: THERAPIES SERIES
Discharge: HOME | End: 2022-07-21
Attending: OBSTETRICS & GYNECOLOGY
Payer: COMMERCIAL

## 2022-07-21 PROCEDURE — 97140 MANUAL THERAPY 1/> REGIONS: CPT | Mod: GP | Performed by: PHYSICAL THERAPIST

## 2022-07-21 NOTE — PROGRESS NOTES
CLINICAL NUTRITION SERVICES     Reason for Contact: Patient was contacted by phone due to requested to speak with a Dietitian on the Oncology Distress Screening tool.    Action: RD called patient indicating reason for phone call. Left a VM with a return call back number.     Follow up: Wait for a return phone call.    Yaa Manzo RD, LD  [phone]

## 2022-07-25 ENCOUNTER — HOSPITAL ENCOUNTER (OUTPATIENT)
Dept: PHYSICAL THERAPY | Facility: CLINIC | Age: 42
Setting detail: THERAPIES SERIES
Discharge: HOME | End: 2022-07-25
Attending: OBSTETRICS & GYNECOLOGY
Payer: COMMERCIAL

## 2022-07-25 PROCEDURE — 97140 MANUAL THERAPY 1/> REGIONS: CPT | Mod: GP | Performed by: PHYSICAL THERAPIST

## 2022-07-25 PROCEDURE — 97110 THERAPEUTIC EXERCISES: CPT | Mod: GP | Performed by: PHYSICAL THERAPIST

## 2022-07-28 ENCOUNTER — TELEPHONE (OUTPATIENT)
Dept: GASTROENTEROLOGY | Facility: CLINIC | Age: 42
End: 2022-07-28

## 2022-07-28 ENCOUNTER — TELEPHONE (OUTPATIENT)
Dept: INTERNAL MEDICINE | Facility: CLINIC | Age: 42
End: 2022-07-28

## 2022-07-28 ENCOUNTER — LAB (OUTPATIENT)
Dept: LAB | Facility: CLINIC | Age: 42
End: 2022-07-28
Payer: COMMERCIAL

## 2022-07-28 DIAGNOSIS — Z15.09 PMS2-RELATED LYNCH SYNDROME (HNPCC4): ICD-10-CM

## 2022-07-28 LAB
ALBUMIN UR-MCNC: NEGATIVE MG/DL
APPEARANCE UR: CLEAR
BILIRUB UR QL STRIP: NEGATIVE
COLOR UR AUTO: YELLOW
GLUCOSE UR STRIP-MCNC: NEGATIVE MG/DL
HGB UR QL STRIP: ABNORMAL
KETONES UR STRIP-MCNC: NEGATIVE MG/DL
LEUKOCYTE ESTERASE UR QL STRIP: NEGATIVE
MUCOUS THREADS #/AREA URNS LPF: PRESENT /LPF
NITRATE UR QL: NEGATIVE
PH UR STRIP: 6 [PH] (ref 5–7)
RBC URINE: 1 /HPF
SP GR UR STRIP: 1.02 (ref 1–1.03)
UROBILINOGEN UR STRIP-MCNC: NORMAL MG/DL
WBC URINE: 0 /HPF

## 2022-07-28 PROCEDURE — 81001 URINALYSIS AUTO W/SCOPE: CPT

## 2022-07-28 PROCEDURE — 88112 CYTOPATH CELL ENHANCE TECH: CPT | Performed by: PATHOLOGY

## 2022-07-28 NOTE — TELEPHONE ENCOUNTER
I am completely full tomorrow. Can see her at 10:40 on Monday.   If pain is severe may need ER and abd CT scan.

## 2022-07-28 NOTE — TELEPHONE ENCOUNTER
"RN has attempted to contact this patient by phone to relay message from provider below, but there is no response.  Left message to \"call clinic at earliest convenience\".  Will try again later.     ENEIDA ChapaN, RN       "

## 2022-07-28 NOTE — TELEPHONE ENCOUNTER
Patient has been having Sharp back pain for 2 weeks, oncologist did UA showed blood in urine. She needs to be seen and possible referral. Patient requesting work appointment with Dr. Arellano Friday.     Verenice Molina MA     7/28/2022

## 2022-07-28 NOTE — TELEPHONE ENCOUNTER
Screening Questions    BlueKIND OF PREP RedLOCATION [review exclusion criteria] GreenSEDATION TYPE      1. Are you active on mychart? Y    2. What insurance is in the chart? BLUE PLUS      3.   Ordering/Referring Provider: LY     4. BMI   (If greater than 40 review exclusion criteria [PAC APPT IF [MAC] @ [facility])  30.6  [If yes, BMI OVER 40-EXTENDED PREP]      **(Sedation review/consideration needed)**  Do you have a legal guardian or Medical Power of    and/or are you able to give consent for your medical care?     Y    5. Have you had a positive covid test in the last 90 days?   N - N    6.  Are you currently on dialysis?   N [ If yes, G-PREP & HOSPITAL setting ONLY]     7.  Do you have chronic kidney disease?  N [ If yes, G-PREP ]    8.   Do you have a diagnosis of diabetes?   N   [ If yes, G-PREP ]    9.  On a regular basis do you go 3-5 days between bowel movements?   N   [ If yes, EXTENDED PREP]    10.  Are you taking any prescription pain medications on a routine schedule?    N - N [ If yes, EXTENDED PREP] [If yes, MAC]      11.   Do you have any chemical dependencies such as alcohol, street drugs, or methadone?    N [If yes, MAC]    12.   Do you have any history of post-traumatic stress syndrome, severe anxiety or history of psychosis?    N  [If yes, MAC]    13.  [FEMALES] Are you currently pregnant? N    If yes, how many weeks?       Respiratory/Heart Screening:  [If yes to any of the following HOSPITAL setting only]     14. Do you have Pulmonary Hypertension [Lungs]?   N       15. Do you have UNCONTROLLED asthma?   N     16.  Do you use daily home oxygen?  N      17. Do you have mod to severe Obstructive Sleep Apnea?         (OKAY @ [facility]  [facility]  [facility]  [facility]  [facility]  [facility] - if pt is not on OXYGEN)  N      18.   Have you had a heart or lung transplant?   N      19.   Have you had a stroke or Transient ischemic attack (TIA - aka  mini stroke ) within 6 months?  (If yes, please review exclusion criteria)  N      20.   In the past 6 months, have you had any heart related issues including cardiomyopathy or heart attack?   N           If yes, did it require cardiac stenting or other implantable device?   N      21.   Do you have any implantable devices in your body (pacemaker, defib, LVAD)? (If yes, please review exclusion criteria)  N   22.  Do you take the medication Phentermine?     Yes-> Hold for 7 days before procedure.  Please consult your prescribing provider if you have questions about holding this medication.     No-> Continue to next question.    23. Do you take nitroglycerin?   N           If yes, how often? N  (if yes, HOSPITAL setting ONLY)    24.  Are you currently taking any blood thinners?    [If yes, INFORM patient to follw up w/ ORDERING PROVIDER FOR BRIDGING INSTRUCTIONS]     N    25.   Do you transfer independently?                (If NO, please HOSPITAL setting ONLY)  Y    26.   Preferred LOCAL Pharmacy for Pre Prescription:         [facility] - [place] - [address] [place] [address] [facility] [place] [address]     Scheduling Details  (Please ask for phone number if not scheduled by patient)      Caller : DIONISIO  Date of Procedure: 10/13  Surgeon: CHETNA  Location:         Sedation Type:  l MAC  Conscious Sedation- Needs  for 6 hours after the procedure  MAC/General-Needs  for 24 hours after procedure    N :[Pre-op Required] at [facility]  [facility]  MG and OR for MAC sedation   (advise patient they will need a pre-op WITH IN 30 DAYS of procedure date)     Type of Procedure Scheduled:   Upper and Lower Endoscopy [EGD and Colonoscopy]    Which Colonoscopy Prep was Sent?:   DEEDEE [facility]  PATIENTS & [facility] PATIENTS NEEDS EXTENDED PREP       Informed patient they will need an adult  Y  Cannot take any type of public or medical transportation alone    Pre-Procedure Covid test to be completed at [facility] or  Externally: Y  **INFORMED OF HOME TESTING & LAB OPTION**        Confirmed Nurse will call to complete assessment Y    Additional comments: N

## 2022-07-29 LAB
PATH REPORT.COMMENTS IMP SPEC: NORMAL
PATH REPORT.FINAL DX SPEC: NORMAL
PATH REPORT.GROSS SPEC: NORMAL
PATH REPORT.MICROSCOPIC SPEC OTHER STN: NORMAL
PATH REPORT.RELEVANT HX SPEC: NORMAL

## 2022-07-29 NOTE — TELEPHONE ENCOUNTER
RN called patient. Patient states pain is 8/10. RN suggested ER to patient. Patient was willing but wanted to see about appointment on Monday and will let us know. RN advised that if pain continues or gets worse then she should go to ER. Patient understood.     ENEIDA ChapaN, RN

## 2022-08-02 ENCOUNTER — OFFICE VISIT (OUTPATIENT)
Dept: UROLOGY | Facility: CLINIC | Age: 42
End: 2022-08-02
Attending: CLINICAL NURSE SPECIALIST
Payer: COMMERCIAL

## 2022-08-02 ENCOUNTER — HOSPITAL ENCOUNTER (OUTPATIENT)
Dept: CT IMAGING | Facility: CLINIC | Age: 42
Discharge: HOME | End: 2022-08-02
Attending: UROLOGY | Admitting: UROLOGY
Payer: COMMERCIAL

## 2022-08-02 VITALS
BODY MASS INDEX: 30.77 KG/M2 | WEIGHT: 184.7 LBS | DIASTOLIC BLOOD PRESSURE: 86 MMHG | HEIGHT: 65 IN | SYSTOLIC BLOOD PRESSURE: 126 MMHG

## 2022-08-02 DIAGNOSIS — R31.9 HEMATURIA, UNSPECIFIED TYPE: ICD-10-CM

## 2022-08-02 DIAGNOSIS — Z15.09 PMS2-RELATED LYNCH SYNDROME (HNPCC4): ICD-10-CM

## 2022-08-02 LAB
ALBUMIN UR-MCNC: NEGATIVE MG/DL
APPEARANCE UR: CLEAR
BACTERIA #/AREA URNS HPF: ABNORMAL /HPF
BILIRUB UR QL STRIP: NEGATIVE
COLOR UR AUTO: YELLOW
GLUCOSE UR STRIP-MCNC: NEGATIVE MG/DL
HGB UR QL STRIP: NEGATIVE
KETONES UR STRIP-MCNC: NEGATIVE MG/DL
LEUKOCYTE ESTERASE UR QL STRIP: NEGATIVE
MUCOUS THREADS #/AREA URNS LPF: PRESENT /LPF
NITRATE UR QL: NEGATIVE
PH UR STRIP: 5.5 [PH] (ref 5–7)
RBC URINE: <1 /HPF
SP GR UR STRIP: >=1.03 (ref 1–1.03)
SQUAMOUS EPITHELIAL: <1 /HPF
UROBILINOGEN UR STRIP-MCNC: NORMAL MG/DL
WBC URINE: <1 /HPF

## 2022-08-02 PROCEDURE — 74176 CT ABD & PELVIS W/O CONTRAST: CPT

## 2022-08-02 PROCEDURE — 99204 OFFICE O/P NEW MOD 45 MIN: CPT | Performed by: UROLOGY

## 2022-08-02 PROCEDURE — 81001 URINALYSIS AUTO W/SCOPE: CPT | Performed by: UROLOGY

## 2022-08-02 NOTE — PATIENT INSTRUCTIONS
Call [phone] to schedule CT    Schedule virtual visit follow up with Dr Dunham to go over results of CT.

## 2022-08-02 NOTE — PROGRESS NOTES
"Christina Santana is a 42 year old female seen in consultation for back pain. Consult from Alphonse Arellano.      Pt reports hx severe back pain about 3 weeks ago, bilat, left >> right. Pt rx'd with increasing fluid, pain was intermittent for several days then improved. Pt then returned last week.     Pain presently \"4\" on 10-scale, was a \"6\"  Yesterday.    Denies dysuria, gross hematuria, frequency. Voids about q 2-3 hours. Noc x 1-2.    Occas AISHA, does not wear a pad.    Has a hx of back pain; has seen chiropracter, accupuncturist, message therapy without significant improvement.    Positive FH of nephrolithiasis in \"lots of men\" in her family including her brother, father, uncle. Hx bladder cancer in her grandfather who had some tobacco hx.    Pt has no personal or FH of nephrolithiasis. Non-smoker.    Denies prior  hx, bladder surgery, use of bladder meds. Occas AISHA, does not wear a pad.     Hx 6 vag deliveries, hyster. Not on HRT.     Drinks 0-1 coffee per day, 3 large jugs of water \"because I'm a runner.\"    Reviewed PMH in detail including REED syndrome, vaginal bleeding >> hyster    Works here at [facility] [employer].        Past Medical History:   Diagnosis Date     ASCUS with positive high risk HPV 12/27/2013     HSIL (high grade squamous intraepithelial lesion) on Pap smear of cervix 04/22/2013     Pap smear of cervix with ASCUS, cannot exclude HGSIL 5/23/11, 8/10/12,      PMS2-related Reed syndrome (HNPCC4)        Past Surgical History:   Procedure Laterality Date     APPENDECTOMY       AS REPAIR PARAESOPHAGEAL HIATAL HERNIA,LAPAROTOMY, W MESH       BIOPSY VULVA N/A 12/10/2021    Procedure: Vaginal cuff biopsy;  Surgeon: Leyda Guzman DO;  [facility]     COLONOSCOPY N/A 6/10/2021    Procedure: COLONOSCOPY, WITH POLYPECTOMY;  Surgeon: Willi Galeas DO;  [facility]     EXAM UNDER ANESTHESIA PELVIC N/A 12/10/2021    Procedure: EXAM UNDER ANESTHESIA, PELVIS;  Surgeon: Leyda Guzman DO; "  Location[facility]     LAPAROSCOPIC HYSTERECTOMY TOTAL, BILATERAL SALPINGO-OOPHORECTOMY, COMBINED N/A 2/24/2021    Procedure: HYSTERECTOMY, TOTAL, LAPAROSCOPIC, WITH SALPINGO-OOPHORECTOMY;  Surgeon: Dylon Alcantar MD;  Location: [facility]       Social History     Socioeconomic History     Marital status:      Spouse name: Thai     Number of children: 6     Years of education: Not on file     Highest education level: Not on file   Occupational History     Employer:  [employer]   Tobacco Use     Smoking status: Never Smoker     Smokeless tobacco: Never Used   Vaping Use     Vaping Use: Never used   Substance and Sexual Activity     Alcohol use: Yes     Comment: occassional     Drug use: No     Sexual activity: Yes     Partners: Male     Birth control/protection: Female Surgical   Other Topics Concern     Parent/sibling w/ CABG, MI or angioplasty before 65F 55M? Not Asked   Social History Narrative     Not on file     Social Determinants of Health     Financial Resource Strain: Not on file   Food Insecurity: Not on file   Transportation Needs: Not on file   Physical Activity: Not on file   Stress: Not on file   Social Connections: Not on file   Intimate Partner Violence: Not on file   Housing Stability: Not on file       No current outpatient medications on file.       Physical Exam:    GENL: NAD. No CVAT. (Remainder of exam deferred)                   Results for orders placed or performed in visit on 08/02/22   UA reflex to Microscopic     Status: Abnormal   Result Value Ref Range    Color Urine Yellow Colorless, Straw, Light Yellow, Yellow    Appearance Urine Clear Clear    Glucose Urine Negative Negative mg/dL    Bilirubin Urine Negative Negative    Ketones Urine Negative Negative mg/dL    Specific Gravity Urine >=1.030 1.003 - 1.035    Blood Urine Negative Negative    pH Urine 5.5 5.0 - 7.0    Protein Albumin Urine Negative Negative mg/dL    Urobilinogen Urine Normal Normal, 2.0 mg/dL    Nitrite  Urine Negative Negative    Leukocyte Esterase Urine Negative Negative    Bacteria Urine Few (A) None Seen /HPF    RBC Urine <1 <=2 /HPF    WBC Urine <1 <=5 /HPF    Squamous Epithelials Urine <1 <=1 /HPF    Mucus Urine Present (A) None Seen /LPF    Narrative    Microscopic not indicated                 (reviewed images with patient)        IMP:  1. Back pain, strong FH nephrolithiasis, Reed Syndrome      PLAN:  1. Discussed situation with patient in detail.  2. CT abd/pelvis stone protocol >> virtual visit to review  3. Discussed water consumption; pt expresses understanding  4. Total time spent in reviewing patient records, discussing history, performing exam, discussing diagnosis, outlining treatment plan and documentation: 45 minutes

## 2022-08-08 ENCOUNTER — HOSPITAL ENCOUNTER (OUTPATIENT)
Dept: PHYSICAL THERAPY | Facility: CLINIC | Age: 42
Setting detail: THERAPIES SERIES
Discharge: HOME | End: 2022-08-08
Attending: OBSTETRICS & GYNECOLOGY
Payer: COMMERCIAL

## 2022-08-08 PROCEDURE — 97140 MANUAL THERAPY 1/> REGIONS: CPT | Mod: GP | Performed by: PHYSICAL THERAPIST

## 2022-08-15 ENCOUNTER — HOSPITAL ENCOUNTER (OUTPATIENT)
Dept: PHYSICAL THERAPY | Facility: CLINIC | Age: 42
Setting detail: THERAPIES SERIES
Discharge: HOME | End: 2022-08-15
Attending: OBSTETRICS & GYNECOLOGY
Payer: COMMERCIAL

## 2022-08-15 PROCEDURE — 90912 BFB TRAINING 1ST 15 MIN: CPT | Mod: GP | Performed by: PHYSICAL THERAPIST

## 2022-08-15 PROCEDURE — 97110 THERAPEUTIC EXERCISES: CPT | Mod: GP | Performed by: PHYSICAL THERAPIST

## 2022-08-15 PROCEDURE — 90913 BFB TRAINING EA ADDL 15 MIN: CPT | Mod: GP | Performed by: PHYSICAL THERAPIST

## 2022-08-16 ENCOUNTER — VIRTUAL VISIT (OUTPATIENT)
Dept: UROLOGY | Facility: CLINIC | Age: 42
End: 2022-08-16
Payer: COMMERCIAL

## 2022-08-16 DIAGNOSIS — G89.29 CHRONIC MIDLINE THORACIC BACK PAIN: Primary | ICD-10-CM

## 2022-08-16 DIAGNOSIS — M54.6 CHRONIC MIDLINE THORACIC BACK PAIN: Primary | ICD-10-CM

## 2022-08-16 PROCEDURE — 99213 OFFICE O/P EST LOW 20 MIN: CPT | Mod: 95 | Performed by: UROLOGY

## 2022-08-16 NOTE — PROGRESS NOTES
"F/u back pain, Reed Syndrom, FH nephrolithiasis, negative CT abd/pelvis, 1/19    Still with some back pain \"but I think I'm learning to live with it.\"    Denies dysuria, gross hematuria, passage of stones    CT from 8/2/22 reviewed in detail: negative       IMP:  1. Back pain, no evidence of  etiology      PLAN:  1. Discussed situation with patient in detail.  2. Moderate flu  3. RTC to us only PRN  4. Total time spent in reviewing patient's previous records, discussion with patient and documentation: 20 minutes        "

## 2022-08-22 ENCOUNTER — HOSPITAL ENCOUNTER (OUTPATIENT)
Dept: PHYSICAL THERAPY | Facility: CLINIC | Age: 42
Setting detail: THERAPIES SERIES
Discharge: HOME | End: 2022-08-22
Attending: OBSTETRICS & GYNECOLOGY
Payer: COMMERCIAL

## 2022-08-22 PROCEDURE — 97110 THERAPEUTIC EXERCISES: CPT | Mod: GP | Performed by: PHYSICAL THERAPIST

## 2022-08-22 PROCEDURE — 97140 MANUAL THERAPY 1/> REGIONS: CPT | Mod: GP | Performed by: PHYSICAL THERAPIST

## 2022-08-22 NOTE — PROGRESS NOTES
[facility]     OUTPATIENT PHYSICAL THERAPY  PLAN OF TREATMENT FOR OUTPATIENT REHABILITATION AND PROGRESS NOTE           Patient's Last Name, First Name, Christina Sanders Date of Birth  1980   Provider's Name  [facility] Medical Record No.  [id number]    Onset Date  2/15/22 Start of Care Date  6/15/2022   Type:     _X_PT   ___OT   ___SLP Medical Diagnosis  Pelvic pain   PT  Diagnosis  Reduced soft tissue flexibility trigger points, reduced sacral and coccyx mobility, reduced pelvic muscle coordination and strength, myofascial mobility restrictions of the lower abdomen and pelvis Plan of Treatment  Frequency/Duration: 1-2x/week Certification date from 8/22/22 to 10/17/22      Goals:  Goal Identifier Pelvic pain   Goal Description Patient will have greater than 50% improvement in post intercourse related pelvic pain in 8 weeks   Target Date 10/17/22   Date Met      Progress (detail required for progress note): States overall improvement in lower abdominal/pelvic cramping and pain to be about 30% better.     Goal Identifier Strength   Goal Description Patient improved pelvic muscle activation via PERF 3+/5/5/10 to better engage mms needed to support pelvis during exercise in 8 weeks   Target Date 10/17/22   Date Met      Progress (detail required for progress note): 3-/5/10/7     Goal Identifier Coordination   Goal Description Patient will demonstrate normal muscle coordination via biofeedback short and long holds, and elimination sequencing is demonstration of improved pelvic muscle coordination in 7 weeks   Target Date 10/17/22   Date Met      Progress (detail required for progress note): See notes below, patient's coordination of evidence on biofeedback has improved quite a bit; resting well was 3.0 incomplete contracted 20  V, endurance  remains limited and  loses greater than 50% of her power after 3 to 4 seconds of the long hold, cannot complete continuous long hold evidenced on biofeedback with abductor assist.  Continues to work in supine to manage the gravity effect on her strengthening program          Beginning/End Dates of Progress Note Reporting Period: 6/13/20 to 8/22/2022    Progress Toward Goals:   Progress this reporting period: Progress noted    Client Self (Subjective) Report for Progress Note Reporting Period: No new concerns, using a ball for her knee squeeze exercises. Pain this AM, states couldnt fall asleep last d/t L hip pain, started to ache some when she was bent over harvesting the garden, loosened up a bit with walking. This was the same L hip pain she had PT for last year.    Objective Measurements:   Objective Measure: PERF  Details: 3-/5/10/7, Full 10 sec hold with adductor assist observed/palpated. Felt qucik holds still hard to fully relax between  Objective Measure: Core/PFM strength  Details: SLR with loss of trunk control L with TPR L. Reduced lower abdominal recruitment with tabletop hookyling position                     I CERTIFY THE NEED FOR THESE SERVICES FURNISHED UNDER        THIS PLAN OF TREATMENT AND WHILE UNDER MY CARE     (Physician co-signature of this document indicates review and certification of the therapy plan).                 Referring Provider: Dr Megan Dockery, PT

## 2022-08-30 ENCOUNTER — HOSPITAL ENCOUNTER (OUTPATIENT)
Dept: PHYSICAL THERAPY | Facility: CLINIC | Age: 42
Setting detail: THERAPIES SERIES
Discharge: HOME | End: 2022-08-30
Attending: OBSTETRICS & GYNECOLOGY
Payer: COMMERCIAL

## 2022-08-30 PROCEDURE — 97110 THERAPEUTIC EXERCISES: CPT | Mod: GP | Performed by: PHYSICAL THERAPIST

## 2022-09-08 ENCOUNTER — HOSPITAL ENCOUNTER (OUTPATIENT)
Dept: PHYSICAL THERAPY | Facility: CLINIC | Age: 42
Setting detail: THERAPIES SERIES
Discharge: HOME | End: 2022-09-08
Attending: OBSTETRICS & GYNECOLOGY
Payer: COMMERCIAL

## 2022-09-08 DIAGNOSIS — M53.3 SI (SACROILIAC) JOINT DYSFUNCTION: Primary | ICD-10-CM

## 2022-09-08 PROCEDURE — 97110 THERAPEUTIC EXERCISES: CPT | Mod: GP | Performed by: PHYSICAL THERAPIST

## 2022-09-12 ENCOUNTER — HOSPITAL ENCOUNTER (OUTPATIENT)
Dept: PHYSICAL THERAPY | Facility: CLINIC | Age: 42
Setting detail: THERAPIES SERIES
Discharge: HOME | End: 2022-09-12
Attending: OBSTETRICS & GYNECOLOGY
Payer: COMMERCIAL

## 2022-09-12 PROCEDURE — 97110 THERAPEUTIC EXERCISES: CPT | Mod: GP,59 | Performed by: PHYSICAL THERAPIST

## 2022-09-12 PROCEDURE — 90913 BFB TRAINING EA ADDL 15 MIN: CPT | Mod: GP | Performed by: PHYSICAL THERAPIST

## 2022-09-12 PROCEDURE — 90912 BFB TRAINING 1ST 15 MIN: CPT | Mod: GP | Performed by: PHYSICAL THERAPIST

## 2022-09-16 ENCOUNTER — TELEPHONE (OUTPATIENT)
Dept: GASTROENTEROLOGY | Facility: CLINIC | Age: 42
End: 2022-09-16

## 2022-09-16 NOTE — TELEPHONE ENCOUNTER
Caller: Christina Santana    Procedure: EGD/ COLONOSCOPY     Date, Location, and Surgeon of Procedure Cancelled: 10/13/22/ [facility]  DR BASILIO    Ordering Provider:Radha Rodriguez APRN CNS    Reason for cancel (please be detailed, any staff messages or encounters to note?): PER PT  UNABLE TO GET WORK OFF         Rescheduled: Y     If rescheduled:    Date: 12/01/22   Location:    Prep Resent: Y(changes to prep?)   Covid Test Rescheduled: Y  HOME TEST   Note any change or update to original order/sedation:

## 2022-09-21 ENCOUNTER — PATIENT OUTREACH (OUTPATIENT)
Dept: OBGYN | Facility: CLINIC | Age: 42
End: 2022-09-21

## 2022-09-22 ENCOUNTER — HOSPITAL ENCOUNTER (OUTPATIENT)
Dept: PHYSICAL THERAPY | Facility: CLINIC | Age: 42
Setting detail: THERAPIES SERIES
Discharge: HOME | End: 2022-09-22
Attending: OBSTETRICS & GYNECOLOGY
Payer: COMMERCIAL

## 2022-09-22 PROCEDURE — 97140 MANUAL THERAPY 1/> REGIONS: CPT | Mod: GP | Performed by: PHYSICAL THERAPIST

## 2022-09-22 PROCEDURE — 97110 THERAPEUTIC EXERCISES: CPT | Mod: GP | Performed by: PHYSICAL THERAPIST

## 2022-10-09 ENCOUNTER — HEALTH MAINTENANCE LETTER (OUTPATIENT)
Age: 42
End: 2022-10-09

## 2022-10-13 ENCOUNTER — HOSPITAL ENCOUNTER (OUTPATIENT)
Dept: PHYSICAL THERAPY | Facility: CLINIC | Age: 42
Setting detail: THERAPIES SERIES
Discharge: HOME | End: 2022-10-13
Attending: OBSTETRICS & GYNECOLOGY
Payer: COMMERCIAL

## 2022-10-13 PROCEDURE — 97110 THERAPEUTIC EXERCISES: CPT | Mod: GP | Performed by: PHYSICAL THERAPIST

## 2022-10-27 ENCOUNTER — HOSPITAL ENCOUNTER (OUTPATIENT)
Dept: PHYSICAL THERAPY | Facility: CLINIC | Age: 42
Setting detail: THERAPIES SERIES
Discharge: HOME | End: 2022-10-27
Attending: OBSTETRICS & GYNECOLOGY
Payer: COMMERCIAL

## 2022-10-27 PROCEDURE — 97110 THERAPEUTIC EXERCISES: CPT | Mod: GP | Performed by: PHYSICAL THERAPIST

## 2022-10-28 NOTE — PROGRESS NOTES
[facility]     OUTPATIENT PHYSICAL THERAPY  PLAN OF TREATMENT FOR OUTPATIENT REHABILITATION AND PROGRESS NOTE           Patient's Last Name, First Name, Christina Sanders Date of Birth  1980   Provider's Name  [facility] Medical Record No.  [id number]    Onset Date  2/15/22 Start of Care Date  6/15/2022   Type:     _X_PT   ___OT   ___SLP Medical Diagnosis  Pelvic pain   PT  Diagnosis  Reduced soft tissue flexibility trigger points, reduced sacral and coccyx mobility, reduced pelvic muscle coordination and strength, myofascial mobility restrictions of the lower abdomen and pelvis Plan of Treatment  Frequency/Duration: Biweekly x 10 weeks Certification date from  10/18/22- 12/27/22      Goals:  Goal Identifier Pelvic pain   Goal Description Patient will have greater than 50% improvement in post intercourse related pelvic pain in 8 weeks   Target Date 10/17/22   Date Met   Met 10/13/22   Progress (detail required for progress note): States overall improvement in lower abdominal/pelvic cramping and pain to be about 80% better.     Goal Identifier Strength   Goal Description Patient improved pelvic muscle activation via PERF 3+/5/5/10 to better engage mms needed to support pelvis during exercise in 8 weeks   Target Date 10/17/22 Extended to 12/27/22   Date Met      Progress (detail required for progress note): 3-/5/10/7 Progressing overall, still needing help progressing upright/functional     Goal Identifier Coordination   Goal Description Patient will demonstrate normal muscle coordination via biofeedback short and long holds, and elimination sequencing is demonstration of improved pelvic muscle coordination in 7 weeks   Target Date 10/17/22 Extended to 12/27/22   Date Met      Progress (detail required for progress note): Coordination is  improving, progressing her exercises and will reassess biofeedback upcoming.           Beginning/End Dates of Progress Note Reporting Period:8/22-10/13/22    Progress Toward Goals:   Progress this reporting period: Progress noted    Client Self (Subjective) Report for Progress Note Reporting Period: Doing better overall and all complaints improving- including pelvic cramping post intercourse, Hip pain, joint laxity and weakness.     Objective Measurements:     Objective Measure: Core/PFM strength  Details: SLR with loss of trunk control L with TPR L is improving, quadruped still demoing poor L SI and trunk stabilization techniques    Palpation  No obvious pelvic mechanical dysfunction, correction of R anterior innominate self MET 1x/week. Using SI belt for stability most days which is really helpful                  I CERTIFY THE NEED FOR THESE SERVICES FURNISHED UNDER        THIS PLAN OF TREATMENT AND WHILE UNDER MY CARE     (Physician co-signature of this document indicates review and certification of the therapy plan).                 Referring Provider: Dr Megan Dockery, PT

## 2022-11-04 ENCOUNTER — HOSPITAL ENCOUNTER (EMERGENCY)
Facility: CLINIC | Age: 42
Discharge: HOME | End: 2022-11-05
Attending: FAMILY MEDICINE | Admitting: FAMILY MEDICINE
Payer: COMMERCIAL

## 2022-11-04 ENCOUNTER — NURSE TRIAGE (OUTPATIENT)
Dept: NURSING | Facility: CLINIC | Age: 42
End: 2022-11-04

## 2022-11-04 DIAGNOSIS — Z90.710 S/P COMPLETE HYSTERECTOMY: ICD-10-CM

## 2022-11-04 DIAGNOSIS — N93.9 VAGINAL SPOTTING: ICD-10-CM

## 2022-11-04 PROCEDURE — 99284 EMERGENCY DEPT VISIT MOD MDM: CPT | Performed by: FAMILY MEDICINE

## 2022-11-04 PROCEDURE — 99282 EMERGENCY DEPT VISIT SF MDM: CPT | Performed by: FAMILY MEDICINE

## 2022-11-05 VITALS
RESPIRATION RATE: 18 BRPM | SYSTOLIC BLOOD PRESSURE: 127 MMHG | OXYGEN SATURATION: 99 % | HEART RATE: 77 BPM | TEMPERATURE: 98.2 F | DIASTOLIC BLOOD PRESSURE: 94 MMHG

## 2022-11-05 ASSESSMENT — ACTIVITIES OF DAILY LIVING (ADL): ADLS_ACUITY_SCORE: 35

## 2022-11-05 NOTE — ED PROVIDER NOTES
[facility] ED Provider Note   Patient: Christina Santana  MRN #:  [id number]  Date of Visit: November 5, 2022    CC:     Chief Complaint   Patient presents with     Vaginal Bleeding     HPI:  Christina Santana is a 42 year old female who presented to the emergency department with an episode of vaginal spotting and the amount of a panty liner at around 6:30 PM Friday evening.  This was preceded by some pelvic cramping.  Patient states that she had a total hysterectomy in February 2021 for various reasons including a genetic predisposition and an abnormal ovary.  Patient has not had any further bleeding.  She called the nurse triage line and was told to come to the ER.  Patient does not have any ovaries.  She has had normal bowel movements and normal urination.    Problem List:  Patient Active Problem List    Diagnosis Date Noted     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     Priority: Medium     Added automatically from request for surgery [id number]       RUQ pain 08/07/2020     Priority: Medium     Added automatically from request for surgery [id number]       Nausea 08/07/2020     Priority: Medium     Added automatically from request for surgery [id number]       Calculus of gallbladder without cholecystitis without obstruction 08/07/2020     Priority: Medium     Added automatically from request for surgery [id number]       Cervicalgia 06/08/2020     Priority: Medium     Muscle spasm 06/08/2020     Priority: Medium     Sinus pressure 03/04/2020     Priority: Medium     Tension type headache 03/04/2020     Priority: Medium     Allergic rhinitis due to dust mite 03/04/2020     Priority: Medium     Seasonal allergic rhinitis due to pollen 03/04/2020     Priority: Medium     Allergic rhinitis due to animal dander 03/04/2020     Priority: Medium     Allergic rhinitis due to mold 03/04/2020     Priority: Medium     GERD with stricture 02/25/2020     Priority:  Medium     Encounter for insertion of mirena IUD 09/13/2018     Priority: Medium     S/P LEEP (status post loop electrosurgical excision procedure) 04/22/2013     Priority: Medium     5/23/11 ASC-H  6/20/11 colp- no high grade lesion noted  8/10/12 ASC-H  4/22/13 pap HSIL  5/1/13 colp. BREEZY 2/3. Plan: LEEP  5/13/13 LEEP. BREEZY 2/3 with + margins  8/28/13 colp- WNL  12/27/13 pap ASCUS + HR HPV  1/29/14 colp- WNL  8/27/14 pap NIL.        BREEZY III (cervical intraepithelial neoplasia grade III) with severe dysplasia 01/01/2013     Priority: Medium     5/2011- ASC-H  6/2011- colposcopy- suspicious for HPV  8/2012- ASC-H  4/2013- HSIL  5/1/2013- colposcopy- BREEZY 2-3  5/13/2013 LEEP: BREEZY II-III, Positive Margins  8/2013- ECC- cervicitis  12/2013 ASCUS, HPV +  1/2014- colposcopy- negative  8/2014- NIL  7/2015- NIL/HPV negative   3/2017- NIL/HPV +  4/2017- colposcopy- suggestive of BREEZY I  4/2018- NIL/HPV negative  9/10/19 NIL/HPV negative   2/24/21 TLH/BSO-benign pathology  10/8/21 NIL pap Neg HPV of vaginal cuff. Colpo completed for vaginal bleeding. Plan: Given discomfort during exam and biopsy attempt despite lidocaine, plan to perform EUA and biopsy under anesthesia in the OR. 12/10/21 Vaginal cuff biopsies- negative. Plan: cotest in 3 years.   1/19/22 Post op appt- cancelled   2/15/22 Appt- Pap due 10/8/22  7/27/23 visit Oncology       Astigmatism 10/03/2003     Priority: Medium     Myopia 10/03/2003     Priority: Medium       Past Medical History:   Diagnosis Date     ASCUS with positive high risk HPV 12/27/2013     HSIL (high grade squamous intraepithelial lesion) on Pap smear of cervix 04/22/2013     Pap smear of cervix with ASCUS, cannot exclude HGSIL 5/23/11, 8/10/12,      PMS2-related Reed syndrome (HNPCC4)        MEDS: No current outpatient medications on file.      ALLERGIES:    Allergies   Allergen Reactions     Latex Shortness Of Breath, Swelling and Rash       Past Surgical History:   Procedure Laterality Date      APPENDECTOMY       AS REPAIR PARAESOPHAGEAL HIATAL HERNIA,LAPAROTOMY, W MESH       BIOPSY VULVA N/A 12/10/2021    Procedure: Vaginal cuff biopsy;  Surgeon: Leyda Guzman DO;  [facility]     COLONOSCOPY N/A 6/10/2021    Procedure: COLONOSCOPY, WITH POLYPECTOMY;  Surgeon: Willi Galeas DO;  [facility]     EXAM UNDER ANESTHESIA PELVIC N/A 12/10/2021    Procedure: EXAM UNDER ANESTHESIA, PELVIS;  Surgeon: Leyda Guzman DO;  [facility]     LAPAROSCOPIC HYSTERECTOMY TOTAL, BILATERAL SALPINGO-OOPHORECTOMY, COMBINED N/A 2/24/2021    Procedure: HYSTERECTOMY, TOTAL, LAPAROSCOPIC, WITH SALPINGO-OOPHORECTOMY;  Surgeon: Dylon Alcantar MD;  Location: [facility]       Social History     Tobacco Use     Smoking status: Never     Smokeless tobacco: Never   Vaping Use     Vaping Use: Never used   Substance Use Topics     Alcohol use: Yes     Comment: occassional     Drug use: No         Review of Systems   Except as noted in HPI, all other systems were reviewed and are negative    Physical Exam     Vitals were reviewed  Patient Vitals for the past 8 hrs:   BP Temp Temp src Pulse Resp SpO2   11/04/22 2346 (!) 127/94 98.2  F (36.8  C) Oral 77 18 99 %     GENERAL APPEARANCE: Alert and oriented x3, no acute distress  FACE: normal facies  EYES: Pupils are equal  HENT: normal external exam  NECK: no adenopathy or asymmetry  RESP: normal respiratory effort;  CV: regular rate and rhythm; no significant murmurs, gallops or rubs  ABD: soft, mild lower abdominal tenderness bilaterally; hyperactive bowel sounds  PELVIC: Speculum exam reveals healthy vaginal tissue.  There is small clotted blood, but no active bleeding.  SKIN: no worrisome rash  NEURO: no facial droop; no focal deficits, speech is normal        Available Lab/Imaging Results   No results found for this or any previous visit (from the past 24 hour(s)).        Impression     Final diagnoses:   Vaginal spotting   S/P complete hysterectomy          ED Course & Medical Decision Making   Christina Santana is a 42 year old female who presented to the emergency department with an episode of vaginal spotting preceded by lower abdominal/pelvic cramping.  Patient reports that the symptoms are extremely unusual given that she had a total hysterectomy back in February 2021.  She denies having any intercourse or inserting anything into the vagina when this episode started.  She has been experiencing some pelvic cramping earlier today like she is about to have her menstrual..  She noticed some bright red blood and pink color to a panty-liner.  She has not had any moira bleeding or discharge.  Patient was seen shortly after arrival.  Temperature is 98.2, blood pressure 127/94, heart rate of 77, respiratory rate of 18 with 99% oxygen saturation.  Speculum exam reveals healthy vaginal tissue with 2 small blood clots that measure approximately 3 to 5 mm.  There is no abnormal discharge or any signs of moira bleeding.  I offered some advanced imaging such as a CT scan of the abdomen to see if there may be some other causes for her pelvic cramping.  We discussed possible causes of abnormal spotting/bleeding, and 1 of those possibilities include endometriosis.  Avoid inserting anything into the vagina. Follow-up with Dr. Guzman within the next week.  Return to the ED at any time if she develops new or worsening symptoms.      Written after-visit summary and instructions were given at the time of discharge.    Follow up Plan:   Leyda Guzman DO  [address]  [place] [address]  [phone]    Schedule an appointment as soon as possible for a visit       [facility] Emergency Dept  [address] [place] [address] Dr [place] [address] [phone]    If symptoms worsen      Discharge Instructions:   Your speculum exam did not reveal any active bleeding.  Your symptoms suggest the possibility of endometriosis.  Please follow-up with Dr. Crabtree  Anthony in the clinic next week.  Return to the emergency department this weekend if you develop any new or worsening symptoms.  Take ibuprofen/Tylenol as needed for pain.       Disclaimer: This note consists of words and symbols derived from keyboarding and dictation using voice recognition software.  As a result, there may be errors that have gone undetected.  Please consider this when interpreting information found in this note.       Jing Mullen MD  11/05/22 0413

## 2022-11-05 NOTE — DISCHARGE INSTRUCTIONS
Your speculum exam did not reveal any active bleeding.  Your symptoms suggest the possibility of endometriosis.  Please follow-up with Dr. Leyda Cruz in the clinic next week.  Return to the emergency department this weekend if you develop any new or worsening symptoms.  Take ibuprofen/Tylenol as needed for pain.

## 2022-11-05 NOTE — TELEPHONE ENCOUNTER
Nurse Triage SBAR    Is this a 2nd Level Triage? YES, LICENSED PRACTITIONER REVIEW IS REQUIRED    Situation:   Patient is calling to report spotting and abd & back pain.    Background:   Today around 4pm, she started to have lower abd and back cramping- she thought was indigestion.  At 6pm, she developed some vaginal spotting - red to deep pink and no clots. She still continues to spot.  She has tried using a heating pad but no tylenol or ibuprofen yet.  She was going to try to sleep it off but pain has gotten worse to 7-8/10.  She had a full hysterectomy on Feb 2021.    Assessment:   Patient states that she is almost certain that the spotting is coming from her vagina and not anywhere else.    Protocol Recommended Disposition:   See HCP Within 4 Hours (Or PCP Triage)    Recommendation:   Ok to send patient to the ED?     Paged to provider     11:17 PM paged Dr. Guzman  11:20 PM - ER - If pain is this bad, she should be seen in the ED.  Md states that her spotting could be from something else.     Does the patient meet one of the following criteria for ADS visit consideration? 16+ years old, with an [facility] PCP     TIP  Providers, please consider if this condition is appropriate for management at one of our Acute and Diagnostic Services sites.     If patient is a good candidate, please use dotphrase <dot>triageresponse and select Refer to ADS to document.    Provider Recommendation Follow Up:   Reached patient/caregiver. Informed of provider's recommendations. Patient verbalized understanding and agrees with the plan.           Reason for Disposition    [1] Constant abdominal pain AND [2] present > 2 hours    Additional Information    Negative: [1] SEVERE abdominal pain (e.g., excruciating) AND [2] present > 1 hour    Negative: Patient sounds very sick or weak to the triager    Negative: [1] Yellow or green vaginal discharge AND [2] fever    Negative: [1] Genital area looks infected (e.g., draining sore, spreading  redness) AND [2] fever    Protocols used: VAGINAL [id number]

## 2022-11-23 NOTE — PROGRESS NOTES
[facility]    Outpatient Physical Therapy Discharge Note  Patient: Christina Santana  : 1980    Beginning/End Dates of Reporting Period:  12/10/2021 to 2022    Referring Provider: Dr. Alphonse Arellano    Therapy Diagnosis: Compensation patterns resulting in asymmetrical mobility and function, hypermobility, weakness     Client Self Report: 2 bad nights were she couldn't get comfortable.  Both hips were hurting, tried heat, prone lying, and ibuprofen but nothing really touched.   Ankle and foot symptoms are much better.  Overall improving and feelign she knows what she needs to do to avoid increased pain.    Objective Measurements:  Objective Measure: LEFS  Details: As of 12/10/21: 71/80 = 88.75%  Objective Measure: Strength  Details: Improved TA firing, LE strength  Objective Measure: Palpation  Details: Tightness along L hip flexor and iliospoas.     Goals:  Goal Identifier #1   Goal Description Pt will demonstrate ability to perform 10 squats without compensation patterns and symptom free in the L hip in order to be more functional with lifting and sit/stand transfers.   Target Date 22   Date Met      Progress (detail required for progress note): 3/10, not able to perform with pain.     Goal Identifier #2   Goal Description Pt will be able to complete an 8 hour work day with <2/10 pain in the LEs in order to return home without increasing pain.   Target Date 21   Date Met  12/10/21   Progress (detail required for progress note): Agree that this is achieved.  <2/10 pain with working.     Goal Identifier #3   Goal Description Pt will report >80% on the LEFS demonstrating improved functional mobility with less symptoms.   Target Date 21   Date Met  12/10/21   Progress (detail required for progress note): 71/80 = 88.75%     Plan:  Discharge from therapy.    Discharge:    Reason for  No contrast administered during procedure. Amount: 0 mL. Discharge: Met 2 of 3 therapy goals.    Equipment Issued: none    Discharge Plan: Patient to continue home program.

## 2022-11-30 ENCOUNTER — ANESTHESIA EVENT (OUTPATIENT)
Dept: GASTROENTEROLOGY | Facility: CLINIC | Age: 42
End: 2022-11-30
Payer: COMMERCIAL

## 2022-11-30 RX ORDER — ONDANSETRON 2 MG/ML
4 INJECTION INTRAMUSCULAR; INTRAVENOUS
Status: CANCELLED | OUTPATIENT
Start: 2022-11-30

## 2022-11-30 RX ORDER — LIDOCAINE 40 MG/G
CREAM TOPICAL
Status: CANCELLED | OUTPATIENT
Start: 2022-11-30

## 2022-11-30 ASSESSMENT — LIFESTYLE VARIABLES: TOBACCO_USE: 1

## 2022-12-01 ENCOUNTER — HOSPITAL ENCOUNTER (OUTPATIENT)
Facility: CLINIC | Age: 42
Discharge: HOME | End: 2022-12-01
Attending: SURGERY | Admitting: SURGERY
Payer: COMMERCIAL

## 2022-12-01 ENCOUNTER — ANESTHESIA (OUTPATIENT)
Dept: GASTROENTEROLOGY | Facility: CLINIC | Age: 42
End: 2022-12-01
Payer: COMMERCIAL

## 2022-12-01 VITALS
OXYGEN SATURATION: 97 % | TEMPERATURE: 98.8 F | RESPIRATION RATE: 16 BRPM | HEART RATE: 73 BPM | HEIGHT: 65 IN | DIASTOLIC BLOOD PRESSURE: 83 MMHG | SYSTOLIC BLOOD PRESSURE: 109 MMHG | WEIGHT: 184 LBS | BODY MASS INDEX: 30.66 KG/M2

## 2022-12-01 LAB
COLONOSCOPY: NORMAL
UPPER GI ENDOSCOPY: NORMAL

## 2022-12-01 PROCEDURE — G0105 COLORECTAL SCRN; HI RISK IND: HCPCS | Performed by: SURGERY

## 2022-12-01 PROCEDURE — 88305 TISSUE EXAM BY PATHOLOGIST: CPT | Mod: 26 | Performed by: PATHOLOGY

## 2022-12-01 PROCEDURE — 250N000011 HC RX IP 250 OP 636: Performed by: NURSE ANESTHETIST, CERTIFIED REGISTERED

## 2022-12-01 PROCEDURE — 45378 DIAGNOSTIC COLONOSCOPY: CPT | Performed by: SURGERY

## 2022-12-01 PROCEDURE — 88305 TISSUE EXAM BY PATHOLOGIST: CPT | Mod: TC | Performed by: SURGERY

## 2022-12-01 PROCEDURE — 43235 EGD DIAGNOSTIC BRUSH WASH: CPT | Mod: 51 | Performed by: SURGERY

## 2022-12-01 PROCEDURE — 43235 EGD DIAGNOSTIC BRUSH WASH: CPT | Performed by: SURGERY

## 2022-12-01 PROCEDURE — 250N000009 HC RX 250: Performed by: NURSE ANESTHETIST, CERTIFIED REGISTERED

## 2022-12-01 PROCEDURE — 370N000017 HC ANESTHESIA TECHNICAL FEE, PER MIN: Performed by: SURGERY

## 2022-12-01 PROCEDURE — 258N000003 HC RX IP 258 OP 636: Performed by: NURSE ANESTHETIST, CERTIFIED REGISTERED

## 2022-12-01 RX ORDER — PROCHLORPERAZINE MALEATE 5 MG
10 TABLET ORAL EVERY 6 HOURS PRN
Status: DISCONTINUED | OUTPATIENT
Start: 2022-12-01 | End: 2022-12-01 | Stop reason: HOSPADM

## 2022-12-01 RX ORDER — LIDOCAINE HYDROCHLORIDE 20 MG/ML
INJECTION, SOLUTION INFILTRATION; PERINEURAL PRN
Status: DISCONTINUED | OUTPATIENT
Start: 2022-12-01 | End: 2022-12-01

## 2022-12-01 RX ORDER — FLUMAZENIL 0.1 MG/ML
0.2 INJECTION, SOLUTION INTRAVENOUS
Status: DISCONTINUED | OUTPATIENT
Start: 2022-12-01 | End: 2022-12-01 | Stop reason: HOSPADM

## 2022-12-01 RX ORDER — ONDANSETRON 2 MG/ML
4 INJECTION INTRAMUSCULAR; INTRAVENOUS EVERY 6 HOURS PRN
Status: DISCONTINUED | OUTPATIENT
Start: 2022-12-01 | End: 2022-12-01 | Stop reason: HOSPADM

## 2022-12-01 RX ORDER — SODIUM CHLORIDE, SODIUM LACTATE, POTASSIUM CHLORIDE, CALCIUM CHLORIDE 600; 310; 30; 20 MG/100ML; MG/100ML; MG/100ML; MG/100ML
INJECTION, SOLUTION INTRAVENOUS CONTINUOUS PRN
Status: DISCONTINUED | OUTPATIENT
Start: 2022-12-01 | End: 2022-12-01

## 2022-12-01 RX ORDER — NALOXONE HYDROCHLORIDE 0.4 MG/ML
0.4 INJECTION, SOLUTION INTRAMUSCULAR; INTRAVENOUS; SUBCUTANEOUS
Status: DISCONTINUED | OUTPATIENT
Start: 2022-12-01 | End: 2022-12-01 | Stop reason: HOSPADM

## 2022-12-01 RX ORDER — NALOXONE HYDROCHLORIDE 0.4 MG/ML
0.2 INJECTION, SOLUTION INTRAMUSCULAR; INTRAVENOUS; SUBCUTANEOUS
Status: DISCONTINUED | OUTPATIENT
Start: 2022-12-01 | End: 2022-12-01 | Stop reason: HOSPADM

## 2022-12-01 RX ORDER — ONDANSETRON 4 MG/1
4 TABLET, ORALLY DISINTEGRATING ORAL EVERY 6 HOURS PRN
Status: DISCONTINUED | OUTPATIENT
Start: 2022-12-01 | End: 2022-12-01 | Stop reason: HOSPADM

## 2022-12-01 RX ORDER — PROPOFOL 10 MG/ML
INJECTION, EMULSION INTRAVENOUS PRN
Status: DISCONTINUED | OUTPATIENT
Start: 2022-12-01 | End: 2022-12-01

## 2022-12-01 RX ORDER — PROPOFOL 10 MG/ML
INJECTION, EMULSION INTRAVENOUS CONTINUOUS PRN
Status: DISCONTINUED | OUTPATIENT
Start: 2022-12-01 | End: 2022-12-01

## 2022-12-01 RX ADMIN — LIDOCAINE HYDROCHLORIDE 50 MG: 20 INJECTION, SOLUTION INFILTRATION; PERINEURAL at 09:23

## 2022-12-01 RX ADMIN — SODIUM CHLORIDE, SODIUM LACTATE, POTASSIUM CHLORIDE, CALCIUM CHLORIDE: 600; 310; 30; 20 INJECTION, SOLUTION INTRAVENOUS at 09:13

## 2022-12-01 RX ADMIN — PROPOFOL 100 MG: 10 INJECTION, EMULSION INTRAVENOUS at 09:23

## 2022-12-01 RX ADMIN — PROPOFOL 200 MCG/KG/MIN: 10 INJECTION, EMULSION INTRAVENOUS at 09:23

## 2022-12-01 ASSESSMENT — ACTIVITIES OF DAILY LIVING (ADL): ADLS_ACUITY_SCORE: 35

## 2022-12-01 NOTE — ANESTHESIA POSTPROCEDURE EVALUATION
Patient: Christina Santana    Procedure: Procedure(s):  ESOPHAGOGASTRODUODENOSCOPY (EGD) with biopsy  COLONOSCOPY       Anesthesia Type:  MAC    Note:  Disposition: Outpatient   Postop Pain Control: Uneventful            Sign Out: Well controlled pain   PONV: No   Neuro/Psych: Uneventful            Sign Out: Acceptable/Baseline neuro status   Airway/Respiratory: Uneventful            Sign Out: Acceptable/Baseline resp. status   CV/Hemodynamics: Uneventful            Sign Out: Acceptable CV status   Other NRE: NONE   DID A NON-ROUTINE EVENT OCCUR? No    Event details/Postop Comments:  Pt was happy with anesthesia care.  No complications.  I will follow up with the pt if needed.           Last vitals:  Vitals Value Taken Time   /74 12/01/22 0958   Temp     Pulse 74 12/01/22 0958   Resp     SpO2 97 % 12/01/22 1000   Vitals shown include unvalidated device data.    Electronically Signed By: SHEYLA Sloan CRNA  December 1, 2022  10:02 AM

## 2022-12-01 NOTE — LETTER
Christina SÁNCHEZ [facility]  [address] [facility] [address]    December 7, 2022      Dear Christina,  This letter is to inform you of the results of your pathology report from your endoscopy.  Your pathology report was:  Final Diagnosis   Stomach, antrum: Biopsy:  - Antral-type mucosa with reactive gastropathy  - No Helicobacter pylori-like organisms seen on H&E examination   The above findings indicate some mild inflammation but no bacteria or anything else that would require additional treatment.  If you have further questions please don t hesitate to call our clinic at [phone].   Sincerely,     Willi Galeas, DO

## 2022-12-01 NOTE — ANESTHESIA CARE TRANSFER NOTE
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    Anesthesia Type:   MAC     Note:    Oropharynx: oropharynx clear of all foreign objects and spontaneously breathing  Level of Consciousness: awake  Oxygen Supplementation: room air    Independent Airway: airway patency satisfactory and stable  Dentition: dentition unchanged  Vital Signs Stable: post-procedure vital signs reviewed and stable  Report to RN Given: handoff report given  Patient transferred to: Phase II    Handoff Report: Identifed the Patient, Identified the Reponsible Provider, Reviewed the pertinent medical history, Discussed the surgical course, Reviewed Intra-OP anesthesia mangement and issues during anesthesia, Set expectations for post-procedure period and Allowed opportunity for questions and acknowledgement of understanding      Vitals:  Vitals Value Taken Time   /74 12/01/22 0958   Temp     Pulse 74 12/01/22 0958   Resp     SpO2 97 % 12/01/22 1000   Vitals shown include unvalidated device data.    Electronically Signed By: SHEYLA Sloan CRNA  December 1, 2022  10:02 AM

## 2022-12-01 NOTE — H&P
Patient seen for Endoscopy    HPI:  Patient is a 42 year old female with patrick syndrome here for EGD/csope for screening. Not taking blood thinning medications. No MI or CVA history. No issues with previous sedation. No recent acute illness.    Review Of Systems    Skin: negative  Ears/Nose/Throat: negative  Respiratory: No shortness of breath, dyspnea on exertion, cough, or hemoptysis  Cardiovascular: negative  Gastrointestinal: negative  Genitourinary: negative  Musculoskeletal: negative  Neurologic: negative  Hematologic/Lymphatic/Immunologic: negative  Endocrine: negative      Past Medical History:   Diagnosis Date     ASCUS with positive high risk HPV 12/27/2013     HSIL (high grade squamous intraepithelial lesion) on Pap smear of cervix 04/22/2013     Pap smear of cervix with ASCUS, cannot exclude HGSIL 5/23/11, 8/10/12,      PMS2-related Patrick syndrome (HNPCC4)        Past Surgical History:   Procedure Laterality Date     APPENDECTOMY       AS REPAIR PARAESOPHAGEAL HIATAL HERNIA,LAPAROTOMY, W MESH       BIOPSY VULVA N/A 12/10/2021    Procedure: Vaginal cuff biopsy;  Surgeon: Leyda Guzman DO;  [facility]     COLONOSCOPY N/A 6/10/2021    Procedure: COLONOSCOPY, WITH POLYPECTOMY;  Surgeon: Willi Galeas DO;  [facility]     EXAM UNDER ANESTHESIA PELVIC N/A 12/10/2021    Procedure: EXAM UNDER ANESTHESIA, PELVIS;  Surgeon: Leyda Guzman DO;  [facility]     LAPAROSCOPIC HYSTERECTOMY TOTAL, BILATERAL SALPINGO-OOPHORECTOMY, COMBINED N/A 2/24/2021    Procedure: HYSTERECTOMY, TOTAL, LAPAROSCOPIC, WITH SALPINGO-OOPHORECTOMY;  Surgeon: Dylon Alcantar MD;  Location: [facility]       Family History   Problem Relation Age of Onset     Fibroids Mother      Patrick Syndrome Mother         PMS2+     Colon Polyps Mother         removed 5 feet of colon for unmanageable polyps     Bladder Cancer Maternal Grandfather 70        lived to be 94     Kidney Cancer Maternal Grandfather      Breast  "Cancer Paternal Grandmother         postmenopausal     Ovarian Cancer Maternal Aunt 40        possible uterine cancer also; hx of fibroids     Ovarian Cancer Maternal Aunt 40        possible uterine cancer also; hx of fibroids     Breast Cancer Paternal Aunt 54       Social History     Socioeconomic History     Marital status:      Spouse name: Thai     Number of children: 6     Years of education: Not on file     Highest education level: Not on file   Occupational History     Employer:  [employer]   Tobacco Use     Smoking status: Never     Smokeless tobacco: Never   Vaping Use     Vaping Use: Never used   Substance and Sexual Activity     Alcohol use: Yes     Comment: occassional     Drug use: No     Sexual activity: Yes     Partners: Male     Birth control/protection: Female Surgical   Other Topics Concern     Parent/sibling w/ CABG, MI or angioplasty before 65F 55M? Not Asked   Social History Narrative     Not on file     Social Determinants of Health     Financial Resource Strain: Not on file   Food Insecurity: Not on file   Transportation Needs: Not on file   Physical Activity: Not on file   Stress: Not on file   Social Connections: Not on file   Intimate Partner Violence: Not on file   Housing Stability: Not on file       No current outpatient medications on file.       Medications and history reviewed    Physical exam:  Vitals: /79   Pulse 76   Temp 98.8  F (37.1  C) (Temporal)   Resp 16   Ht 1.651 m (5' 5\")   Wt 83.5 kg (184 lb)   LMP 02/03/2021   SpO2 97%   BMI 30.62 kg/m    BMI= Body mass index is 30.62 kg/m .    Constitutional: Healthy, alert, non-distressed   Head: Normo-cephalic, atraumatic, no lesions, masses or tenderness   Cardiovascular: RRR, no new murmurs, +S1, +S2 heart sounds, no clicks, rubs or gallops   Respiratory: CTAB, no rales, rhonchi or wheezing, equal chest rise, good respiratory effort   Gastrointestinal: Soft, non-tender, non distended, no rebound rigidity " or guarding, no masses or hernias palpated   : Deferred  Musculoskeletal: Moves all extremities, normal  strength, no deformities noted   Skin: No suspicious lesions or rashes   Psychiatric: Mentation appears normal, affect appropriate   Hematologic/Lymphatic/Immunologic: Normal cervical and supraclavicular lymph nodes   Patient able to get up on table without difficulty.    Labs show:  No results found for this or any previous visit (from the past 24 hour(s)).    Assessment: Endoscopy  Plan: Pt cleared for anesthesia for proposed procedure.    Willi Galeas DO

## 2022-12-01 NOTE — DISCHARGE INSTRUCTIONS
[facility]    Home Care Following Endoscopy          Activity:  You have just undergone an endoscopic procedure usually performed with conscious sedation.  Do not work or operate machinery (including a car) for at least 12 hours.    I encourage you to walk and attempt to pass this air as soon as possible.    Diet:  Return to the diet you were on before your procedure but eat lightly for the first 12-24 hours.  Drink plenty of water.  Resume any regular medications unless otherwise advised by your physician.  Please begin any new medication prescribed as a result of your procedure as directed by your physician.   If you had any biopsy or polyp removed please refrain from aspirin or aspirin products for 2 days.  If on Coumadin please restart as instructed by your physician.   Pain:  You may take Tylenol as needed for pain.  Expected Recovery:  You can expect some mild abdominal fullness and/or discomfort due to the air used to inflate your intestinal tract. It is also normal to have a mild sore throat after upper endoscopy.    Call Your Physician if You Have:  After Upper Endoscopy:  Shoulder, back or chest pain.  Difficulty breathing or swallowing.  Vomiting blood.  After Colonoscopy:  Worsening persisting abdominal pain which is worse with activity.  Fevers (>101 degrees F), chills or shakes.  Passage of continued blood with bowel movements.   Any questions or concerns about your recovery, please call [phone] or after hours [phone] Nurse Advice Line.    Follow-up Care:  You did have polyps/biopsy tissue sample(s) removed.  The polyps/biopsy tissue sample(s) will be sent to pathology.  You should receive letter in your My Chart with your results within 1-2 weeks. If you do not participate in My Chart a physical letter will come in the mail in 2-3 weeks.  Please call if you have not received a notification of your results.  If asked to return to clinic please make an appointment 1 week after  your procedure.  Call [phone].

## 2022-12-01 NOTE — ANESTHESIA PREPROCEDURE EVALUATION
Anesthesia Pre-Procedure Evaluation    Patient: Christina Santana   MRN: [id number] : 1980        Procedure : Procedure(s):  ESOPHAGOGASTRODUODENOSCOPY (EGD)  COLONOSCOPY          Past Medical History:   Diagnosis Date     ASCUS with positive high risk HPV 2013     HSIL (high grade squamous intraepithelial lesion) on Pap smear of cervix 2013     Pap smear of cervix with ASCUS, cannot exclude HGSIL 11, 8/10/12,      PMS2-related Reed syndrome (HNPCC4)       Past Surgical History:   Procedure Laterality Date     APPENDECTOMY       AS REPAIR PARAESOPHAGEAL HIATAL HERNIA,LAPAROTOMY, W MESH       BIOPSY VULVA N/A 12/10/2021    Procedure: Vaginal cuff biopsy;  Surgeon: Leyda Guzman DO;  [facility]     COLONOSCOPY N/A 6/10/2021    Procedure: COLONOSCOPY, WITH POLYPECTOMY;  Surgeon: Willi Galeas DO;  [facility]     EXAM UNDER ANESTHESIA PELVIC N/A 12/10/2021    Procedure: EXAM UNDER ANESTHESIA, PELVIS;  Surgeon: Leyda Guzman DO;  [facility]     LAPAROSCOPIC HYSTERECTOMY TOTAL, BILATERAL SALPINGO-OOPHORECTOMY, COMBINED N/A 2021    Procedure: HYSTERECTOMY, TOTAL, LAPAROSCOPIC, WITH SALPINGO-OOPHORECTOMY;  Surgeon: Dylon Alcantar MD;  Location: [facility]      Allergies   Allergen Reactions     Latex Shortness Of Breath, Swelling and Rash      Social History     Tobacco Use     Smoking status: Never     Smokeless tobacco: Never   Substance Use Topics     Alcohol use: Yes     Comment: occassional      Wt Readings from Last 1 Encounters:   22 83.8 kg (184 lb 11.2 oz)        Anesthesia Evaluation   Pt has had prior anesthetic. Type: General and MAC.    No history of anesthetic complications       ROS/MED HX  ENT/Pulmonary:     (+) allergic rhinitis, tobacco use,     Neurologic:  - neg neurologic ROS     Cardiovascular:     (+) -----Previous cardiac testing   Echo: Date: Results:    Stress Test: Date: Results:    ECG Reviewed: Date: 21  Results:  SR  Cath: Date: Results:      METS/Exercise Tolerance:     Hematologic:  - neg hematologic  ROS     Musculoskeletal:  - neg musculoskeletal ROS     GI/Hepatic:     (+) GERD, Asymptomatic on medication,     Renal/Genitourinary:  - neg Renal ROS     Endo:  - neg endo ROS     Psychiatric/Substance Use:  - neg psychiatric ROS     Infectious Disease:  - neg infectious disease ROS     Malignancy:  - neg malignancy ROS     Other:  - neg other ROS          Physical Exam    Airway  airway exam normal      Mallampati: II   TM distance: > 3 FB   Neck ROM: full   Mouth opening: > 3 cm    Respiratory Devices and Support         Dental  no notable dental history         Cardiovascular   cardiovascular exam normal       Rhythm and rate: regular and normal     Pulmonary   pulmonary exam normal        breath sounds clear to auscultation           OUTSIDE LABS:  CBC:   Lab Results   Component Value Date    WBC 7.1 08/05/2021    WBC 7.4 02/24/2021    HGB 13.0 08/05/2021    HGB 13.7 02/24/2021    HCT 39.8 08/05/2021    HCT 40.5 02/24/2021     08/05/2021     02/24/2021     BMP:   Lab Results   Component Value Date     08/05/2021     02/24/2021    POTASSIUM 3.8 08/05/2021    POTASSIUM 4.8 02/24/2021    CHLORIDE 107 08/05/2021    CHLORIDE 110 (H) 02/24/2021    CO2 28 08/05/2021    CO2 25 02/24/2021    BUN 10 08/05/2021    BUN 13 02/24/2021    CR 0.74 08/05/2021    CR 0.62 02/24/2021     (H) 08/05/2021    GLC 90 02/24/2021     COAGS: No results found for: PTT, INR, FIBR  POC:   Lab Results   Component Value Date    HCG Negative 02/24/2021     HEPATIC:   Lab Results   Component Value Date    ALBUMIN 3.7 08/05/2021    PROTTOTAL 7.0 08/05/2021    ALT 32 08/05/2021    AST 17 08/05/2021    ALKPHOS 63 08/05/2021    BILITOTAL 0.2 08/05/2021     OTHER:   Lab Results   Component Value Date    LACT 2.7 (H) 01/01/2019    A1C 5.1 10/28/2014    NERISSA 9.0 08/05/2021    LIPASE 212 08/05/2021    TSH 1.20 06/16/2021     T4 1.36 06/16/2021    CRP <2.9 08/05/2021    SED 7 06/16/2021       Anesthesia Plan    ASA Status:  2   NPO Status:  NPO Appropriate    Anesthesia Type: MAC.     - Reason for MAC: straight local not clinically adequate   Induction: Intravenous, Propofol.   Maintenance: TIVA.        Consents    Anesthesia Plan(s) and associated risks, benefits, and realistic alternatives discussed. Questions answered and patient/representative(s) expressed understanding.    - Discussed:     - Discussed with:  Patient      - Extended Intubation/Ventilatory Support Discussed: No.      - Patient is DNR/DNI Status: No    Use of blood products discussed: No .     - Discussed with: Patient.     - Consented: consented to blood products            Reason for refusal: other.     Postoperative Care    Pain management: IV analgesics.        Comments:    Other Comments: The risks and benefits of anesthesia, and the alternatives where applicable, have been discussed with the patient, and they wish to proceed.            SHEYLA Sloan CRNA

## 2022-12-05 LAB
PATH REPORT.COMMENTS IMP SPEC: NORMAL
PATH REPORT.FINAL DX SPEC: NORMAL
PATH REPORT.GROSS SPEC: NORMAL
PATH REPORT.MICROSCOPIC SPEC OTHER STN: NORMAL
PATH REPORT.RELEVANT HX SPEC: NORMAL
PHOTO IMAGE: NORMAL

## 2023-02-28 ENCOUNTER — OFFICE VISIT (OUTPATIENT)
Dept: INTERNAL MEDICINE | Facility: CLINIC | Age: 43
End: 2023-02-28
Payer: COMMERCIAL

## 2023-02-28 VITALS
TEMPERATURE: 98.5 F | OXYGEN SATURATION: 97 % | RESPIRATION RATE: 18 BRPM | HEART RATE: 79 BPM | DIASTOLIC BLOOD PRESSURE: 80 MMHG | SYSTOLIC BLOOD PRESSURE: 115 MMHG | BODY MASS INDEX: 32.45 KG/M2 | WEIGHT: 195 LBS

## 2023-02-28 DIAGNOSIS — R20.0 NUMBNESS AND TINGLING OF RIGHT ARM: Primary | ICD-10-CM

## 2023-02-28 DIAGNOSIS — R20.2 NUMBNESS AND TINGLING OF RIGHT ARM: Primary | ICD-10-CM

## 2023-02-28 PROCEDURE — 99214 OFFICE O/P EST MOD 30 MIN: CPT | Performed by: INTERNAL MEDICINE

## 2023-02-28 ASSESSMENT — PAIN SCALES - GENERAL: PAINLEVEL: SEVERE PAIN (6)

## 2023-02-28 NOTE — PROGRESS NOTES
"  Assessment & Plan   Problem List Items Addressed This Visit    None  Visit Diagnoses     Numbness and tingling of right arm    -  Primary    Relevant Orders    EMG         Patient who has a history of Reed syndrome she comes in with 2 months of right arm numbness and tingling which seems to be getting worse.  She has soreness and tightness in her shoulder, elbow tingling going down into the forearm and her hand throughout all 5 fingers.  Strength and reflexes are still present but she is dropping things at times.  I would like to start off with an EMG.  Then consider a CT scan of the chest or MRI of the neck if we do not find things on her EMG.  Continue to use Tylenol for pain control we did discuss gabapentin but will hold off on that for now.           BMI:   Estimated body mass index is 32.45 kg/m  as calculated from the following:    Height as of 12/1/22: 1.651 m (5' 5\").    Weight as of this encounter: 88.5 kg (195 lb).           No follow-ups on file.    Alphonse Arellano MD  [facility]    Raghav Vasquez is a 42 year old, presenting for the following health issues:  Musculoskeletal Problem (Right arm, shoulder wrist)    Musculoskeletal Problem    History of Present Illness       Reason for visit:  Right arm/elbow/wrist pain  Symptom onset:  1-2 weeks ago  Symptoms include:  Pain, numbness, tingling, sore muscle and joint pain  Symptom intensity:  Moderate  Symptom progression:  Worsening  Had these symptoms before:  No  What makes it worse:  Movement, touch    She eats 4 or more servings of fruits and vegetables daily.She consumes 0 sweetened beverage(s) daily.She exercises with enough effort to increase her heart rate 20 to 29 minutes per day.  She exercises with enough effort to increase her heart rate 4 days per week.   She is taking medications regularly.     Started a few months ago with arm moving up then would tingle and go numb.     Goes to chiropracter and massage every " 4 weeks.     Tight in the shoulder and elbow, then gets pins and needles down to the wrist and hand.   Drops things, toothbrush. Curling iron.     Some times is better then others,     No falls, or injury, no coughing, no chest pain.      Neck is tight but moves ok.         Review of Systems         Objective    /80   Pulse 79   Temp 98.5  F (36.9  C) (Oral)   Resp 18   Wt 88.5 kg (195 lb)   LMP 02/03/2021   SpO2 97%   BMI 32.45 kg/m    Body mass index is 32.45 kg/m .  Physical Exam   No acute distress  Right shoulder has good passive range of motion, pain with flexion above 90 degrees and with abduction above 110 degrees.  Mild pain at the AC joint.  No grinding on examination.  Good strength  Elbow has good range of motion  Reflexes are 2+ bilaterally throughout

## 2023-03-02 ENCOUNTER — TELEPHONE (OUTPATIENT)
Dept: INTERNAL MEDICINE | Facility: CLINIC | Age: 43
End: 2023-03-02
Payer: COMMERCIAL

## 2023-03-02 NOTE — TELEPHONE ENCOUNTER
Patient called and requested referral to be sent to new fax number. Faxed referral to [phone].

## 2023-04-11 ENCOUNTER — HOSPITAL ENCOUNTER (OUTPATIENT)
Dept: MAMMOGRAPHY | Facility: CLINIC | Age: 43
Discharge: HOME | End: 2023-04-11
Attending: INTERNAL MEDICINE | Admitting: INTERNAL MEDICINE
Payer: COMMERCIAL

## 2023-04-11 DIAGNOSIS — Z12.31 VISIT FOR SCREENING MAMMOGRAM: ICD-10-CM

## 2023-04-11 PROCEDURE — 77067 SCR MAMMO BI INCL CAD: CPT

## 2023-04-20 NOTE — ED AVS SNAPSHOT
[facility] Emergency Department  [address] [place] [address]  Phone:  [phone]  Fax:  [phone]                                    Christina Santana   MRN: [id number]    Department:  [facility] Emergency Department   Date of Visit:  1/1/2019           After Visit Summary Signature Page    I have received my discharge instructions, and my questions have been answered. I have discussed any challenges I see with this plan with the nurse or doctor.    ..........................................................................................................................................  Patient/Patient Representative Signature      ..........................................................................................................................................  Patient Representative Print Name and Relationship to Patient    ..................................................               ................................................  Date                                   Time    ..........................................................................................................................................  Reviewed by Signature/Title    ...................................................              ..............................................  Date                                               Time          22EPIC Rev 08/18       
Patient/Caregiver provided printed discharge information.

## 2023-05-16 ENCOUNTER — ANCILLARY PROCEDURE (OUTPATIENT)
Dept: GENERAL RADIOLOGY | Facility: CLINIC | Age: 43
End: 2023-05-16
Attending: PHYSICIAN ASSISTANT
Payer: COMMERCIAL

## 2023-05-16 ENCOUNTER — OFFICE VISIT (OUTPATIENT)
Dept: ORTHOPEDICS | Facility: CLINIC | Age: 43
End: 2023-05-16
Payer: COMMERCIAL

## 2023-05-16 VITALS
SYSTOLIC BLOOD PRESSURE: 120 MMHG | WEIGHT: 194.5 LBS | DIASTOLIC BLOOD PRESSURE: 62 MMHG | HEIGHT: 66 IN | BODY MASS INDEX: 31.26 KG/M2

## 2023-05-16 DIAGNOSIS — G56.01 RIGHT CARPAL TUNNEL SYNDROME: Primary | ICD-10-CM

## 2023-05-16 DIAGNOSIS — M25.531 RIGHT WRIST PAIN: ICD-10-CM

## 2023-05-16 PROCEDURE — 73110 X-RAY EXAM OF WRIST: CPT | Mod: TC | Performed by: RADIOLOGY

## 2023-05-16 PROCEDURE — 99213 OFFICE O/P EST LOW 20 MIN: CPT | Performed by: PHYSICIAN ASSISTANT

## 2023-05-16 ASSESSMENT — PAIN SCALES - GENERAL: PAINLEVEL: MILD PAIN (2)

## 2023-05-16 NOTE — PROGRESS NOTES
ORTHOPEDIC CONSULT      Chief Complaint: Christina Santana is a 43 year old female who works for [employer] and is a mother.  She is here with her daughter today.    She is being seen for   Chief Complaints and History of Present Illnesses   Patient presents with     Musculoskeletal Problem     Right wrist pain         History of Present Illness:   Mechanism of Injury: No injury fall or trauma  Location: Right hand digits 1 through 2 as well as 3 4 and 5 at times and radiates up to proximal forearm  Duration of Pain: Approximately 7 months  Rating of Pain: 2 out of 10  Pain Quality: Numbness and tingling but pain also  Pain is better with: Bracing  Pain is worse with: Sleeping at night without the braces  Treatment so far consists of: Patient saw Dr. Arellano on 2/28/2023 and at that time the patient had about 2 weeks of numbness and tingling the right upper extremity.  He discussed ordering an EMG.  Also discussed that the EMG was negative CT scan of the cervical spine.  Discussed gabapentin but decided to hold off on that and the patient was to use Tylenol.  Patient has tried chiropractic without much help.  She has tried massage with some help.  Wrist brace is helped considerably and if she does not wear them she wakes at night.  She wears them at night.  Patient has tried taping with some benefit.  Patient has tried unofficial hand therapy which has helped slightly.  She has an EMG scheduled on June 6, 2023 in [place]  Associated Features: Denies shooting burning or electric pain but she does get numbness and tingling digits 1 through 5 and pain in digits 1 and 2.  Patient has noticed dropping items and having difficulty with regular activities such as putting on her seatbelt or dressing.  She has noticed some decrease in dexterity.  Her symptoms have become more consistent now also.  Patient has noticed some weakness.  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  Patient has no neck pain or radiculopathy from her neck.      Patient's past medical, surgical, social and family histories reviewed.     Past Medical History:   Diagnosis Date     ASCUS with positive high risk HPV 12/27/2013     HSIL (high grade squamous intraepithelial lesion) on Pap smear of cervix 04/22/2013     Pap smear of cervix with ASCUS, cannot exclude HGSIL 5/23/11, 8/10/12,      PMS2-related Reed syndrome (HNPCC4)         Past Surgical History:   Procedure Laterality Date     APPENDECTOMY       AS REPAIR PARAESOPHAGEAL HIATAL HERNIA,LAPAROTOMY, W MESH       BIOPSY VULVA N/A 12/10/2021    Procedure: Vaginal cuff biopsy;  Surgeon: Leyda Guzman DO;  [facility]     COLONOSCOPY N/A 6/10/2021    Procedure: COLONOSCOPY, WITH POLYPECTOMY;  Surgeon: Willi Galeas DO;  [facility]     COLONOSCOPY N/A 12/1/2022    Procedure: COLONOSCOPY;  Surgeon: Willi Galeas DO;  [facility]     ESOPHAGOSCOPY, GASTROSCOPY, DUODENOSCOPY (EGD), COMBINED N/A 12/1/2022    Procedure: ESOPHAGOGASTRODUODENOSCOPY (EGD) with biopsy;  Surgeon: Willi Galeas DO;  [facility]     EXAM UNDER ANESTHESIA PELVIC N/A 12/10/2021    Procedure: EXAM UNDER ANESTHESIA, PELVIS;  Surgeon: Leyda Guzman DO;  [facility]     LAPAROSCOPIC HYSTERECTOMY TOTAL, BILATERAL SALPINGO-OOPHORECTOMY, COMBINED N/A 2/24/2021    Procedure: HYSTERECTOMY, TOTAL, LAPAROSCOPIC, WITH SALPINGO-OOPHORECTOMY;  Surgeon: Dylon Alcantar MD;  [facility]       Medications:  PROGESTERONE 100MG/G CREAM, Apply 0.5 g topically 2 times daily    No current facility-administered medications on file prior to visit.      Allergies   Allergen Reactions     Latex  "Shortness Of Breath, Swelling and Rash       Social History     Occupational History     Employer: [employer]   Tobacco Use     Smoking status: Never     Smokeless tobacco: Never   Vaping Use     Vaping status: Never Used     Passive vaping exposure: Yes   Substance and Sexual Activity     Alcohol use: Yes     Comment: occassional     Drug use: No     Sexual activity: Yes     Partners: Male     Birth control/protection: Female Surgical       Family History   Problem Relation Age of Onset     Fibroids Mother      Reed Syndrome Mother         PMS2+     Colon Polyps Mother         removed 5 feet of colon for unmanageable polyps     Bladder Cancer Maternal Grandfather 70        lived to be 94     Kidney Cancer Maternal Grandfather      Breast Cancer Paternal Grandmother         postmenopausal     Ovarian Cancer Maternal Aunt 40        possible uterine cancer also; hx of fibroids     Ovarian Cancer Maternal Aunt 40        possible uterine cancer also; hx of fibroids     Breast Cancer Paternal Aunt 54       REVIEW OF SYSTEMS  10 point review systems performed otherwise negative as noted as per history of present illness.    Physical Exam:  Vitals: /62   Ht 1.67 m (5' 5.75\")   Wt 88.2 kg (194 lb 8 oz)   LMP 02/03/2021   BMI 31.63 kg/m    BMI= Body mass index is 31.63 kg/m .    Constitutional: healthy, alert and no acute distress   Psychiatric: mentation appears normal and affect normal/bright  NEURO: no focal deficits, CMS intact Right upper extremity   RESP: Normal with easy respirations and no use of accessory muscles to breathe, no audible wheezing or retractions  CV: +2 radial pulse and her hand is warm to palpation.   SKIN: No erythema, rashes, excoriation, or breakdown. No evidence of infection.   MUSCULOSKELETAL:    INSPECTION of right wrist: No gross deformities, erythema, edema, ecchymosis, atrophy or fasciculations.     PALPATION: No tenderness distal radius or distal ulna, no tenderness to " palpation of the hand or digits or forearm.  No increased warmth.     ROM: flexion 85, extension 45, supination 90, pronation 90.  Moving all 5 digits completely.  The range of motion is without catching locking or pain.      STRENGTH: 5 out of 5 , interosseous and thumb without pain.     SPECIAL TEST: Positive Tinel's test, positive Phalen's test, positive carpal compression test.   GAIT: non-antalgic  Lymph: no palpable lymph nodes    Diagnostic Modalities:  X-rays done today showing no fracture no dislocation no tumor and good joint spacing amongst the carpals and other joints of the wrist.    Independent visualization of the images was performed.    Impression: 1.  Right carpal tunnel syndrome    Plan:  All of the above pertinent physical exam and imaging modalities findings was reviewed with Christina and her daughter.    FOCUSED PLAN:  43-year-old female with 7 months of numbness and tingling primarily in digits 1 and 2 with pain but also some numbness and tingling in 3 through 5.  Patient has noticed dropping items and weakness.  Patient has tried chiropractic, massage, taping, unofficial hand therapy.  She has not had much luck except for with bracing especially at night.  This does wake her at night.  We did discuss steroid injection but she has an EMG scheduled on June 6, 2023 thus we will wait for the results of that which will come from Dr. Arellano as he ordered it.  If the results are positive I would set her up to talk with Dr. Navarro if he is okay with that for possible carpal tunnel release surgery.  I did explain the surgery today in detail.  When we get the results we will set her up with a virtual appointment to discuss the results.  Follow-up with virtual appointment.    Re-x-ray on return: No      This note was dictated with [employer].    Teo Jackson PA-C

## 2023-05-16 NOTE — LETTER
5/16/2023         RE: Christina Santana  [address] [facility] [address]        Dear Colleague,    Thank you for referring your patient, Christina Santana, to the [facility]. Please see a copy of my visit note below.    ORTHOPEDIC CONSULT      Chief Complaint: Christina Santana is a 43 year old female who works for [employer] and is a mother.  She is here with her daughter today.    She is being seen for   Chief Complaints and History of Present Illnesses   Patient presents with     Musculoskeletal Problem     Right wrist pain         History of Present Illness:   Mechanism of Injury: No injury fall or trauma  Location: Right hand digits 1 through 2 as well as 3 4 and 5 at times and radiates up to proximal forearm  Duration of Pain: Approximately 7 months  Rating of Pain: 2 out of 10  Pain Quality: Numbness and tingling but pain also  Pain is better with: Bracing  Pain is worse with: Sleeping at night without the braces  Treatment so far consists of: Patient saw Dr. Arellano on 2/28/2023 and at that time the patient had about 2 weeks of numbness and tingling the right upper extremity.  He discussed ordering an EMG.  Also discussed that the EMG was negative CT scan of the cervical spine.  Discussed gabapentin but decided to hold off on that and the patient was to use Tylenol.  Patient has tried chiropractic without much help.  She has tried massage with some help.  Wrist brace is helped considerably and if she does not wear them she wakes at night.  She wears them at night.  Patient has tried taping with some benefit.  Patient has tried unofficial hand therapy which has helped slightly.  She has an EMG scheduled on June 6, 2023 in [place]  Associated Features: Denies shooting burning or electric pain but she does get numbness and tingling digits 1 through 5 and pain in digits 1 and 2.  Patient has noticed dropping items and having difficulty with regular activities such as  putting on her seatbelt or dressing.  She has noticed some decrease in dexterity.  Her symptoms have become more consistent now also.  Patient has noticed some weakness.  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  Patient has no neck pain or radiculopathy from her neck.      Patient's past medical, surgical, social and family histories reviewed.     Past Medical History:   Diagnosis Date     ASCUS with positive high risk HPV 12/27/2013     HSIL (high grade squamous intraepithelial lesion) on Pap smear of cervix 04/22/2013     Pap smear of cervix with ASCUS, cannot exclude HGSIL 5/23/11, 8/10/12,      PMS2-related Reed syndrome (HNPCC4)         Past Surgical History:   Procedure Laterality Date     APPENDECTOMY       AS REPAIR PARAESOPHAGEAL HIATAL HERNIA,LAPAROTOMY, W MESH       BIOPSY VULVA N/A 12/10/2021    Procedure: Vaginal cuff biopsy;  Surgeon: Leyda Guzman DO;  [facility]     COLONOSCOPY N/A 6/10/2021    Procedure: COLONOSCOPY, WITH POLYPECTOMY;  Surgeon: Willi Galeas DO;  [facility]     COLONOSCOPY N/A 12/1/2022    Procedure: COLONOSCOPY;  Surgeon: Willi Galeas DO;  [facility]     ESOPHAGOSCOPY, GASTROSCOPY, DUODENOSCOPY (EGD), COMBINED N/A 12/1/2022    Procedure: ESOPHAGOGASTRODUODENOSCOPY (EGD) with biopsy;  Surgeon: Willi Galeas DO;  [facility]     EXAM UNDER ANESTHESIA PELVIC N/A 12/10/2021    Procedure: EXAM UNDER ANESTHESIA, PELVIS;  Surgeon: Leyda Guzman DO;  [facility]     LAPAROSCOPIC HYSTERECTOMY TOTAL, BILATERAL SALPINGO-OOPHORECTOMY, COMBINED N/A 2/24/2021    Procedure: HYSTERECTOMY, TOTAL, LAPAROSCOPIC, WITH SALPINGO-OOPHORECTOMY;   "Surgeon: Dylon Alcantar MD;  Location:  OR       Medications:  PROGESTERONE 100MG/G CREAM, Apply 0.5 g topically 2 times daily    No current facility-administered medications on file prior to visit.      Allergies   Allergen Reactions     Latex Shortness Of Breath, Swelling and Rash       Social History     Occupational History     Employer: [employer]   Tobacco Use     Smoking status: Never     Smokeless tobacco: Never   Vaping Use     Vaping status: Never Used     Passive vaping exposure: Yes   Substance and Sexual Activity     Alcohol use: Yes     Comment: occassional     Drug use: No     Sexual activity: Yes     Partners: Male     Birth control/protection: Female Surgical       Family History   Problem Relation Age of Onset     Fibroids Mother      Reed Syndrome Mother         PMS2+     Colon Polyps Mother         removed 5 feet of colon for unmanageable polyps     Bladder Cancer Maternal Grandfather 70        lived to be 94     Kidney Cancer Maternal Grandfather      Breast Cancer Paternal Grandmother         postmenopausal     Ovarian Cancer Maternal Aunt 40        possible uterine cancer also; hx of fibroids     Ovarian Cancer Maternal Aunt 40        possible uterine cancer also; hx of fibroids     Breast Cancer Paternal Aunt 54       REVIEW OF SYSTEMS  10 point review systems performed otherwise negative as noted as per history of present illness.    Physical Exam:  Vitals: /62   Ht 1.67 m (5' 5.75\")   Wt 88.2 kg (194 lb 8 oz)   LMP 02/03/2021   BMI 31.63 kg/m    BMI= Body mass index is 31.63 kg/m .    Constitutional: healthy, alert and no acute distress   Psychiatric: mentation appears normal and affect normal/bright  NEURO: no focal deficits, CMS intact Right upper extremity   RESP: Normal with easy respirations and no use of accessory muscles to breathe, no audible wheezing or retractions  CV: +2 radial pulse and her hand is warm to palpation.   SKIN: No erythema, rashes, " excoriation, or breakdown. No evidence of infection.   MUSCULOSKELETAL:    INSPECTION of right wrist: No gross deformities, erythema, edema, ecchymosis, atrophy or fasciculations.     PALPATION: No tenderness distal radius or distal ulna, no tenderness to palpation of the hand or digits or forearm.  No increased warmth.     ROM: flexion 85, extension 45, supination 90, pronation 90.  Moving all 5 digits completely.  The range of motion is without catching locking or pain.      STRENGTH: 5 out of 5 , interosseous and thumb without pain.     SPECIAL TEST: Positive Tinel's test, positive Phalen's test, positive carpal compression test.   GAIT: non-antalgic  Lymph: no palpable lymph nodes    Diagnostic Modalities:  X-rays done today showing no fracture no dislocation no tumor and good joint spacing amongst the carpals and other joints of the wrist.    Independent visualization of the images was performed.    Impression: 1.  Right carpal tunnel syndrome    Plan:  All of the above pertinent physical exam and imaging modalities findings was reviewed with Christina and her daughter.    FOCUSED PLAN:  43-year-old female with 7 months of numbness and tingling primarily in digits 1 and 2 with pain but also some numbness and tingling in 3 through 5.  Patient has noticed dropping items and weakness.  Patient has tried chiropractic, massage, taping, unofficial hand therapy.  She has not had much luck except for with bracing especially at night.  This does wake her at night.  We did discuss steroid injection but she has an EMG scheduled on June 6, 2023 thus we will wait for the results of that which will come from Dr. Arellano as he ordered it.  If the results are positive I would set her up to talk with Dr. Navarro if he is okay with that for possible carpal tunnel release surgery.  I did explain the surgery today in detail.  When we get the results we will set her up with a virtual appointment to discuss the results.  Follow-up  with virtual appointment.    Re-x-ray on return: No      This note was dictated with [employer].    Teo Jackson PA-C        Again, thank you for allowing me to participate in the care of your patient.        Sincerely,        Teo Jackson PA-C

## 2023-06-06 ENCOUNTER — TRANSFERRED RECORDS (OUTPATIENT)
Dept: HEALTH INFORMATION MANAGEMENT | Facility: CLINIC | Age: 43
End: 2023-06-06
Payer: COMMERCIAL

## 2023-06-09 ENCOUNTER — HOSPITAL ENCOUNTER (EMERGENCY)
Facility: CLINIC | Age: 43
Discharge: HOME | End: 2023-06-09
Attending: FAMILY MEDICINE | Admitting: FAMILY MEDICINE
Payer: COMMERCIAL

## 2023-06-09 ENCOUNTER — APPOINTMENT (OUTPATIENT)
Dept: GENERAL RADIOLOGY | Facility: CLINIC | Age: 43
End: 2023-06-09
Attending: FAMILY MEDICINE
Payer: COMMERCIAL

## 2023-06-09 VITALS
RESPIRATION RATE: 18 BRPM | SYSTOLIC BLOOD PRESSURE: 141 MMHG | OXYGEN SATURATION: 99 % | DIASTOLIC BLOOD PRESSURE: 93 MMHG | TEMPERATURE: 97.8 F | HEART RATE: 82 BPM

## 2023-06-09 DIAGNOSIS — S90.32XA CONTUSION OF LEFT FOOT, INITIAL ENCOUNTER: ICD-10-CM

## 2023-06-09 PROCEDURE — 99283 EMERGENCY DEPT VISIT LOW MDM: CPT | Performed by: FAMILY MEDICINE

## 2023-06-09 PROCEDURE — 250N000013 HC RX MED GY IP 250 OP 250 PS 637: Performed by: FAMILY MEDICINE

## 2023-06-09 PROCEDURE — 73630 X-RAY EXAM OF FOOT: CPT | Mod: LT

## 2023-06-09 RX ORDER — ACETAMINOPHEN 325 MG/1
975 TABLET ORAL ONCE
Status: COMPLETED | OUTPATIENT
Start: 2023-06-09 | End: 2023-06-09

## 2023-06-09 RX ADMIN — ACETAMINOPHEN 975 MG: 325 TABLET ORAL at 23:20

## 2023-06-10 ENCOUNTER — HEALTH MAINTENANCE LETTER (OUTPATIENT)
Age: 43
End: 2023-06-10

## 2023-06-10 NOTE — ED TRIAGE NOTES
L foot injury getting out of the pool - hit foot on a metal hammock stand and c/o L lateral foot pain.      Triage Assessment     Row Name 06/09/23 6430       Triage Assessment (Adult)    Airway WDL WDL       Respiratory WDL    Respiratory WDL WDL       Cardiac WDL    Cardiac WDL WDL

## 2023-06-10 NOTE — DISCHARGE INSTRUCTIONS
1.  Ice your foot often over the weekend, keep elevated when possible.  2.  Follow-up with your doctor if things or not improving in the next week

## 2023-06-10 NOTE — ED PROVIDER NOTES
History     Chief Complaint   Patient presents with     Foot Pain     HPI  Christina Santana is a 43 year old female who presents with left foot pain.  Patient was getting out of the pool when she had her foot on the side of a metal pole.  This happened about 5 hours ago.  Since then she has been having increasing pain.  She cannot really put any weight on the lateral aspect of her foot.  Denies any extremity numbness or tingling.  Patient states the pain is radiating up into her ankle.  She denies any previous problems with her ankle before.    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     Priority: Medium     Added automatically from request for surgery [id number]       RUQ pain 08/07/2020     Priority: Medium     Added automatically from request for surgery [id number]       Nausea 08/07/2020     Priority: Medium     Added automatically from request for surgery [id number]       Calculus of gallbladder without cholecystitis without obstruction 08/07/2020     Priority: Medium     Added automatically from request for surgery [id number]       Cervicalgia 06/08/2020     Priority: Medium     Muscle spasm 06/08/2020     Priority: Medium     Sinus pressure 03/04/2020     Priority: Medium     Tension type headache 03/04/2020     Priority: Medium     Allergic rhinitis due to dust mite 03/04/2020     Priority: Medium     Seasonal allergic rhinitis due to pollen 03/04/2020     Priority: Medium     Allergic rhinitis due to animal dander 03/04/2020     Priority: Medium     Allergic rhinitis due to mold 03/04/2020     Priority: Medium     GERD with stricture 02/25/2020     Priority: Medium     Encounter for insertion of mirena IUD 09/13/2018     Priority: Medium     S/P LEEP (status post loop electrosurgical excision procedure) 04/22/2013     Priority: Medium      5/23/11 ASC-H  6/20/11 colp- no high grade lesion noted  8/10/12 ASC-H  4/22/13 pap HSIL  5/1/13 colp. BREEZY 2/3. Plan: LEEP  5/13/13 LEEP. BREEZY 2/3 with + margins  8/28/13 colp- WNL  12/27/13 pap ASCUS + HR HPV  1/29/14 colp- WNL  8/27/14 pap NIL.        BREEZY III (cervical intraepithelial neoplasia grade III) with severe dysplasia 01/01/2013     Priority: Medium     5/2011- ASC-H  6/2011- colposcopy- suspicious for HPV  8/2012- ASC-H  4/2013- HSIL  5/1/2013- colposcopy- BREEZY 2-3  5/13/2013 LEEP: BREEZY II-III, Positive Margins  8/2013- ECC- cervicitis  12/2013 ASCUS, HPV +  1/2014- colposcopy- negative  8/2014- NIL  7/2015- NIL/HPV negative   3/2017- NIL/HPV +  4/2017- colposcopy- suggestive of BREEZY I  4/2018- NIL/HPV negative  9/10/19 NIL/HPV negative   2/24/21 TLH/BSO-benign pathology  10/8/21 NIL pap Neg HPV of vaginal cuff. Colpo completed for vaginal bleeding. Plan: Given discomfort during exam and biopsy attempt despite lidocaine, plan to perform EUA and biopsy under anesthesia in the OR. 12/10/21 Vaginal cuff biopsies- negative. Plan: cotest in 3 years.   1/19/22 Post op appt- cancelled   2/15/22 Appt- Pap due 10/8/22  7/27/23 visit Oncology       Astigmatism 10/03/2003     Priority: Medium     Myopia 10/03/2003     Priority: Medium        Past Medical History:    Past Medical History:   Diagnosis Date     ASCUS with positive high risk HPV 12/27/2013     HSIL (high grade squamous intraepithelial lesion) on Pap smear of cervix 04/22/2013     Pap smear of cervix with ASCUS, cannot exclude HGSIL 5/23/11, 8/10/12,      PMS2-related Reed syndrome (HNPCC4)        Past Surgical History:    Past Surgical History:   Procedure Laterality Date     APPENDECTOMY       AS REPAIR PARAESOPHAGEAL HIATAL HERNIA,LAPAROTOMY, W MESH       BIOPSY VULVA N/A 12/10/2021    Procedure: Vaginal cuff biopsy;  Surgeon: Leyda Guzman DO;  [facility]     COLONOSCOPY N/A 6/10/2021    Procedure: COLONOSCOPY, WITH POLYPECTOMY;  Surgeon:  Willi Galeas DO;  [facility]     COLONOSCOPY N/A 12/1/2022    Procedure: COLONOSCOPY;  Surgeon: Willi Galeas DO;  [facility]     ESOPHAGOSCOPY, GASTROSCOPY, DUODENOSCOPY (EGD), COMBINED N/A 12/1/2022    Procedure: ESOPHAGOGASTRODUODENOSCOPY (EGD) with biopsy;  Surgeon: Willi Galeas DO;  [facility]     EXAM UNDER ANESTHESIA PELVIC N/A 12/10/2021    Procedure: EXAM UNDER ANESTHESIA, PELVIS;  Surgeon: Leyda Guzman DO;  [facility]     LAPAROSCOPIC HYSTERECTOMY TOTAL, BILATERAL SALPINGO-OOPHORECTOMY, COMBINED N/A 2/24/2021    Procedure: HYSTERECTOMY, TOTAL, LAPAROSCOPIC, WITH SALPINGO-OOPHORECTOMY;  Surgeon: Dylon Alcantar MD;  [facility]       Family History:    Family History   Problem Relation Age of Onset     Fibroids Mother      Reed Syndrome Mother         PMS2+     Colon Polyps Mother         removed 5 feet of colon for unmanageable polyps     Bladder Cancer Maternal Grandfather 70        lived to be      Kidney Cancer Maternal Grandfather      Breast Cancer Paternal Grandmother         postmenopausal     Ovarian Cancer Maternal Aunt 40        possible uterine cancer also; hx of fibroids     Ovarian Cancer Maternal Aunt 40        possible uterine cancer also; hx of fibroids     Breast Cancer Paternal Aunt 54       Social History:  Marital Status:   [2]  Social History     Tobacco Use     Smoking status: Never     Smokeless tobacco: Never   Vaping Use     Vaping status: Never Used     Passive vaping exposure: Yes   Substance Use Topics     Alcohol use: Yes     Comment: occassional     Drug use: No        Medications:    PROGESTERONE 100MG/G CREAM          Review of Systems   All other systems reviewed and are negative.      Physical Exam   BP: (!) 141/93  Pulse: 82  Temp: 97.8  F (36.6  C)  Resp: 18  SpO2: 99 %      Physical Exam  Vitals and nursing note reviewed.   Musculoskeletal:      Left foot: Normal range of motion. Prominent  metatarsal heads, tenderness and bony tenderness present. No swelling, deformity, bunion, Charcot foot or foot drop.   Skin:     General: Skin is warm and dry.         ED Course                 Procedures       Results for orders placed or performed during the hospital encounter of 06/09/23   Foot XR, G/E 3 views, left     Status: None    Narrative    EXAM: XR FOOT LEFT G/E 3 VIEWS  LOCATION: [facility]  DATE/TIME: 6/9/2023 11:16 PM CDT    INDICATION: L foot injury   hit on metal hammock stand L lateral foot pain  COMPARISON: None.      Impression    IMPRESSION: No acute fracture or dislocation. There is a tiny radiopaque foreign body in the soft tissues lateral to the head of the fifth metatarsal.     No results found for this or any previous visit (from the past 24 hour(s)).    Medications   acetaminophen (TYLENOL) tablet 975 mg (975 mg Oral $Given 6/9/23 5571)     X-ray of the foot was unremarkable.  Patient likely has just a contusion to the foot or sprain.  Patient was placed in an Ace wrap and will recommend conservative care.  Patient will be discharged at this time.    Assessments & Plan (with Medical Decision Making)  Foot contusion     I have reviewed the nursing notes.    I have reviewed the findings, diagnosis, plan and need for follow up with the patient.      Discharge Medication List as of 6/9/2023 11:43 PM          Final diagnoses:   Contusion of left foot, initial encounter       6/9/2023   [facility] EMERGENCY DEPT     Joon Parson MD  06/11/23 1921

## 2023-06-13 ENCOUNTER — VIRTUAL VISIT (OUTPATIENT)
Dept: ORTHOPEDICS | Facility: CLINIC | Age: 43
End: 2023-06-13
Payer: COMMERCIAL

## 2023-06-13 DIAGNOSIS — G56.01 RIGHT CARPAL TUNNEL SYNDROME: Primary | ICD-10-CM

## 2023-06-13 PROCEDURE — 99214 OFFICE O/P EST MOD 30 MIN: CPT | Mod: VID | Performed by: PHYSICIAN ASSISTANT

## 2023-06-13 NOTE — PROGRESS NOTES
How would you like to obtain your AVS? [employer]  If the video visit is dropped, the invitation should be resent by: Text to cell phone: [phone]  Will anyone else be joining your video visit? Leslye Gamino/ARIELLE

## 2023-06-13 NOTE — LETTER
"    6/13/2023         RE: Christina Santana  [address] [facility] [address]        Dear Colleague,    Thank you for referring your patient, Christina Santana, to the [facility]. Please see a copy of my visit note below.    Christina Santana is a 43 year old female who is being evaluated via a billable video visit.      The patient has been notified of following:     \"This video visit will be conducted via a call between you and your physician/provider. We have found that certain health care needs can be provided without the need for a physical exam.  This service lets us provide the care you need with a short video conversation.  If a prescription is necessary we can send it directly to your pharmacy.  If lab work is needed we can place an order for that and you can then stop by our lab to have the test done at a later time.    If during the course of the call the physician/provider feels a video visit is not appropriate, you will not be charged for this service.\"     Patient has given verbal consent for Video visit?  Yes    I have reviewed and updated the patient's Past Medical History, Social History, Family History and Medication List.    ALLERGIES  Latex    Subjective:  Christina Santana complains of    Chief Complaint   Patient presents with     Results     EMG results       HPI  Patient was last seen on 5/16/2023 myself and the patient had been having about 8 months now symptoms of numbness and tingling in digits 1 through 2 and pain in digits 3 through 5.  Patient has noticed dropping items and weakness.  She is try chiropractic massage taping and unofficial hand therapy.  This does wake her at night.  She has not had luck with bracing during the day but only at night.  Patient did get an EMG which showed moderate carpal tunnel syndrome.  Patient still having problems with numbness tingling and pain in her digits.  She is interested in carpal tunnel release surgery but does not like " coming July.  Might do the surgery after her leg.    Objective:    Vitals:  No vitals were obtained today due to virtual visit.      INSPECTION of right wrist: No gross deformities, erythema, edema, ecchymosis, atrophy or fasciculations.     PALPATION: No tenderness distal radius or distal ulna, no tenderness to palpation of the hand or digits or forearm.  No increased warmth.  Per patient    ROM: Patient will flex extend supinate pronate wrist, the range of motion is without catching locking or pain per the patient    STRENGTH: 5 out of 5  strength per patient    SPECIAL TEST: None today  Diagnostic Modalities:  Recent Results (from the past 744 hour(s))   XR Wrist Right G/E 3 Views    Narrative    XR WRIST RIGHT G/E 3 VIEWS 5/16/2023 8:25 AM     HISTORY: Right wrist pain    COMPARISON: None.       Impression    IMPRESSION: Normal joint spaces and alignment. No acute fracture.    CHACORTA SCOTT MD         SYSTEM ID:  [id number]                        I agree with the above reading.    EMG results that were done on 6/6/2023 showing moderate neuropathy and no ulnar or cervical nerve radiculopathy.    Independent visualization of the images was performed.    Assessment:  1.  Right carpal tunnel syndrome, moderate    PLAN:  1.  Patient would like virtual visit with Dr. Navarro to discuss The Surgery.  2.  Patient deciding whether to do steroid injection versus surgery  3.  We discussed possibly holding off doing surgery until after July when the wedding is.  4.  We will discuss with Dr. Navarro if it is okay for virtual appointment.    Video-Visit Details    Type of service:  Video Visit    Video Total Time: 7 minutes    Originating Location (pt. Location): Home    Distant Location (provider location):  [facility]     Mode of Communication:  Video Conference via [employer]       This note was dictated with Door [employer].    Teo Jackson PA-C      How would you like to obtain your AVS? MyChart  If  the video visit is dropped, the invitation should be resent by: Text to cell phone: [phone]  Will anyone else be joining your video visit? No      Stevenson/ARIELLE         Again, thank you for allowing me to participate in the care of your patient.        Sincerely,        Teo Jackson PA-C

## 2023-06-13 NOTE — PROGRESS NOTES
"Christina Santana is a 43 year old female who is being evaluated via a billable video visit.      The patient has been notified of following:     \"This video visit will be conducted via a call between you and your physician/provider. We have found that certain health care needs can be provided without the need for a physical exam.  This service lets us provide the care you need with a short video conversation.  If a prescription is necessary we can send it directly to your pharmacy.  If lab work is needed we can place an order for that and you can then stop by our lab to have the test done at a later time.    If during the course of the call the physician/provider feels a video visit is not appropriate, you will not be charged for this service.\"     Patient has given verbal consent for Video visit?  Yes    I have reviewed and updated the patient's Past Medical History, Social History, Family History and Medication List.    ALLERGIES  Latex    Subjective:  Christina Santana complains of    Chief Complaint   Patient presents with     Results     EMG results       HPI  Patient was last seen on 5/16/2023 myself and the patient had been having about 8 months now symptoms of numbness and tingling in digits 1 through 2 and pain in digits 3 through 5.  Patient has noticed dropping items and weakness.  She is try chiropractic massage taping and unofficial hand therapy.  This does wake her at night.  She has not had luck with bracing during the day but only at night.  Patient did get an EMG which showed moderate carpal tunnel syndrome.  Patient still having problems with numbness tingling and pain in her digits.  She is interested in carpal tunnel release surgery but does not like coming July.  Might do the surgery after her leg.    Objective:    Vitals:  No vitals were obtained today due to virtual visit.      INSPECTION of right wrist: No gross deformities, erythema, edema, ecchymosis, atrophy or fasciculations.     PALPATION: " No tenderness distal radius or distal ulna, no tenderness to palpation of the hand or digits or forearm.  No increased warmth.  Per patient    ROM: Patient will flex extend supinate pronate wrist, the range of motion is without catching locking or pain per the patient    STRENGTH: 5 out of 5  strength per patient    SPECIAL TEST: None today  Diagnostic Modalities:  Recent Results (from the past 744 hour(s))   XR Wrist Right G/E 3 Views    Narrative    XR WRIST RIGHT G/E 3 VIEWS 5/16/2023 8:25 AM     HISTORY: Right wrist pain    COMPARISON: None.       Impression    IMPRESSION: Normal joint spaces and alignment. No acute fracture.    CHACORTA SCOTT MD         SYSTEM ID:  [id number]                        I agree with the above reading.    EMG results that were done on 6/6/2023 showing moderate neuropathy and no ulnar or cervical nerve radiculopathy.    Independent visualization of the images was performed.    Assessment:  1.  Right carpal tunnel syndrome, moderate    PLAN:  1.  Patient would like virtual visit with Dr. Navarro to discuss The Surgery.  2.  Patient deciding whether to do steroid injection versus surgery  3.  We discussed possibly holding off doing surgery until after July when the wedding is.  4.  We will discuss with Dr. Navarro if it is okay for virtual appointment.    Video-Visit Details    Type of service:  Video Visit    Video Total Time: 7 minutes    Originating Location (pt. Location): Home    Distant Location (provider location):  [facility]     Mode of Communication:  Video Conference via [employer]       This note was dictated with [employer]..    Teo Jackson PA-C

## 2023-06-30 NOTE — PROGRESS NOTES
DISCHARGE  Reason for Discharge: Patient has failed to schedule further appointments.    Equipment Issued: HEP, SI Belt, progressive education    Discharge Plan: Patient to continue home program.    Referring Provider:  Leyda Guzman             10/27/22 1600   Appointment Info   Signing clinician's name / credentials Jerri Dockery, PT, DPT   Visits Used 14   Progress Note/Certification   Progress Note Completed Date 08/22/22   Subjective Report   Subjective Report No new concerns over, doing ok. Has stopped running d/t season over. She is doing her PT exercises well. She continues to walk. SI belt still wearing 5/7 days per week most of the day. Pelvic pain not so much , not cramping, if present <2/10 and lasting not too long. Also, hip pain has remained better overall; occasional popping with exercises but non painful. >80% better with across the board complaints overall. Will start HRT with IM implant every 3 months to replace Estrogen, Progestrone and Testosterone.   Therapeutic Procedure/Exercise   Therapeutic Procedures: strength, endurance, ROM, flexibillity minutes (79830) 45   Skilled Intervention Progression of exercises   Patient Response/Progress Progressive core and PFM upright ex are going well, 80% better overall with all noted complaints   Education   Learner/Method Patient   Plan   Home program Ex additions/advancements as above   Plan for next session PRogress ex, recheck biofeedback and strength   Ortho Goal 1   Goal Identifier Pelvic pain   Goal Description Patient will have greater than 50% improvement in post intercourse related pelvic pain in 8 weeks   Goal Progress Less lingering and less frequent   Target Date 10/17/22   Date Met 10/13/22   Ortho Goal 2   Goal Identifier Strength   Goal Description Patient improved pelvic muscle activation via PERF 3+/5/5/10 to better engage mms needed to support pelvis during exercise in 8 weeks   Target Date 12/27/22   Ortho Goal 3   Goal Identifier  Coordination   Goal Description Patient will demonstrate normal muscle coordination via biofeedback short and long holds, and elimination sequencing is demonstration of improved pelvic muscle coordination in 7 weeks   Goal Progress Patient is progressing upright pelvic, core, hips/SI joint protocol.   Target Date 12/27/22   Session Number   Additional Session Number 6/10   Authorization status [employer] [place] [facility] *Cert

## 2023-07-27 ENCOUNTER — VIRTUAL VISIT (OUTPATIENT)
Dept: ONCOLOGY | Facility: CLINIC | Age: 43
End: 2023-07-27
Attending: CLINICAL NURSE SPECIALIST
Payer: COMMERCIAL

## 2023-07-27 ENCOUNTER — TELEPHONE (OUTPATIENT)
Dept: ONCOLOGY | Facility: CLINIC | Age: 43
End: 2023-07-27

## 2023-07-27 VITALS — WEIGHT: 184 LBS | BODY MASS INDEX: 29.57 KG/M2 | HEIGHT: 66 IN

## 2023-07-27 DIAGNOSIS — Z12.11 SPECIAL SCREENING FOR MALIGNANT NEOPLASMS, COLON: Primary | ICD-10-CM

## 2023-07-27 PROCEDURE — 99215 OFFICE O/P EST HI 40 MIN: CPT | Mod: 95 | Performed by: CLINICAL NURSE SPECIALIST

## 2023-07-27 RX ORDER — SOD SULF/POT CHLORIDE/MAG SULF 1.479 G
1 TABLET ORAL ONCE
Qty: 24 TABLET | Refills: 0 | Status: SHIPPED | OUTPATIENT
Start: 2023-07-27 | End: 2023-07-27

## 2023-07-27 ASSESSMENT — PAIN SCALES - GENERAL: PAINLEVEL: MODERATE PAIN (4)

## 2023-07-27 NOTE — LETTER
2023         RE: Christina Santana  [address] [facility] [address]        Dear Colleague,    Thank you for referring your patient, Christina Santana, to the [facility]. Please see a copy of my visit note below.      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 She has mitigated her risk for gynecological cancers by having a THBSO in . All pathology was benign.     Primary Physician: Alphonse Arellano MD     History Of Present Illness:  Ms. Santana is a very pleasant, healthy 43 year old female who presents with PMS2+ associated Reed Syndrome.     Genetic Testin2021-  POSITIVE for a PMS2 likely pathogenic mutation. Specifically her mutation is called p.[id number] (also known as c.2113G>A) identified using Cancer Next-Expanded testing from [employer].   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 This variant is called p.A417S (c.1249G>T). No other variants or mutations were found in the STK11 gene. Given the uncertain significance of this result, medical management decisions should NOT be made based on this test result alone.     Christina is negative for known, pathogenic mutations in the AIP, ALK, APC, CARINA, AXIN2, BAP1, BARD1, BLM, BMPR1A, BRCA1, BRCA2, BRIP1, CDC73, CDH1, CDK4, CDKN1B, CDKN2A, CHEK2, CTNNA1, DICER1, EPCAM, EGFR, EGLN1, FANCC, FH, FLCN, GALNT12, GREM1, HOXB13, KIF1B, KIT, LZTR1, MAX, MEN1, MET, MITF, MLH1, MRE11, MSH2, MSH3, MSH6, MUTYH, NBN, NF1, NF2, NTHL1, PALB2, PDGFRA, PHOX2B, POLD1, POLE, POT1, VVVIC9P, PTCH1, PTEN, RAD50, RAD51C, RAD51D, RB1, RECQL, RET, SDHA, SDHAF2, SDHB, SDHC, SDHD, SMAD4, SMARCA4, SMARCB1, SMARCE1, STK11, SUFU, UMJK724, TP53, TSC1, TSC2,  VHL, and XRCC2 genes. No pathogenic mutations were found in any of the other 76 of 77 genes analyzed. This test involved sequencing and deletion/duplication analysis of all genes with the exceptions of VHL and XRCC2 (sequencing and deletion/duplication); EGFR, EGLN1, HOXB13, KIT, MITF, PDGFRA, POLD1 and POLE (sequencing only); EPCAM and GREM1 (deletion/duplication only).     History:    s/p  x6  2021- Total laparoscopic hysterectomy and bilateral salpingo oophorectomy (Dr. Dylon Alcantar), pathology benign  Currently using estradiol patch for post menopausal symptoms.      12/10/2021- Vaginal cuff at 9:00, biopsies:  - Benign acutely and chronically inflamed granulation tissue.  - No vaginal tissue identified.     B.  Vaginal cuff at 3:00, biopsy:  - Benign acutely and chronically inflamed granulation tissue.  - A portion of benign vaginal tissue with normal mature squamous epithelium and benign stroma with focal moderate chronic inflammation.      Screening history:  EGD 10/31/2018- Dysphagia by Dr Harris   1. LA Grade A (one or more mucosal breaks less than 5 mm, not extending between tops of 2 mucosal folds) esophagitis with no bleeding was found 34 to 36 cm from the incisors.  ESOPHAGUS, DISTAL, BIOPSY:   1. Esophageal eosinophilia (peak count of 10), see comment : The differential diagnosis primarily includes eosinophilic esophagitis (EoE), proton-pump inhibitor-responsive esophageal eosinophilia (PPI-REE) and gastroesophageal reflux disease (GERD) with more eosinophils than typical. ACG guidelines recommend further endoscopic evaluation with re-biopsy after a two month trial of high dose proton-pump inhibitor (PPI) therapy to further investigate these three possibilities  2. Negative for columnar mucosa  3. A medium-sized hiatal hernia was present.  The ampulla, duodenal bulb, first portion of the duodenum and second portion of the duodenum were normal.  One benign-appearing, intrinsic mild  stenosis was found 36 to 37 cm from the incisors. tenosis measured 1 cm (in length).   The stenosis was traversed. A TTS dilator was passed through the scope. Dilation with a 12-13.5-15 mm balloon dilator was performed to 15 mm.   The dilation site was examined and showed moderate mucosal disruption.       6/10/2021- Colonoscopy (Dr. Garcia, [place]), Hemorrhoids found on perianal exam.  One 2 mm hyperplastic polyp in the rectum  12/1/2022- Colonoscopy and EGD combined ([place]):  Colon normal. No specimens taken                                                                                 Impression:            - Normal esophagus. Normal duodenum                         - Normal stomach. Biopsied. Pathology:     Stomach, antrum: Biopsy:  - Antral-type mucosa with reactive gastropathy, see comment  - No Helicobacter pylori-like organisms seen on H&E examination     Review of Systems:   GENERAL: No change in weight, sleep or appetite.  Normal energy.  No fever or chills  EYES: Negative for vision changes or eye problems  ENT: No problems with ears, nose or throat.  No difficulty swallowing.  RESP: No coughing, wheezing or shortness of breath  CV: No chest pains or palpitations  GI: No nausea, vomiting,  heartburn, abdominal pain, diarrhea, constipation or change in bowel habits  : No urinary frequency or dysuria, bladder or kidney problems  MUSCULOSKELETAL: No significant muscle or joint pains  NEUROLOGIC: No headaches, numbness, tingling, weakness, problems with balance or coordination  PSYCHIATRIC: No problems with anxiety, depression or mental health  HEME/IMMUNE/ALLERGY: No history of bleeding or clotting problems or anemia.  No allergies or immune system problems  ENDOCRINE: No history of thyroid disease, diabetes or other endocrine disorders  SKIN: No rashes,worrisome lesions or skin problems        Past Medical/Surgical History:  Past Medical History:   Diagnosis Date    ASCUS with positive high  risk HPV 2013    HSIL (high grade squamous intraepithelial lesion) on Pap smear of cervix 2013    Pap smear of cervix with ASCUS, cannot exclude HGSIL 11, 8/10/12,     PMS2-related Reed syndrome (HNPCC4)      Past Surgical History:   Procedure Laterality Date    APPENDECTOMY      AS REPAIR PARAESOPHAGEAL HIATAL HERNIA,LAPAROTOMY, W MESH      BIOPSY VULVA N/A 12/10/2021    Procedure: Vaginal cuff biopsy;  Surgeon: Leyda Guzman DO;  Location: [facility]    COLONOSCOPY N/A 6/10/2021    Procedure: COLONOSCOPY, WITH POLYPECTOMY;  Surgeon: Willi Galesa DO;  Location: [facility]    COLONOSCOPY N/A 2022    Procedure: COLONOSCOPY;  Surgeon: Willi Galeas DO;  Location: [facility]    ESOPHAGOSCOPY, GASTROSCOPY, DUODENOSCOPY (EGD), COMBINED N/A 2022    Procedure: ESOPHAGOGASTRODUODENOSCOPY (EGD) with biopsy;  Surgeon: Willi Galeas DO;  Location: [facility]    EXAM UNDER ANESTHESIA PELVIC N/A 12/10/2021    Procedure: EXAM UNDER ANESTHESIA, PELVIS;  Surgeon: Leyda Guzman DO;  Location: [facility]    LAPAROSCOPIC HYSTERECTOMY TOTAL, BILATERAL SALPINGO-OOPHORECTOMY, COMBINED N/A 2021    Procedure: HYSTERECTOMY, TOTAL, LAPAROSCOPIC, WITH SALPINGO-OOPHORECTOMY;  Surgeon: Dylon Alcantar MD;  Location: [facility]       Allergies:  Allergies as of 2023 - Reviewed 2023   Allergen Reaction Noted    Latex Shortness Of Breath, Swelling, and Rash 2021       Current Medications:  Current Outpatient Medications   Medication Sig Dispense Refill    PROGESTERONE 100MG/G CREAM Apply 0.5 g topically 2 times daily      Sodium Sulfate-Mag Sulfate-KCl (SUTAB) 1719-985-552 MG TABS Take 1 kit by mouth once for 1 dose 24 tablet 0        Family History:  Family History   Problem Relation Age of Onset    Fibroids Mother     Reed Syndrome Mother         PMS2+    Colon Polyps Mother         removed 5 feet of colon for unmanageable polyps;  in her sleep at 67, took a nape and  "never woke up,    Reed Syndrome Brother         PMS2+    Bladder Cancer Maternal Grandfather 70        lived to be 94    Kidney Cancer Maternal Grandfather     Breast Cancer Paternal Grandmother         postmenopausal    Ovarian Cancer Maternal Aunt 40        possible uterine cancer also; hx of fibroids    Ovarian Cancer Maternal Aunt 40        possible uterine cancer also; hx of fibroids    Breast Cancer Paternal Aunt 54       Social History:  Social History     Socioeconomic History    Marital status:      Spouse name: Thai    Number of children: 6    Years of education: Not on file    Highest education level: Not on file   Occupational History    Occupation: Working on CNA training     Employer: [employer]     Comment: Works in several roles: security, 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 Not Asked   Social History Narrative    Not on file     Social Determinants of Health     Financial Resource Strain: Not on file   Food Insecurity: Not on file   Transportation Needs: Not on file   Physical Activity: Not on file   Stress: Not on file   Social Connections: Not on file   Intimate Partner Violence: Not on file   Housing Stability: Not on file       Physical Exam:  Ht 1.676 m (5' 6\")   Wt 83.5 kg (184 lb)   LMP 02/03/2021   BMI 29.70 kg/m    GENERAL: Healthy, alert and no distress  EYES: Eyes grossly normal to inspection.  No discharge or erythema, or obvious scleral/conjunctival abnormalities.  RESP: No audible wheeze, cough, or visible cyanosis.  No visible retractions or increased work of breathing.    SKIN: Visible skin " clear. No significant rash, abnormal pigmentation or lesions.  NEURO: Cranial nerves grossly intact.  Mentation and speech appropriate for age.  PSYCH: Mentation appears normal, affect normal/bright, judgement and insight intact, normal speech and appearance well-groomed.      Laboratory/Imaging Studies  No results found for any visits on 07/27/23.    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 She has no complaints today and will schedule her appointments in the [facility] per below. Essentially, she needs a colonoscopy in December and her urine sample taken to the lab (or collected at the lab) by the end of 2023. She will not need any upper GI screening or pancreatic screening unless her family history changes. She has neither gastric, pancreatic nor small bowel cancers in her family.  She did have one episode of microhematuria last year, but her re-test was negative.     I have ordered her Bass Tab prep for her colonoscopy in December and showed her how to access the savings coupon the website that would allow for no more than a $50 co pay.    Site  Estimated Average Age of Presentation for PMS2+ carriers Cumulative Risk for Diagnosis Through Age 80 Cumulative Risk for Diagnosis Through Lifetime for people in the general population     Colorectal     61-66 years    8.7-20%    4.2%   Endometrial  49-50 years     13-26%    3.1%     Ovarian     51-59 years      3%    1.3%     Renal pelvis and/or ureter     No data   <1 % -3.7%   Less than 1%     Bladder     71 years  <1%-2.4 2.4%   Gastric  inadequate date  Inadequate data 0.9%   Small bowel  Single case - 69 years    0.1% -0.3%   0.3%     Pancreas    No data    <1%--1.6%   1.6%     Biliary tract    No data   0.2-<1%    0.2%     Prostate     No data    4.6%-11.6%    11.6%   Breast (female)    No data  8.1-12.8%    12.8%     Brain    40 years    0.6%-<1%    0.6%      Individualized  Surveillance Plan for Reed Syndrome   (MSH6+ and PMS2+ mutation carriers)   Based on NCCN Guidelines Version 2.2022   Type of Screening Recommendation Last Done Next Due   Colon Cancer Screening Colonoscopy at age 30-35 years or 2-5 years prior to the earliest colon cancer in the family if it is diagnosed prior to age 30.     Repeat every 1-2 years.    12/2022 normal   December 2023   Endometrial and Ovarian Cancer Consider Prophylactic hysterectomy and bilateral salpingo-oophorectomy (BSO) can be considered by women who have completed childbearing.    Insufficient evidence exists to make specific recommendation for RRSO in MSH6+ and PMS2+ carriers.     Surgery complete     NA    Screening using  endometrial sampling is an option every 1-2 years; women should be aware that dysfunctional uterine bleeding warrants evaluation.    Data do not support ovarian cancer screening for Reed Syndrome. Annual transvaginal ultrasound and  tests may be considered at a clinician's discretion.     NA     NA   Gastric and small bowel cancer Upper GI screening every 2-4 years, starting at age 30 for MSH6+ carriers    PMS2+ carriers - Selected individuals or families or those of  descent may consider EGD with extended duodenoscopy every 3-5 years beginning at age 40.   2022   Repeat if symptomatic or family history changes   Urothelial cancer Consider annual urinalysis at age 30-35 in MSH6+ families with family history of urothelial cancers 2022    Due now due to family history of bladder cancer Needs lab appt soon and then repeat in one year   Pancreatic screening for MSH6+ with 1st or 2nd degree relatives with pancreatic cancer on Reed side of family Annual contrast-enhanced MRI/MRCP and/or EUS, with consideration of shorter screening intervals for individuals found to have worrisome abnormalities on screening.     Most small cystic lesions found on screening will not warrant biopsy, surgical resection, or any other  intervention.     No family history of pancreatic cancer     Review in future   Prostate Screening  Annual PSA test with general population screening; may begin earlier if younger prostate cancers in the family NA NA   Central Nervous System cancer Annual physical/neurological examinations starting at age 25-30. 7/27/2023 July 2024       There are data to suggest that aspirin may decrease the risk of colon cancer in Reed Syndrome.  However, optimal dose and duration of aspirin therapy is uncertain.    There have been suggestions that there is an increased risk for breast cancer in Reed Syndrome patients; however, there is not enough evidence to support increased screening above average risk breast cancer screening.     I spent a total of 60 minutes on the day of the visit. Please see the note for further information on patient assessment and treatment.    Radha Rodriguez, APRN-CNS, OCN, AGN-BC  Clinical Nurse Specialist  Cancer Risk Management Program  [facility] [place]    CC: Alphonse Arellano MD

## 2023-07-27 NOTE — PROGRESS NOTES
Virtual Visit Details    Type of service:  Video Visit     Originating Location (pt. 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 She has mitigated her risk for gynecological cancers by having a THBSO in . All pathology was benign.     Primary Physician: Alphonse Arellano MD     History Of Present Illness:  Ms. Santana is a very pleasant, healthy 43 year old female who presents with PMS2+ associated Reed Syndrome.     Genetic Testin2021-  POSITIVE for a PMS2 likely pathogenic mutation. Specifically her mutation is called p.E705K (also known as c.2113G>A) identified using Cancer Next-Expanded testing from [employer].   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 This variant is called p.A417S (c.1249G>T). No other variants or mutations were found in the STK11 gene. Given the uncertain significance of this result, medical management decisions should NOT be made based on this test result alone.     Christina is negative for known, pathogenic mutations in the AIP, ALK, APC, CARINA, AXIN2, BAP1, BARD1, BLM, BMPR1A, BRCA1, BRCA2, BRIP1, CDC73, CDH1, CDK4, CDKN1B, CDKN2A, CHEK2, CTNNA1, DICER1, EPCAM, EGFR, EGLN1, FANCC, FH, FLCN, GALNT12, GREM1, HOXB13, KIF1B, KIT, LZTR1, MAX, MEN1, MET, MITF, MLH1, MRE11, MSH2, MSH3, MSH6, MUTYH, NBN, NF1, NF2, NTHL1, PALB2, PDGFRA, PHOX2B, POLD1, POLE, POT1, BIQAM9N, PTCH1, PTEN, RAD50, RAD51C, RAD51D, RB1, RECQL, RET, SDHA, SDHAF2, SDHB, SDHC, SDHD, SMAD4, SMARCA4, SMARCB1, SMARCE1, STK11, SUFU, MUFT919, TP53, TSC1, TSC2, VHL, and XRCC2 genes. No pathogenic mutations were found in any of  the other 76 of 77 genes analyzed. This test involved sequencing and deletion/duplication analysis of all genes with the exceptions of VHL and XRCC2 (sequencing and deletion/duplication); EGFR, EGLN1, HOXB13, KIT, MITF, PDGFRA, POLD1 and POLE (sequencing only); EPCAM and GREM1 (deletion/duplication only).     History:    s/p  x6  2021- Total laparoscopic hysterectomy and bilateral salpingo oophorectomy (Dr. Dylon Alcantar), pathology benign  Currently using estradiol patch for post menopausal symptoms.      12/10/2021- Vaginal cuff at 9:00, biopsies:  - Benign acutely and chronically inflamed granulation tissue.  - No vaginal tissue identified.     B.  Vaginal cuff at 3:00, biopsy:  - Benign acutely and chronically inflamed granulation tissue.  - A portion of benign vaginal tissue with normal mature squamous epithelium and benign stroma with focal moderate chronic inflammation.      Screening history:  EGD 10/31/2018- Dysphagia by Dr Harris   1. LA Grade A (one or more mucosal breaks less than 5 mm, not extending between tops of 2 mucosal folds) esophagitis with no bleeding was found 34 to 36 cm from the incisors.  ESOPHAGUS, DISTAL, BIOPSY:   1. Esophageal eosinophilia (peak count of 10), see comment : The differential diagnosis primarily includes eosinophilic esophagitis (EoE), proton-pump inhibitor-responsive esophageal eosinophilia (PPI-REE) and gastroesophageal reflux disease (GERD) with more eosinophils than typical. ACG guidelines recommend further endoscopic evaluation with re-biopsy after a two month trial of high dose proton-pump inhibitor (PPI) therapy to further investigate these three possibilities  2. Negative for columnar mucosa  3. A medium-sized hiatal hernia was present.  The ampulla, duodenal bulb, first portion of the duodenum and second portion of the duodenum were normal.  One benign-appearing, intrinsic mild stenosis was found 36 to 37 cm from the incisors. tenosis measured 1  cm (in length).   The stenosis was traversed. A TTS dilator was passed through the scope. Dilation with a 12-13.5-15 mm balloon dilator was performed to 15 mm.   The dilation site was examined and showed moderate mucosal disruption.       6/10/2021- Colonoscopy (Dr. Garcia, [place]), Hemorrhoids found on perianal exam.  One 2 mm hyperplastic polyp in the rectum  12/1/2022- Colonoscopy and EGD combined ([place]):  Colon normal. No specimens taken                                                                                 Impression:            - Normal esophagus. Normal duodenum                         - Normal stomach. Biopsied. Pathology:     Stomach, antrum: Biopsy:  - Antral-type mucosa with reactive gastropathy, see comment  - No Helicobacter pylori-like organisms seen on H&E examination     Review of Systems:   GENERAL: No change in weight, sleep or appetite.  Normal energy.  No fever or chills  EYES: Negative for vision changes or eye problems  ENT: No problems with ears, nose or throat.  No difficulty swallowing.  RESP: No coughing, wheezing or shortness of breath  CV: No chest pains or palpitations  GI: No nausea, vomiting,  heartburn, abdominal pain, diarrhea, constipation or change in bowel habits  : No urinary frequency or dysuria, bladder or kidney problems  MUSCULOSKELETAL: No significant muscle or joint pains  NEUROLOGIC: No headaches, numbness, tingling, weakness, problems with balance or coordination  PSYCHIATRIC: No problems with anxiety, depression or mental health  HEME/IMMUNE/ALLERGY: No history of bleeding or clotting problems or anemia.  No allergies or immune system problems  ENDOCRINE: No history of thyroid disease, diabetes or other endocrine disorders  SKIN: No rashes,worrisome lesions or skin problems        Past Medical/Surgical History:  Past Medical History:   Diagnosis Date    ASCUS with positive high risk HPV 12/27/2013    HSIL (high grade squamous intraepithelial  lesion) on Pap smear of cervix 2013    Pap smear of cervix with ASCUS, cannot exclude HGSIL 11, 8/10/12,     PMS2-related Reed syndrome (HNPCC4)      Past Surgical History:   Procedure Laterality Date    APPENDECTOMY      AS REPAIR PARAESOPHAGEAL HIATAL HERNIA,LAPAROTOMY, W MESH      BIOPSY VULVA N/A 12/10/2021    Procedure: Vaginal cuff biopsy;  Surgeon: Leyda Guzman DO;  Location: [facility]    COLONOSCOPY N/A 6/10/2021    Procedure: COLONOSCOPY, WITH POLYPECTOMY;  Surgeon: Willi Galeas DO;  Location: [facility]    COLONOSCOPY N/A 2022    Procedure: COLONOSCOPY;  Surgeon: Willi Galeas DO;  [facility]: [facility]    ESOPHAGOSCOPY, GASTROSCOPY, DUODENOSCOPY (EGD), COMBINED N/A 2022    Procedure: ESOPHAGOGASTRODUODENOSCOPY (EGD) with biopsy;  Surgeon: Willi Galeas DO;  [facility]    EXAM UNDER ANESTHESIA PELVIC N/A 12/10/2021    Procedure: EXAM UNDER ANESTHESIA, PELVIS;  Surgeon: Leyda Guzman DO;  Location: [facility]    LAPAROSCOPIC HYSTERECTOMY TOTAL, BILATERAL SALPINGO-OOPHORECTOMY, COMBINED N/A 2021    Procedure: HYSTERECTOMY, TOTAL, LAPAROSCOPIC, WITH SALPINGO-OOPHORECTOMY;  Surgeon: Dylon Alcantar MD;  Location: [facility]       Allergies:  Allergies as of 2023 - Reviewed 2023   Allergen Reaction Noted    Latex Shortness Of Breath, Swelling, and Rash 2021       Current Medications:  Current Outpatient Medications   Medication Sig Dispense Refill    PROGESTERONE 100MG/G CREAM Apply 0.5 g topically 2 times daily      Sodium Sulfate-Mag Sulfate-KCl (SUTAB) 0350-845-139 MG TABS Take 1 kit by mouth once for 1 dose 24 tablet 0        Family History:  Family History   Problem Relation Age of Onset    Fibroids Mother     Reed Syndrome Mother         PMS2+    Colon Polyps Mother         removed 5 feet of colon for unmanageable polyps;  in her sleep at 67, took a nape and never woke up,    Reed Syndrome Brother         PMS2+    Bladder  "Cancer Maternal Grandfather 70        lived to be 94    Kidney Cancer Maternal Grandfather     Breast Cancer Paternal Grandmother         postmenopausal    Ovarian Cancer Maternal Aunt 40        possible uterine cancer also; hx of fibroids    Ovarian Cancer Maternal Aunt 40        possible uterine cancer also; hx of fibroids    Breast Cancer Paternal Aunt 54       Social History:  Social History     Socioeconomic History    Marital status:      Spouse name: Thai    Number of children: 6    Years of education: Not on file    Highest education level: Not on file   Occupational History    Occupation: Working on CNA training     Employer: [employer]     Comment: Works in several roles: security, 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 Not Asked   Social History Narrative    Not on file     Social Determinants of Health     Financial Resource Strain: Not on file   Food Insecurity: Not on file   Transportation Needs: Not on file   Physical Activity: Not on file   Stress: Not on file   Social Connections: Not on file   Intimate Partner Violence: Not on file   Housing Stability: Not on file       Physical Exam:  Ht 1.676 m (5' 6\")   Wt 83.5 kg (184 lb)   LMP 02/03/2021   BMI 29.70 kg/m    GENERAL: Healthy, alert and no distress  EYES: Eyes grossly normal to inspection.  No discharge or erythema, or obvious scleral/conjunctival abnormalities.  RESP: No audible wheeze, cough, or visible cyanosis.  No visible retractions or increased work of breathing.    SKIN: Visible skin clear. No significant rash, abnormal pigmentation or " lesions.  NEURO: Cranial nerves grossly intact.  Mentation and speech appropriate for age.  PSYCH: Mentation appears normal, affect normal/bright, judgement and insight intact, normal speech and appearance well-groomed.      Laboratory/Imaging Studies  No results found for any visits on 07/27/23.    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 She has no complaints today and will schedule her appointments in the [facility] per below. Essentially, she needs a colonoscopy in December and her urine sample taken to the lab (or collected at the lab) by the end of 2023. She will not need any upper GI screening or pancreatic screening unless her family history changes. She has neither gastric, pancreatic nor small bowel cancers in her family.  She did have one episode of microhematuria last year, but her re-test was negative.     I have ordered her Bass Tab prep for her colonoscopy in December and showed her how to access the [email or web address] coupon the website that would allow for no more than a $50 co pay.    Site  Estimated Average Age of Presentation for PMS2+ carriers Cumulative Risk for Diagnosis Through Age 80 Cumulative Risk for Diagnosis Through Lifetime for people in the general population     Colorectal     61-66 years    8.7-20%    4.2%   Endometrial  49-50 years     13-26%    3.1%     Ovarian     51-59 years      3%    1.3%     Renal pelvis and/or ureter     No data   <1 % -3.7%   Less than 1%     Bladder     71 years  <1%-2.4 2.4%   Gastric  inadequate date  Inadequate data 0.9%   Small bowel  Single case - 70 years    0.1% -0.3%   0.3%     Pancreas    No data    <1%--1.6%   1.6%     Biliary tract    No data   0.2-<1%    0.2%     Prostate     No data    4.6%-11.6%    11.6%   Breast (female)    No data  8.1-12.8%    12.8%     Brain    40 years    0.6%-<1%    0.6%      Individualized Surveillance Plan for Reed Syndrome   (MSH6+ and  PMS2+ mutation carriers)   Based on NCCN Guidelines Version 2.2022   Type of Screening Recommendation Last Done Next Due   Colon Cancer Screening Colonoscopy at age 30-35 years or 2-5 years prior to the earliest colon cancer in the family if it is diagnosed prior to age 30.     Repeat every 1-2 years.    12/2022 normal   December 2023   Endometrial and Ovarian Cancer Consider Prophylactic hysterectomy and bilateral salpingo-oophorectomy (BSO) can be considered by women who have completed childbearing.    Insufficient evidence exists to make specific recommendation for RRSO in MSH6+ and PMS2+ carriers.     Surgery complete     NA    Screening using  endometrial sampling is an option every 1-2 years; women should be aware that dysfunctional uterine bleeding warrants evaluation.    Data do not support ovarian cancer screening for Reed Syndrome. Annual transvaginal ultrasound and  tests may be considered at a clinician's discretion.     NA     NA   Gastric and small bowel cancer Upper GI screening every 2-4 years, starting at age 30 for MSH6+ carriers    PMS2+ carriers - Selected individuals or families or those of  descent may consider EGD with extended duodenoscopy every 3-5 years beginning at age 40.   2022   Repeat if symptomatic or family history changes   Urothelial cancer Consider annual urinalysis at age 30-35 in MSH6+ families with family history of urothelial cancers 2022    Due now due to family history of bladder cancer Needs lab appt soon and then repeat in one year   Pancreatic screening for MSH6+ with 1st or 2nd degree relatives with pancreatic cancer on Reed side of family Annual contrast-enhanced MRI/MRCP and/or EUS, with consideration of shorter screening intervals for individuals found to have worrisome abnormalities on screening.     Most small cystic lesions found on screening will not warrant biopsy, surgical resection, or any other intervention.     No family history of pancreatic  cancer     Review in future   Prostate Screening  Annual PSA test with general population screening; may begin earlier if younger prostate cancers in the family NA NA   Central Nervous System cancer Annual physical/neurological examinations starting at age 25-30. 7/27/2023 July 2024       There are data to suggest that aspirin may decrease the risk of colon cancer in Reed Syndrome.  However, optimal dose and duration of aspirin therapy is uncertain.    There have been suggestions that there is an increased risk for breast cancer in Reed Syndrome patients; however, there is not enough evidence to support increased screening above average risk breast cancer screening.     I spent a total of 60 minutes on the day of the visit. Please see the note for further information on patient assessment and treatment.    Radha Rodriguez, APRN-CNS, OCN, AGN-BC  Clinical Nurse Specialist  Cancer Risk Management Program  [facility]    CC: Alphonse Arellano MD

## 2023-07-27 NOTE — NURSING NOTE
Is the patient currently in the state of [place]? YES    Visit mode:VIDEO    If the visit is dropped, the patient can be reconnected by: VIDEO VISIT: Text to cell phone: [phone]    Will anyone else be joining the visit? NO      How would you like to obtain your AVS? MyChart    Are changes needed to the allergy or medication list? NO    Ju ESCOBAR    Reason for visit: RECHECK

## 2023-07-27 NOTE — TELEPHONE ENCOUNTER
Sutab not covered by [employer].  Do you want to change bowel prep or try for prior auth?    Insurance:  [employer] [place] PMAP  Bin  [id number]  ID  [id number]  Group  [id number]  PCN  [id number]    Thank you,  Jennifer Dockery [facility]  [phone]

## 2023-07-27 NOTE — PATIENT INSTRUCTIONS
Individualized Surveillance Plan for Reed Syndrome   (MSH6+ and PMS2+ mutation carriers)   Based on NCCN Guidelines Version 2.2022   Type of Screening Recommendation Last Done Next Due   Colon Cancer Screening Colonoscopy at age 30-35 years or 2-5 years prior to the earliest colon cancer in the family if it is diagnosed prior to age 30.     Repeat every 1-2 years.    12/2022 normal   December 2023   Endometrial and Ovarian Cancer Consider Prophylactic hysterectomy and bilateral salpingo-oophorectomy (BSO) can be considered by women who have completed childbearing.    Insufficient evidence exists to make specific recommendation for RRSO in MSH6+ and PMS2+ carriers.     Surgery complete     NA    Screening using  endometrial sampling is an option every 1-2 years; women should be aware that dysfunctional uterine bleeding warrants evaluation.    Data do not support ovarian cancer screening for Reed Syndrome. Annual transvaginal ultrasound and  tests may be considered at a clinician's discretion.     NA     NA   Gastric and small bowel cancer Upper GI screening every 2-4 years, starting at age 30 for MSH6+ carriers    PMS2+ carriers - Selected individuals or families or those of  descent may consider EGD with extended duodenoscopy every 3-5 years beginning at age 40.   2022   Repeat if symptomatic or family history changes   Urothelial cancer Consider annual urinalysis at age 30-35 in MSH6+ families with family history of urothelial cancers 2022    Due now due to family history of bladder cancer Needs lab appt soon and then repeat in one year   Pancreatic screening for MSH6+ with 1st or 2nd degree relatives with pancreatic cancer on Reed side of family Annual contrast-enhanced MRI/MRCP and/or EUS, with consideration of shorter screening intervals for individuals found to have worrisome abnormalities on screening.     Most small cystic lesions found on screening will not warrant biopsy, surgical resection, or  any other intervention.     No family history of pancreatic cancer     Review in future   Prostate Screening  Annual PSA test with general population screening; may begin earlier if younger prostate cancers in the family NA NA   Central Nervous System cancer Annual physical/neurological examinations starting at age 25-30. 7/27/2023 July 2024       There are data to suggest that aspirin may decrease the risk of colon cancer in Reed Syndrome.  However, optimal dose and duration of aspirin therapy is uncertain.    There have been suggestions that there is an increased risk for breast cancer in Reed Syndrome patients; however, there is not enough evidence to support increased screening above average risk breast cancer screening.

## 2023-09-14 ENCOUNTER — TELEPHONE (OUTPATIENT)
Dept: OBGYN | Facility: CLINIC | Age: 43
End: 2023-09-14
Payer: COMMERCIAL

## 2023-09-14 ENCOUNTER — MYC MEDICAL ADVICE (OUTPATIENT)
Dept: OBGYN | Facility: CLINIC | Age: 43
End: 2023-09-14
Payer: COMMERCIAL

## 2023-09-14 NOTE — TELEPHONE ENCOUNTER
[facility] Call Center    Phone Message    May a detailed message be left on voicemail: yes     Reason for Call: Pt is requesting Std Labs ordered for appointment. Pt is requesting physical pap for appt booked 11/6. Please call pt to discuss and confirm, thank you    Action Taken: Message routed to:  [facility] Obgyn    Travel Screening: Not Applicable

## 2023-09-14 NOTE — TELEPHONE ENCOUNTER
Pt is scheduled with Dr. Guzman on 11/6.  She is requesting STD testing and pap during that appt.    Sending pt a [employer] message letting her know that Dr. Guzman can do the pap during that appt and she will order and do STD testing at that appt as well after further discussion about which ones are needed/wanted.    Kirsty Corona RN

## 2023-09-15 ENCOUNTER — TELEPHONE (OUTPATIENT)
Dept: GASTROENTEROLOGY | Facility: CLINIC | Age: 43
End: 2023-09-15
Payer: COMMERCIAL

## 2023-09-15 NOTE — TELEPHONE ENCOUNTER
"Endoscopy Scheduling Screen    Have you had a positive Covid test in the last 14 days?  No    Are you active on MyChart?   Yes    What insurance is in the chart?  Other:  [employer]    Ordering/Referring Provider: DIANA BARRERA    (If ordering provider performs procedure, schedule with ordering provider unless otherwise instructed. )    BMI: Estimated body mass index is 29.7 kg/m  as calculated from the following:    Height as of 7/27/23: 1.676 m (5' 6\").    Weight as of 7/27/23: 83.5 kg (184 lb).     Sedation Ordered  moderate sedation.   If patient BMI > 50 do not schedule in [facility].    If patient BMI > 45 do not schedule at [facility].    Are you taking methadone or Suboxone?  No    Are you taking any prescription medications for pain 3 or more times per week?   No    Do you have a history of malignant hyperthermia or adverse reaction to anesthesia?  No    (Females) Are you currently pregnant?   No     Have you been diagnosed or told you have pulmonary hypertension?   No    Do you have an LVAD?  No    Have you been told you have moderate to severe sleep apnea?  No    Have you been told you have COPD, asthma, or any other lung disease?  No    Do you have any heart conditions?  No     Have you ever had an organ transplant?   No    Have you ever had or are you awaiting a heart or lung transplant?   No    Have you had a stroke or transient ischemic attack (TIA aka \"mini stroke\" in the last 6 months?   No    Have you been diagnosed with or been told you have cirrhosis of the liver?   No    Are you currently on dialysis?   No    Do you need assistance transferring?   No    BMI: Estimated body mass index is 29.7 kg/m  as calculated from the following:    Height as of 7/27/23: 1.676 m (5' 6\").    Weight as of 7/27/23: 83.5 kg (184 lb).     Is patients BMI > 40 and scheduling location [facility]?  No    Do you take an injectable medication for weight loss or diabetes (excluding insulin)?  No    Do you take the medication " Naltrexone?  No    Do you take blood thinners?  No       Prep   Are you currently on dialysis or do you have chronic kidney disease?  No    Do you have a diagnosis of diabetes?  No    Do you have a diagnosis of cystic fibrosis (CF)?  No    On a regular basis do you go 3 -5 days between bowel movements?  No    BMI > 40?  No    Preferred Pharmacy:    [facility] - [address] [place] [address]  Phone: [phone] Fax: [phone]      Final Scheduling Details   Colonoscopy prep sent?  Sutab    Procedure scheduled  Colonoscopy    Surgeon:  CHETNA     Date of procedure:  12/4/2023     Pre-OP / PAC:   No - Not required for this site.    Location  [facility] - Per order.    Sedation   MAC/Deep Sedation  LOCATION      Patient Reminders:   You will receive a call from a Nurse to review instructions and health history.  This assessment must be completed prior to your procedure.  Failure to complete the Nurse assessment may result in the procedure being cancelled.      On the day of your procedure, please designate an adult(s) who can drive you home stay with you for the next 24 hours. The medicines used in the exam will make you sleepy. You will not be able to drive.      You cannot take public transportation, ride share services, or non-medical taxi service without a responsible caregiver.  Medical transport services are allowed with the requirement that a responsible caregiver will receive you at your destination.  We require that drivers and caregivers are confirmed prior to your procedure.

## 2023-09-18 ASSESSMENT — ENCOUNTER SYMPTOMS
SHORTNESS OF BREATH: 0
HEMATOCHEZIA: 0
COUGH: 0
HEADACHES: 1
NAUSEA: 0
WEAKNESS: 0
MYALGIAS: 0
FEVER: 0
CHILLS: 0
PALPITATIONS: 0
FREQUENCY: 0
ABDOMINAL PAIN: 0
DIARRHEA: 0
PARESTHESIAS: 0
NERVOUS/ANXIOUS: 1
HEMATURIA: 0
CONSTIPATION: 0
EYE PAIN: 0
DIZZINESS: 0
ARTHRALGIAS: 1
BREAST MASS: 0
JOINT SWELLING: 0
SORE THROAT: 0
HEARTBURN: 0
DYSURIA: 0

## 2023-09-19 ENCOUNTER — OFFICE VISIT (OUTPATIENT)
Dept: OBGYN | Facility: OTHER | Age: 43
End: 2023-09-19
Payer: COMMERCIAL

## 2023-09-19 VITALS
WEIGHT: 182.98 LBS | BODY MASS INDEX: 29.53 KG/M2 | SYSTOLIC BLOOD PRESSURE: 119 MMHG | DIASTOLIC BLOOD PRESSURE: 84 MMHG

## 2023-09-19 DIAGNOSIS — Z11.3 SCREENING FOR STDS (SEXUALLY TRANSMITTED DISEASES): Primary | ICD-10-CM

## 2023-09-19 DIAGNOSIS — Z12.4 SCREENING FOR MALIGNANT NEOPLASM OF CERVIX: ICD-10-CM

## 2023-09-19 DIAGNOSIS — B37.31 YEAST INFECTION OF THE VAGINA: ICD-10-CM

## 2023-09-19 DIAGNOSIS — Z01.419 WOMEN'S ANNUAL ROUTINE GYNECOLOGICAL EXAMINATION: ICD-10-CM

## 2023-09-19 LAB
CLUE CELLS: ABNORMAL
TRICHOMONAS, WET PREP: ABNORMAL
WBC'S/HIGH POWER FIELD, WET PREP: ABNORMAL
YEAST, WET PREP: PRESENT

## 2023-09-19 PROCEDURE — 87591 N.GONORRHOEAE DNA AMP PROB: CPT | Performed by: OBSTETRICS & GYNECOLOGY

## 2023-09-19 PROCEDURE — 87624 HPV HI-RISK TYP POOLED RSLT: CPT | Performed by: OBSTETRICS & GYNECOLOGY

## 2023-09-19 PROCEDURE — G0145 SCR C/V CYTO,THINLAYER,RESCR: HCPCS | Performed by: OBSTETRICS & GYNECOLOGY

## 2023-09-19 PROCEDURE — 99396 PREV VISIT EST AGE 40-64: CPT | Performed by: OBSTETRICS & GYNECOLOGY

## 2023-09-19 PROCEDURE — 87210 SMEAR WET MOUNT SALINE/INK: CPT | Performed by: OBSTETRICS & GYNECOLOGY

## 2023-09-19 PROCEDURE — 87491 CHLMYD TRACH DNA AMP PROBE: CPT | Performed by: OBSTETRICS & GYNECOLOGY

## 2023-09-19 RX ORDER — FLUCONAZOLE 150 MG/1
150 TABLET ORAL ONCE
Qty: 1 TABLET | Refills: 0 | Status: SHIPPED | OUTPATIENT
Start: 2023-09-19 | End: 2023-09-19

## 2023-09-19 NOTE — PATIENT INSTRUCTIONS
If you have any questions regarding your visit, Please contact your care team.     [employer]: [phone]    To Schedule an Appointment 24/7  Call: [phone] HOU[facility] TELEPHONE NUMBER     Eulogio Avina MD    Medical Assistant    Lubna Amaya-Surgery Scheduler  Marquita-Surgery Scheduler     Monday-[place]  1:00 pm-4:30 pm    Tuesday-[place]  7:30 am-4:30 pm    Wednesday-[place]  7:30 am-4:30 pm        Typical Surgery Days: Monday or Thursday       [address] [address]  [phone] appointment line  [phone] Fax    Imaging Scheduling all locations  [phone]    **Surgeries** Our Surgery Schedulers will contact you to schedule. If you do not receive a call within 3 business days, please call [phone].    If you need a medication refill, please contact your pharmacy. Please allow 3 business days for your refill to be completed.    As always, Thank you for trusting us with your healthcare needs!                   see additional instructions from your care team below

## 2023-09-19 NOTE — PROGRESS NOTES
Christina is a 43 year old  female who presents for annual exam.     Besides routine health maintenance,  she would like to discuss STD check and refill of estrogen/progesterone.    HPI:  The patient's PCP is Alphonse Arellano MD.       GYNECOLOGIC HISTORY:    Patient's last menstrual period was 2021.    Regular menses? No, had hsyterectomy    Her current contraception method is: hysterectomy.  She  reports that she has never smoked. She has never used smokeless tobacco.    Patient is sexually active.  STD testing offered?  Declined        HEALTH MAINTENANCE:  Cholesterol: (No results found for: CHOL   Last Mammo: , Result: , Next Mammo:  one year  Pap: (  Lab Results   Component Value Date    GYNINTERP  10/08/2021     Negative for Intraepithelial Lesion or Malignancy (NILM)    )   Colonoscopy:   Health maintenance updated:  no    HISTORY:  OB History    Para Term  AB Living   8 6 5 1 2 6   SAB IAB Ectopic Multiple Live Births   2 0 0 0 6      # Outcome Date GA Lbr Eleazar/2nd Weight Sex Delivery Anes PTL Lv   8 SAB            7 SAB            6          MIR   5 Term         MIR   4 Term         MIR   3 Term         MIR   2 Term         MIR   1 Term         MIR       Patient Active Problem List   Diagnosis     S/P LEEP (status post loop electrosurgical excision procedure)     BREEZY III (cervical intraepithelial neoplasia grade III) with severe dysplasia     Encounter for insertion of mirena IUD     GERD with stricture     Astigmatism     Myopia     Sinus pressure     Tension type headache     Allergic rhinitis due to dust mite     Seasonal allergic rhinitis due to pollen     Allergic rhinitis due to animal dander     Allergic rhinitis due to mold     Cervicalgia     Muscle spasm     RUQ pain     Nausea     Calculus of gallbladder without cholecystitis without obstruction     PMS2-related Reed syndrome (HNPCC4)     Past Surgical History:   Procedure Laterality Date      APPENDECTOMY       AS REPAIR PARAESOPHAGEAL HIATAL HERNIA,LAPAROTOMY, W MESH       BIOPSY VULVA N/A 12/10/2021    Procedure: Vaginal cuff biopsy;  Surgeon: Leyad Guzman DO;  [facility]     COLONOSCOPY N/A 6/10/2021    Procedure: COLONOSCOPY, WITH POLYPECTOMY;  Surgeon: Willi Galeas DO;  [facility]     COLONOSCOPY N/A 2022    Procedure: COLONOSCOPY;  Surgeon: Willi Galeas DO;  [facility]     ESOPHAGOSCOPY, GASTROSCOPY, DUODENOSCOPY (EGD), COMBINED N/A 2022    Procedure: ESOPHAGOGASTRODUODENOSCOPY (EGD) with biopsy;  Surgeon: Willi Galeas DO;  [facility]     EXAM UNDER ANESTHESIA PELVIC N/A 12/10/2021    Procedure: EXAM UNDER ANESTHESIA, PELVIS;  Surgeon: Leyda Guzman DO;  [facility]     LAPAROSCOPIC HYSTERECTOMY TOTAL, BILATERAL SALPINGO-OOPHORECTOMY, COMBINED N/A 2021    Procedure: HYSTERECTOMY, TOTAL, LAPAROSCOPIC, WITH SALPINGO-OOPHORECTOMY;  Surgeon: Dylon Alcantar MD;  [facility]      Social History     Tobacco Use     Smoking status: Never     Smokeless tobacco: Never   Substance Use Topics     Alcohol use: Yes     Comment: occassional      Problem (# of Occurrences) Relation (Name,Age of Onset)    Breast Cancer (2) Paternal Grandmother: postmenopausal, Paternal Aunt (54)    Fibroids (1) Mother    Colon Polyps (1) Mother: removed 5 feet of colon for unmanageable polyps;  in her sleep at 67, took a nape and never woke up,    Ovarian Cancer (2) Maternal Aunt (40): possible uterine cancer also; hx of fibroids, Maternal Aunt (40): possible uterine cancer also; hx of fibroids    Bladder Cancer (1) Maternal Grandfather (70): lived to be 94    Kidney Cancer (1) Maternal Grandfather    Reed Syndrome (2) Mother: PMS2+, Brother: PMS2+              Current Outpatient Medications   Medication Sig     PROGESTERONE 100MG/G CREAM Apply 0.5 g topically 2 times daily     No current facility-administered medications for this  visit.     Allergies   Allergen Reactions     Latex Shortness Of Breath, Swelling and Rash       Past medical, surgical, social and family histories were reviewed and updated in [facility].    ROS:       EXAM:  LMP 02/03/2021    BMI: There is no height or weight on file to calculate BMI.    PHYSICAL EXAM:  Constitutional:   Appearance: Well nourished, well developed, alert, in no acute distress  Breasts: Inspection of Breasts:  No lymphadenopathy present., Palpation of Breasts and Axillae:  No masses present on palpation, no breast tenderness., Axillary Lymph Nodes:  No lymphadenopathy present., and No nodularity, asymmetry or nipple discharge bilaterally.  Gastrointestinal:   Abdominal Examination:  Abdomen nontender to palpation, tone normal without rigidity or guarding, no masses present, umbilicus without lesions   Liver and Spleen:  No hepatomegaly present, liver nontender to palpation    Hernias:  No hernias present  Lymphatic: Lymph Nodes:  No other lymphadenopathy present  Skin:  General Inspection:  No rashes present, no lesions present, no areas of  discoloration  Neurologic:    Mental Status:  Oriented X3.  Normal strength and tone, sensory exam                grossly normal, mentation intact and speech normal.    Psychiatric:   Mentation appears normal and affect normal/bright.         Pelvic Exam:  External Genitalia:     Normal appearance for age, no discharge present, no tenderness present, no inflammatory lesions present, color normal  Vagina:     Normal vaginal vault without central or paravaginal defects, no discharge present, no inflammatory lesions present, no masses present  Bladder:     Nontender to palpation  Urethra:   Urethral Body:  Urethra palpation normal, urethra structural support normal   Urethral Meatus:  No erythema or lesions present  Cervix:     And uterus surgically absent  Adnexa:     No adnexal tenderness present, no adnexal masses present  Perineum:     Perineum within normal limits,  no evidence of trauma, no rashes or skin lesions present  Anus:     Anus within normal limits, no hemorrhoids present  Inguinal Lymph Nodes:     No lymphadenopathy present  Pubic Hair:     Normal pubic hair distribution for age  Genitalia and Groin:     No rashes present, no lesions present, no areas of discoloration, no masses present    COUNSELING:   Reviewed preventive health counseling, as reflected in patient instructions       Regular exercise continue HRT till 53  Practice safe sex    BMI: There is no height or weight on file to calculate BMI.      ASSESSMENT:  43 year old female with satisfactory annual exam.  No diagnosis found.    PLAN:  Std check  Will refill meds    Eulogio Avina MD

## 2023-09-20 LAB
C TRACH DNA SPEC QL NAA+PROBE: NEGATIVE
N GONORRHOEA DNA SPEC QL NAA+PROBE: NEGATIVE

## 2023-09-22 LAB
BKR LAB AP GYN ADEQUACY: NORMAL
BKR LAB AP GYN INTERPRETATION: NORMAL
BKR LAB AP HPV REFLEX: NORMAL
BKR LAB AP PREVIOUS ABNL DX: NORMAL
BKR LAB AP PREVIOUS ABNORMAL: NORMAL
PATH REPORT.COMMENTS IMP SPEC: NORMAL
PATH REPORT.COMMENTS IMP SPEC: NORMAL
PATH REPORT.RELEVANT HX SPEC: NORMAL

## 2023-09-25 ENCOUNTER — MYC MEDICAL ADVICE (OUTPATIENT)
Dept: OBGYN | Facility: OTHER | Age: 43
End: 2023-09-25
Payer: COMMERCIAL

## 2023-09-25 LAB
HUMAN PAPILLOMA VIRUS 16 DNA: NEGATIVE
HUMAN PAPILLOMA VIRUS 18 DNA: NEGATIVE
HUMAN PAPILLOMA VIRUS FINAL DIAGNOSIS: ABNORMAL
HUMAN PAPILLOMA VIRUS OTHER HR: POSITIVE

## 2023-10-03 ENCOUNTER — TELEPHONE (OUTPATIENT)
Dept: OBGYN | Facility: OTHER | Age: 43
End: 2023-10-03
Payer: COMMERCIAL

## 2023-10-03 NOTE — TELEPHONE ENCOUNTER
Sergo Avina. One of our [facility] reached out to her previous pharmacy and verified what concentrations her cream was.       She was previously getting BIEST 1mg/gm (80/20) w/Progesterone 100mg/gram cream and dosing at 0.5 grams daily.     Let me know if you have any questions or need any additional information.      Juan C Verdugo    [facility]   [address] [place] [id number]   Phone: [phone]  Fax: [phone]

## 2023-10-04 ENCOUNTER — PATIENT OUTREACH (OUTPATIENT)
Dept: FAMILY MEDICINE | Facility: OTHER | Age: 43
End: 2023-10-04
Payer: COMMERCIAL

## 2023-10-16 NOTE — PATIENT INSTRUCTIONS
If you have any questions regarding your visit, Please contact your care team.    [facility]: [phone]      Women s Health CLINIC HOURS TELEPHONE NUMBER   Leyda Guzman DO.    BRIGITTE Gusman-Surgery Scheduler  Marquita - Surgery Scheduler    MARIAJOSE Lofton, MARIAJOSE Espinal RN     Monday, Thursday  [place]  7am-3pm    Tuesday, Wednesday  [place]  7am-3pm    E/O Friday &   [place]    Typical Surgery Days: Thursday or Friday   [facility]  [address]  [place], [place] [address] [phone] Phone  [phone] Fax    [facility]  [address] [place] [address] [phone] Phone    Imaging [phone] Phone    [facility] Delivery:  [phone] Phone     **Surgeries** Our Surgery Schedulers will contact you to schedule. If you do not receive a call within 3 business days, please call [phone].    Urgent Care locations:  [facility] Saturday and    9 am - 5 pm    Monday-Friday   12 pm - 8 pm  Saturday and    9 am - 5 pm   [phone]       If you need a medication refill, please contact your pharmacy. Please allow 3 business days for your refill to be completed.  As always, Thank you for trusting us with your healthcare needs!

## 2023-10-16 NOTE — PROGRESS NOTES
Christina Santana is a 43 year old female who is being evaluated via a billable telephone visit.      What phone number would you like to be contacted at? [phone]  How would you like to obtain your AVS? [facility]  Distant Location (provider location):  On-site  Patient Location: Home  Start Time: 1405  End Time: 1409    Subjective  Christina Santana is a 43 year old female here for a VV to discuss colposcopy under sedation.  Her last pap smear:  5/2011- ASC-H  6/2011- colposcopy- suspicious for HPV  8/2012- ASC-H  4/2013- HSIL  5/1/2013- colposcopy- BREEZY 2-3  5/13/2013 LEEP: BREEZY II-III, Positive Margins  8/2013- ECC- cervicitis  12/2013 ASCUS, HPV +  1/2014- colposcopy- negative  8/2014- NIL  7/2015- NIL/HPV negative  3/2017- NIL/HPV +  4/2017- colposcopy- suggestive of BREEZY I  4/2018- NIL/HPV negative  9/10/19 NIL/HPV negative  2/24/21 TLH/BSO-benign pathology  10/8/21 NIL pap Neg HPV of vaginal cuff. Colpo completed for vaginal bleeding. Plan: Given discomfort during exam and biopsy attempt despite lidocaine, plan to perform EUA and biopsy under anesthesia in the OR. 12/10/21 Vaginal cuff biopsies- negative. Plan: cotest in 3 years.  1/19/22 Post op appt- cancelled  2/15/22 Appt- Pap due 10/8/22  7/27/23 Visit Oncology  9/19/23 NIL pap, + HR HPV (not 16 or 18). Plan: pending provider.    Patient's last menstrual period was 02/03/2021.      OBJECTIVE:  There were no vitals filed for this visit.   Patient alert, oriented, and in no acute distress.    ASSESSMENT:    NIL pap, + HR HPV (not 16 or 18)    PLAN:   Colposcopy in the office.  If biopsies needed, plan to perform under sedation      Leyda Guzman,

## 2023-10-18 ENCOUNTER — VIRTUAL VISIT (OUTPATIENT)
Dept: OBGYN | Facility: CLINIC | Age: 43
End: 2023-10-18
Payer: COMMERCIAL

## 2023-10-18 DIAGNOSIS — B97.7 HIGH RISK HPV INFECTION: Primary | ICD-10-CM

## 2023-10-18 DIAGNOSIS — Z90.710 S/P HYSTERECTOMY: ICD-10-CM

## 2023-10-18 DIAGNOSIS — Z15.09 LYNCH SYNDROME: ICD-10-CM

## 2023-10-18 PROCEDURE — 99213 OFFICE O/P EST LOW 20 MIN: CPT | Mod: 95 | Performed by: OBSTETRICS & GYNECOLOGY

## 2023-10-30 ENCOUNTER — TELEPHONE (OUTPATIENT)
Dept: INTERNAL MEDICINE | Facility: CLINIC | Age: 43
End: 2023-10-30
Payer: COMMERCIAL

## 2023-10-30 NOTE — TELEPHONE ENCOUNTER
FYI - Status Update    Who is Calling: patient    Update: Patient would like to have the severe allergy to latex taken out of her chart. She does not have shortness of breath, swelling or a rash from latex. She does have a sensitivity and sometimes gets a headache from it.    Does caller want a call/response back: No

## 2023-11-02 NOTE — PATIENT INSTRUCTIONS
If you have any questions regarding your visit, Please contact your care team.    [facility]: [phone]      Women s Health CLINIC HOURS TELEPHONE NUMBER   Leyda Guzman DO.    BRIGITTE Gusman-Surgery Scheduler  Marquita - Surgery Scheduler    MARIAJOSE Lofton, MARIAJOSE Espinal RN     Monday, Thursday  [place]  7am-3pm    Tuesday, Wednesday  [place]  7am-3pm    E/O Friday &   [place]    Typical Surgery Days: Thursday or Friday   [facility]  [address]  [place], [place] [address] [phone] Phone  [phone] Fax    [facility]  [address] [place] [address] [phone] Phone    Imaging [phone] Phone    [facility] Labor and Delivery:  [phone] Phone     **Surgeries** Our Surgery Schedulers will contact you to schedule. If you do not receive a call within 3 business days, please call [phone].    Urgent Care locations:  [facility] Saturday and    9 am - 5 pm    Monday-Friday   12 pm - 8 pm  Saturday and    9 am - 5 pm   [phone]       If you need a medication refill, please contact your pharmacy. Please allow 3 business days for your refill to be completed.  As always, Thank you for trusting us with your healthcare needs!

## 2023-11-06 ENCOUNTER — HOSPITAL ENCOUNTER (EMERGENCY)
Facility: CLINIC | Age: 43
Discharge: HOME | End: 2023-11-06
Attending: EMERGENCY MEDICINE | Admitting: EMERGENCY MEDICINE
Payer: COMMERCIAL

## 2023-11-06 VITALS
OXYGEN SATURATION: 99 % | WEIGHT: 188 LBS | SYSTOLIC BLOOD PRESSURE: 132 MMHG | RESPIRATION RATE: 20 BRPM | TEMPERATURE: 98 F | HEIGHT: 66 IN | HEART RATE: 77 BPM | DIASTOLIC BLOOD PRESSURE: 90 MMHG | BODY MASS INDEX: 30.22 KG/M2

## 2023-11-06 DIAGNOSIS — R21 RASH: ICD-10-CM

## 2023-11-06 PROCEDURE — 99283 EMERGENCY DEPT VISIT LOW MDM: CPT | Performed by: EMERGENCY MEDICINE

## 2023-11-06 RX ORDER — PREDNISONE 20 MG/1
TABLET ORAL
Qty: 10 TABLET | Refills: 0 | Status: SHIPPED | OUTPATIENT
Start: 2023-11-06 | End: 2024-05-03

## 2023-11-06 NOTE — ED TRIAGE NOTES
Reports having a procedure to right wrist on Thursday, Friday she noticed a rash to right arm up to elbow.      Triage Assessment (Adult)       Row Name 11/06/23 1504          Triage Assessment    Airway WDL WDL        Respiratory WDL    Respiratory WDL WDL        Skin Circulation/Temperature WDL    Skin Circulation/Temperature WDL X  rash        Cardiac WDL    Cardiac WDL WDL        Peripheral/Neurovascular WDL    Peripheral Neurovascular WDL WDL        Cognitive/Neuro/Behavioral WDL    Cognitive/Neuro/Behavioral WDL WDL

## 2023-11-06 NOTE — DISCHARGE INSTRUCTIONS
May use topical hydrocortisone cream over-the-counter twice a day to the area.  May use at baseline of Zyrtec or Claritin once a day orally as well.  For itching and rash, in addition recommend Benadryl 25 mg 1-2 every 4-6 hours as needed.

## 2023-11-06 NOTE — ED PROVIDER NOTES
History     Chief Complaint   Patient presents with    Rash     HPI  Christina Santana is a 43 year old female who presents with a rash to her right forearm.  4 days previously she had right wrist surgery.  This was for carpal tunnel  and de Quervain's.  The next day she developed an itchy rash proximal to the incision over the forearm.  No fever.  No chills.  No cough.  No dyspnea.  She is tried a little bit of Benadryl.    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     Priority: Medium     Added automatically from request for surgery [id number]      RUQ pain 08/07/2020     Priority: Medium     Added automatically from request for surgery [id number]      Nausea 08/07/2020     Priority: Medium     Added automatically from request for surgery [id number]      Calculus of gallbladder without cholecystitis without obstruction 08/07/2020     Priority: Medium     Added automatically from request for surgery [id number]      Cervicalgia 06/08/2020     Priority: Medium    Muscle spasm 06/08/2020     Priority: Medium    Sinus pressure 03/04/2020     Priority: Medium    Tension type headache 03/04/2020     Priority: Medium    Allergic rhinitis due to dust mite 03/04/2020     Priority: Medium    Seasonal allergic rhinitis due to pollen 03/04/2020     Priority: Medium    Allergic rhinitis due to animal dander 03/04/2020     Priority: Medium    Allergic rhinitis due to mold 03/04/2020     Priority: Medium    GERD with stricture 02/25/2020     Priority: Medium    Encounter for insertion of mirena IUD 09/13/2018     Priority: Medium    S/P LEEP (status post loop electrosurgical excision procedure) 04/22/2013     Priority: Medium     5/23/11 ASC-H  6/20/11 colp- no high grade lesion noted  8/10/12 ASC-H  4/22/13 pap HSIL  5/1/13 colp. BREEZY 2/3. Plan: LEEP  5/13/13 LEEP. BREEZY 2/3 with + margins  8/28/13  colp- WNL  12/27/13 pap ASCUS + HR HPV  1/29/14 colp- WNL  8/27/14 pap NIL.       BREEZY III (cervical intraepithelial neoplasia grade III) with severe dysplasia 01/01/2013     Priority: Medium     5/2011- ASC-H  6/2011- colposcopy- suspicious for HPV  8/2012- ASC-H  4/2013- HSIL  5/1/2013- colposcopy- BREEZY 2-3  5/13/2013 LEEP: BREEZY II-III, Positive Margins  8/2013- ECC- cervicitis  12/2013 ASCUS, HPV +  1/2014- colposcopy- negative  8/2014- NIL  7/2015- NIL/HPV negative   3/2017- NIL/HPV +  4/2017- colposcopy- suggestive of BREEZY I  4/2018- NIL/HPV negative  9/10/19 NIL/HPV negative   2/24/21 TLH/BSO-benign pathology  10/8/21 NIL pap Neg HPV of vaginal cuff. Colpo completed for vaginal bleeding. Plan: Given discomfort during exam and biopsy attempt despite lidocaine, plan to perform EUA and biopsy under anesthesia in the OR. 12/10/21 Vaginal cuff biopsies- negative. Plan: cotest in 3 years.   1/19/22 Post op appt- cancelled   2/15/22 Appt- Pap due 10/8/22  7/27/23 Visit Oncology  9/19/23 NIL pap, + HR HPV (not 16 or 18). Plan: colp bef 12/19/23  10/4/23 Pt notified by phone.         Astigmatism 10/03/2003     Priority: Medium    Myopia 10/03/2003     Priority: Medium        Past Medical History:    Past Medical History:   Diagnosis Date    ASCUS with positive high risk HPV 12/27/2013    HSIL (high grade squamous intraepithelial lesion) on Pap smear of cervix 04/22/2013    Pap smear of cervix with ASCUS, cannot exclude HGSIL 5/23/11, 8/10/12,     PMS2-related Reed syndrome (HNPCC4)        Past Surgical History:    Past Surgical History:   Procedure Laterality Date    APPENDECTOMY      AS REPAIR PARAESOPHAGEAL HIATAL HERNIA,LAPAROTOMY, W MESH      BIOPSY VULVA N/A 12/10/2021    Procedure: Vaginal cuff biopsy;  Surgeon: Leyda Guzman DO;  [facility]    COLONOSCOPY N/A 6/10/2021    Procedure: COLONOSCOPY, WITH POLYPECTOMY;  Surgeon: Willi Galeas DO;  [facility]    COLONOSCOPY N/A 12/1/2022     "Procedure: COLONOSCOPY;  Surgeon: Willi Galeas DO;  [facility]    ESOPHAGOSCOPY, GASTROSCOPY, DUODENOSCOPY (EGD), COMBINED N/A 2022    Procedure: ESOPHAGOGASTRODUODENOSCOPY (EGD) with biopsy;  Surgeon: Willi Galeas DO;  [facility]    EXAM UNDER ANESTHESIA PELVIC N/A 12/10/2021    Procedure: EXAM UNDER ANESTHESIA, PELVIS;  Surgeon: Leyda Guzman DO;  [facility]    LAPAROSCOPIC HYSTERECTOMY TOTAL, BILATERAL SALPINGO-OOPHORECTOMY, COMBINED N/A 2021    Procedure: HYSTERECTOMY, TOTAL, LAPAROSCOPIC, WITH SALPINGO-OOPHORECTOMY;  Surgeon: Dylon Alcantar MD;  [facility]       Family History:    Family History   Problem Relation Age of Onset    Fibroids Mother     Reed Syndrome Mother         PMS2+    Colon Polyps Mother         removed 5 feet of colon for unmanageable polyps;  in her sleep at 67, took a nape and never woke up,    Reed Syndrome Brother         PMS2+    Bladder Cancer Maternal Grandfather 70        lived to be     Kidney Cancer Maternal Grandfather     Breast Cancer Paternal Grandmother         postmenopausal    Ovarian Cancer Maternal Aunt 40        possible uterine cancer also; hx of fibroids    Ovarian Cancer Maternal Aunt 40        possible uterine cancer also; hx of fibroids    Breast Cancer Paternal Aunt 54       Social History:  Marital Status:   [2]  Social History     Tobacco Use    Smoking status: Never    Smokeless tobacco: Never   Vaping Use    Vaping Use: Never used   Substance Use Topics    Alcohol use: Yes     Comment: occassional    Drug use: No        Medications:    predniSONE (DELTASONE) 20 MG tablet  PROGESTERONE 100MG/G CREAM          Review of Systems  All other systems are reviewed and are negative    Physical Exam   BP: (!) 132/90  Pulse: 77  Temp: 98  F (36.7  C)  Resp: 20  Height: 167.6 cm (5' 6\")  Weight: 85.3 kg (188 lb)  SpO2: 99 %      Physical Exam  Vitals reviewed.   Constitutional:       General: " She is not in acute distress.     Appearance: She is not diaphoretic.   HENT:      Head: Normocephalic and atraumatic.   Eyes:      General: No scleral icterus.        Right eye: No discharge.         Left eye: No discharge.      Conjunctiva/sclera: Conjunctivae normal.   Pulmonary:      Effort: Pulmonary effort is normal.      Breath sounds: No stridor.   Musculoskeletal:         General: Normal range of motion.      Cervical back: Normal range of motion.   Skin:     Findings: Rash present.      Comments: 2 incisions over the right wrist appear clean, dry and without signs of infection.  Proximally there is a raised red bumpy rash over the ventral forearm.  No signs of secondary infection   Neurological:      Mental Status: She is alert.      Comments: Normal speech and mentation   Psychiatric:         Judgment: Judgment normal.         ED Course                 Procedures                  No results found for this or any previous visit (from the past 24 hour(s)).    Medications - No data to display    Assessments & Plan (with Medical Decision Making)  43-year-old female with rash to the right forearm 4 days after right wrist surgery.  Rash began the day after surgery.  Appears to be potential contact dermatitis from skin prep would be suspicion.  Either way, no evidence for acute emergency medical condition.  Appears allergic in nature.  Will place on prednisone for 5 days.  Also have recommended Claritin or Zyrtec.  May use topical hydrocortisone in addition to Benadryl as needed.  Follow-up in clinic if not improving over the next few days     I have reviewed the nursing notes.    I have reviewed the findings, diagnosis, plan and need for follow up with the patient.          New Prescriptions    PREDNISONE (DELTASONE) 20 MG TABLET    Take two tablets (= 40mg) each day for 5 (five) days       Final diagnoses:   Rash       11/6/2023   [facility] EMERGENCY DEPT       Luis Subramanian MD  11/06/23  [id number]

## 2023-11-08 ENCOUNTER — OFFICE VISIT (OUTPATIENT)
Dept: OBGYN | Facility: CLINIC | Age: 43
End: 2023-11-08
Payer: COMMERCIAL

## 2023-11-08 VITALS
SYSTOLIC BLOOD PRESSURE: 110 MMHG | BODY MASS INDEX: 30.31 KG/M2 | HEART RATE: 74 BPM | WEIGHT: 187.8 LBS | DIASTOLIC BLOOD PRESSURE: 80 MMHG

## 2023-11-08 DIAGNOSIS — D06.9 CIN III (CERVICAL INTRAEPITHELIAL NEOPLASIA GRADE III) WITH SEVERE DYSPLASIA: ICD-10-CM

## 2023-11-08 DIAGNOSIS — B97.7 HIGH RISK HPV INFECTION: Primary | ICD-10-CM

## 2023-11-08 DIAGNOSIS — Z98.890 S/P LEEP (STATUS POST LOOP ELECTROSURGICAL EXCISION PROCEDURE): ICD-10-CM

## 2023-11-08 DIAGNOSIS — Z15.09 PMS2-RELATED LYNCH SYNDROME (HNPCC4): ICD-10-CM

## 2023-11-08 PROCEDURE — 57452 EXAM OF CERVIX W/SCOPE: CPT | Performed by: OBSTETRICS & GYNECOLOGY

## 2023-11-08 RX ORDER — HYDROCODONE BITARTRATE AND ACETAMINOPHEN 5; 325 MG/1; MG/1
1-2 TABLET ORAL
COMMUNITY
Start: 2023-11-02 | End: 2024-05-03

## 2023-11-08 NOTE — PROGRESS NOTES
Subjective  Christina Santana is a 43 year old female here for a colposcopy.   Her last pap smear:    5/2011- ASC-H  6/2011- colposcopy- suspicious for HPV  8/2012- ASC-H  4/2013- HSIL  5/1/2013- colposcopy- BREEZY 2-3  5/13/2013 LEEP: BREEZY II-III, Positive Margins  8/2013- ECC- cervicitis  12/2013 ASCUS, HPV +  1/2014- colposcopy- negative  8/2014- NIL  7/2015- NIL/HPV negative  3/2017- NIL/HPV +  4/2017- colposcopy- suggestive of BREEZY I  4/2018- NIL/HPV negative  9/10/19 NIL/HPV negative  2/24/21 TLH/BSO-benign pathology  10/8/21 NIL pap Neg HPV of vaginal cuff. Colpo completed for vaginal bleeding. Plan: Given discomfort during exam and biopsy attempt despite lidocaine, plan to perform EUA and biopsy under anesthesia in the OR. 12/10/21 Vaginal cuff biopsies- negative. Plan: cotest in 3 years.  1/19/22 Post op appt- cancelled  2/15/22 Appt- Pap due 10/8/22  7/27/23 Visit Oncology  9/19/23 NIL pap, + HR HPV (not 16 or 18)    Patient's last menstrual period was 02/03/2021.  I discussed the history of HPV and the risk of dysplasia/cancer.  I explained the indications for the colposcopy and the procedure in detail.  I discussed the potential risks including bleeding, infection and cramping. I have recommend abstinence for 1 week and no vigorous activity for 48 hours.  Consent form was signed by patient and all questions were answered.    OBJECTIVE:  Vitals:    11/08/23 1426   BP: 110/80   Pulse: 74   Weight: 85.2 kg (187 lb 12.8 oz)      Patient alert, oriented, and in no acute distress. She was placed on the exam table in the dorsal position and a sterile speculum inserted into the vagina. The cervix was easily visualized.  Acetic acid was applied.  Colposcopy was performed in the usual fashion.  No biopsy taken, vagina normal, performed. See procedure note and exam for further details.    ASSESSMENT:    Colposcopy of the vagina with no biopsy.       NIL +HR HPV Other pap smear.    PLAN:   Repeat pap in one  year    Discussed symptoms to watch for, including bleeding through 1 pad or more per hour, heavy cramps or abdominal pain, fever, or purulent foul smelling discharge.  If these occur, then she will call or return for follow up.    Leyda Guzman DO    Physical Exam   GYN: Colposcopy/LEEP    Date/Time: 11/8/2023 1:24 PM    Performed by: Leyda Guzman DO  Authorized by: Leyda Guzman DO    Consent:     Consent obtained:  Written    Consent given by:  Patient    Procedural risks discussed:  Bleeding, damage to other organs, repeat procedure and infection    Patient questions answered: yes      Patient agrees, verbalizes understanding, and wants to proceed: yes      Educational handouts given: yes      Instructions and paperwork completed: yes    Universal protocol:     Patient states understanding of procedure being performed: yes      Relevant documents present and verified: yes      Test results available and properly labeled: yes      Required blood products, implants, devices, and special equipment available: yes    Pre-procedure:     Pre-procedure timeout performed: yes      Prepped with: acetic acid    Indication:     Indication:  HPV    HPV Indications:  Other high risk  Procedure:     Procedure: Colposcopy only      Under satisfactory analgesia the patient was prepped and draped in the dorsal lithotomy position: yes      [place] speculum was placed in the vagina: yes      Under colposcopic examination the transition zone was seen in entirety: no      Intracervical block was performed: no      Tampon inserted: no      Biopsy(s): no    Post-procedure:     Impression comment:  Normal appearing vagina    Patient tolerance of procedure:  Patient tolerated the procedure well with no immediate complications

## 2023-11-15 ENCOUNTER — PATIENT OUTREACH (OUTPATIENT)
Dept: OBGYN | Facility: CLINIC | Age: 43
End: 2023-11-15
Payer: COMMERCIAL

## 2023-11-15 DIAGNOSIS — D06.9 CIN III (CERVICAL INTRAEPITHELIAL NEOPLASIA GRADE III) WITH SEVERE DYSPLASIA: Primary | ICD-10-CM

## 2023-11-15 NOTE — TELEPHONE ENCOUNTER
Dr. Guzman,    1/8/23 Vaginal [place] - no biopsies, visually normal. Plan cotest in 1 year. Would you like the cotest to be 1 year from the pap date or the vaginal colposcopy date? Thanks!    5/2011- ASC-H  6/2011- colposcopy- suspicious for HPV  8/2012- ASC-H  4/2013- HSIL  5/1/2013- colposcopy- BREEZY 2-3  5/13/2013 LEEP: BREEZY II-III, Positive Margins  8/2013- ECC- cervicitis  12/2013 ASCUS, HPV +  1/2014- colposcopy- negative  8/2014- NIL  7/2015- NIL/HPV negative   3/2017- NIL/HPV +  4/2017- colposcopy- suggestive of BREEZY I  4/2018- NIL/HPV negative  9/10/19 NIL/HPV negative   2/24/21 TLH/BSO-benign pathology  10/8/21 NIL pap Neg HPV of vaginal cuff. Colpo completed for vaginal bleeding. Plan: Given discomfort during exam and biopsy attempt despite lidocaine, plan to perform EUA and biopsy under anesthesia in the OR. 12/10/21 Vaginal cuff biopsies- negative. Plan: cotest in 3 years.   1/19/22 Post op appt- cancelled   2/15/22 Appt- Pap due 10/8/22  7/27/23 Visit Oncology  9/19/23 NIL pap, + HR HPV (not 16 or 18). Plan: colp bef 12/19/23  10/4/23 Pt notified by phone.   11/8/23 Vaginal [place] - no biopsies, visually normal. Plan cotest in 1 year    Sondra Jackson, RN  Pap Tracking

## 2023-11-17 ENCOUNTER — TELEPHONE (OUTPATIENT)
Dept: INTERNAL MEDICINE | Facility: CLINIC | Age: 43
End: 2023-11-17
Payer: COMMERCIAL

## 2023-11-18 NOTE — TELEPHONE ENCOUNTER
Prior Authorization Retail Medication Request    Medication/Dose: Prior auth needed for Sutab [id number]   Diagnosis and ICD code (if different than what is on RX):  --  New/renewal/insurance change PA/secondary ins. PA:  Previously Tried and Failed:  --  Rationale:  Prior auth needed for Sutab [id number]     Insurance   Primary: YANNICK RICHMOND [facility] [phone]  Insurance ID:  [id number]    Secondary (if applicable):--  Insurance ID:  --    Pharmacy Information (if different than what is on RX)  Name:  [facility]  Phone:  [phone]  Fax:[phone]

## 2023-11-22 NOTE — TELEPHONE ENCOUNTER
PRIOR AUTHORIZATION DENIED    Medication: SUTAB [id number] MG PO TABS  Insurance Company: [employer] - Phone [phone] Fax [phone]  Denial Date: 11/22/2023  Denial Rational:       Appeal Information:     Patient Notified: No, care team must notify on denials.

## 2023-11-22 NOTE — TELEPHONE ENCOUNTER
PA Initiation    Medication: SUTAB [phone] MG PO TABS  Insurance Company: [employer] - Phone [phone] Fax [phone]  Pharmacy Filling the Rx: [address]   Filling Pharmacy Phone: [phone]  Filling Pharmacy Fax: [phone]  Start Date: 11/22/2023

## 2023-11-27 NOTE — TELEPHONE ENCOUNTER
Patient notified of denial, patient will think about paying out of pocket for the prep. Patient aware that if she decides to use golytely she will need to call same day surgery for new prep.       Florecita Bailey, RN on 11/27/2023 at 9:55 AM                Willi Galeas, DO  [facility] [place]13 hours ago (7:21 PM)       She will have to talk to GI service line RN to see what other options are.     Letty Santamaria RN [place], Robert Kent, DO5 days ago     HW  Would you like to appeal this denial or prescribe a different medication? Thanks!     Chi Douglass [facility]5 days ago     MIHIR Camara, the PA for this medication was denied. Please read the encounter for more details. If the provider would like to appeal, please provide a letter of medical necessity and route back to the PA team. Otherwise, please advise patient on next steps and you may close the encounter. Thank you!

## 2023-12-03 ENCOUNTER — ANESTHESIA EVENT (OUTPATIENT)
Dept: GASTROENTEROLOGY | Facility: CLINIC | Age: 43
End: 2023-12-03
Payer: COMMERCIAL

## 2023-12-03 ASSESSMENT — LIFESTYLE VARIABLES: TOBACCO_USE: 1

## 2023-12-04 ENCOUNTER — HOSPITAL ENCOUNTER (OUTPATIENT)
Facility: CLINIC | Age: 43
Discharge: HOME | End: 2023-12-04
Attending: SURGERY | Admitting: SURGERY
Payer: COMMERCIAL

## 2023-12-04 ENCOUNTER — ANESTHESIA (OUTPATIENT)
Dept: GASTROENTEROLOGY | Facility: CLINIC | Age: 43
End: 2023-12-04
Payer: COMMERCIAL

## 2023-12-04 VITALS
HEART RATE: 78 BPM | TEMPERATURE: 97.8 F | OXYGEN SATURATION: 96 % | RESPIRATION RATE: 16 BRPM | DIASTOLIC BLOOD PRESSURE: 77 MMHG | SYSTOLIC BLOOD PRESSURE: 117 MMHG

## 2023-12-04 LAB — COLONOSCOPY: NORMAL

## 2023-12-04 PROCEDURE — 258N000003 HC RX IP 258 OP 636: Performed by: NURSE ANESTHETIST, CERTIFIED REGISTERED

## 2023-12-04 PROCEDURE — G0105 COLORECTAL SCRN; HI RISK IND: HCPCS | Performed by: SURGERY

## 2023-12-04 PROCEDURE — 370N000017 HC ANESTHESIA TECHNICAL FEE, PER MIN: Performed by: SURGERY

## 2023-12-04 PROCEDURE — 45378 DIAGNOSTIC COLONOSCOPY: CPT | Performed by: SURGERY

## 2023-12-04 PROCEDURE — 250N000011 HC RX IP 250 OP 636: Performed by: NURSE ANESTHETIST, CERTIFIED REGISTERED

## 2023-12-04 PROCEDURE — 250N000009 HC RX 250: Performed by: NURSE ANESTHETIST, CERTIFIED REGISTERED

## 2023-12-04 RX ORDER — LIDOCAINE 40 MG/G
CREAM TOPICAL
Status: DISCONTINUED | OUTPATIENT
Start: 2023-12-04 | End: 2023-12-04 | Stop reason: HOSPADM

## 2023-12-04 RX ORDER — SODIUM CHLORIDE, SODIUM LACTATE, POTASSIUM CHLORIDE, CALCIUM CHLORIDE 600; 310; 30; 20 MG/100ML; MG/100ML; MG/100ML; MG/100ML
INJECTION, SOLUTION INTRAVENOUS CONTINUOUS PRN
Status: DISCONTINUED | OUTPATIENT
Start: 2023-12-04 | End: 2023-12-04

## 2023-12-04 RX ORDER — LIDOCAINE HYDROCHLORIDE 10 MG/ML
INJECTION, SOLUTION INFILTRATION; PERINEURAL PRN
Status: DISCONTINUED | OUTPATIENT
Start: 2023-12-04 | End: 2023-12-04

## 2023-12-04 RX ORDER — LIDOCAINE 40 MG/G
CREAM TOPICAL
Status: DISCONTINUED | OUTPATIENT
Start: 2023-12-04 | End: 2023-12-04

## 2023-12-04 RX ORDER — ONDANSETRON 4 MG/1
4 TABLET, ORALLY DISINTEGRATING ORAL EVERY 30 MIN PRN
Status: DISCONTINUED | OUTPATIENT
Start: 2023-12-04 | End: 2023-12-04 | Stop reason: HOSPADM

## 2023-12-04 RX ORDER — SODIUM CHLORIDE, SODIUM LACTATE, POTASSIUM CHLORIDE, CALCIUM CHLORIDE 600; 310; 30; 20 MG/100ML; MG/100ML; MG/100ML; MG/100ML
INJECTION, SOLUTION INTRAVENOUS CONTINUOUS
Status: DISCONTINUED | OUTPATIENT
Start: 2023-12-04 | End: 2023-12-04 | Stop reason: HOSPADM

## 2023-12-04 RX ORDER — ONDANSETRON 2 MG/ML
4 INJECTION INTRAMUSCULAR; INTRAVENOUS EVERY 30 MIN PRN
Status: DISCONTINUED | OUTPATIENT
Start: 2023-12-04 | End: 2023-12-04 | Stop reason: HOSPADM

## 2023-12-04 RX ORDER — ONDANSETRON 2 MG/ML
4 INJECTION INTRAMUSCULAR; INTRAVENOUS
Status: DISCONTINUED | OUTPATIENT
Start: 2023-12-04 | End: 2023-12-04 | Stop reason: HOSPADM

## 2023-12-04 RX ORDER — PROPOFOL 10 MG/ML
INJECTION, EMULSION INTRAVENOUS CONTINUOUS PRN
Status: DISCONTINUED | OUTPATIENT
Start: 2023-12-04 | End: 2023-12-04

## 2023-12-04 RX ADMIN — LIDOCAINE HYDROCHLORIDE 3 ML: 10 INJECTION, SOLUTION INFILTRATION; PERINEURAL at 13:03

## 2023-12-04 RX ADMIN — PROPOFOL 200 MCG/KG/MIN: 10 INJECTION, EMULSION INTRAVENOUS at 13:04

## 2023-12-04 RX ADMIN — SODIUM CHLORIDE, POTASSIUM CHLORIDE, SODIUM LACTATE AND CALCIUM CHLORIDE: 600; 310; 30; 20 INJECTION, SOLUTION INTRAVENOUS at 12:59

## 2023-12-04 RX ADMIN — PROPOFOL 100 MG: 10 INJECTION, EMULSION INTRAVENOUS at 13:03

## 2023-12-04 RX ADMIN — SODIUM CHLORIDE, POTASSIUM CHLORIDE, SODIUM LACTATE AND CALCIUM CHLORIDE: 600; 310; 30; 20 INJECTION, SOLUTION INTRAVENOUS at 13:03

## 2023-12-04 ASSESSMENT — ACTIVITIES OF DAILY LIVING (ADL): ADLS_ACUITY_SCORE: 35

## 2023-12-04 NOTE — ANESTHESIA CARE TRANSFER NOTE
Patient: Christina Sanatna    Procedure: Procedure(s):  Colonoscopy       Diagnosis: Special screening for malignant neoplasms, colon [Z12.11]  Diagnosis Additional Information: No value filed.    Anesthesia Type:   MAC     Note:    Oropharynx: oropharynx clear of all foreign objects and spontaneously breathing  Level of Consciousness: drowsy  Oxygen Supplementation: room air    Independent Airway: airway patency satisfactory and stable  Dentition: dentition unchanged  Vital Signs Stable: post-procedure vital signs reviewed and stable  Report to RN Given: handoff report given  Patient transferred to: Phase II    Handoff Report: Identifed the Patient, Identified the Reponsible Provider, Reviewed the pertinent medical history, Discussed the surgical course, Reviewed Intra-OP anesthesia mangement and issues during anesthesia, Set expectations for post-procedure period and Allowed opportunity for questions and acknowledgement of understanding  Vitals:  Vitals Value Taken Time   /83 12/04/23 1330   Temp     Pulse 81 12/04/23 1330   Resp 16 12/04/23 1326   SpO2 99 % 12/04/23 1334   Vitals shown include unfiled device data.    Electronically Signed By: HSEYLA Gonzalez CRNA  December 4, 2023  1:35 PM

## 2023-12-04 NOTE — ANESTHESIA POSTPROCEDURE EVALUATION
Patient: Christina Santana    Procedure: Procedure(s):  Colonoscopy       Anesthesia Type:  MAC    Note:  Disposition: Outpatient   Postop Pain Control: Uneventful            Sign Out: Well controlled pain   PONV: No   Neuro/Psych: Uneventful            Sign Out: Acceptable/Baseline neuro status   Airway/Respiratory: Uneventful            Sign Out: Acceptable/Baseline resp. status   CV/Hemodynamics: Uneventful            Sign Out: Acceptable CV status   Other NRE: NONE   DID A NON-ROUTINE EVENT OCCUR? No    Event details/Postop Comments:  Pt was happy with anesthesia care.  No complications.  I will follow up with the pt if needed.       Last vitals:  Vitals Value Taken Time   /83 12/04/23 1330   Temp     Pulse 81 12/04/23 1330   Resp 16 12/04/23 1326   SpO2 96 % 12/04/23 1334   Vitals shown include unfiled device data.    Electronically Signed By: SHEYLA Gonzalez CRNA  December 4, 2023  1:36 PM

## 2023-12-04 NOTE — DISCHARGE INSTRUCTIONS
[facility]    Home Care Following Endoscopy          Activity:  You have just undergone an endoscopic procedure usually performed with conscious sedation.  Do not work or operate machinery (including a car) for at least 12 hours.    I encourage you to walk and attempt to pass this air as soon as possible.    Diet:  Return to the diet you were on before your procedure but eat lightly for the first 12-24 hours.  Drink plenty of water.  Resume any regular medications unless otherwise advised by your physician.  Please begin any new medication prescribed as a result of your procedure as directed by your physician.   If you had any biopsy or polyp removed please refrain from aspirin or aspirin products for 2 days.  If on Coumadin please restart as instructed by your physician.   Pain:  You may take Tylenol as needed for pain.  Expected Recovery:  You can expect some mild abdominal fullness and/or discomfort due to the air used to inflate your intestinal tract.     Call Your Physician if You Have:    After Colonoscopy:  Worsening persisting abdominal pain which is worse with activity.  Fevers (>101 degrees F), chills or shakes.  Passage of continued blood with bowel movements.     Any questions or concerns about your recovery, please call [phone] or after hours [phone] ([phone]) Nurse Advice Line.    Follow-up Care:  You did not have polyps/biopsy tissue sample(s) removed.  Refer to your procedure report.

## 2023-12-04 NOTE — ANESTHESIA PREPROCEDURE EVALUATION
Anesthesia Pre-Procedure Evaluation    Patient: Christina Santana   MRN: [id number] : 1980        Procedure : Procedure(s):  COLONOSCOPY          Past Medical History:   Diagnosis Date    ASCUS with positive high risk HPV 2013    HSIL (high grade squamous intraepithelial lesion) on Pap smear of cervix 2013    Pap smear of cervix with ASCUS, cannot exclude HGSIL 11, 8/10/12,     PMS2-related Reed syndrome (HNPCC4)       Past Surgical History:   Procedure Laterality Date    APPENDECTOMY      AS REPAIR PARAESOPHAGEAL HIATAL HERNIA,LAPAROTOMY, W MESH      BIOPSY VULVA N/A 12/10/2021    Procedure: Vaginal cuff biopsy;  Surgeon: Leyda Guzman DO;  Location: [facility] OR    COLONOSCOPY N/A 6/10/2021    Procedure: COLONOSCOPY, WITH POLYPECTOMY;  Surgeon: Willi Galeas DO;  Location: [facility] GI    COLONOSCOPY N/A 2022    Procedure: COLONOSCOPY;  Surgeon: Willi Galeas DO;  Location: [facility]    ESOPHAGOSCOPY, GASTROSCOPY, DUODENOSCOPY (EGD), COMBINED N/A 2022    Procedure: ESOPHAGOGASTRODUODENOSCOPY (EGD) with biopsy;  Surgeon: Willi Galeas DO;  Location: [facility]    EXAM UNDER ANESTHESIA PELVIC N/A 12/10/2021    Procedure: EXAM UNDER ANESTHESIA, PELVIS;  Surgeon: Leyda Guzman DO;  Location: [facility] OR    LAPAROSCOPIC HYSTERECTOMY TOTAL, BILATERAL SALPINGO-OOPHORECTOMY, COMBINED N/A 2021    Procedure: HYSTERECTOMY, TOTAL, LAPAROSCOPIC, WITH SALPINGO-OOPHORECTOMY;  Surgeon: Dylon Alcantar MD;  Location: [facility] OR      Allergies   Allergen Reactions    Latex Headache      Social History     Tobacco Use    Smoking status: Never    Smokeless tobacco: Never   Substance Use Topics    Alcohol use: Yes     Comment: occassional      Wt Readings from Last 1 Encounters:   23 85.2 kg (187 lb 12.8 oz)        Anesthesia Evaluation   Pt has had prior anesthetic. Type: General and MAC.    No history of anesthetic complications       ROS/MED HX  ENT/Pulmonary:     (+)      "      allergic rhinitis,     tobacco use,                       Neurologic: Comment: Chronic tension headaches      Cardiovascular:     (+)  - -   -  - -                                 Previous cardiac testing   Echo: Date: Results:    Stress Test:  Date: Results:    ECG Reviewed:  Date: 8/5/21 Results:  SR  Cath:  Date: Results:      METS/Exercise Tolerance:     Hematologic:  - neg hematologic  ROS     Musculoskeletal: Comment: Cervicalgia      GI/Hepatic:     (+) GERD, Asymptomatic on medication,      bowel prep,            Renal/Genitourinary:  - neg Renal ROS     Endo:  - neg endo ROS     Psychiatric/Substance Use:  - neg psychiatric ROS     Infectious Disease:  - neg infectious disease ROS     Malignancy:  - neg malignancy ROS     Other:  - neg other ROS          Physical Exam    Airway  airway exam normal      Mallampati: II   TM distance: > 3 FB   Neck ROM: full   Mouth opening: > 3 cm    Respiratory Devices and Support         Dental  no notable dental history         Cardiovascular   cardiovascular exam normal       Rhythm and rate: regular and normal     Pulmonary   pulmonary exam normal        breath sounds clear to auscultation           OUTSIDE LABS:  CBC:   Lab Results   Component Value Date    WBC 7.1 08/05/2021    WBC 7.4 02/24/2021    HGB 13.0 08/05/2021    HGB 13.7 02/24/2021    HCT 39.8 08/05/2021    HCT 40.5 02/24/2021     08/05/2021     02/24/2021     BMP:   Lab Results   Component Value Date     08/05/2021     02/24/2021    POTASSIUM 3.8 08/05/2021    POTASSIUM 4.8 02/24/2021    CHLORIDE 107 08/05/2021    CHLORIDE 110 (H) 02/24/2021    CO2 28 08/05/2021    CO2 25 02/24/2021    BUN 10 08/05/2021    BUN 13 02/24/2021    CR 0.74 08/05/2021    CR 0.62 02/24/2021     (H) 08/05/2021    GLC 90 02/24/2021     COAGS: No results found for: \"PTT\", \"INR\", \"FIBR\"  POC:   Lab Results   Component Value Date    HCG Negative 02/24/2021     HEPATIC:   Lab Results   Component " Value Date    ALBUMIN 3.7 08/05/2021    PROTTOTAL 7.0 08/05/2021    ALT 32 08/05/2021    AST 17 08/05/2021    ALKPHOS 63 08/05/2021    BILITOTAL 0.2 08/05/2021     OTHER:   Lab Results   Component Value Date    LACT 2.7 (H) 01/01/2019    A1C 5.1 10/28/2014    NERISSA 9.0 08/05/2021    LIPASE 212 08/05/2021    TSH 1.20 06/16/2021    T4 1.36 06/16/2021    CRP <2.9 08/05/2021    SED 7 06/16/2021       Anesthesia Plan    ASA Status:  2    NPO Status:  NPO Appropriate    Anesthesia Type: MAC.     - Reason for MAC: straight local not clinically adequate   Induction: Intravenous, Propofol.   Maintenance: TIVA.        Consents    Anesthesia Plan(s) and associated risks, benefits, and realistic alternatives discussed. Questions answered and patient/representative(s) expressed understanding.     - Discussed:     - Discussed with:  Patient      - Extended Intubation/Ventilatory Support Discussed: No.      - Patient is DNR/DNI Status: No     Use of blood products discussed: No .     - Discussed with: Patient.     - Consented: consented to blood products            Reason for refusal: other.     Postoperative Care    Pain management: Oral pain medications.   PONV prophylaxis: Background Propofol Infusion     Comments:    Other Comments: The risks and benefits of anesthesia, and the alternatives where applicable, have been discussed with the patient, and they wish to proceed.            SHEYLA Gonzalez CRNA

## 2023-12-04 NOTE — ANESTHESIA POSTPROCEDURE EVALUATION
Patient: Christina Santana    Procedure: Procedure(s):  Colonoscopy       Anesthesia Type:  MAC    Note:  Disposition: Outpatient   Postop Pain Control: Uneventful            Sign Out: Well controlled pain   PONV: No   Neuro/Psych: Uneventful            Sign Out: Acceptable/Baseline neuro status   Airway/Respiratory: Uneventful            Sign Out: Acceptable/Baseline resp. status   CV/Hemodynamics: Uneventful            Sign Out: Acceptable CV status   Other NRE: NONE   DID A NON-ROUTINE EVENT OCCUR? No    Event details/Postop Comments:  Pt was happy with anesthesia care.  No complications.  I will follow up with the pt if needed.       Last vitals:  Vitals Value Taken Time   /83 12/04/23 1330   Temp     Pulse 81 12/04/23 1330   Resp 16 12/04/23 1326   SpO2 99 % 12/04/23 1334   Vitals shown include unfiled device data.    Electronically Signed By: SHEYLA Gonzalez CRNA  December 4, 2023  1:35 PM

## 2023-12-11 LAB — LAB SCANNED RESULT: NORMAL

## 2023-12-21 ENCOUNTER — DOCUMENTATION ONLY (OUTPATIENT)
Dept: ONCOLOGY | Facility: CLINIC | Age: 43
End: 2023-12-21
Payer: COMMERCIAL

## 2023-12-21 ENCOUNTER — PATIENT OUTREACH (OUTPATIENT)
Dept: GASTROENTEROLOGY | Facility: CLINIC | Age: 43
End: 2023-12-21
Payer: COMMERCIAL

## 2023-12-21 DIAGNOSIS — Z15.09 PMS2-RELATED LYNCH SYNDROME (HNPCC4): Primary | ICD-10-CM

## 2023-12-21 NOTE — PROGRESS NOTES
12/21/2023    Referring Provider: Leyda Guzman DO    Presenting Information:  I spoke to Christina Santana by phone today to discuss an update to her genetic testing results. She was previously seen as part of the Cancer Risk Management Program due to the family history of breast, gynecological, and bladder cancer. Her a saliva kit was sent to [facility] [facility] for their CancerNext-Expanded panel. 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 This variant is called c.1249G>T (p.A417S). 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 Specifically, this mutation is called c.2113G>A ( p.E705K). No clinically significant variants were detected at that time in the other 75 genes tested: AIP, ALK, APC, CARINA, AXIN2, BAP1, BARD1, BLM, BMPR1A, BRCA1, BRCA2, BRIP1, CDC73, CDH1, CDK4, CDKN1B, CDKN2A, CHEK2, CTNNA1, DICER1, EPCAM, EGFR, EGLN1, FANCC, FH, FLCN, GALNT12, GREM1, HOXB13, KIF1B, KIT, LZTR1, MAX, MEN1, MET, MITF, MLH1, MRE11, MSH2, MSH3, MSH6, MUTYH, NBN, NF1, NF2, NTHL1, PALB2, PDGFRA, PHOX2B, POLD1, POLE, POT1, KZRMS5T, PTCH1, PTEN, RAD50, RAD51C, RAD51D, RB1, RECQL, RET, SDHA, SDHAF2, SDHB, SDHC, SDHD, SMAD4, SMARCA4, SMARCB1, SMARCE1, SUFU, UTPN495, TP53, TSC1, TSC2, VHL, and XRCC2 genes.    Result Reclassification:  Recently [employer] contacted me with a new report. The VUS in the STK11 gene has been reclassified as likely benign. This means that the STK11 variant found in Christina is most likely NOT associated with Peutz-Jeghers syndrome.     Screening Recommendations:  Christina should continue with cancer screening based on personal medical and family history, as previously discussed with Miah.    Summary:  While the STK11 variant has been changed to likely benign, Christina may still be at risk for certain cancers due to family history, environmental factors, or other genetic causes. Because of that, it is important that she continue  with cancer screening based on her personal and family history as previously discussed.    Genetic testing is rapidly advancing, and new cancer susceptibility genes will most likely be identified in the future. Therefore, Christina is encouraged to contact me, or talk to Radha, regularly or if there are changes in her personal or family history. This may change how we assess her cancer risk, screening, and the testing we would offer.    Plan:   1) I will send Christina a copy of the new test report that reflects the change in classification.  2) If there are any further questions or concerns, she should not hesitate to contact me via [employer].    Candido Moreno MS, [facility]  Licensed, Certified Genetic Counselor

## 2023-12-21 NOTE — LETTER
December 21, 2023    Christina Santana  [id number] [place] [address]      Dear Christina,    Here is a copy of the new information about your genetic test results. If you have any questions, please feel free to contact me.    Referring Provider: Leyda Guzman DO    Presenting Information:  I spoke to Christina Santana by phone today to discuss an update to her genetic testing results. She was previously seen as part of the Cancer Risk Management Program due to the family history of breast, gynecological, and bladder cancer. Her a saliva kit was sent to [employer] for their CancerNext-Expanded panel. 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 This variant is called c.1249G>T (p.A417S). 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 Specifically, this mutation is called c.2113G>A ( p.E705K). No clinically significant variants were detected at that time in the other 75 genes tested: AIP, ALK, APC, CARINA, AXIN2, BAP1, BARD1, BLM, BMPR1A, BRCA1, BRCA2, BRIP1, CDC73, CDH1, CDK4, CDKN1B, CDKN2A, CHEK2, CTNNA1, DICER1, EPCAM, EGFR, EGLN1, FANCC, FH, FLCN, GALNT12, GREM1, HOXB13, KIF1B, KIT, LZTR1, MAX, MEN1, MET, MITF, MLH1, MRE11, MSH2, MSH3, MSH6, MUTYH, NBN, NF1, NF2, NTHL1, PALB2, PDGFRA, PHOX2B, POLD1, POLE, POT1, NRWXE3Q, PTCH1, PTEN, RAD50, RAD51C, RAD51D, RB1, RECQL, RET, SDHA, SDHAF2, SDHB, SDHC, SDHD, SMAD4, SMARCA4, SMARCB1, SMARCE1, SUFU, JMLS010, TP53, TSC1, TSC2, VHL, and XRCC2 genes.    Result Reclassification:  Recently [employer] contacted me with a new report. The VUS in the STK11 gene has been reclassified as likely benign. This means that the STK11 variant found in Christina is most likely NOT associated with Peutz-Jeghers syndrome.     Screening Recommendations:  Christina should continue with cancer screening based on personal medical and family history, as previously discussed with Miah.    Summary:  While the  STK11 variant has been changed to likely benign, Christina may still be at risk for certain cancers due to family history, environmental factors, or other genetic causes. Because of that, it is important that she continue with cancer screening based on her personal and family history as previously discussed.    Genetic testing is rapidly advancing, and new cancer susceptibility genes will most likely be identified in the future. Therefore, Christina is encouraged to contact me, or talk to Radha, regularly or if there are changes in her personal or family history. This may change how we assess her cancer risk, screening, and the testing we would offer.    Plan:   1) I will send Chritsina a copy of the new test report that reflects the change in classification.  2) If there are any further questions or concerns, she should not hesitate to contact me via [employer].    Candido Moreno MS, [facility]  Licensed, Certified Genetic Counselor

## 2024-01-21 ENCOUNTER — HOSPITAL ENCOUNTER (EMERGENCY)
Facility: CLINIC | Age: 44
Discharge: HOME | End: 2024-01-21
Attending: EMERGENCY MEDICINE | Admitting: EMERGENCY MEDICINE
Payer: COMMERCIAL

## 2024-01-21 ENCOUNTER — APPOINTMENT (OUTPATIENT)
Dept: GENERAL RADIOLOGY | Facility: CLINIC | Age: 44
End: 2024-01-21
Attending: EMERGENCY MEDICINE
Payer: COMMERCIAL

## 2024-01-21 ENCOUNTER — APPOINTMENT (OUTPATIENT)
Dept: CT IMAGING | Facility: CLINIC | Age: 44
End: 2024-01-21
Attending: EMERGENCY MEDICINE
Payer: COMMERCIAL

## 2024-01-21 VITALS
SYSTOLIC BLOOD PRESSURE: 132 MMHG | HEIGHT: 66 IN | HEART RATE: 81 BPM | DIASTOLIC BLOOD PRESSURE: 92 MMHG | WEIGHT: 191 LBS | BODY MASS INDEX: 30.7 KG/M2 | OXYGEN SATURATION: 100 % | RESPIRATION RATE: 16 BRPM | TEMPERATURE: 97.8 F

## 2024-01-21 DIAGNOSIS — S46.912A STRAIN OF LEFT SHOULDER, INITIAL ENCOUNTER: ICD-10-CM

## 2024-01-21 DIAGNOSIS — S16.1XXA NECK STRAIN, INITIAL ENCOUNTER: ICD-10-CM

## 2024-01-21 DIAGNOSIS — S66.911A MUSCLE STRAIN OF RIGHT WRIST, INITIAL ENCOUNTER: ICD-10-CM

## 2024-01-21 DIAGNOSIS — M54.12 CERVICAL RADICULOPATHY: ICD-10-CM

## 2024-01-21 PROCEDURE — 99284 EMERGENCY DEPT VISIT MOD MDM: CPT | Mod: 25 | Performed by: EMERGENCY MEDICINE

## 2024-01-21 PROCEDURE — 250N000011 HC RX IP 250 OP 636: Performed by: EMERGENCY MEDICINE

## 2024-01-21 PROCEDURE — 72125 CT NECK SPINE W/O DYE: CPT

## 2024-01-21 PROCEDURE — 96372 THER/PROPH/DIAG INJ SC/IM: CPT | Performed by: EMERGENCY MEDICINE

## 2024-01-21 PROCEDURE — 99284 EMERGENCY DEPT VISIT MOD MDM: CPT | Mod: GC | Performed by: EMERGENCY MEDICINE

## 2024-01-21 PROCEDURE — 73030 X-RAY EXAM OF SHOULDER: CPT | Mod: LT

## 2024-01-21 PROCEDURE — 99285 EMERGENCY DEPT VISIT HI MDM: CPT | Mod: 25 | Performed by: EMERGENCY MEDICINE

## 2024-01-21 PROCEDURE — 71046 X-RAY EXAM CHEST 2 VIEWS: CPT

## 2024-01-21 RX ORDER — KETOROLAC TROMETHAMINE 30 MG/ML
30 INJECTION, SOLUTION INTRAMUSCULAR; INTRAVENOUS ONCE
Status: COMPLETED | OUTPATIENT
Start: 2024-01-21 | End: 2024-01-21

## 2024-01-21 RX ORDER — CYCLOBENZAPRINE HCL 10 MG
5-10 TABLET ORAL 3 TIMES DAILY PRN
Qty: 15 TABLET | Refills: 0 | Status: SHIPPED | OUTPATIENT
Start: 2024-01-21 | End: 2024-05-03

## 2024-01-21 RX ORDER — KETOROLAC TROMETHAMINE 15 MG/ML
15 INJECTION, SOLUTION INTRAMUSCULAR; INTRAVENOUS ONCE
Status: DISCONTINUED | OUTPATIENT
Start: 2024-01-21 | End: 2024-01-21

## 2024-01-21 RX ORDER — METHYLPREDNISOLONE 4 MG
TABLET, DOSE PACK ORAL
Qty: 21 TABLET | Refills: 0 | Status: SHIPPED | OUTPATIENT
Start: 2024-01-21 | End: 2024-05-03

## 2024-01-21 RX ADMIN — KETOROLAC TROMETHAMINE 30 MG: 30 INJECTION, SOLUTION INTRAMUSCULAR; INTRAVENOUS at 21:01

## 2024-01-21 ASSESSMENT — ACTIVITIES OF DAILY LIVING (ADL): ADLS_ACUITY_SCORE: 35

## 2024-01-22 NOTE — ED TRIAGE NOTES
Pt comes in today along with her  c/o a fall today. She states that she tripped on a crack in the sidewalk. Right arm/wrist pain. Nausea and dizziness. Was evaluated by EMS earlier.      Triage Assessment (Adult)       Row Name 01/21/24 1917          Triage Assessment    Airway WDL WDL        Respiratory WDL    Respiratory WDL WDL        Skin Circulation/Temperature WDL    Skin Circulation/Temperature WDL WDL        Cardiac WDL    Cardiac WDL WDL        Peripheral/Neurovascular WDL    Peripheral Neurovascular WDL WDL        Cognitive/Neuro/Behavioral WDL    Cognitive/Neuro/Behavioral WDL WDL

## 2024-01-22 NOTE — DISCHARGE INSTRUCTIONS
Return to the emergency department if new or worsening symptoms.  Use a Medrol Dosepak to decrease inflammation and hopefully that will help with the possible pinched nerve in the cervical spine.  Your CT scan and x-rays look good.  You may have strained or torn something in your rotator cuff or labrum of your left shoulder.  Use ice rest and ibuprofen for that.  Medrol Dosepak might help a lot as well.  Take the Flexeril, muscle relaxer as discussed.  Do not drive if taking this medication.  If no improvement you may need an MRI of your cervical spine.  I hope you get better quickly.  Was a pleasure to see you.  I am sorry you are going through this.  Hopefully recover quickly.

## 2024-01-22 NOTE — ED PROVIDER NOTES
Outside lab at  History     Chief Complaint   Patient presents with    Fall     HPI  Christina Santana is a 43 year old female who presents to the [facility] emergency room with her  for evaluation of a mechanical fall injury.  Patient reports that she was walking outside and tripped on a crack in the sidewalk she is unable to tell me exactly how she fell but believes it is on her chest versus the left side.  Since the time of the fall she endorses left arm numbness on the outer aspect starting in the shoulder rating down to the elbow and occasionally down into the third and fourth digit on the palmar aspect.  She does report that rotational movement of her neck can cause the pain to shoot down her arm.  Endorses some neck tightness but denies pain.  She also endorses chest tightness on the anterior aspect.  She has been having increasing nausea since the time of the fall and dizziness but denies any episodes of emesis.  She has not taken any medications since the incident.  She was evaluated by EMS who recommended that she be seen and evaluated in the emergency room prompting her presentation to [facility] emergency room.  She did not hit her head.  She is not on any blood thinners.  She denies headache, vision changes, shortness of breath, or diaphoresis.    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     Priority: Medium     Added automatically from request for surgery [id number]      RUQ pain 08/07/2020     Priority: Medium     Added automatically from request for surgery [id number]      Nausea 08/07/2020     Priority: Medium     Added automatically from request for surgery [id number]      Calculus of gallbladder without cholecystitis without obstruction 08/07/2020     Priority: Medium     Added automatically from request for surgery [id number]      Cervicalgia 06/08/2020      Priority: Medium    Muscle spasm 06/08/2020     Priority: Medium    Sinus pressure 03/04/2020     Priority: Medium    Tension type headache 03/04/2020     Priority: Medium    Allergic rhinitis due to dust mite 03/04/2020     Priority: Medium    Seasonal allergic rhinitis due to pollen 03/04/2020     Priority: Medium    Allergic rhinitis due to animal dander 03/04/2020     Priority: Medium    Allergic rhinitis due to mold 03/04/2020     Priority: Medium    GERD with stricture 02/25/2020     Priority: Medium    Encounter for insertion of mirena IUD 09/13/2018     Priority: Medium    S/P LEEP (status post loop electrosurgical excision procedure) 04/22/2013     Priority: Medium     5/23/11 ASC-H  6/20/11 colp- no high grade lesion noted  8/10/12 ASC-H  4/22/13 pap HSIL  5/1/13 colp. BREEZY 2/3. Plan: LEEP  5/13/13 LEEP. BREEZY 2/3 with + margins  8/28/13 colp- WNL  12/27/13 pap ASCUS + HR HPV  1/29/14 colp- WNL  8/27/14 pap NIL.       BREEZY III (cervical intraepithelial neoplasia grade III) with severe dysplasia 01/01/2013     Priority: Medium     5/2011- ASC-H  6/2011- colposcopy- suspicious for HPV  8/2012- ASC-H  4/2013- HSIL  5/1/2013- colposcopy- BREEZY 2-3  5/13/2013 LEEP: BREEZY II-III, Positive Margins  8/2013- ECC- cervicitis  12/2013 ASCUS, HPV +  1/2014- colposcopy- negative  8/2014- NIL  7/2015- NIL/HPV negative   3/2017- NIL/HPV +  4/2017- colposcopy- suggestive of BREEZY I  4/2018- NIL/HPV negative  9/10/19 NIL/HPV negative   2/24/21 TLH/BSO-benign pathology  10/8/21 NIL pap Neg HPV of vaginal cuff. Colpo completed for vaginal bleeding. Plan: Given discomfort during exam and biopsy attempt despite lidocaine, plan to perform EUA and biopsy under anesthesia in the OR. 12/10/21 Vaginal cuff biopsies- negative. Plan: cotest in 3 years.   1/19/22 Post op appt- cancelled   2/15/22 Appt- Pap due 10/8/22  7/27/23 Visit Oncology  9/19/23 NIL pap, + HR HPV (not 16 or 18). Plan: colp bef 12/19/23 11/8/23 Vaginal [place] - no biopsies,  visually normal. Plan cotest in 1 year due 2024      Astigmatism 10/03/2003     Priority: Medium    Myopia 10/03/2003     Priority: Medium        Past Medical History:    Past Medical History:   Diagnosis Date    ASCUS with positive high risk HPV 2013    HSIL (high grade squamous intraepithelial lesion) on Pap smear of cervix 2013    Pap smear of cervix with ASCUS, cannot exclude HGSIL 11, 8/10/12,     PMS2-related Reed syndrome (HNPCC4)        Past Surgical History:    Past Surgical History:   Procedure Laterality Date    APPENDECTOMY      AS REPAIR PARAESOPHAGEAL HIATAL HERNIA,LAPAROTOMY, W MESH      BIOPSY VULVA N/A 12/10/2021    Procedure: Vaginal cuff biopsy;  Surgeon: Leyda Guzman DO;  Location: [facility]    COLONOSCOPY N/A 6/10/2021    Procedure: COLONOSCOPY, WITH POLYPECTOMY;  Surgeon: Willi Galeas DO;  Location: [facility]    COLONOSCOPY N/A 2022    Procedure: COLONOSCOPY;  Surgeon: Willi Galeas DO;  Location: [facility]    COLONOSCOPY N/A 2023    Procedure: Colonoscopy;  Surgeon: Willi Galeas DO;  Location: [facility]    ESOPHAGOSCOPY, GASTROSCOPY, DUODENOSCOPY (EGD), COMBINED N/A 2022    Procedure: ESOPHAGOGASTRODUODENOSCOPY (EGD) with biopsy;  Surgeon: Willi Galeas DO;  Location: [facility]    EXAM UNDER ANESTHESIA PELVIC N/A 12/10/2021    Procedure: EXAM UNDER ANESTHESIA, PELVIS;  Surgeon: Leyda Guzman DO;  Location: [facility]    LAPAROSCOPIC HYSTERECTOMY TOTAL, BILATERAL SALPINGO-OOPHORECTOMY, COMBINED N/A 2021    Procedure: HYSTERECTOMY, TOTAL, LAPAROSCOPIC, WITH SALPINGO-OOPHORECTOMY;  Surgeon: Dylon Alcantra MD;  Location: [facility]       Family History:    Family History   Problem Relation Age of Onset    Fibroids Mother     Reed Syndrome Mother         PMS2+    Colon Polyps Mother         removed 5 feet of colon for unmanageable polyps;  in her sleep at 67, took a nape and never woke up,    Reed Syndrome Brother   "       PMS2+    Bladder Cancer Maternal Grandfather 70        lived to be 94    Kidney Cancer Maternal Grandfather     Breast Cancer Paternal Grandmother         postmenopausal    Ovarian Cancer Maternal Aunt 40        possible uterine cancer also; hx of fibroids    Ovarian Cancer Maternal Aunt 40        possible uterine cancer also; hx of fibroids    Breast Cancer Paternal Aunt 54       Social History:  Marital Status:   [2]  Social History     Tobacco Use    Smoking status: Never    Smokeless tobacco: Never   Vaping Use    Vaping Use: Never used   Substance Use Topics    Alcohol use: Yes     Comment: occassional    Drug use: No        Medications:    cyclobenzaprine (FLEXERIL) 10 MG tablet  methylPREDNISolone (MEDROL DOSEPAK) 4 MG tablet therapy pack  HYDROcodone-acetaminophen (NORCO) 5-325 MG tablet  predniSONE (DELTASONE) 20 MG tablet  PROGESTERONE 100MG/G CREAM          Review of Systems    Physical Exam   BP: (!) 132/92  Pulse: 81  Temp: 97.8  F (36.6  C)  Resp: 16  Height: 167.6 cm (5' 6\")  Weight: 86.6 kg (191 lb)  SpO2: 100 %      Physical Exam  Vitals and nursing note reviewed.   Constitutional:       Appearance: Normal appearance.   HENT:      Head: Normocephalic and atraumatic.   Cardiovascular:      Rate and Rhythm: Normal rate and regular rhythm.      Pulses: Normal pulses.      Heart sounds: Normal heart sounds.   Pulmonary:      Effort: Pulmonary effort is normal.      Breath sounds: Normal breath sounds.   Musculoskeletal:         General: Tenderness present. No deformity. Normal range of motion.      Right shoulder: Normal.      Left shoulder: Normal. No effusion, bony tenderness or crepitus.      Right upper arm: Normal.      Right elbow: Normal.      Cervical back: Normal range of motion. Tenderness present. No rigidity.      Comments: Bony tenderness C7 spine   Skin:     General: Skin is warm.   Neurological:      General: No focal deficit present.      Mental Status: She is alert.      " Cranial Nerves: Cranial nerves 2-12 are intact.      Sensory: Sensation is intact.      Motor: No weakness.      Comments: Pain with strength testing of rotator cuff muscles but no weakness   Psychiatric:         Mood and Affect: Mood normal.         Behavior: Behavior normal.         ED Course     Christina is a 43-year-old female who was seen evaluated the [place] emergency room with her  for a mechanical fall injury.  On presentation she does appear in pain.  She did not hit her head or lose consciousness so no indication for head CT at this time.  Given radicular symptoms a CT of her spine was performed to assess for possible aggravated disc bulge from the fall which was pending at time of note. Please see Dr. Navarro's attestation for final results, on my read no fracture.  She did have anterior chest pain so a chest x-ray was performed to assess for any rib fractures which was negative.  Shoulder x-ray no acute fractures or pathology.  She was given IM Toradol which did help with her pain. Discussed supportive management for pain and radicular symptoms. Discussed importance of following up with her primary care provider and consideration of physical therapy vs additional imaging on a non-emergent basis. Pt stated understanding and agreement with plan.     Results for orders placed or performed during the hospital encounter of 01/21/24 (from the past 24 hour(s))   CT Cervical Spine w/o Contrast    Narrative    EXAM: CT CERVICAL SPINE W/O CONTRAST  LOCATION: [facility]  DATE: 1/21/2024    INDICATION: Fall. Neck pain. Radiculopathy. Traumatic injury.  COMPARISON: None.  TECHNIQUE: Routine CT Cervical Spine without IV contrast. Multiplanar reformats. Dose reduction techniques were used.    FINDINGS:  VERTEBRA: Normal vertebral body heights. Reversal of the normal cervical lordosis. No fracture or posttraumatic subluxation.     CANAL/FORAMINA: No significant canal or neural  foraminal stenosis.    PARASPINAL: No extraspinal abnormality.      Impression    IMPRESSION:  1.  No fracture or posttraumatic subluxation.  2.  No high-grade spinal canal or neural foraminal stenosis.   XR Chest 2 Views    Narrative    EXAM: XR CHEST 2 VIEWS  LOCATION: [facility]  DATE: 1/21/2024    INDICATION: chest pain after a fall  COMPARISON: None.      Impression    IMPRESSION: No consolidation. No pleural effusions or pneumothorax. Normal cardiac size. No acute displaced rib fractures.   XR Shoulder Left G/E 3 Views    Narrative    EXAM: XR SHOULDER LEFT G/E 3 VIEWS  LOCATION: [facility]  DATE: 1/21/2024    INDICATION: fall shoulder pain  COMPARISON: None.      Impression    IMPRESSION: Normal joint spaces and alignment. No acute displaced fracture identified. No glenohumeral dislocation.       Medications   ketorolac (TORADOL) injection 30 mg (30 mg Intramuscular $Given 1/21/24 2101)       Assessments & Plan (with Medical Decision Making)  X-rays are unremarkable.  CT scan is also unremarkable.  Patient does have symptoms consistent with cervical radiculopathy.  We decided to prescribe Medrol Dosepak and Flexeril.  She was warned regarding their use.  If no improvement she will need an MRI of her cervical spine.  If no improvement of her shoulder will need an MRI of the to determine if there is any rotator cuff injury.  The diagnosis treatment options risk and follow-up discussed with the company patient agrees with plan.     I have reviewed the nursing notes.    I have reviewed the findings, diagnosis, plan and need for follow up with the patient.    Medical Decision Making  The patient's presentation was of moderate complexity (an undiagnosed new problem with uncertain diagnosis).    The patient's evaluation involved:  review of 3+ test result(s) ordered prior to this encounter (see separate area of note for details)    The patient's  management necessitated moderate risk (prescription drug management including medications given in the ED).      New Prescriptions    CYCLOBENZAPRINE (FLEXERIL) 10 MG TABLET    Take 0.5-1 tablets (5-10 mg) by mouth 3 times daily as needed for muscle spasms    METHYLPREDNISOLONE (MEDROL DOSEPAK) 4 MG TABLET THERAPY PACK    Follow Package Directions       Final diagnoses:   Cervical radiculopathy   Neck strain, initial encounter   Strain of left shoulder, initial encounter   Muscle strain of right wrist, initial encounter       1/21/2024   [facility] EMERGENCY DEPT    Girish Nuñez  [facility] Resident PGY3    Patient care was discussed with attending Dr. Navarro who agrees with assessment and plan.          Girish Nuñez MD  01/21/24 [id number]       Inocente Navarro MD  01/21/24 [id number]

## 2024-01-26 NOTE — ED PROVIDER NOTES
This is a shared visit with Dr. Girish Nuñez.  Patient was seen and examined by me and I agree with the assessment and plan.  Please see his note for further details.     Inocente Navarro MD  01/25/24 [id number]

## 2024-02-06 ENCOUNTER — MYC MEDICAL ADVICE (OUTPATIENT)
Dept: OBGYN | Facility: CLINIC | Age: 44
End: 2024-02-06

## 2024-02-06 DIAGNOSIS — N64.4 BREAST PAIN, LEFT: Primary | ICD-10-CM

## 2024-02-06 NOTE — TELEPHONE ENCOUNTER
Pt last seen 11/8/2023 for colposcopy     Pt having concerns for breast pain in L) breast since end of December 2023. Pt states has been worsening since.    No concerns in R) breast, no lump, discoloration, or change in texture in L) breast.    RN routing to provider regarding diagnostic mammogram- pt currently scheduled for regular screening mammogram 4/2024.    Kylie Noland RN on 2/6/2024 at 1:37 PM

## 2024-02-07 NOTE — TELEPHONE ENCOUNTER
Leyda Guzman DO  [facility] Triage  Phone Number: [phone]     Mammogram ordered. She should be able to schedule this.  Below are tips for mastalgia that she can try as well in the event this is MSK-related pain.    Thank you,  Leyda Guzman DO        Breast Pain  Breast pain is one of the most common breast disorders experienced by women of all ages, affecting up to 70% of women in their lifetime. While it can be frightening, less than 3% of these women will have breast cancer.  There are two types of breast pain, cyclical and non cyclical. Cyclical breast pain improved and worsens as a results of the changes in hormone levels. Symptoms often worsen the week before menstruation and improve after the start of a period. Non cyclical breast pain is more common in perimenopausal and menopausal women. Pain can be constant or come and go, may be present in one or both breast and localized to particular part of the breast.  Chest wall pain is also similar to breast pain and can be easily confused with breast pain. Pain coming from the chest wall beneath the breast tissue is often on one side and can worsen with activity or when pressure is applied. Conditions such as osteoarthritis or chostochondritis can cause this type of pain.    Recommendations for management    Lifestyle  -Lower dietary fat intake to less than 20% of total caloric intake  -If current a moderate-heavy caffeine consumer, eliminate caffeine completely (Chocolate, tea, soda, coffee)    Bra  -Attend a professional bra fitting to ensure correct sizing  -Wear at least one well-fitting, supportive bra at all times, even during sleep    Supplements and medications  -NSAIDs: Oral medications may include naproxen, ibuprofen or tylenol. Creams may include diclofenac. Use according to package  -Flaxseed: 25g of flaxseed (2 tablespoons whole flaxseed) daily may reduce cyclical breast pain  -Evening Primrose Oil: may be used in the short term (6  months) for cyclical breast pain. Start with 500mg/day and increase up to 3000mg/day  -Vitamin E: May be used short term (6 months) for cyclical breast pain. May start 400 international unit(s)/day and increase up to 57556 international unit(s)/day  -Chasteberry: 400-500mg once per day has been shown to resude cyclical breast pain    When to return to clinic  If your breast pain does not improve within the next three months, or you experience a new breast symptom such as a lump or nipple discharge, please call your doctor.

## 2024-02-13 DIAGNOSIS — Z15.09 PMS2-RELATED LYNCH SYNDROME (HNPCC4): ICD-10-CM

## 2024-02-13 DIAGNOSIS — Z12.6 SCREENING FOR BLADDER CANCER: Primary | ICD-10-CM

## 2024-02-14 ENCOUNTER — LAB (OUTPATIENT)
Dept: LAB | Facility: CLINIC | Age: 44
End: 2024-02-14
Payer: COMMERCIAL

## 2024-02-14 DIAGNOSIS — Z15.09 PMS2-RELATED LYNCH SYNDROME (HNPCC4): ICD-10-CM

## 2024-02-14 DIAGNOSIS — Z12.6 SCREENING FOR BLADDER CANCER: ICD-10-CM

## 2024-02-14 LAB
ALBUMIN UR-MCNC: NEGATIVE MG/DL
APPEARANCE UR: CLEAR
BILIRUB UR QL STRIP: NEGATIVE
COLOR UR AUTO: YELLOW
GLUCOSE UR STRIP-MCNC: NEGATIVE MG/DL
HGB UR QL STRIP: NEGATIVE
KETONES UR STRIP-MCNC: NEGATIVE MG/DL
LEUKOCYTE ESTERASE UR QL STRIP: NEGATIVE
MUCOUS THREADS #/AREA URNS LPF: PRESENT /LPF
NITRATE UR QL: NEGATIVE
PH UR STRIP: 6 [PH] (ref 5–7)
RBC URINE: 1 /HPF
SP GR UR STRIP: 1.03 (ref 1–1.03)
SQUAMOUS EPITHELIAL: <1 /HPF
UROBILINOGEN UR STRIP-MCNC: NORMAL MG/DL
WBC URINE: 1 /HPF

## 2024-02-14 PROCEDURE — 88112 CYTOPATH CELL ENHANCE TECH: CPT | Performed by: PATHOLOGY

## 2024-02-14 PROCEDURE — 81001 URINALYSIS AUTO W/SCOPE: CPT

## 2024-02-16 ENCOUNTER — HOSPITAL ENCOUNTER (OUTPATIENT)
Dept: ULTRASOUND IMAGING | Facility: CLINIC | Age: 44
Discharge: HOME | End: 2024-02-16
Attending: OBSTETRICS & GYNECOLOGY
Payer: COMMERCIAL

## 2024-02-16 ENCOUNTER — HOSPITAL ENCOUNTER (OUTPATIENT)
Dept: MAMMOGRAPHY | Facility: CLINIC | Age: 44
Discharge: HOME | End: 2024-02-16
Attending: OBSTETRICS & GYNECOLOGY
Payer: COMMERCIAL

## 2024-02-16 DIAGNOSIS — N64.4 BREAST PAIN, LEFT: ICD-10-CM

## 2024-02-16 PROCEDURE — 76642 ULTRASOUND BREAST LIMITED: CPT | Mod: LT

## 2024-02-16 PROCEDURE — 77061 BREAST TOMOSYNTHESIS UNI: CPT | Mod: LT

## 2024-02-20 ENCOUNTER — MYC MEDICAL ADVICE (OUTPATIENT)
Dept: INTERNAL MEDICINE | Facility: CLINIC | Age: 44
End: 2024-02-20
Payer: COMMERCIAL

## 2024-02-20 ENCOUNTER — E-VISIT (OUTPATIENT)
Dept: INTERNAL MEDICINE | Facility: CLINIC | Age: 44
End: 2024-02-20
Payer: COMMERCIAL

## 2024-02-20 DIAGNOSIS — M62.838 MUSCLE SPASM: ICD-10-CM

## 2024-02-20 DIAGNOSIS — R53.83 FATIGUE, UNSPECIFIED TYPE: Primary | ICD-10-CM

## 2024-02-20 PROCEDURE — 99421 OL DIG E/M SVC 5-10 MIN: CPT | Performed by: INTERNAL MEDICINE

## 2024-02-21 ENCOUNTER — LAB (OUTPATIENT)
Dept: LAB | Facility: CLINIC | Age: 44
End: 2024-02-21
Payer: COMMERCIAL

## 2024-02-21 DIAGNOSIS — R53.83 FATIGUE, UNSPECIFIED TYPE: ICD-10-CM

## 2024-02-21 DIAGNOSIS — M62.838 MUSCLE SPASM: ICD-10-CM

## 2024-02-21 LAB
ANION GAP SERPL CALCULATED.3IONS-SCNC: 11 MMOL/L (ref 7–15)
BUN SERPL-MCNC: 14.2 MG/DL (ref 6–20)
CALCIUM SERPL-MCNC: 9.4 MG/DL (ref 8.6–10)
CHLORIDE SERPL-SCNC: 105 MMOL/L (ref 98–107)
CREAT SERPL-MCNC: 0.74 MG/DL (ref 0.51–0.95)
DEPRECATED HCO3 PLAS-SCNC: 25 MMOL/L (ref 22–29)
EGFRCR SERPLBLD CKD-EPI 2021: >90 ML/MIN/1.73M2
ERYTHROCYTE [DISTWIDTH] IN BLOOD BY AUTOMATED COUNT: 12.1 % (ref 10–15)
GLUCOSE SERPL-MCNC: 101 MG/DL (ref 70–99)
HCT VFR BLD AUTO: 43.4 % (ref 35–47)
HGB BLD-MCNC: 14.4 G/DL (ref 11.7–15.7)
IRON BINDING CAPACITY (ROCHE): 374 UG/DL (ref 240–430)
IRON SATN MFR SERPL: 30 % (ref 15–46)
IRON SERPL-MCNC: 112 UG/DL (ref 37–145)
MAGNESIUM SERPL-MCNC: 1.9 MG/DL (ref 1.7–2.3)
MCH RBC QN AUTO: 30.3 PG (ref 26.5–33)
MCHC RBC AUTO-ENTMCNC: 33.2 G/DL (ref 31.5–36.5)
MCV RBC AUTO: 91 FL (ref 78–100)
PLATELET # BLD AUTO: 228 10E3/UL (ref 150–450)
POTASSIUM SERPL-SCNC: 4 MMOL/L (ref 3.4–5.3)
RBC # BLD AUTO: 4.75 10E6/UL (ref 3.8–5.2)
SODIUM SERPL-SCNC: 141 MMOL/L (ref 135–145)
TSH SERPL DL<=0.005 MIU/L-ACNC: 0.86 UIU/ML (ref 0.3–4.2)
WBC # BLD AUTO: 6.2 10E3/UL (ref 4–11)

## 2024-02-21 PROCEDURE — 80048 BASIC METABOLIC PNL TOTAL CA: CPT

## 2024-02-21 PROCEDURE — 83540 ASSAY OF IRON: CPT

## 2024-02-21 PROCEDURE — 83735 ASSAY OF MAGNESIUM: CPT

## 2024-02-21 PROCEDURE — 36415 COLL VENOUS BLD VENIPUNCTURE: CPT

## 2024-02-21 PROCEDURE — 83550 IRON BINDING TEST: CPT

## 2024-02-21 PROCEDURE — 84443 ASSAY THYROID STIM HORMONE: CPT

## 2024-02-21 PROCEDURE — 85027 COMPLETE CBC AUTOMATED: CPT

## 2024-02-21 NOTE — PATIENT INSTRUCTIONS
Thank you for choosing us for your care. Given your symptoms, I would like you to do a lab-only visit to determine what is causing them.  I have placed the orders.  Please schedule an appointment with the lab right here [facility], or call [phone].  I will let you know when the results are back and next steps to take.

## 2024-02-29 ENCOUNTER — TELEPHONE (OUTPATIENT)
Dept: TRANSPLANT | Facility: CLINIC | Age: 44
End: 2024-02-29

## 2024-02-29 NOTE — TELEPHONE ENCOUNTER
General  Route to LPN    Reason for call: Christina was calling in to follow up with the emails she has received to become a donor. She was just trying to set up next steps.    Call back needed? Yes    Return Call Needed  Same as documented in contacts section  When to return call?: Greater than one day: Route standard priority

## 2024-03-01 ENCOUNTER — TELEPHONE (OUTPATIENT)
Dept: TRANSPLANT | Facility: CLINIC | Age: 44
End: 2024-03-01

## 2024-03-01 ENCOUNTER — DOCUMENTATION ONLY (OUTPATIENT)
Dept: TRANSPLANT | Facility: CLINIC | Age: 44
End: 2024-03-01
Payer: COMMERCIAL

## 2024-03-01 NOTE — TELEPHONE ENCOUNTER
"Donor Intake Start:2/15/24Donor Intake Complete:2/15/24  Expiration Date:5/15/24  Gender:FemalePreferred Language:English  Full Name:Christina Madera  Needed:[not answered]  Preferred Name:Christina  Phone Number:[phone] Phone:  Contact Preference:[not answered]Best Contact Time:Noon - 5pm  Emergency Contact:Thai DasilvaGinger Contact #:[phone]  Relationship to Contact:Contact is my spouse  :[birth year]Age:43  Country:[place]  Address:[address] [place] [address] [place]  [id number]  Height:5'6\"Weight:190lbs  BMI:30.7  Employment Status:EmployedHas PTO for donation?Yes, using vacation  Occupation:CNARequires Heavy Lifting?  No  Education Level:[employer] Or AssocMarital Status:  Exercise Routine:2-3/WeekHealth Insurance:  Yes  Blood Type:UnknownEthnicity/Race:White  Donor Type:Standard Voucher Donor  Prefer Remote Donation:[not answered]  Physician:Alphonse Martino [place]  Motivation to donate:  What a gift to give!  Living Donor Pre-Screening  Is In [place]  Yes  Will Accept Blood Transfusions?  Yes  Has been Diagnosed with Kidney Disease?  No  Has had a Heart Attack?  No  Has Diabetes?  No  Has had Cancer?  No  Has had Kidney Stones?  No  Has ever been Pregnant?  Yes    - Is Currently Pregnant?  No    - Months Since Pregnancy?24+    - Is Currently Nursing?  No    - Gestational Diabetes?  No    - Hypertension during pregnancy?  Never  Is Planning on Pregnancy?  No  Is Taking Birth Control?  No  Has Used Tobacco  No  Has HIV?  No  Is Currently Incarcerated?  No  Is Currently Residing in [place]  Yes  History Misc  Has Allergies?  No  Has had Surgeries?  Yes  Surgery When  Hysterectomy 2021  Appendectomy 2008  Takes Medication?  No  Medical History  History of High BP?  Never  Has History Of CABG (bypass surgery)?  No  History of Blood Clots?  Never  History of Coronary Disease?  Never  Has Stents Implanted?  No  Has History of Chest Pain with Exercise?  No  Has " History of Chest Pain at Other Times?  No  Results of Climbing 2 Flights of Stairs?No Problem  Has had Stress Test within Last Year?  No  Has had Stroke?  No  Has had Leg Bypass?  No  History of Lung Disease?  Never  History of COPD?  Never  History of TB?  Never    - Is TB Active?[not answered]  History of Pneumonia?  Never  Has Respiratory Issues?  No  Has Gastro Issues?  No  History of Gallstones?  Never  History of Pancreatitis?  Never  History of Liver Disease?  Still being treated  History of Hepatitis B?  Never    - Is Hep B Active?[not answered]  History of Hepatitis C?  Never  History of Bleeding Problem?  Never  History of UTIs?  Yes    - UTI episodes:1    - Last UTI:10 years  History of Kidney Damage?  Never  History of Proteinuria?  Never  History of Hematuria?  Never  History of Neuro Disease?  Never  History of Seizure?  Never  History of Lupus?  Never  History of Paralysis?  Never  History of Arthritis?  Never  History of Neuropathy?  Treated in past  History of Depression?  Never  History of Anxiety?  Never  History of Documented Psychiatric Illness?  Never  History of Fibroid Uterus?  Never  History of Endometriosis?  Never  History of Polycystic Ovaries?  Never  Has had Miscarriages?  Yes    - # of Miscarriages:1    - Had Late Term Miscarriage?  Yes  Has had Abortions?  Yes    - # of Abortions:1  Has had Transfusions?  No  History of Obesity?  No  History of Fabry's Disease?  No  History of Sickle Cell Disease?  No  History of Sickle Cell Trait?  No  History of Sarcoidosis?  No  History of Auto-Immune Disease  No  Has had Physical Exam?  Yes    - how many years ago:2  Has had Mammogram?  Yes    - how many years ago:1  Has had Pap Smear?  Yes    - how many years ago:1  Has had Colonoscopy?  Yes    - How Many Years Ago:1  Medical History Comments?[no comments]  Living Donor Family Medical History  Anyone with kidney disease?  No  Anyone with liver disease?  No  Anyone with heart disease?  No  Anyone  with coronary artery disease?  No  Anyone with high blood pressure?  No  Anyone with blood disorder?  No  Anyone with cancer?  No  Anyone with kidney cancer?  No  Anyone with diabetes?  No  Is mother alive?  No  Mother's age?66  Mother's cause of death?Unknown  Is father alive?  Yes  Father's age?67  How many siblings?2  How many adult children?2  How many children under 18?4  Social History  Has Used Alcohol?  Yes    - currently uses alcohol:  No    - how much:1/Monthly  Has Abused Alcohol?  No  Has Used Drugs?  No  Has had legal issues w/ law enforcement?  No  Traveled over 100 miles from home in last year?  Yes    - Traveled Where?[place], [place]  Has had suicidal thoughts or attempts in the last five years?  No

## 2024-03-06 ENCOUNTER — TELEPHONE (OUTPATIENT)
Dept: TRANSPLANT | Facility: CLINIC | Age: 44
End: 2024-03-06
Payer: COMMERCIAL

## 2024-03-06 NOTE — TELEPHONE ENCOUNTER
Initial Independent Living Donor Advocate contact made with potential donor today.  I introduced myself and my role during the donation process, including:   SUSANA ROLE   The federal government requires that all licensed transplant centers provide the living donor with an Independent Living Donor Advocate (SUSANA).  I do not meet recipients or attend meetings that discuss their care or decision to transplant them. My role is separate to avoid any conflict of interest.  My role is to ensure:  1) your rights are protected;  2) you get all the information you need from the transplant team to make a fully informed decision whether to donate;   3) that living donation is in your best interest.   4) that you have the right to decide NOT to go forward with living donation at any time during this process.  I am available to you throughout the workup, during surgery phase and follow-up at home.     WORKUP & PRIVACY   Your identity and workup are not shared with the recipient at any time.   The recipient's insurance covers the medical expenses related to the donor evaluation and surgery.  However, it is important that you carry your own health insurance to address any medical issues that are found and are NOT related to living donation.  Additionally, age appropriate cancer screening (I.e. mammograms,  colonoscopies, etc) is required and would be through your insurance.  There is a psychosocial and medical donor workup that consists of testing to determine if you are healthy enough to donate. Workup tests include tissue typing/genetics, many blood draws, urine collection/ (kidney function testing), chest x-ray, EKG/other heart testing, CT scan. Age appropriate cancer screening is required and would be through your insurance. As you complete each step then you may move on to the next.  Workup can take as little or as long as you need and you can stop the process at any time. Transplant is a treatment option, not a cure. A kidney  from a living kidney donor can last 12-14 years.  Other treatment options are  donation and two types of dialysis.   This is major surgery and your estimated hospital stay is approximately 1-2 nights.  After surgery, there are driving and lifting restrictions - no driving for two weeks and no lifting over ten pounds for 8 weeks.  Donors are routinely off from work for 4 - 6 weeks after surgery, and potentially longer if they have a physical job.     If you anticipate lost wages due to donation, donor wage reimbursement options may be available to you and will be reviewed with you during the evaluation process. Donor Shield and NLDAC explained.  We reviewed the importance of completing follow-up labs and surveys at six months, 1 year and 2 years after donation to monitor kidney health and the impact donation has had on their life post donation.        QUESTIONS    Have you received the Welcome e-mail that includes copies of the informed consent, financial letter, information on donor shield and NLDAC from the transplant department? Yes.    Have you discussed with anyone your potential decision to donate?   Yes.    Is anyone pressuring or coercing you to donate? No.    Have you discussed any financial arrangements with recipient around donating a kidney? No.    Are you aware that you can confidentially opt out at any time, up to and including day of donation? Yes.    At this time, would you like to proceed with the medical evaluation to see if you can be a kidney donor?  Yes.    If yes, I will make an appointment for your donor coordinator to reach out to you with next steps.     Contact information for SUSANA's was provided Yes.    Ghada Arellano- [phone]  Ramona Meyers-  [phone]    Confirmed that Christina Santana reviewed Informed consent document and all questions answered.  Reviewed that they will receive Docusign to obtain electronic signature for the following: Informed consent, SRTR data, OSWALDO for  medical information, Auth for Electronic communication, Kidney for Life and will need their signed consent back before proceeding with evaluation.     Time frame for donation: tbd  Paired exchange was introduced  Yes.      MyChart was initiated  Yes.  CareEverywhere was initiated Yes.    SUSANA NOTES: Christina Max is interested in donating a kidney to Andres Nagel.  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  Darshan is a patient there, whom she has gotten to know over the past six months.  She would like to donate a kidney to him (not sure she's interested if not a direct match) and he is aware that she is looking into this.  She agrees that she will no longer have access to his records or work with him on a professional basis.  This should be further assessed.  Her grandfather was a donor and she has always looked up to him.  Her mother  last year and being able to extend someone's life so that they can have time with their grandchildren is important to her.  Her  is somewhat supportive.  She has six kids, ages 8, 11, 14, 17, 21 and 23 years of age.  She has donated platelets and blood in the past.  She states that giving of herself is important to her.  Appt. Scheduled with Mary Hammer for Monday, 3/11 at 1PM.  Email sent with contact information for Mary and SUSANA's.    Duration of call 45 minutes

## 2024-03-11 ENCOUNTER — TELEPHONE (OUTPATIENT)
Dept: TRANSPLANT | Facility: CLINIC | Age: 44
End: 2024-03-11
Payer: COMMERCIAL

## 2024-03-11 NOTE — TELEPHONE ENCOUNTER
Contacted Christina Santana to introduce myself and my role, review of medical/surgical/family history and next steps.     Christina Santana is aware She can stop donor evaluation at any time.    Regular blood donor? No Last blood donation date N/A (Notified donor to avoid blood donation at this time to get accurate blood counts if going through evaluation)     Christina Santana is a 43 year old female  ABO A that would like to learn more about kidney donation. Open to paired exchange: no, not at this time     Concerns from medical/surgical/family history: none    Current medications and NSAID use: none    Allergies reviewed: yes    Legal issues w/ law enforcement: No    Reviewed any history of travel in endemic areas: [place], Yes  Strongyloides- [place], [place] and [place].  Trypanosoma cruzi ([place])- [place]  West Nile Virus- [place], [place], [place], [place] and [place]. (Included in [place] testing prior to donation)    Per our Phase 1 algorithm, does not meet criteria to do preliminary testing. Social work screening NO.      Verified that potential donor was provided the informed consent DocuSign and they are comfortable moving forward to living donor evaluation.    Reviewed evaluation testing: Iohexol, Lab work, CXR, EKG, Provider visits and functions, CT Angiogram.     Reviewed operations of selection committee and angio review meetings and the need for multidisciplinary input. Post-donation requirements include post-op appointment with your surgeon at 2 weeks after surgery, 6 week, 6 month, 1 year and 2 year lab tests.     Reviewed NKR listing and transfer of care to KPD team if approved. Provided Christina with NKR website to review.     Briefly went over options if approved of NDD and voucher donation.     Christina would like to proceed with next steps: tissue typing & then eval      Encouraged sign up for MyChart and reviewed importance of watching teaching videos prior to evaluation.      Verified recipient status if not NDD.    Donor timeline: TBD     Will send orders to scheduling team to set up for tissue typing first and if can do direct will set up eval testing.

## 2024-03-15 ENCOUNTER — DOCUMENTATION ONLY (OUTPATIENT)
Dept: TRANSPLANT | Facility: CLINIC | Age: 44
End: 2024-03-15
Payer: COMMERCIAL

## 2024-04-04 NOTE — PROGRESS NOTES
ORTHOPEDIC CONSULT      Chief Complaint: Christina Santana is a 43 year old female who is being seen for   Chief Complaint   Patient presents with    Left Hip - Pain       History of Present Illness:   Today's visit:  Returns for left hip/groin pain.  She reports the pain will come and go on her.  Nonradiating.  Usually good for the first part of the morning then by lunch and starts to cause her pain and she is limping at times.  Will wake her up at night.  Previously had been seen and we discussed a intra-articular injection.  She never received that.  No recent traumas.  No new injuries.  Pain is inconsistent with activities.    September 29, 2021 office visit:  Presents with left lateral hip pain.  However she reports at times it will radiate down her leg associate with numbness and tingling into her toes.  She has had pain for many years however last few months has been significantly exacerbated.  No specific injuries or trauma started it.  She stays active with running.  At times it hurts to sit other times it hurts the same and other times it hurts to lay.  She is attempted some acupuncture some chiropractic treatments.  She is on her second physical therapy appointment tomorrow.  At times the leg feels weak.         Patient's past medical, surgical, social and family histories reviewed.     Past Medical History:   Diagnosis Date    ASCUS with positive high risk HPV 12/27/2013    HSIL (high grade squamous intraepithelial lesion) on Pap smear of cervix 04/22/2013    Pap smear of cervix with ASCUS, cannot exclude HGSIL 5/23/11, 8/10/12,     PMS2-related Reed syndrome (HNPCC4)        Past Surgical History:   Procedure Laterality Date    APPENDECTOMY      AS REPAIR PARAESOPHAGEAL HIATAL HERNIA,LAPAROTOMY, W MESH      BIOPSY VULVA N/A 12/10/2021    Procedure: Vaginal cuff biopsy;  Surgeon: Leyda Guzman DO;  [facility]    COLONOSCOPY N/A 6/10/2021    Procedure: COLONOSCOPY, WITH POLYPECTOMY;  Surgeon:  Willi Galeas DO;  [facility]    COLONOSCOPY N/A 12/1/2022    Procedure: COLONOSCOPY;  Surgeon: Willi Galeas DO;  [facility]    COLONOSCOPY N/A 12/4/2023    Procedure: Colonoscopy;  Surgeon: Willi Galeas DO;  [facility]    ESOPHAGOSCOPY, GASTROSCOPY, DUODENOSCOPY (EGD), COMBINED N/A 12/1/2022    Procedure: ESOPHAGOGASTRODUODENOSCOPY (EGD) with biopsy;  Surgeon: Willi Galeas DO;  [facility]    EXAM UNDER ANESTHESIA PELVIC N/A 12/10/2021    Procedure: EXAM UNDER ANESTHESIA, PELVIS;  Surgeon: Leyda Guzman DO;  [facility]    LAPAROSCOPIC HYSTERECTOMY TOTAL, BILATERAL SALPINGO-OOPHORECTOMY, COMBINED N/A 2/24/2021    Procedure: HYSTERECTOMY, TOTAL, LAPAROSCOPIC, WITH SALPINGO-OOPHORECTOMY;  Surgeon: Dylon Alcantar MD;  Location[facility]       Medications:  Current Outpatient Medications   Medication Sig Dispense Refill    PROGESTERONE 100MG/G CREAM Apply 0.5 g topically 2 times daily      cyclobenzaprine (FLEXERIL) 10 MG tablet Take 0.5-1 tablets (5-10 mg) by mouth 3 times daily as needed for muscle spasms 15 tablet 0    HYDROcodone-acetaminophen (NORCO) 5-325 MG tablet Take 1-2 tablets by mouth (Patient not taking: Reported on 11/8/2023)      methylPREDNISolone (MEDROL DOSEPAK) 4 MG tablet therapy pack Follow Package Directions 21 tablet 0    predniSONE (DELTASONE) 20 MG tablet Take two tablets (= 40mg) each day for 5 (five) days (Patient not taking: Reported on 11/8/2023) 10 tablet 0     No current facility-administered medications for this visit.       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   Tobacco Use    Smoking status: Never    Smokeless tobacco: Never   Vaping Use    Vaping Use: Never used   Substance and Sexual  Activity    Alcohol use: Yes     Comment: occassional    Drug use: No    Sexual activity: Yes     Partners: Male     Birth control/protection: Female Surgical       Family History   Problem Relation Age of Onset    Fibroids Mother     Reed Syndrome Mother         PMS2+    Colon Polyps Mother         removed 5 feet of colon for unmanageable polyps;  in her sleep at 67, took a nape and never woke up,    Reed Syndrome Brother         PMS2+    Bladder Cancer Maternal Grandfather 70        lived to be 94    Kidney Cancer Maternal Grandfather     Breast Cancer Paternal Grandmother         postmenopausal    Ovarian Cancer Maternal Aunt 40        possible uterine cancer also; hx of fibroids    Ovarian Cancer Maternal Aunt 40        possible uterine cancer also; hx of fibroids    Breast Cancer Paternal Aunt 54       REVIEW OF SYSTEMS  10 point review systems performed otherwise negative as noted as per history of present illness.    Physical Exam:  Vitals: /88   Pulse 94   Temp 98.1  F (36.7  C) (Oral)   LMP 2021   BMI= There is no height or weight on file to calculate BMI.  Constitutional: healthy, alert and no acute distress   Psychiatric: mentation appears normal and affect normal/bright  NEURO: no focal deficits  RESP: Normal with easy respirations and no use of accessory muscles to breathe, no audible wheezing or retractions  CV: LLE:  no edema         Regular rate and rhythm by palpation  SKIN: No erythema, rashes, excoriation, or breakdown. No evidence of infection.   JOINT/EXTREMITIES:left hip: No gross deformity.  No focal atrophy.  No focal areas of tenderness.  Negative straight leg.  Full active range of motion.  In the supine position increased pain with flexion internal rotation.  Negative Evelina.     GAIT: not tested     Diagnostic Modalities:  left hip X-ray: No fracture, dislocation and or lesion. Normal alignment.  Joint space maintained no significant arthritis. No appreciable soft  tissue abnormality.     MR HIP ARTHROGRAM LEFT WITH CONTRAST 10/21/2021 9:39 AM     HISTORY: Left hip pain.     TECHNIQUE: Coronal T1 and STIR. Axial T1 and T2 fat suppression. This  patient received intraarticular gadolinium.  Injection procedure  dictated separately.     COMPARISON: X-ray from 9/29/2021.     FINDINGS:   Osseous and Cartilaginous Structures:  No fracture. No femoral head  osteonecrosis. Mild subchondral sclerosis superior lateral acetabulum  most prominent posteriorly. This could be related to early  degenerative change.     Acetabular Labrum: No definite labral tear.  No juxtaacetabular cyst.     Trochanteric and Iliopsoas Bursae: No fluid collection.     Common Hamstring Tendon: Intact.     Additional Findings:  Mild thickening of the left gluteus minimus  tendon at the insertion site on the anterior greater trochanter with  mild surrounding edema and fluid consistent with tendinosis. Mild  reactive bone marrow edema adjacent to the insertion site. Gluteus  medius is unremarkable.                                                                      IMPRESSION:   1. No evidence of labral tear.  2. Mild subchondral sclerosis superior lateral acetabulum most  prominent posteriorly. This could be related to very early  degenerative change. This is best seen on coronal series 7 image 13  and 14.  3. Abnormal soft tissue edema and a small amount of fluid surrounding  the insertion site of the gluteus minimus tendon on the anterior  greater trochanter. Mild thickening of the gluteus minimus tendon.  Findings are consistent with tendinosis/tendinitis.     Independent visualization of the images was performed.      Impression: left hip pain-questionable impingement    Plan:  All of the above pertinent physical exam and imaging modalities findings was reviewed with Christina.    We discussed her exam findings.  Radiographs unremarkable.  Pain is located in the groin.  There is impingement findings on exam.   Recommend MRI arthrogram for reevaluation.        Return to clinic to discuss test results, or sooner as needed for changes.  Re-x-ray on return: no    Erick Fish D.O.

## 2024-04-05 ENCOUNTER — ANCILLARY PROCEDURE (OUTPATIENT)
Dept: GENERAL RADIOLOGY | Facility: CLINIC | Age: 44
End: 2024-04-05
Attending: ORTHOPAEDIC SURGERY
Payer: COMMERCIAL

## 2024-04-05 ENCOUNTER — OFFICE VISIT (OUTPATIENT)
Dept: ORTHOPEDICS | Facility: CLINIC | Age: 44
End: 2024-04-05
Payer: COMMERCIAL

## 2024-04-05 VITALS — HEART RATE: 94 BPM | SYSTOLIC BLOOD PRESSURE: 128 MMHG | TEMPERATURE: 98.1 F | DIASTOLIC BLOOD PRESSURE: 88 MMHG

## 2024-04-05 DIAGNOSIS — M25.552 LEFT HIP PAIN: ICD-10-CM

## 2024-04-05 DIAGNOSIS — M25.552 LEFT HIP PAIN: Primary | ICD-10-CM

## 2024-04-05 PROCEDURE — 99213 OFFICE O/P EST LOW 20 MIN: CPT | Performed by: ORTHOPAEDIC SURGERY

## 2024-04-05 PROCEDURE — 73502 X-RAY EXAM HIP UNI 2-3 VIEWS: CPT | Mod: TC | Performed by: RADIOLOGY

## 2024-04-05 ASSESSMENT — PAIN SCALES - GENERAL: PAINLEVEL: MODERATE PAIN (5)

## 2024-04-05 NOTE — LETTER
4/5/2024         RE: Christina Santana  [address] [facility] [address]        Dear Colleague,    Thank you for referring your patient, Christina Santana, to the [facility]. Please see a copy of my visit note below.    ORTHOPEDIC CONSULT      Chief Complaint: Christina Santana is a 43 year old female who is being seen for   Chief Complaint   Patient presents with     Left Hip - Pain       History of Present Illness:   Today's visit:  Returns for left hip/groin pain.  She reports the pain will come and go on her.  Nonradiating.  Usually good for the first part of the morning then by lunch and starts to cause her pain and she is limping at times.  Will wake her up at night.  Previously had been seen and we discussed a intra-articular injection.  She never received that.  No recent traumas.  No new injuries.  Pain is inconsistent with activities.    September 29, 2021 office visit:  Presents with left lateral hip pain.  However she reports at times it will radiate down her leg associate with numbness and tingling into her toes.  She has had pain for many years however last few months has been significantly exacerbated.  No specific injuries or trauma started it.  She stays active with running.  At times it hurts to sit other times it hurts the same and other times it hurts to lay.  She is attempted some acupuncture some chiropractic treatments.  She is on her second physical therapy appointment tomorrow.  At times the leg feels weak.         Patient's past medical, surgical, social and family histories reviewed.     Past Medical History:   Diagnosis Date     ASCUS with positive high risk HPV 12/27/2013     HSIL (high grade squamous intraepithelial lesion) on Pap smear of cervix 04/22/2013     Pap smear of cervix with ASCUS, cannot exclude HGSIL 5/23/11, 8/10/12,      PMS2-related Reed syndrome (HNPCC4)        Past Surgical History:   Procedure Laterality Date     APPENDECTOMY       AS  REPAIR PARAESOPHAGEAL HIATAL HERNIA,LAPAROTOMY, W MESH       BIOPSY VULVA N/A 12/10/2021    Procedure: Vaginal cuff biopsy;  Surgeon: Leyda Guzman DO;  [facility]     COLONOSCOPY N/A 6/10/2021    Procedure: COLONOSCOPY, WITH POLYPECTOMY;  Surgeon: Willi Galeas DO;  [facility]     COLONOSCOPY N/A 12/1/2022    Procedure: COLONOSCOPY;  Surgeon: Willi Galeas DO;  [facility]     COLONOSCOPY N/A 12/4/2023    Procedure: Colonoscopy;  Surgeon: Willi Galeas DO;  [facility]     ESOPHAGOSCOPY, GASTROSCOPY, DUODENOSCOPY (EGD), COMBINED N/A 12/1/2022    Procedure: ESOPHAGOGASTRODUODENOSCOPY (EGD) with biopsy;  Surgeon: Willi Galeas DO;  [facility]     EXAM UNDER ANESTHESIA PELVIC N/A 12/10/2021    Procedure: EXAM UNDER ANESTHESIA, PELVIS;  Surgeon: Leyda Guzman DO;  [facility]     LAPAROSCOPIC HYSTERECTOMY TOTAL, BILATERAL SALPINGO-OOPHORECTOMY, COMBINED N/A 2/24/2021    Procedure: HYSTERECTOMY, TOTAL, LAPAROSCOPIC, WITH SALPINGO-OOPHORECTOMY;  Surgeon: Dylon Alcantar MD;  [facility]       Medications:  Current Outpatient Medications   Medication Sig Dispense Refill     PROGESTERONE 100MG/G CREAM Apply 0.5 g topically 2 times daily       cyclobenzaprine (FLEXERIL) 10 MG tablet Take 0.5-1 tablets (5-10 mg) by mouth 3 times daily as needed for muscle spasms 15 tablet 0     HYDROcodone-acetaminophen (NORCO) 5-325 MG tablet Take 1-2 tablets by mouth (Patient not taking: Reported on 11/8/2023)       methylPREDNISolone (MEDROL DOSEPAK) 4 MG tablet therapy pack Follow Package Directions 21 tablet 0     predniSONE (DELTASONE) 20 MG tablet Take two tablets (= 40mg) each day for 5 (five) days (Patient not taking: Reported on 11/8/2023) 10 tablet 0     No current facility-administered medications for this visit.       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   Tobacco Use     Smoking status: Never     Smokeless tobacco: Never   Vaping Use     Vaping Use: Never used   Substance and Sexual Activity     Alcohol use: Yes     Comment: occassional     Drug use: No     Sexual activity: Yes     Partners: Male     Birth control/protection: Female Surgical       Family History   Problem Relation Age of Onset     Fibroids Mother      Reed Syndrome Mother         PMS2+     Colon Polyps Mother         removed 5 feet of colon for unmanageable polyps;  in her sleep at 67, took a nape and never woke up,     Reed Syndrome Brother         PMS2+     Bladder Cancer Maternal Grandfather 70        lived to be 94     Kidney Cancer Maternal Grandfather      Breast Cancer Paternal Grandmother         postmenopausal     Ovarian Cancer Maternal Aunt 40        possible uterine cancer also; hx of fibroids     Ovarian Cancer Maternal Aunt 40        possible uterine cancer also; hx of fibroids     Breast Cancer Paternal Aunt 54       REVIEW OF SYSTEMS  10 point review systems performed otherwise negative as noted as per history of present illness.    Physical Exam:  Vitals: /88   Pulse 94   Temp 98.1  F (36.7  C) (Oral)   LMP 2021   BMI= There is no height or weight on file to calculate BMI.  Constitutional: healthy, alert and no acute distress   Psychiatric: mentation appears normal and affect normal/bright  NEURO: no focal deficits  RESP: Normal with easy respirations and no use of accessory muscles to breathe, no audible wheezing or retractions  CV: LLE:  no edema         Regular rate and rhythm by palpation  SKIN: No erythema, rashes, excoriation, or breakdown. No evidence of infection.   JOINT/EXTREMITIES:left hip: No gross deformity.  No focal atrophy.  No focal areas of tenderness.  Negative straight leg.  Full active range of motion.  In  the supine position increased pain with flexion internal rotation.  Negative Evelina.     GAIT: not tested     Diagnostic Modalities:  left hip X-ray: No fracture, dislocation and or lesion. Normal alignment.  Joint space maintained no significant arthritis. No appreciable soft tissue abnormality.     MR HIP ARTHROGRAM LEFT WITH CONTRAST 10/21/2021 9:39 AM     HISTORY: Left hip pain.     TECHNIQUE: Coronal T1 and STIR. Axial T1 and T2 fat suppression. This  patient received intraarticular gadolinium.  Injection procedure  dictated separately.     COMPARISON: X-ray from 9/29/2021.     FINDINGS:   Osseous and Cartilaginous Structures:  No fracture. No femoral head  osteonecrosis. Mild subchondral sclerosis superior lateral acetabulum  most prominent posteriorly. This could be related to early  degenerative change.     Acetabular Labrum: No definite labral tear.  No juxtaacetabular cyst.     Trochanteric and Iliopsoas Bursae: No fluid collection.     Common Hamstring Tendon: Intact.     Additional Findings:  Mild thickening of the left gluteus minimus  tendon at the insertion site on the anterior greater trochanter with  mild surrounding edema and fluid consistent with tendinosis. Mild  reactive bone marrow edema adjacent to the insertion site. Gluteus  medius is unremarkable.                                                                      IMPRESSION:   1. No evidence of labral tear.  2. Mild subchondral sclerosis superior lateral acetabulum most  prominent posteriorly. This could be related to very early  degenerative change. This is best seen on coronal series 7 image 13  and 14.  3. Abnormal soft tissue edema and a small amount of fluid surrounding  the insertion site of the gluteus minimus tendon on the anterior  greater trochanter. Mild thickening of the gluteus minimus tendon.  Findings are consistent with tendinosis/tendinitis.     Independent visualization of the images was performed.      Impression: left  hip pain-questionable impingement    Plan:  All of the above pertinent physical exam and imaging modalities findings was reviewed with Christina.    We discussed her exam findings.  Radiographs unremarkable.  Pain is located in the groin.  There is impingement findings on exam.  Recommend MRI arthrogram for reevaluation.        Return to clinic to discuss test results, or sooner as needed for changes.  Re-x-ray on return: kajal Fish D.O.      Again, thank you for allowing me to participate in the care of your patient.        Sincerely,        Dany Fish, DO

## 2024-04-10 ENCOUNTER — TELEPHONE (OUTPATIENT)
Dept: TRANSPLANT | Facility: CLINIC | Age: 44
End: 2024-04-10
Payer: COMMERCIAL

## 2024-04-10 ENCOUNTER — DOCUMENTATION ONLY (OUTPATIENT)
Dept: TRANSPLANT | Facility: CLINIC | Age: 44
End: 2024-04-10
Payer: COMMERCIAL

## 2024-04-10 DIAGNOSIS — Z00.5 TRANSPLANT DONOR EVALUATION: Primary | ICD-10-CM

## 2024-04-10 RX ORDER — ALBUTEROL SULFATE 0.83 MG/ML
2.5 SOLUTION RESPIRATORY (INHALATION)
Status: CANCELLED | OUTPATIENT
Start: 2024-05-03

## 2024-04-10 RX ORDER — DIPHENHYDRAMINE HYDROCHLORIDE 50 MG/ML
50 INJECTION INTRAMUSCULAR; INTRAVENOUS
Status: CANCELLED
Start: 2024-05-03

## 2024-04-10 RX ORDER — ALBUTEROL SULFATE 90 UG/1
1-2 AEROSOL, METERED RESPIRATORY (INHALATION)
Status: CANCELLED
Start: 2024-05-03

## 2024-04-10 RX ORDER — EPINEPHRINE 1 MG/ML
0.3 INJECTION, SOLUTION, CONCENTRATE INTRAVENOUS EVERY 5 MIN PRN
Status: CANCELLED | OUTPATIENT
Start: 2024-05-03

## 2024-04-10 RX ORDER — MEPERIDINE HYDROCHLORIDE 25 MG/ML
25 INJECTION INTRAMUSCULAR; INTRAVENOUS; SUBCUTANEOUS EVERY 30 MIN PRN
Status: CANCELLED | OUTPATIENT
Start: 2024-05-03

## 2024-04-10 NOTE — TELEPHONE ENCOUNTER
Spoke to Christina to let her know she could do direct donation and that she can come in for evaluation if she still wanted to proceed. She is willing to move forward and is coming on 5/3/24.

## 2024-04-10 NOTE — PROGRESS NOTES
IKTP-    1,2,2,1   Med eplet   PRA from 1/2024    -did virtual off of 3g     -Can do DIRECT donation

## 2024-05-02 DIAGNOSIS — Z00.5 TRANSPLANT DONOR EVALUATION: ICD-10-CM

## 2024-05-02 LAB
A1 AG RBC QL: POSITIVE
ABO/RH(D): NORMAL
ABO/RH(D): NORMAL
ANTIBODY SCREEN: NEGATIVE
ANTIBODY SCREEN: NEGATIVE
SPECIMEN EXPIRATION DATE: NORMAL

## 2024-05-03 ENCOUNTER — APPOINTMENT (OUTPATIENT)
Dept: LAB | Facility: CLINIC | Age: 44
End: 2024-05-03
Attending: INTERNAL MEDICINE
Payer: COMMERCIAL

## 2024-05-03 ENCOUNTER — LAB (OUTPATIENT)
Dept: LAB | Facility: CLINIC | Age: 44
End: 2024-05-03

## 2024-05-03 ENCOUNTER — OFFICE VISIT (OUTPATIENT)
Dept: INFUSION THERAPY | Facility: CLINIC | Age: 44
End: 2024-05-03
Attending: INTERNAL MEDICINE

## 2024-05-03 ENCOUNTER — ALLIED HEALTH/NURSE VISIT (OUTPATIENT)
Dept: TRANSPLANT | Facility: CLINIC | Age: 44
End: 2024-05-03
Attending: INTERNAL MEDICINE

## 2024-05-03 ENCOUNTER — ANCILLARY PROCEDURE (OUTPATIENT)
Dept: GENERAL RADIOLOGY | Facility: CLINIC | Age: 44
End: 2024-05-03
Attending: INTERNAL MEDICINE
Payer: COMMERCIAL

## 2024-05-03 ENCOUNTER — LAB (OUTPATIENT)
Dept: LAB | Facility: CLINIC | Age: 44
End: 2024-05-03
Attending: INTERNAL MEDICINE

## 2024-05-03 ENCOUNTER — OFFICE VISIT (OUTPATIENT)
Dept: TRANSPLANT | Facility: CLINIC | Age: 44
End: 2024-05-03
Attending: INTERNAL MEDICINE

## 2024-05-03 ENCOUNTER — ANCILLARY PROCEDURE (OUTPATIENT)
Dept: CT IMAGING | Facility: CLINIC | Age: 44
End: 2024-05-03
Attending: INTERNAL MEDICINE
Payer: COMMERCIAL

## 2024-05-03 VITALS
RESPIRATION RATE: 16 BRPM | BODY MASS INDEX: 31.12 KG/M2 | WEIGHT: 193.6 LBS | DIASTOLIC BLOOD PRESSURE: 87 MMHG | TEMPERATURE: 97.6 F | HEART RATE: 65 BPM | OXYGEN SATURATION: 99 % | SYSTOLIC BLOOD PRESSURE: 127 MMHG | HEIGHT: 66 IN

## 2024-05-03 VITALS
OXYGEN SATURATION: 98 % | HEART RATE: 71 BPM | TEMPERATURE: 97.4 F | SYSTOLIC BLOOD PRESSURE: 119 MMHG | HEIGHT: 66 IN | DIASTOLIC BLOOD PRESSURE: 86 MMHG | WEIGHT: 193.6 LBS | BODY MASS INDEX: 31.12 KG/M2

## 2024-05-03 DIAGNOSIS — Z00.5 TRANSPLANT DONOR EVALUATION: ICD-10-CM

## 2024-05-03 DIAGNOSIS — K22.2 GERD WITH STRICTURE: ICD-10-CM

## 2024-05-03 DIAGNOSIS — Z00.5 TRANSPLANT DONOR EVALUATION: Primary | ICD-10-CM

## 2024-05-03 DIAGNOSIS — Z15.09 PMS2-RELATED LYNCH SYNDROME (HNPCC4): ICD-10-CM

## 2024-05-03 DIAGNOSIS — K21.9 GERD WITH STRICTURE: ICD-10-CM

## 2024-05-03 PROBLEM — G44.209 TENSION TYPE HEADACHE: Status: RESOLVED | Noted: 2020-03-04 | Resolved: 2024-05-03

## 2024-05-03 PROBLEM — J30.89 ALLERGIC RHINITIS DUE TO DUST MITE: Status: RESOLVED | Noted: 2020-03-04 | Resolved: 2024-05-03

## 2024-05-03 PROBLEM — R11.0 NAUSEA: Status: RESOLVED | Noted: 2020-08-07 | Resolved: 2024-05-03

## 2024-05-03 PROBLEM — J34.89 SINUS PRESSURE: Status: RESOLVED | Noted: 2020-03-04 | Resolved: 2024-05-03

## 2024-05-03 PROBLEM — M62.838 MUSCLE SPASM: Status: RESOLVED | Noted: 2020-06-08 | Resolved: 2024-05-03

## 2024-05-03 PROBLEM — K80.20 CALCULUS OF GALLBLADDER WITHOUT CHOLECYSTITIS WITHOUT OBSTRUCTION: Status: RESOLVED | Noted: 2020-08-07 | Resolved: 2024-05-03

## 2024-05-03 PROBLEM — J30.81 ALLERGIC RHINITIS DUE TO ANIMAL DANDER: Status: RESOLVED | Noted: 2020-03-04 | Resolved: 2024-05-03

## 2024-05-03 PROBLEM — R10.11 RUQ PAIN: Status: RESOLVED | Noted: 2020-08-07 | Resolved: 2024-05-03

## 2024-05-03 PROBLEM — J30.89 ALLERGIC RHINITIS DUE TO MOLD: Status: RESOLVED | Noted: 2020-03-04 | Resolved: 2024-05-03

## 2024-05-03 PROBLEM — M54.2 CERVICALGIA: Status: RESOLVED | Noted: 2020-06-08 | Resolved: 2024-05-03

## 2024-05-03 LAB
ALBUMIN MFR UR ELPH: 11.6 MG/DL
ALBUMIN SERPL BCG-MCNC: 4.7 G/DL (ref 3.5–5.2)
ALBUMIN UR-MCNC: NEGATIVE MG/DL
ALP SERPL-CCNC: 75 U/L (ref 40–150)
ALT SERPL W P-5'-P-CCNC: 25 U/L (ref 0–50)
ANION GAP SERPL CALCULATED.3IONS-SCNC: 12 MMOL/L (ref 7–15)
APPEARANCE UR: CLEAR
APTT PPP: 27 SECONDS (ref 22–38)
AST SERPL W P-5'-P-CCNC: 23 U/L (ref 0–45)
BILIRUB SERPL-MCNC: 0.4 MG/DL
BILIRUB UR QL STRIP: NEGATIVE
BUN SERPL-MCNC: 17.5 MG/DL (ref 6–20)
CALCIUM SERPL-MCNC: 9.4 MG/DL (ref 8.6–10)
CHLORIDE SERPL-SCNC: 103 MMOL/L (ref 98–107)
CHOLEST SERPL-MCNC: 221 MG/DL
CMV IGG SERPL IA-ACNC: 3.8 U/ML
CMV IGG SERPL IA-ACNC: ABNORMAL
COLOR UR AUTO: YELLOW
CREAT SERPL-MCNC: 0.73 MG/DL (ref 0.51–0.95)
CREAT UR-MCNC: 185 MG/DL
CREAT UR-MCNC: 186 MG/DL
DEPRECATED HCO3 PLAS-SCNC: 26 MMOL/L (ref 22–29)
EBV VCA IGG SER IA-ACNC: 366 U/ML
EBV VCA IGG SER IA-ACNC: POSITIVE
EBV VCA IGM SER IA-ACNC: <10 U/ML
EBV VCA IGM SER IA-ACNC: NORMAL
EGFRCR SERPLBLD CKD-EPI 2021: >90 ML/MIN/1.73M2
ERYTHROCYTE [DISTWIDTH] IN BLOOD BY AUTOMATED COUNT: 11.9 % (ref 10–15)
FASTING STATUS PATIENT QL REPORTED: YES
GLUCOSE SERPL-MCNC: 96 MG/DL (ref 70–99)
GLUCOSE UR STRIP-MCNC: NEGATIVE MG/DL
HBA1C MFR BLD: 5.7 %
HBV CORE AB SERPL QL IA: NONREACTIVE
HBV SURFACE AB SERPL IA-ACNC: 555 M[IU]/ML
HBV SURFACE AB SERPL IA-ACNC: REACTIVE M[IU]/ML
HBV SURFACE AG SERPL QL IA: NONREACTIVE
HCT VFR BLD AUTO: 47.3 % (ref 35–47)
HCV AB SERPL QL IA: NONREACTIVE
HDLC SERPL-MCNC: 73 MG/DL
HGB BLD-MCNC: 15.5 G/DL (ref 11.7–15.7)
HGB UR QL STRIP: NEGATIVE
HIV 1+2 AB+HIV1 P24 AG SERPL QL IA: NONREACTIVE
INR PPP: 0.95 (ref 0.85–1.15)
KETONES UR STRIP-MCNC: NEGATIVE MG/DL
LDLC SERPL CALC-MCNC: 121 MG/DL
LEUKOCYTE ESTERASE UR QL STRIP: NEGATIVE
MCH RBC QN AUTO: 29.8 PG (ref 26.5–33)
MCHC RBC AUTO-ENTMCNC: 32.8 G/DL (ref 31.5–36.5)
MCV RBC AUTO: 91 FL (ref 78–100)
MICROALBUMIN UR-MCNC: <12 MG/L
MICROALBUMIN/CREAT UR: NORMAL MG/G{CREAT}
MUCOUS THREADS #/AREA URNS LPF: PRESENT /LPF
NITRATE UR QL: NEGATIVE
NONHDLC SERPL-MCNC: 148 MG/DL
PH UR STRIP: 7 [PH] (ref 5–7)
PHOSPHATE SERPL-MCNC: 3.1 MG/DL (ref 2.5–4.5)
PLATELET # BLD AUTO: 225 10E3/UL (ref 150–450)
POTASSIUM SERPL-SCNC: 3.7 MMOL/L (ref 3.4–5.3)
PROT SERPL-MCNC: 8 G/DL (ref 6.4–8.3)
PROT/CREAT 24H UR: 0.06 MG/MG CR (ref 0–0.2)
RBC # BLD AUTO: 5.2 10E6/UL (ref 3.8–5.2)
RBC URINE: 1 /HPF
SODIUM SERPL-SCNC: 141 MMOL/L (ref 135–145)
SP GR UR STRIP: 1.03 (ref 1–1.03)
SQUAMOUS EPITHELIAL: 1 /HPF
T PALLIDUM AB SER QL: NONREACTIVE
TRIGL SERPL-MCNC: 134 MG/DL
URATE SERPL-MCNC: 4.4 MG/DL (ref 2.4–5.7)
UROBILINOGEN UR STRIP-MCNC: NORMAL MG/DL
WBC # BLD AUTO: 5.6 10E3/UL (ref 4–11)
WBC URINE: 1 /HPF

## 2024-05-03 PROCEDURE — 86905 BLOOD TYPING RBC ANTIGENS: CPT | Performed by: INTERNAL MEDICINE

## 2024-05-03 PROCEDURE — 86665 EPSTEIN-BARR CAPSID VCA: CPT | Performed by: INTERNAL MEDICINE

## 2024-05-03 PROCEDURE — 85610 PROTHROMBIN TIME: CPT

## 2024-05-03 PROCEDURE — 80061 LIPID PANEL: CPT

## 2024-05-03 PROCEDURE — 86481 TB AG RESPONSE T-CELL SUSP: CPT | Performed by: INTERNAL MEDICINE

## 2024-05-03 PROCEDURE — 99205 OFFICE O/P NEW HI 60 MIN: CPT | Performed by: INTERNAL MEDICINE

## 2024-05-03 PROCEDURE — 74175 CTA ABDOMEN W/CONTRAST: CPT | Performed by: RADIOLOGY

## 2024-05-03 PROCEDURE — 83036 HEMOGLOBIN GLYCOSYLATED A1C: CPT | Performed by: INTERNAL MEDICINE

## 2024-05-03 PROCEDURE — 86900 BLOOD TYPING SEROLOGIC ABO: CPT | Performed by: INTERNAL MEDICINE

## 2024-05-03 PROCEDURE — 86789 WEST NILE VIRUS ANTIBODY: CPT

## 2024-05-03 PROCEDURE — 84156 ASSAY OF PROTEIN URINE: CPT

## 2024-05-03 PROCEDURE — 93000 ELECTROCARDIOGRAM COMPLETE: CPT | Performed by: INTERNAL MEDICINE

## 2024-05-03 PROCEDURE — 86780 TREPONEMA PALLIDUM: CPT | Performed by: INTERNAL MEDICINE

## 2024-05-03 PROCEDURE — 81001 URINALYSIS AUTO W/SCOPE: CPT

## 2024-05-03 PROCEDURE — 86644 CMV ANTIBODY: CPT | Performed by: INTERNAL MEDICINE

## 2024-05-03 PROCEDURE — 82043 UR ALBUMIN QUANTITATIVE: CPT | Performed by: INTERNAL MEDICINE

## 2024-05-03 PROCEDURE — 99204 OFFICE O/P NEW MOD 45 MIN: CPT | Performed by: TRANSPLANT SURGERY

## 2024-05-03 PROCEDURE — 86706 HEP B SURFACE ANTIBODY: CPT | Performed by: INTERNAL MEDICINE

## 2024-05-03 PROCEDURE — 86682 HELMINTH ANTIBODY: CPT

## 2024-05-03 PROCEDURE — 85027 COMPLETE CBC AUTOMATED: CPT

## 2024-05-03 PROCEDURE — 82542 COL CHROMOTOGRAPHY QUAL/QUAN: CPT | Performed by: TRANSPLANT SURGERY

## 2024-05-03 PROCEDURE — 86788 WEST NILE VIRUS AB IGM: CPT

## 2024-05-03 PROCEDURE — 36415 COLL VENOUS BLD VENIPUNCTURE: CPT

## 2024-05-03 PROCEDURE — 86704 HEP B CORE ANTIBODY TOTAL: CPT | Performed by: INTERNAL MEDICINE

## 2024-05-03 PROCEDURE — 84550 ASSAY OF BLOOD/URIC ACID: CPT

## 2024-05-03 PROCEDURE — 80053 COMPREHEN METABOLIC PANEL: CPT

## 2024-05-03 PROCEDURE — 81378 HLA I & II TYPING HR: CPT

## 2024-05-03 PROCEDURE — 85730 THROMBOPLASTIN TIME PARTIAL: CPT

## 2024-05-03 PROCEDURE — 71046 X-RAY EXAM CHEST 2 VIEWS: CPT | Mod: GC | Performed by: RADIOLOGY

## 2024-05-03 PROCEDURE — 86900 BLOOD TYPING SEROLOGIC ABO: CPT

## 2024-05-03 PROCEDURE — 84100 ASSAY OF PHOSPHORUS: CPT

## 2024-05-03 PROCEDURE — 99213 OFFICE O/P EST LOW 20 MIN: CPT | Performed by: TRANSPLANT SURGERY

## 2024-05-03 PROCEDURE — 86803 HEPATITIS C AB TEST: CPT | Performed by: INTERNAL MEDICINE

## 2024-05-03 PROCEDURE — 86753 PROTOZOA ANTIBODY NOS: CPT | Performed by: INTERNAL MEDICINE

## 2024-05-03 PROCEDURE — 36415 COLL VENOUS BLD VENIPUNCTURE: CPT | Performed by: INTERNAL MEDICINE

## 2024-05-03 PROCEDURE — 82542 COL CHROMOTOGRAPHY QUAL/QUAN: CPT | Performed by: INTERNAL MEDICINE

## 2024-05-03 PROCEDURE — 36415 COLL VENOUS BLD VENIPUNCTURE: CPT | Performed by: TRANSPLANT SURGERY

## 2024-05-03 PROCEDURE — 87340 HEPATITIS B SURFACE AG IA: CPT | Performed by: INTERNAL MEDICINE

## 2024-05-03 PROCEDURE — 255N000002 HC RX 255 OP 636: Performed by: INTERNAL MEDICINE

## 2024-05-03 PROCEDURE — 99207 PR NO BILLABLE SERVICE THIS VISIT: CPT

## 2024-05-03 RX ORDER — ALBUTEROL SULFATE 0.83 MG/ML
2.5 SOLUTION RESPIRATORY (INHALATION)
OUTPATIENT
Start: 2024-05-03

## 2024-05-03 RX ORDER — EPINEPHRINE 1 MG/ML
0.3 INJECTION, SOLUTION INTRAMUSCULAR; SUBCUTANEOUS EVERY 5 MIN PRN
OUTPATIENT
Start: 2024-05-03

## 2024-05-03 RX ORDER — MEPERIDINE HYDROCHLORIDE 25 MG/ML
25 INJECTION INTRAMUSCULAR; INTRAVENOUS; SUBCUTANEOUS EVERY 30 MIN PRN
OUTPATIENT
Start: 2024-05-03

## 2024-05-03 RX ORDER — ALBUTEROL SULFATE 90 UG/1
1-2 AEROSOL, METERED RESPIRATORY (INHALATION)
Start: 2024-05-03

## 2024-05-03 RX ORDER — DIPHENHYDRAMINE HYDROCHLORIDE 50 MG/ML
50 INJECTION INTRAMUSCULAR; INTRAVENOUS
Start: 2024-05-03

## 2024-05-03 RX ORDER — METHYLPREDNISOLONE SODIUM SUCCINATE 125 MG/2ML
125 INJECTION, POWDER, LYOPHILIZED, FOR SOLUTION INTRAMUSCULAR; INTRAVENOUS
Start: 2024-05-03

## 2024-05-03 RX ORDER — IOPAMIDOL 755 MG/ML
90 INJECTION, SOLUTION INTRAVASCULAR ONCE
Status: COMPLETED | OUTPATIENT
Start: 2024-05-03 | End: 2024-05-03

## 2024-05-03 RX ADMIN — IOHEXOL 4 ML: 350 INJECTION, SOLUTION INTRAVENOUS at 07:14

## 2024-05-03 RX ADMIN — IOPAMIDOL 90 ML: 755 INJECTION, SOLUTION INTRAVASCULAR at 11:40

## 2024-05-03 NOTE — NURSING NOTE
Chief Complaint   Patient presents with    Blood Draw     Labs drawn via PIV by RN in lab.      Labs drawn via peripheral IV. Vital signs taken. Checked into next appointment.   Anca Mccormick RN

## 2024-05-03 NOTE — LETTER
5/3/2024         RE: Christina Santana  [address] [facility] [address]        Dear Colleague,    Thank you for referring your patient, Christina Santana, to the [facility]. Please see a copy of my visit note below.    Donor Iohexol test    Christina Santana presents today to [facility] for a Donor Iohexol test.      Progress note:  ID verified by name and .     The following information was verified with the patient:  Female Patients is there any possibility of being pregnant No  Is there a history of allergy (skin rash, swelling, ect) to:   A.  Iodine (except skin reactions to betadine): No   B. Intravenous radio-contrast agents: No   C. Seafood No     present during visit today: Not Applicable.    R.N. provided patient with educational handout regarding timed test. Yes    PIV placed in left AC and Iohexol administered over 2 minutes.  Positive blood return verified before and after injection. PIV removed.  20 gauge PIV placed in right AC prior to [facility] appt in [facility] lab for blood draws and CT this afternoon.    Medication administered:  Iohexol (Omnipaque 300mg iodine/ml concentration) 5 mls.    Start time: 0714  Stop time: 0716    Evaluation nurse in transplant to draw labs at 2 and 4 hours post iohexol administration.  Patient given a slip with the times to get labs drawn and verbalized understanding of the plan.    Patient tolerated the procedure:  Yes    After the infusion patient was discharged to the next appointment.    Shazia Erickson RN    Administrations This Visit       iohexol (OMNIPAQUE) 350 MG/ML injectable solution 4 mL       Admin Date  2024 Action  $Given Dose  4 mL Route  Intravenous Documented By  Shazia Erickson RN                Again, thank you for allowing me to participate in the care of your patient.        Sincerely,        Specialty Infusion Nurse

## 2024-05-03 NOTE — PROGRESS NOTES
Donor Iohexol test    Christina Santana presents today to [facility] for a Donor Iohexol test.      Progress note:  ID verified by name and .     The following information was verified with the patient:  Female Patients is there any possibility of being pregnant No  Is there a history of allergy (skin rash, swelling, ect) to:   A.  Iodine (except skin reactions to betadine): No   B. Intravenous radio-contrast agents: No   C. Seafood No     present during visit today: Not Applicable.    R.N. provided patient with educational handout regarding timed test. Yes    PIV placed in left AC and Iohexol administered over 2 minutes.  Positive blood return verified before and after injection. PIV removed.  20 gauge PIV placed in right AC prior to [facility] appt in [facility] lab for blood draws and CT this afternoon.    Medication administered:  Iohexol (Omnipaque 300mg iodine/ml concentration) 5 mls.    Start time: 0714  Stop time: 0716    Evaluation nurse in transplant to draw labs at 2 and 4 hours post iohexol administration.  Patient given a slip with the times to get labs drawn and verbalized understanding of the plan.    Patient tolerated the procedure:  Yes    After the infusion patient was discharged to the next appointment.    Shazia Erickson RN    Administrations This Visit       iohexol (OMNIPAQUE) 350 MG/ML injectable solution 4 mL       Admin Date  2024 Action  $Given Dose  4 mL Route  Intravenous Documented By  Shazia Erickson, RN

## 2024-05-03 NOTE — PROGRESS NOTES
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    PERSONAL BACKGROUND:  Current Living Situation: Christina lives with her  and 6 children ages 22, 21, 17, 14, 11 and 8.     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 She states she has no logistical or financial concerns about taking time way from work for donation. SW provided information and explained Donor Shield reimbursement program.     Health Insurance Status: yes, through employer    Family History: Christina states her father is living, mother  and has one brother and one sister.    General Health: Christina describes her overall health as good, does not have any chronic health conditions she is managing. States she's had a hysterectomy in the past.    Mental Health: Christina states she feels she experiences seasonal depression and that it does not impact her daily functioning and has never sought treatment for it. The donor denies any past or present treatment for mental health issues, such as anxiety, depression, bipolar disorder, or disorders of thought such as schizophrenia or schizoaffective disorder.  There is no history of personality disorder or eating disorders.  The donor denies any need to see a counselor or therapist at this time.  The donor denies any past suicidal ideation, plans, or past attempts.  The donor denies any use of psychotropic medications at this time or in the past.  The donor denies any past history of hospitalization for psychiatric illnesses.  The donor denies any past history of ADHD or ADD.  The donor denies any  "history of educational issues or need for special educational services in their past history.     Alcohol and Drug Use/Abuse/Dependency: Christina reports that she consumes approximately 2 servings of alcohol per MONTH ( a serving is defined here as one, 12 oz beer, or one 4 oz glass of wine, or one 1 /2 oz of hard liquor).  The donor denies any past history of abuse or dependency on alcohol or illicit drugs. The donor denies any current use of street drugs, including marijuana, vaping, edible marijuana, or other mood altering substances.  The donor denies any past history of negative consequences of use of alcohol or drugs such as a DUI, relationship problems, problems with fulfilling parenting or other care giving responsibilities, or problems with work performance.         Cigarette Use: none    Legal: none    Coping with major surgery/associated stress: support from friends and family    Support System:  and father.    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 She states they've become friends while she worked at the clinic he visits and she continues to work there and he continues to be a client. Christina states she \"knows not to look in his chart now\" and when she does his vitals, \"another RN charts the vital information\".     Decision Process/Motivation to Donate: Christina states her grandfather whom she was very close to, had donated a kidney, she has also been a blood donor in the past. She states she was meeting with Andres at one of his clinic appointments and he shared he recently found out that he's going to be needing a kidney and has put the word out to family and friends should they want to donate. Christina states she then offered to see if she could donate as they have \"become friends\" at this point. Christina states that she wants to know the person she donates " "to, to be sure they \"really need it and are grateful\".    High risk behaviors as defined by [place] Public Health Services (PHS) that have potential to increase risk of disease transmission were reviewed and no risk identified.   Living Donor Risk Criteria     Person who has had sex (including vaginal, anal, and oral) with a person known or suspected to have HIV, HBV, or HCV infections in the preceding 30 days. No    Men who have had sex with men in the preceding 30 days  No    Person who has had sex in exchange for money or drugs in the preceding 30 days No    Person who has had sex with a person who has sex in exchange for money or drugs in the preceding 30 days No    Person who has injected drugs for non-medical reasons in the preceding 30 days No    Person who has had sex with a person that has injected drugs for non-medical reasons in the preceding 30 days No    Person who has been incarcerated (confined in shelter, halfway, or juvenile correctional facility) for greater than/equal to 72 consecutive hours in the preceding 30 days No    Child who has been  within the preceding 30 days by a mother with HIV infection N/A    Child born in the preceding 30 days to a mother known to be infected with HIV, HBV, or HCV N/A    Person with an unknown medical or social history for the preceding 30 days (a Donor Risk Assessment Interview - DRAI - cannot be obtained or risk factors cannot be determined) No        PREPARATION FOR DONATION, RECOVERY, AND POTENTIAL SHORT-LONG-TERM OUTCOMES:  Understanding of the Living Donation Process:  We discussed the role of living donor .  Short and long term medical and psychosocial risks to both, donor and recipient were reviewed and she expressed understanding.  Post surgical restrictions (2 weeks no driving, 6 weeks no lifting over 10 lbs) were reviewed and she appears capable of adhering to the post surgical requirements. The need for a caregiver was discussed and she has " appropriate caregiver support .  The risk of poor psychosocial outcome including problems with body image, post-surgery depression or anxiety, or feelings of emotional distress or bereavement if recipient experiences any recurrent disease, poor outcome or death was reviewed.  Additionally, potential financial implications, including the risk of having difficulty obtaining health care insurance, life insurance, disability insurance, or long term care insurance were reviewed, as were available donor grants to assist with donor related expenses.      We also discussed some unique issues that arise with paired kidney donation, which include the uncertainty of the timing and the importance of having a employment situation and support system that is able to provide sustained support and flexibility.    Christina appears capable of understanding this information and making an informed medical decision.    Impressions/Recommendations:   Christina is highly motivated to donate kidney to [facility]. Her decision to donate is free of inducement, coercion, or other undue pressure. Her housing, finances and employment are stable.  No current/active mental health or chemical abuse issues were identified.  The need for a caregiver was reviewed and she is able to identify a plan to meet her post operative care needs. She appears capable of making an informed medical decision.    SW does not recommend Christina to move forward as a donor until the relationship between she and intended donor be addressed. The potential boundary issues are of concern with recipient continuing to be a patient where she works along with them developing a friendship in this setting. It would be necessary for Christina to speak with her supervisor about her possibly donating a kidney to him, set boundaries for their clinic setting and discuss any conflict of interest. I also recommend that Christina and recipient stop the patient/provider relationship they have.      Contact  Information:   JOSEPH Mohr, [facility]  Living Donor   Phone: [phone]  Pager: [phone]  [email or web address]      Time Spent: 35 minutes

## 2024-05-03 NOTE — LETTER
5/3/2024         RE: Christina Santana  [address]  [facility] [address]        Dear Colleague,    Thank you for referring your patient, hCristina Santana, to the [facility]. Please see a copy of my visit note below.    Transplant Surgery Consult Note    Medical record number: [id number]  YOB: 1980,   Consult requested by the patient for evaluation of kidney donation candidacy.    Assessment and Recommendations: Ms. Santana appears to be a good candidate for kidney donation at this point in the evaluation. The following issues will need to be addressed prior to formal review:    Iohexol ordered for today for assessment of adequate kidney function for donation. Will be reviewed when resulted  Donor labs ordered for today and reviewed to include: CBC, CMP, Mg, PO4, hemoglobin A1c, lipid panel, blood type x 2, hemoglobin A1c, UA with microscopic, urine culture, urine protein/cr, INR/PTT, Quantiferon gold, hepatitis C (antibody), hepatitis B panel, HIV, treponemia, West Nile (antibody panel), CMV (antibody panel), EBV (antibody panel), pregnancy screen (for females of childbearing age), PSA (males >50yrs), HLA tissue typing, buccal swab for eplet typing  Social work consult ordered  Dietician consult ordered  Transplant nephrology consult ordered  Transplant coordinator consult ordered  SUSANA (independent living donor advovate) consult ordered  EKG ordered for today and will be reviewed when resulted. Suitable to proceed today with this testing today:  Yes   Chest x-ray ordered for today and will be reviewed when resulted. Suitable to proceed with this testing today:  Yes   CT angio of abdomen and pelvis for anatomical assessment. Images and report to be reviewed when resulted.  Suitable to proceed with this testing today:  Yes  After review of the above, additional testing/concerns include:      Discuss implications of Reed syndrome and likelihood of malignancy.    Recommend  but not require weight loss    The majority of our visit today was spent in counseling regarding the medical and surgical risks of kidney donation, the typical pasquale-and post-operative experience and recovery/return to work pattern.  Surgical risks may be transient or permanent and can include but not limited to decreased kidney function or acute kidney failure and the need for dialysis or kidney transplant for the living donor in the immediate post-operative period. We also talked about post-op visits and longer term health care maintenance, as well as the implications of having one remaining kidney. This discussion included, but was not limited to rates of complications such as bleeding, infection, need for transfusion, reoperation, other organ injury, future bowel obstruction, incisional hernia, port site pain, varicocele, venous thrombosis, pulmonary embolism, renal failure, and death (3 per 10,000). The patient understands that if end stage renal failure happens that dialysis or transplant would be required. For female donor of child bearing age, the risks of preeclampsi or gestational hypertension during pregnancies after donation were discussed .  At the conclusion of the visit, all questions had been answered and Ms. Santana's candidacy for donation will be reviewed at our Multidisciplinary Donor Selection Committee. She will stay in contact with the nurse coordinator with any concerns.      Total time: 60 minutes        Jemal Dsouza MD  Professor of Surgery  Kidney/Pancreas Transplantation  ---------------------------------------------------------------------------------------------------    HPI: Christina Santana is a 44 year old year old female who presents for a kidney donor evaluation.  Patient would like to donate to a friend.    Patient has Reed sydrome with annual colonoscopy.    Lap LISY BSO for Reed and cysts    Appy    Lap paraesophageal hernia.          Personal history of:   No    Yes  Cancer:     [x]      []             Comment:     Diabetes   [x]      []  Comment:    Thombosis   [x]      []  Comment:       Hepatitis   [x]      []  Comment:    Tuberculosis   [x]      []  Comment:   Back or neck pain:  [x]      []  Comment:      Kidney stones   [x]      []  Comment:                  Kidney infections  [x]      []  Comment:           Urinary retention  [x]      []            Comment:   Regular NSAID use:  [x]      []            Comment:      Constipation:   [x]      []            Comment:      Anabaptist  []      []            Comment:      Other:    []      []            Comment:         Past Medical History:   Diagnosis Date     ASCUS with positive high risk HPV 12/27/2013     HSIL (high grade squamous intraepithelial lesion) on Pap smear of cervix 04/22/2013     Pap smear of cervix with ASCUS, cannot exclude HGSIL 5/23/11, 8/10/12,      PMS2-related Reed syndrome (HNPCC4)      Past Surgical History:   Procedure Laterality Date     APPENDECTOMY       AS REPAIR PARAESOPHAGEAL HIATAL HERNIA,LAPAROTOMY, W MESH       BIOPSY VULVA N/A 12/10/2021    Procedure: Vaginal cuff biopsy;  Surgeon: Leyda Guzman DO;  Location: [facility]     COLONOSCOPY N/A 6/10/2021    Procedure: COLONOSCOPY, WITH POLYPECTOMY;  Surgeon: Willi Galeas DO;  Location: [facility]     COLONOSCOPY N/A 12/1/2022    Procedure: COLONOSCOPY;  Surgeon: Willi Galeas DO;  Location: [facility]     COLONOSCOPY N/A 12/4/2023    Procedure: Colonoscopy;  Surgeon: Willi Galeas DO;  Location: [facility]     ESOPHAGOSCOPY, GASTROSCOPY, DUODENOSCOPY (EGD), COMBINED N/A 12/1/2022    Procedure: ESOPHAGOGASTRODUODENOSCOPY (EGD) with biopsy;  Surgeon: Willi Galeas DO;  Location: [facility]     EXAM UNDER ANESTHESIA PELVIC N/A 12/10/2021    Procedure: EXAM UNDER ANESTHESIA, PELVIS;  Surgeon: Leyda Guzman DO;  Location: [facility]     LAPAROSCOPIC HYSTERECTOMY TOTAL, BILATERAL SALPINGO-OOPHORECTOMY, COMBINED N/A 2/24/2021     Procedure: HYSTERECTOMY, TOTAL, LAPAROSCOPIC, WITH SALPINGO-OOPHORECTOMY;  Surgeon: Dylon Alcantar MD;  Location: [facility]     Family History   Problem Relation Age of Onset     Fibroids Mother      Reed Syndrome Mother         PMS2+     Colon Polyps Mother         removed 5 feet of colon for unmanageable polyps;  in her sleep at 67, took a nape and never woke up,     Reed Syndrome Brother         PMS2+     Bladder Cancer Maternal Grandfather 70        lived to be 94     Kidney Cancer Maternal Grandfather      Breast Cancer Paternal Grandmother         postmenopausal     Ovarian Cancer Maternal Aunt 40        possible uterine cancer also; hx of fibroids     Ovarian Cancer Maternal Aunt 40        possible uterine cancer also; hx of fibroids     Breast Cancer Paternal Aunt 54     Social History     Socioeconomic History     Marital status:      Spouse name: Thai     Number of children: 6     Years of education: Not on file     Highest education level: Not on file   Occupational History     Occupation: Working on CNA training     Employer: [employer]     Comment: Works in several roles: security, 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 2 a month     Drug use: No     Sexual activity: Yes     Partners: Male     Birth control/protection: Female Surgical   Other Topics Concern     Parent/sibling w/ CABG, MI or angioplasty before 65F 55M? Not Asked   Social History Narrative     Not on file     Social Determinants of Health     Financial Resource Strain: High Risk (2022)    [facility] from [facility], [facility]    Financial Resource Strain      Difficulty of Paying Living Expenses: Not on file      Difficulty of Paying Living Expenses:  Not on file   Food Insecurity: Not on file   Transportation Needs: Not on file   Physical Activity: Not on file   Stress: Not on file   Social Connections: Unknown (1/1/2022)    [facility], [facility]    Social Connections      Frequency of Communication with Friends and Family: Not on file   Interpersonal Safety: Not on file   Housing Stability: Not on file       ROS:   CONSTITUTIONAL:  No fevers or chills  EYES: negative for icterus  ENT:  negative for hearing loss, tinnitus and sore throat  RESPIRATORY:  negative for cough, sputum, dyspnea  CARDIOVASCULAR:  negative for chest pain  GASTROINTESTINAL:  negative for nausea, vomiting, diarrhea or constipation  GENITOURINARY:  negative for incontinence, dysuria, bladder emptying problems  HEME:  No easy bruising  INTEGUMENT:  negative for rash and pruritus  NEURO:  Negative for headache, seizure disorder    Allergies:   Allergies   Allergen Reactions     Latex Headache     Other Drug Allergy (See Comments) Hives     Possible Lidocaine family allergy (noticed during procedure--numbing-type medication) HIVES       Medications:  Prescription Medications as of 5/3/2024         Rx Number Disp Refills Start End Last Dispensed Date Next Fill Date Owning Pharmacy    PROGESTERONE 100MG/G CREAM  -- -- 2/28/2023 --       Sig: Apply 0.5 g topically 2 times daily    Class: Historical    Route: Topical          Clinic-Administered Medications as of 5/3/2024         Dose Frequency Start End    iohexol (OMNIPAQUE) 350 MG/ML injectable solution 4 mL (Completed) 4 mL ONCE 5/3/2024 5/3/2024    Route: Intravenous            Exam:   Temp:  [97.4  F (36.3  C)-97.6  F (36.4  C)] 97.4  F (36.3  C)  Pulse:  [65-71] 71  Resp:  [16] 16  BP: (118-127)/(85-87) 119/86  SpO2:  [98 %-99 %] 98 %  Body mass index is 31.49 kg/m .  Temp:  [97.4  F (36.3  C)-97.6  F (36.4  C)] 97.4  F (36.3  C)  Pulse:  [65-71] 71  Resp:   [16] 16  BP: (118-127)/(85-87) 119/86  SpO2:  [98 %-99 %] 98 %  Appearance: in no apparent distress.   Skin: normal  Head and Neck: Normal, no rashes or jaundice  Respiratory: normal respiratory excursions, no audible wheeze  Cardiovascular: RRR  Abdomen: rounded, No distention   Extremeties: Edema, none  Neuro: grossly normal       Diagnostics:   Recent Results (from the past 336 hour(s))   Protein  random urine    Collection Time: 05/03/24  6:35 AM   Result Value Ref Range    Total Protein Urine mg/dL 11.6   mg/dL    Total Protein Urine mg/mg Creat 0.06 0.00 - 0.20 mg/mg Cr    Creatinine Urine mg/dL 186.0 mg/dL   Routine UA with microscopic    Collection Time: 05/03/24  6:35 AM   Result Value Ref Range    Color Urine Yellow Colorless, Straw, Light Yellow, Yellow    Appearance Urine Clear Clear    Glucose Urine Negative Negative mg/dL    Bilirubin Urine Negative Negative    Ketones Urine Negative Negative mg/dL    Specific Gravity Urine 1.030 1.003 - 1.035    Blood Urine Negative Negative    pH Urine 7.0 5.0 - 7.0    Protein Albumin Urine Negative Negative mg/dL    Urobilinogen Urine Normal Normal, 2.0 mg/dL    Nitrite Urine Negative Negative    Leukocyte Esterase Urine Negative Negative    Mucus Urine Present (A) None Seen /LPF    RBC Urine 1 <=2 /HPF    WBC Urine 1 <=5 /HPF    Squamous Epithelials Urine 1 <=1 /HPF   CBC with platelets    Collection Time: 05/03/24  6:35 AM   Result Value Ref Range    WBC Count 5.6 4.0 - 11.0 10e3/uL    RBC Count 5.20 3.80 - 5.20 10e6/uL    Hemoglobin 15.5 11.7 - 15.7 g/dL    Hematocrit 47.3 (H) 35.0 - 47.0 %    MCV 91 78 - 100 fL    MCH 29.8 26.5 - 33.0 pg    MCHC 32.8 31.5 - 36.5 g/dL    RDW 11.9 10.0 - 15.0 %    Platelet Count 225 150 - 450 10e3/uL   Partial thromboplastin time    Collection Time: 05/03/24  6:35 AM   Result Value Ref Range    aPTT 27 22 - 38 Seconds   INR    Collection Time: 05/03/24  6:35 AM   Result Value Ref Range    INR 0.95 0.85 - 1.15   Phosphorus     Collection Time: 05/03/24  6:35 AM   Result Value Ref Range    Phosphorus 3.1 2.5 - 4.5 mg/dL   Uric acid    Collection Time: 05/03/24  6:35 AM   Result Value Ref Range    Uric Acid 4.4 2.4 - 5.7 mg/dL   Lipid Profile    Collection Time: 05/03/24  6:35 AM   Result Value Ref Range    Cholesterol 221 (H) <200 mg/dL    Triglycerides 134 <150 mg/dL    Direct Measure HDL 73 >=50 mg/dL    LDL Cholesterol Calculated 121 (H) <=100 mg/dL    Non HDL Cholesterol 148 (H) <130 mg/dL    Patient Fasting > 8hrs? Yes    Comprehensive metabolic panel    Collection Time: 05/03/24  6:35 AM   Result Value Ref Range    Sodium 141 135 - 145 mmol/L    Potassium 3.7 3.4 - 5.3 mmol/L    Carbon Dioxide (CO2) 26 22 - 29 mmol/L    Anion Gap 12 7 - 15 mmol/L    Urea Nitrogen 17.5 6.0 - 20.0 mg/dL    Creatinine 0.73 0.51 - 0.95 mg/dL    GFR Estimate >90 >60 mL/min/1.73m2    Calcium 9.4 8.6 - 10.0 mg/dL    Chloride 103 98 - 107 mmol/L    Glucose 96 70 - 99 mg/dL    Alkaline Phosphatase 75 40 - 150 U/L    AST 23 0 - 45 U/L    ALT 25 0 - 50 U/L    Protein Total 8.0 6.4 - 8.3 g/dL    Albumin 4.7 3.5 - 5.2 g/dL    Bilirubin Total 0.4 <=1.2 mg/dL         Again, thank you for allowing me to participate in the care of your patient.        Sincerely,        Jemal Dsouza MD

## 2024-05-03 NOTE — LETTER
5/3/2024         RE: Christina Santana  [address] [facility] [address]        Dear Colleague,    Thank you for referring your patient, Christina Santana, to the [facility]. Please see a copy of my visit note below.    TRANSPLANT NEPHROLOGY DONOR EVALUATION    Assessment and Plan:  # Prospective Kidney Transplant Donor: Patient with one issue that needs to be addressed prior to donation. Patient's blood pressure is acceptable at this visit, kidney function appears to be acceptable with Iohexol pending, and urinalysis is bland.   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 She understands the risk of development of malignancy and subsequent need for chemotherapy that could cause JB vs worsening CKD. She is up to date with all of her testing and has been proactive to avoid any cancers (LISY/BSO in 2021 after diagnosis).     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 She has mitigated her risk for gynecoloical cancers by having LISY BSO in 2021 with all pathology benign   -She has had EGDs that have been benign, most recently 4/2024   -Pt with 8.7-20% risk of development of colorectal CA   -She has a family history of bladder cancer but no gastric, pancreatic, or small bowel cancers in her family.    -Colonoscopy negative 12/2023, repeat 1 year    ite  Estimated Average Age of Presentation for PMS2+ carriers Cumulative Risk for Diagnosis Through Age 80 Cumulative Risk for Diagnosis Through Lifetime for people in the general population      Colorectal      61-66 years     8.7-20%     4.2%   Endometrial  49-50 years      13-26%     3.1%      Ovarian      51-59 years       3%     1.3%      Renal pelvis and/or ureter      No data    <1 % -3.7%    Less than 1%      Bladder      71 years  <1%-2.4 2.4%   Gastric  inadequate date  Inadequate data 0.9%   Small bowel  Single case - 70 years     0.1% -0.3%    0.3%      Pancreas     No data     <1%--1.6%     1.6%      Biliary tract     No data    0.2-<1%     0.2%      Prostate      No data     4.6%-11.6%     11.6%   Breast (female)     No data  8.1-12.8%     12.8%      Brain     40 years     0.6%-<1%     0.6%            Individualized Surveillance Plan for Reed Syndrome   (MSH6+ and PMS2+ mutation carriers)   Based on NCCN Guidelines Version 2.2022   Type of Screening Recommendation Last Done Next Due   Colon Cancer Screening Colonoscopy at age 30-35 years or 2-5 years prior to the earliest colon cancer in the family if it is diagnosed prior to age 30.     Repeat every 1-2 years.     12/2022 normal    December 2023   Endometrial and Ovarian Cancer Consider Prophylactic hysterectomy and bilateral salpingo-oophorectomy (BSO) can be considered by women who have completed childbearing.     Insufficient evidence exists to make specific recommendation for RRSO in MSH6+ and PMS2+ carriers.       Surgery complete       NA     Screening using  endometrial sampling is an option every 1-2 years; women should be aware that dysfunctional uterine bleeding warrants evaluation.     Data do not support ovarian cancer screening for Reed Syndrome. Annual transvaginal ultrasound and  tests may be considered at a clinician's discretion.       NA       NA   Gastric and small bowel cancer Upper GI screening every 2-4 years, starting at age 30 for MSH6+ carriers     PMS2+ carriers - Selected individuals or families or those of  descent may consider EGD with extended duodenoscopy every 3-5 years beginning at age 40.    2022    Repeat if symptomatic or family history changes   Urothelial cancer Consider annual urinalysis at age 30-35 in MSH6+ families with family history of urothelial cancers 2022     Due now due to family history of bladder cancer Needs lab appt soon and then repeat in one year   Pancreatic screening for MSH6+ with 1st or 2nd degree relatives with pancreatic cancer on Reed side of family Annual contrast-enhanced  MRI/MRCP and/or EUS, with consideration of shorter screening intervals for individuals found to have worrisome abnormalities on screening.      Most small cystic lesions found on screening will not warrant biopsy, surgical resection, or any other intervention.       No family history of pancreatic cancer       Review in future   Prostate Screening  Annual PSA test with general population screening; may begin earlier if younger prostate cancers in the family NA NA   Central Nervous System cancer Annual physical/neurological examinations starting at age 25-30. 7/27/2023 July 2024     # History of HSIL and BREEZY III:   -history of LISY/BSO 2021 with benign pathology.    -She has had NIL pap and vaginal cuff biopsies since LISY.    -Most recently NIL pap with +HR HPV 9/2023. Colposscopy negative 11'/2023. Plan for repeat pap in 1 year    # Prediabetes:   -A1c 5.7% today. Mother with history of DM2. In 11/2022 her A1c was 5.4%   -Fasting BG 96. In 2/21/24 was 101 but per patient was not fasting.     # Hip pain:   -Concern from orthopaedic surgery for imingement. Plan for MRI arthrogram      # Cardiac risk:   -Low. EKG only    Discussed the risks of donating a kidney, including the surgical risk and the possible risks of living with one kidney.    Education about expected post-donation kidney function and how chronic kidney disease (CKD) and end stage kidney disease (ESKD) might potentially impact the donor in the future, include, but not limited to:       - On average, donors will have 25-35% permanent loss of kidney function at donation.       - Baseline risk of ESKD may slightly exceed that of members of the general        population with the same demographic profile.       - Donor risks must be interpreted in light of known epidemiology of both CKD or         ESKD, such as that CKD generally develops in midlife (40-50 years old) and ESKD         generally develops after age 60.       - Medical evaluation of young  potential donors cannot predict lifetime risk of CKD or         ESKD.       - Donors may be at higher risk for CKD if they sustain damage to the remaining         kidney.       - Development of CKD and progression of ESKD may be more rapid with only 1         kidney.       - Some type of kidney replacement therapy, either kidney transplant or dialysis, is         required when reaching ESKD.    Potential medical or surgical risks include, but not limited to:       - Death.       - Scars, pain, fatigue, and other consequences typical of any surgical procedure.       - Decreased kidney function.       - Abdominal or bowel symptoms, such as bloating and nausea, and developing         bowel obstruction.       - Kidney failure (ESKD) and the need for a kidney transplant or dialysis for the donor.       - Impact of obesity, hypertension, or other donor-specific medical conditions on         morbidity and mortality of the potential donor.    Patients overall evaluation will be discussed with the transplant team and a final recommendation on the patients' suitability to be a kidney transplant donor will be made at that time.    Consult:  Christina Santana was seen in consultation at the request of Dr. Jemal Dsouza for evaluation as a potential kidney transplant donor.    Reason for Visit:  Christina Santana is a 44 year old female who presents for a kidney donor evaluation.  Patient would like to donate to an acquaintance .    Present Condition and Donor-Related Medical History:    Ms. Santana is a 44yF w/ history of Nissen fundoplication 2018 with normal EGD 12/2022, upper GI series 2/23/24 was negative, repeat EGD 4/15/24 that showed a stenosis at site of previous Nissen s/p dilation, a single 12mm polyp at GE junction with biopsy showing polypoid foveolar hyperplasia (hyperplastic polyp), R carpal tunnel and R De Quervain's stenosing tenosynovisitis s/p release 11/2/23, PMS2+ associated Reed syndrome s/p LISY/BSO in 2021  (negative pathology) with family history of bladder CA presenting for evaluation to be considered as living kidney donor to an acquaintance w/ ESRD 2/2 light chain cast nephropathy.     The patient overall feels well. She denies any recent hospitalizations. She denies nausea, vomiting, diarrhea, fever, chills, shortness of breath, chest pain, LE edema, unintentional weight loss, nights sweats, dysuria, hematuria.            Kidney Disease Hx:       h/o Kidney Problems: No   Family h/o Genetic Kidney Disease: No       h/o Hypertension: No      Usual Blood Pressure:  120s systolic       h/o Protein in Urine: No    h/o Blood in Urine: No       h/o Kidney Stones: No     h/o Kidney Injury: No       h/o Recurrent UTI: No   h/o Genitourinary Problems: No       h/o Chronic NSAID Use: No         Other Medical Hx:       h/o Diabetes: No             h/o Gastrointestinal, Pancreas or Liver Problems: No       h/o Lung or Heart Problems: No       h/o Hematologic Problems: No  h/o Bleeding or Clotting Problems: No       h/o Cancer: No       h/o Infection Problems: No       H/o Gestational DM: No      H/o Gestational HTN: No     H/o Preeclampsia: No         Skin Cancer Risk:       h/o more than 50 moles: No       h/o extensive sun exposure: No       h/o melanoma: No       Family h/o melanoma: No         Mental Health Assessment:       h/o Depression: No       h/o Psychiatric Illness: No       h/o Suicidal Attempt(s): No    COVID Status:  Vaccination Up To Date: No  H/o COVID Infection: Yes     Review Of Systems:   A comprehensive review of systems was obtained and negative, except as noted in the HPI or PMH.    Past Medical History:   History was taken from the patient as noted below.  Past Medical History:   Diagnosis Date     ASCUS with positive high risk HPV 12/27/2013     HSIL (high grade squamous intraepithelial lesion) on Pap smear of cervix 04/22/2013     Pap smear of cervix with ASCUS, cannot exclude HGSIL 5/23/11, 8/10/12,       PMS2-related Reed syndrome (HNPCC4)        Past Social History:   Past Surgical History:   Procedure Laterality Date     APPENDECTOMY       AS REPAIR PARAESOPHAGEAL HIATAL HERNIA,LAPAROTOMY, W MESH       BIOPSY VULVA N/A 12/10/2021    Procedure: Vaginal cuff biopsy;  Surgeon: Leyda Guzman DO;  Location: [facility]     COLONOSCOPY N/A 6/10/2021    Procedure: COLONOSCOPY, WITH POLYPECTOMY;  Surgeon: Willi Galeas DO;  Location: [facility]     COLONOSCOPY N/A 2022    Procedure: COLONOSCOPY;  Surgeon: Willi Galeas DO;  Location: [facility]     COLONOSCOPY N/A 2023    Procedure: Colonoscopy;  Surgeon: Willi Galeas DO;  Location: [facility]     ESOPHAGOSCOPY, GASTROSCOPY, DUODENOSCOPY (EGD), COMBINED N/A 2022    Procedure: ESOPHAGOGASTRODUODENOSCOPY (EGD) with biopsy;  Surgeon: Willi Galeas DO;  Location: [facility]     EXAM UNDER ANESTHESIA PELVIC N/A 12/10/2021    Procedure: EXAM UNDER ANESTHESIA, PELVIS;  Surgeon: Leyda Guzman DO;  Location: [facility]     LAPAROSCOPIC HYSTERECTOMY TOTAL, BILATERAL SALPINGO-OOPHORECTOMY, COMBINED N/A 2021    Procedure: HYSTERECTOMY, TOTAL, LAPAROSCOPIC, WITH SALPINGO-OOPHORECTOMY;  Surgeon: Dylon Alcantar MD;  Location: [facility]     Personal or family history of anesthesia problems: Yes: possible allergy to lidocaine    Family History:   Family History   Problem Relation Age of Onset     Fibroids Mother      Reed Syndrome Mother         PMS2+     Colon Polyps Mother         removed 5 feet of colon for unmanageable polyps;  in her sleep at 67, took a nape and never woke up,     Reed Syndrome Brother         PMS2+     Bladder Cancer Maternal Grandfather 70        lived to be 94     Kidney Cancer Maternal Grandfather      Breast Cancer Paternal Grandmother         postmenopausal     Ovarian Cancer Maternal Aunt 40        possible uterine cancer also; hx of fibroids     Ovarian Cancer Maternal Aunt 40        possible uterine  cancer also; hx of fibroids     Breast Cancer Paternal Aunt 54          Specific Family History in First Degree Relatives:       FH of Kidney Dz: No  FH of Diabetes: Yes        FH of Hypertension: Yes, maternal grandfather  FH of CAD: No       FH of Cancer: Yes   FH of Kidney Cancer: Yes     Personal History:   Social History     Socioeconomic History     Marital status:      Spouse name: Thai     Number of children: 6     Years of education: Not on file     Highest education level: Not on file   Occupational History     Occupation: Working on CNA training     Employer: [employer]     Comment: Works in several roles: security, 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 Not Asked   Social History Narrative     Not on file     Social Determinants of Health     Financial Resource Strain: High Risk (1/1/2022)    [facility] [employer] [facility]    Financial Resource Strain      Difficulty of Paying Living Expenses: Not on file      Difficulty of Paying Living Expenses: Not on file   Food Insecurity: Not on file   Transportation Needs: Not on file   Physical Activity: Not on file   Stress: Not on file   Social Connections: Unknown (1/1/2022)    [facility] [employer] [facility]    Social Connections      Frequency of Communication with Friends and Family: Not on file   Interpersonal Safety: Not on file   Housing Stability: Not on file          Specific Social History:       Health Insurance  "Status: Yes       Employment Status: Part time  Occupation: Nursing Assistant                       Living Arrangements: Lives at home with  and 4 children.        Social Support: Yes       Presence of increased risk for disease transmission behaviors as defined by PHS guidelines: No        Allergies:  Allergies   Allergen Reactions     Latex Headache       Medications:  Current Outpatient Medications   Medication Sig Dispense Refill     cyclobenzaprine (FLEXERIL) 10 MG tablet Take 0.5-1 tablets (5-10 mg) by mouth 3 times daily as needed for muscle spasms 15 tablet 0     HYDROcodone-acetaminophen (NORCO) 5-325 MG tablet Take 1-2 tablets by mouth (Patient not taking: Reported on 11/8/2023)       methylPREDNISolone (MEDROL DOSEPAK) 4 MG tablet therapy pack Follow Package Directions 21 tablet 0     predniSONE (DELTASONE) 20 MG tablet Take two tablets (= 40mg) each day for 5 (five) days (Patient not taking: Reported on 11/8/2023) 10 tablet 0     PROGESTERONE 100MG/G CREAM Apply 0.5 g topically 2 times daily       No current facility-administered medications for this visit.     There are no discontinued medications.      Vitals:      12/4/2023     1:42 PM 1/21/2024     7:13 PM 4/5/2024    10:16 AM   Vital Signs   Systolic 117 132 128   Diastolic 77 92 88   Pulse 78 81 94   Temperature  97.8  F (36.6  C) 98.1  F (36.7  C)   Respirations 16 16    Weight (LB)  191 lb    Height  5' 6\"    BMI (Calculated)  30.83    Pain Score   5 (Moderate)   O2 96 % 100 %        Exam:   GENERAL APPEARANCE: alert and no distress  HENT: mouth without ulcers or lesions  RESP: lungs clear to auscultation - no rales, rhonchi or wheezes  CV: regular rhythm, normal rate, no rub, no murmur  EDEMA: no LE edema bilaterally  ABDOMEN: soft, nondistended, nontender, bowel sounds normal  MS: extremities normal - no gross deformities noted, no evidence of inflammation in joints, no muscle tenderness  SKIN: no rash  NEURO: normal strength and tone, " sensory exam grossly normal, mentation intact and speech normal  PSYCH: mentation appears normal and affect normal/bright    Results:   Labs and imaging were ordered for this visit and reviewed by me.  No results found for this or any previous visit (from the past 336 hour(s)).          Again, thank you for allowing me to participate in the care of your patient.        Sincerely,        Daniel Tilley MD

## 2024-05-03 NOTE — NURSING NOTE
Chief Complaint   Patient presents with    Labs Only     Labs drawn via PIV by RN in lab     Verified with Gurvinder SANCHES RN, that ABO/Rh Type and Screen was the only lab needing to be collected at this time.    Labs drawn from PIV placed at previous lab appointment this morning. Line flushed with saline. Verified with patient that she attended all other afternoon appointments before removing PIV.    Julia De Oliveira RN

## 2024-05-03 NOTE — PROGRESS NOTES
TRANSPLANT NEPHROLOGY DONOR EVALUATION    Assessment and Plan:  # Prospective Kidney Transplant Donor: Patient with one issue that needs to be addressed prior to donation. Patient's blood pressure is acceptable at this visit, kidney function appears to be acceptable with Iohexol pending, and urinalysis is bland.   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 She understands the risk of development of malignancy and subsequent need for chemotherapy that could cause JB vs worsening CKD. She is up to date with all of her testing and has been proactive to avoid any cancers (LISY/BSO in 2021 after diagnosis).     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 She has mitigated her risk for gynecoloical cancers by having LISY BSO in 2021 with all pathology benign   -She has had EGDs that have been benign, most recently 4/2024   -Pt with 8.7-20% risk of development of colorectal CA   -She has a family history of bladder cancer but no gastric, pancreatic, or small bowel cancers in her family.    -Colonoscopy negative 12/2023, repeat 1 year    ite  Estimated Average Age of Presentation for PMS2+ carriers Cumulative Risk for Diagnosis Through Age 80 Cumulative Risk for Diagnosis Through Lifetime for people in the general population      Colorectal      61-66 years     8.7-20%     4.2%   Endometrial  49-50 years      13-26%     3.1%      Ovarian      51-59 years       3%     1.3%      Renal pelvis and/or ureter      No data    <1 % -3.7%    Less than 1%      Bladder      71 years  <1%-2.4 2.4%   Gastric  inadequate date  Inadequate data 0.9%   Small bowel  Single case - 70 years     0.1% -0.3%    0.3%      Pancreas     No data     <1%--1.6%    1.6%      Biliary tract     No data    0.2-<1%     0.2%      Prostate      No data     4.6%-11.6%     11.6%   Breast (female)     No data  8.1-12.8%     12.8%      Brain     40 years     0.6%-<1%     0.6%            Individualized Surveillance Plan  for Reed Syndrome   (MSH6+ and PMS2+ mutation carriers)   Based on NCCN Guidelines Version 2.2022   Type of Screening Recommendation Last Done Next Due   Colon Cancer Screening Colonoscopy at age 30-35 years or 2-5 years prior to the earliest colon cancer in the family if it is diagnosed prior to age 30.     Repeat every 1-2 years.     12/2022 normal    December 2023   Endometrial and Ovarian Cancer Consider Prophylactic hysterectomy and bilateral salpingo-oophorectomy (BSO) can be considered by women who have completed childbearing.     Insufficient evidence exists to make specific recommendation for RRSO in MSH6+ and PMS2+ carriers.       Surgery complete       NA     Screening using  endometrial sampling is an option every 1-2 years; women should be aware that dysfunctional uterine bleeding warrants evaluation.     Data do not support ovarian cancer screening for Reed Syndrome. Annual transvaginal ultrasound and  tests may be considered at a clinician's discretion.       NA       NA   Gastric and small bowel cancer Upper GI screening every 2-4 years, starting at age 30 for MSH6+ carriers     PMS2+ carriers - Selected individuals or families or those of  descent may consider EGD with extended duodenoscopy every 3-5 years beginning at age 40.    2022    Repeat if symptomatic or family history changes   Urothelial cancer Consider annual urinalysis at age 30-35 in MSH6+ families with family history of urothelial cancers 2022     Due now due to family history of bladder cancer Needs lab appt soon and then repeat in one year   Pancreatic screening for MSH6+ with 1st or 2nd degree relatives with pancreatic cancer on Reed side of family Annual contrast-enhanced MRI/MRCP and/or EUS, with consideration of shorter screening intervals for individuals found to have worrisome abnormalities on screening.      Most small cystic lesions found on screening will not warrant biopsy, surgical resection, or any other  intervention.       No family history of pancreatic cancer       Review in future   Prostate Screening  Annual PSA test with general population screening; may begin earlier if younger prostate cancers in the family NA NA   Central Nervous System cancer Annual physical/neurological examinations starting at age 25-30. 7/27/2023 July 2024     # History of HSIL and BREEZY III:   -history of LISY/BSO 2021 with benign pathology.    -She has had NIL pap and vaginal cuff biopsies since LISY.    -Most recently NIL pap with +HR HPV 9/2023. Colposscopy negative 11'/2023. Plan for repeat pap in 1 year    # Prediabetes:   -A1c 5.7% today. Mother with history of DM2. In 11/2022 her A1c was 5.4%   -Fasting BG 96. In 2/21/24 was 101 but per patient was not fasting.     # Hip pain:   -Concern from orthopaedic surgery for imingement. Plan for MRI arthrogram      # Cardiac risk:   -Low. EKG only    Discussed the risks of donating a kidney, including the surgical risk and the possible risks of living with one kidney.    Education about expected post-donation kidney function and how chronic kidney disease (CKD) and end stage kidney disease (ESKD) might potentially impact the donor in the future, include, but not limited to:       - On average, donors will have 25-35% permanent loss of kidney function at donation.       - Baseline risk of ESKD may slightly exceed that of members of the general        population with the same demographic profile.       - Donor risks must be interpreted in light of known epidemiology of both CKD or         ESKD, such as that CKD generally develops in midlife (40-50 years old) and ESKD         generally develops after age 60.       - Medical evaluation of young potential donors cannot predict lifetime risk of CKD or         ESKD.       - Donors may be at higher risk for CKD if they sustain damage to the remaining         kidney.       - Development of CKD and progression of ESKD may be more rapid with only 1          kidney.       - Some type of kidney replacement therapy, either kidney transplant or dialysis, is         required when reaching ESKD.    Potential medical or surgical risks include, but not limited to:       - Death.       - Scars, pain, fatigue, and other consequences typical of any surgical procedure.       - Decreased kidney function.       - Abdominal or bowel symptoms, such as bloating and nausea, and developing         bowel obstruction.       - Kidney failure (ESKD) and the need for a kidney transplant or dialysis for the donor.       - Impact of obesity, hypertension, or other donor-specific medical conditions on         morbidity and mortality of the potential donor.    Patients overall evaluation will be discussed with the transplant team and a final recommendation on the patients' suitability to be a kidney transplant donor will be made at that time.    Consult:  Christina Santana was seen in consultation at the request of Dr. Jemal Dsouza for evaluation as a potential kidney transplant donor.    Reason for Visit:  Christina Santana is a 44 year old female who presents for a kidney donor evaluation.  Patient would like to donate to an acquaintance .    Present Condition and Donor-Related Medical History:    Ms. Santana is a 44yF w/ history of Nissen fundoplication 2018 with normal EGD 12/2022, upper GI series 2/23/24 was negative, repeat EGD 4/15/24 that showed a stenosis at site of previous Nissen s/p dilation, a single 12mm polyp at GE junction with biopsy showing polypoid foveolar hyperplasia (hyperplastic polyp), R carpal tunnel and R De Quervain's stenosing tenosynovisitis s/p release 11/2/23, PMS2+ associated Reed syndrome s/p LISY/BSO in 2021 (negative pathology) with family history of bladder CA presenting for evaluation to be considered as living kidney donor to an acquaintance w/ ESRD 2/2 light chain cast nephropathy.     The patient overall feels well. She denies any recent hospitalizations. She  denies nausea, vomiting, diarrhea, fever, chills, shortness of breath, chest pain, LE edema, unintentional weight loss, nights sweats, dysuria, hematuria.            Kidney Disease Hx:       h/o Kidney Problems: No   Family h/o Genetic Kidney Disease: No       h/o Hypertension: No      Usual Blood Pressure:  120s systolic       h/o Protein in Urine: No    h/o Blood in Urine: No       h/o Kidney Stones: No     h/o Kidney Injury: No       h/o Recurrent UTI: No   h/o Genitourinary Problems: No       h/o Chronic NSAID Use: No         Other Medical Hx:       h/o Diabetes: No             h/o Gastrointestinal, Pancreas or Liver Problems: No       h/o Lung or Heart Problems: No       h/o Hematologic Problems: No  h/o Bleeding or Clotting Problems: No       h/o Cancer: No       h/o Infection Problems: No       H/o Gestational DM: No      H/o Gestational HTN: No     H/o Preeclampsia: No         Skin Cancer Risk:       h/o more than 50 moles: No       h/o extensive sun exposure: No       h/o melanoma: No       Family h/o melanoma: No         Mental Health Assessment:       h/o Depression: No       h/o Psychiatric Illness: No       h/o Suicidal Attempt(s): No    COVID Status:  Vaccination Up To Date: No  H/o COVID Infection: Yes     Review Of Systems:   A comprehensive review of systems was obtained and negative, except as noted in the HPI or PMH.    Past Medical History:   History was taken from the patient as noted below.  Past Medical History:   Diagnosis Date    ASCUS with positive high risk HPV 12/27/2013    HSIL (high grade squamous intraepithelial lesion) on Pap smear of cervix 04/22/2013    Pap smear of cervix with ASCUS, cannot exclude HGSIL 5/23/11, 8/10/12,     PMS2-related Reed syndrome (HNPCC4)        Past Social History:   Past Surgical History:   Procedure Laterality Date    APPENDECTOMY      AS REPAIR PARAESOPHAGEAL HIATAL HERNIA,LAPAROTOMY, W MESH      BIOPSY VULVA N/A 12/10/2021    Procedure: Vaginal cuff  biopsy;  Surgeon: Leyda Guzman DO;  Location: [facility]    COLONOSCOPY N/A 6/10/2021    Procedure: COLONOSCOPY, WITH POLYPECTOMY;  Surgeon: Willi Galeas DO;  Location: [facility]    COLONOSCOPY N/A 2022    Procedure: COLONOSCOPY;  Surgeon: Willi Galeas DO;  Location: [facility]    COLONOSCOPY N/A 2023    Procedure: Colonoscopy;  Surgeon: Willi Galeas DO;  Location: [facility]    ESOPHAGOSCOPY, GASTROSCOPY, DUODENOSCOPY (EGD), COMBINED N/A 2022    Procedure: ESOPHAGOGASTRODUODENOSCOPY (EGD) with biopsy;  Surgeon: Willi Galeas DO;  Location: [facility]    EXAM UNDER ANESTHESIA PELVIC N/A 12/10/2021    Procedure: EXAM UNDER ANESTHESIA, PELVIS;  Surgeon: Leyda Guzman DO;  Location: [facility]    LAPAROSCOPIC HYSTERECTOMY TOTAL, BILATERAL SALPINGO-OOPHORECTOMY, COMBINED N/A 2021    Procedure: HYSTERECTOMY, TOTAL, LAPAROSCOPIC, WITH SALPINGO-OOPHORECTOMY;  Surgeon: Dylon Alcantar MD;  Location: [facility]     Personal or family history of anesthesia problems: Yes: possible allergy to lidocaine    Family History:   Family History   Problem Relation Age of Onset    Fibroids Mother     Reed Syndrome Mother         PMS2+    Colon Polyps Mother         removed 5 feet of colon for unmanageable polyps;  in her sleep at 67, took a nape and never woke up,    Reed Syndrome Brother         PMS2+    Bladder Cancer Maternal Grandfather 70        lived to be 94    Kidney Cancer Maternal Grandfather     Breast Cancer Paternal Grandmother         postmenopausal    Ovarian Cancer Maternal Aunt 40        possible uterine cancer also; hx of fibroids    Ovarian Cancer Maternal Aunt 40        possible uterine cancer also; hx of fibroids    Breast Cancer Paternal Aunt 54          Specific Family History in First Degree Relatives:       FH of Kidney Dz: No  FH of Diabetes: Yes        FH of Hypertension: Yes, maternal grandfather  FH of CAD: No       FH of Cancer: Yes   FH of Kidney  Cancer: Yes     Personal History:   Social History     Socioeconomic History    Marital status:      Spouse name: Thai    Number of children: 6    Years of education: Not on file    Highest education level: Not on file   Occupational History    Occupation: Working on CNA training     Employer: [employer]     Comment: Works in several roles: security, 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 Not Asked   Social History Narrative    Not on file     Social Determinants of Health     Financial Resource Strain: High Risk (1/1/2022)    [facility]    Financial Resource Strain     Difficulty of Paying Living Expenses: Not on file     Difficulty of Paying Living Expenses: Not on file   Food Insecurity: Not on file   Transportation Needs: Not on file   Physical Activity: Not on file   Stress: Not on file   Social Connections: Unknown (1/1/2022)    [facility]    Social Connections     Frequency of Communication with Friends and Family: Not on file   Interpersonal Safety: Not on file   Housing Stability: Not on file          Specific Social History:       Health Insurance Status: Yes       Employment Status: Part time  Occupation: Nursing Assistant                       Living Arrangements: Lives at home with  and 4 children.        Social Support: Yes       Presence of increased risk for disease transmission behaviors as defined by PHS guidelines:  "No        Allergies:  Allergies   Allergen Reactions    Latex Headache       Medications:  Current Outpatient Medications   Medication Sig Dispense Refill    cyclobenzaprine (FLEXERIL) 10 MG tablet Take 0.5-1 tablets (5-10 mg) by mouth 3 times daily as needed for muscle spasms 15 tablet 0    HYDROcodone-acetaminophen (NORCO) 5-325 MG tablet Take 1-2 tablets by mouth (Patient not taking: Reported on 11/8/2023)      methylPREDNISolone (MEDROL DOSEPAK) 4 MG tablet therapy pack Follow Package Directions 21 tablet 0    predniSONE (DELTASONE) 20 MG tablet Take two tablets (= 40mg) each day for 5 (five) days (Patient not taking: Reported on 11/8/2023) 10 tablet 0    PROGESTERONE 100MG/G CREAM Apply 0.5 g topically 2 times daily       No current facility-administered medications for this visit.     There are no discontinued medications.      Vitals:      12/4/2023     1:42 PM 1/21/2024     7:13 PM 4/5/2024    10:16 AM   Vital Signs   Systolic 117 132 128   Diastolic 77 92 88   Pulse 78 81 94   Temperature  97.8  F (36.6  C) 98.1  F (36.7  C)   Respirations 16 16    Weight (LB)  191 lb    Height  5' 6\"    BMI (Calculated)  30.83    Pain Score   5 (Moderate)   O2 96 % 100 %        Exam:   GENERAL APPEARANCE: alert and no distress  HENT: mouth without ulcers or lesions  RESP: lungs clear to auscultation - no rales, rhonchi or wheezes  CV: regular rhythm, normal rate, no rub, no murmur  EDEMA: no LE edema bilaterally  ABDOMEN: soft, nondistended, nontender, bowel sounds normal  MS: extremities normal - no gross deformities noted, no evidence of inflammation in joints, no muscle tenderness  SKIN: no rash  NEURO: normal strength and tone, sensory exam grossly normal, mentation intact and speech normal  PSYCH: mentation appears normal and affect normal/bright    Results:   Labs and imaging were ordered for this visit and reviewed by me.  No results found for this or any previous visit (from the past 336 hour(s)).        "

## 2024-05-03 NOTE — PROGRESS NOTES
[facility] SOLID ORGAN TRANSPLANT  OUTPATIENT MNT: KIDNEY DONOR EVALUATION     Current BMI: 31.5 (HT 65.75 in,  lbs/88 kg)    8 Year Estimated Risk of T2DM  </= 3%     TIME SPENT: 15 minutes  VISIT TYPE: Initial  REFERRING PHYSICIAN: Finger   PT ACCOMPANIED BY: self     NUTRITION ASSESSMENT  H/o kidney stones: no  Parental h/o DM: mom  H/o GDM or HTN in pregnancy (if applicable): no    Vitamins, Supplements, Pertinent Meds: occasional vit D, NAC   Herbal Medicines/Supplements: none   Protein Supplements: occasional protein drink    Weight hx:   - stable weight since hysterectomy (2021)  - prior to hysterectomy was 15 lbs less     PHYSICAL ACTIVITY   3-4x/week- light weights, kettle bells  Averages 10k steps/day   Swims in the summer     FOOD SECURITY: any concerns about having enough money to buy food or access to grocery stores? No     DIET RECALL  Breakfast Scrambled eggs w/ chopped veggies + fruit; less often protein smoothie w/ fruit   Lunch Protein, veggie, some starch (swt potato)   Dinner Egg roll in a bowl; quinoa, green beans, grilled chicken; venison burger    Snacks 10 am- fruit or veggie, beef jerky, crackers    Beverages Herbal tea (green or roobios), coffee (1-2x/week- vanilla flavoring w/ cream), water (50 oz/day)/some liquid IV, occasional chocolate milk (homemade)    Alcohol 2 drinks/month    Dining out 1x/week or less      LABS  Recent Labs   Lab Test 05/03/24  0635   CHOL 221*   HDL 73   *   TRIG 134       FBG = 96  A1c = 5.7  BP = wnl x 3     Prediction of Incident Diabetes Mellitus in Middle-aged Adults: The [employer] Offspring Study  Zaheer Zhang MD; James B. Meigs, MD, MPH; Bhavani Menendez, PhD; Susanna Guo MD, MPH; Eulogio Lozoya MD; Austin Romo Sr,   PhD  Pt's estimated risk for T2DM (per Table 6 above)  Pt received points for the following criteria: BMI>30, parental h/o DM   Total points: 8  8-Year estimated risk of T2DM: </= 3%    NUTRITION  "DIAGNOSIS  No nutrition diagnosis identified at this time.    NUTRITION INTERVENTION  Nutrition education provided:  Reviewed overall healthy diet guidelines for pre and post kidney donation. Discussed that there are no specific \"kidney donor\" diets. Encouraged routine medical follow up post donation (ie annual physicals) to monitor BP and BG levels. If these things change/worsen in the future post donation, we reviewed how diet and lifestyle modifications can help mitigate risk factors for DM & HTN. Discussed maintenance of a healthy weight and Na+ intake <3000 mg/day (<2000 mg/day if HTN). Encouraged adequate hydration >60 oz/day of water post donation.     Avoid the following post op d/t unknown effects on the organs:  - Herbal, Chinese, holistic, chiropractic, natural, alternative medicines and supplements  - Detoxes and cleanses  - Weight loss pills  - Protein powders or other products with extracts or herbs (ie green tea extract)    Patient Understanding: Pt verbalized understanding of education provided.  Expected Engagement: Good  Follow-Up Plans: PRN     NUTRITION GOALS  No nutrition goals identified at this time     Argelia Mullen, RD, LD, CCTD                                "

## 2024-05-03 NOTE — NURSING NOTE
"Chief Complaint   Patient presents with    Transplant Donor Evaluation     Kidney donor evaluation       BP 1: 120/86  BP 2: 118/85  BP 3: 119/87    /87   Pulse 71   Temp 97.4  F (36.3  C) (Oral)   Ht 1.67 m (5' 5.75\")   Wt 87.8 kg (193 lb 9.6 oz)   LMP 02/03/2021   SpO2 98%   BMI 31.49 kg/m      Gurvinder Lundberg RN on 5/3/2024 at 7:48 AM    "

## 2024-05-03 NOTE — DISCHARGE INSTRUCTIONS

## 2024-05-03 NOTE — PROGRESS NOTES
"Living Kidney Donor Consent per OPTN Policy 14.2 for Independent Living Donor Advocate (SUSANA)    Organ Type: Unrelated Kidney Donor  Presenting Information:  Christina Santana presents to [facility] to complete a living donor evaluation since she is interested in becoming a kidney donor for Andres Nagel.  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  And this is where she met him.  Over time, they have become friends.  She offered to donate a kidney to him, and denies she was ever asked and denies pressure.  Her  was initially concerned about this, but has become supportive over time.  Andres and his wife, along with Christina and her  went out to lunch together so that her  could meet him.  After this meeting, he stated he could see why she would want to help him, and is not supportive.  She does still \"room\" him in clinic when she is working but reportedly does not chart on him anymore.  For examply, she will take his blood pressure, but have someone else enter it into his chart.    Written assurance has been obtained from the potential donor that he/she:   Is willing to donate  Is free from inducement and coercion  Has been informed that the he/she may decline to donate at any time  Has been informed that transplant centers must:   A) Offer donors an opportunity to discontinue the donor consent or evaluation process in a way that is protected and confidential  B) Provide an independent living donor advocate (SUSANA) to assist the potential donor during this process    The following was presented to the potential donor in a language in which the potential donor is able to engage in meaningful dialogue:   Education and instruction about all phases of the living donation process including:   Consent  Medical and psychosocial evaluation  Information about the surgical procedure  Pre and post " operative care  Benefits of post operative follow up  Disclosure that the [facility] will take all reasonable precautions to provide confidentiality for the donor/recipient  Disclosure that it is a federal crime for any person to knowingly acquire, obtain or otherwise transfer any human organ for valuable consideration  Disclosure that the [facility] must provide an independent living donor advocate (SUSANA)  Disclosure that health information obtained during the evaluation is subject to the same regulations as all records and could reveal conditions that must be reported to local, state, or federal public health authorities  Disclosure that the [facility] is required to report living donor follow up information at 6 months, 1 year, and 2 years, and that the potential donor must commit to post operative follow up testing coordinated by the [facility]    Disclosure has been provided that these risks may be transient or permanent & include but are not limited to:  Potential psychosocial risks:  Problems with body image  Post-surgery depression or anxiety  Feelings of emotional distress or bereavement if recipient experiences any recurrent disease or in the event of the recipient s death  Impact of donation on the donor s lifestyle, such as limited ability to exercise in the short term post operative recovery period, no driving for the first 2 weeks post op or until the donor is no longer needing pain medications that impair the ability to drive.      Potential financial impacts:  Personal expenses of travel, housing, , lost wages related to donation might not be reimbursed. However, resources may be available to defray some donation-related expenses.   Need for life-long follow up at the donor s expense  Loss of employment or income  Negative impact on the ability to obtain future employment  Negative impact on the ability to obtain, maintain, or afford health, disability, and life  insurance  Future health problems experienced by living donors following donation may not be covered by the recipient s insurance      PREPARATION FOR DONATION, RECOVERY, AND POTENTIAL SHORT-LONG-TERM OUTCOMES:  Understanding of the Living Donation Process:  We discussed the role of Independent Living Donor Advocate.  Short and long term medical and psychosocial risks to both, donor and recipient were reviewed and she is capable of understanding the risks.  Post surgical restrictions (2 weeks no driving, 6 weeks no lifting over 10 lbs) were reviewed and she appears capable of adhering to the post surgical requirements. The need for a caregiver was discussed and her  will care for her .  The risk of poor psychosocial outcome including problems with body image, post-surgery depression or anxiety, or feelings of emotional distress or bereavement if recipient experiences any recurrent disease, poor outcome or death was reviewed.  Additionally, potential financial implications, including the risk of having difficulty obtaining health care insurance, life insurance, disability insurance, or long term care insurance were reviewed, as were available donor grants to assist with donor related expenses.      IMPRESSIONS/RECOMMENDATIONS:  Christina Santana appears highly motivated to donate a kidney to Andres Van Heel.  She appears capable of understanding this information and making an informed medical decision.  Of concern is the potential boundary issues with him continuing to be a patient of hers in clinic.  She says they are more like friends now, but she does room him in clinic.  She states she does not read his medical records.  It would be prudent for her to have to have a conversation with her work supervisor about her plans to be a kidney donor for him, and discuss any conflict of interest they may have upfront and how/if this will impact her work moving forward.  She reports she is giving by nature, having been a   She has my contact information and is aware that I am available thoughout the donation process.    Contact Information:  JOSEPH WARNER, [facility]  Independent Living Donor Advocate  [facility]  Phone - [phone]  [email or web address]      Time Spent: 30 minutes

## 2024-05-03 NOTE — PROGRESS NOTES
Saw Christina in clinic on 5/3/24 for Living Kidney Donor Evaluation.     Christina is interested in donation for a friend.    I provided a folder which included copies of the following:    Living Kidney Donor Evaluation Consent  Paired Exchange Consent  Donor Shield Pamphlet  Living Donor Collective Study information  Kidney for Life pamphlet  Kidney Donors are Heroes! Study synopsis  Most current SRTR data.      I also provided a parking pass and food voucher.      I reviewed the Living Kidney Donor Evaluation Consent, dated 7-6-2020 and Paired Exchange/ NDD consent dated 9-.  I answered any question.    Evaluation Notes:  Internal tissue typing collected and sent

## 2024-05-03 NOTE — PROGRESS NOTES
Transplant Surgery Consult Note    Medical record number: [id number]  YOB: 1980,   Consult requested by the patient for evaluation of kidney donation candidacy.    Assessment and Recommendations: Ms. Santana appears to be a good candidate for kidney donation at this point in the evaluation. The following issues will need to be addressed prior to formal review:    Iohexol ordered for today for assessment of adequate kidney function for donation. Will be reviewed when resulted  Donor labs ordered for today and reviewed to include: CBC, CMP, Mg, PO4, hemoglobin A1c, lipid panel, blood type x 2, hemoglobin A1c, UA with microscopic, urine culture, urine protein/cr, INR/PTT, Quantiferon gold, hepatitis C (antibody), hepatitis B panel, HIV, treponemia, West Nile (antibody panel), CMV (antibody panel), EBV (antibody panel), pregnancy screen (for females of childbearing age), PSA (males >50yrs), HLA tissue typing, buccal swab for eplet typing  Social work consult ordered  Dietician consult ordered  Transplant nephrology consult ordered  Transplant coordinator consult ordered  SUSANA (independent living donor advovate) consult ordered  EKG ordered for today and will be reviewed when resulted. Suitable to proceed today with this testing today:  Yes   Chest x-ray ordered for today and will be reviewed when resulted. Suitable to proceed with this testing today:  Yes   CT angio of abdomen and pelvis for anatomical assessment. Images and report to be reviewed when resulted.  Suitable to proceed with this testing today:  Yes  After review of the above, additional testing/concerns include:      Discuss implications of Reed syndrome and likelihood of malignancy.    Recommend but not require weight loss    The majority of our visit today was spent in counseling regarding the medical and surgical risks of kidney donation, the typical pasquale-and post-operative experience and recovery/return to work pattern.  Surgical risks  may be transient or permanent and can include but not limited to decreased kidney function or acute kidney failure and the need for dialysis or kidney transplant for the living donor in the immediate post-operative period. We also talked about post-op visits and longer term health care maintenance, as well as the implications of having one remaining kidney. This discussion included, but was not limited to rates of complications such as bleeding, infection, need for transfusion, reoperation, other organ injury, future bowel obstruction, incisional hernia, port site pain, varicocele, venous thrombosis, pulmonary embolism, renal failure, and death (3 per 10,000). The patient understands that if end stage renal failure happens that dialysis or transplant would be required. For female donor of child bearing age, the risks of preeclampsi or gestational hypertension during pregnancies after donation were discussed .  At the conclusion of the visit, all questions had been answered and Ms. Santana's candidacy for donation will be reviewed at our Multidisciplinary Donor Selection Committee. She will stay in contact with the nurse coordinator with any concerns.      Total time: 60 minutes        Jemal Dsouza MD  Professor of Surgery  Kidney/Pancreas Transplantation  ---------------------------------------------------------------------------------------------------    HPI: Christina Santana is a 44 year old year old female who presents for a kidney donor evaluation.  Patient would like to donate to a friend.    Patient has Reed sydrome with annual colonoscopy.    Lap LISY BSO for Reed and cysts    Appy    Lap paraesophageal hernia.          Personal history of:   No    Yes  Cancer:    [x]      []             Comment:     Diabetes   [x]      []  Comment:    Thombosis   [x]      []  Comment:       Hepatitis   [x]      []  Comment:    Tuberculosis   [x]      []  Comment:   Back or neck pain:  [x]      []  Comment:      Kidney  stones   [x]      []  Comment:                  Kidney infections  [x]      []  Comment:           Urinary retention  [x]      []            Comment:   Regular NSAID use:  [x]      []            Comment:      Constipation:   [x]      []            Comment:      Adventism  []      []            Comment:      Other:    []      []            Comment:         Past Medical History:   Diagnosis Date    ASCUS with positive high risk HPV 12/27/2013    HSIL (high grade squamous intraepithelial lesion) on Pap smear of cervix 04/22/2013    Pap smear of cervix with ASCUS, cannot exclude HGSIL 5/23/11, 8/10/12,     PMS2-related Reed syndrome (HNPCC4)      Past Surgical History:   Procedure Laterality Date    APPENDECTOMY      AS REPAIR PARAESOPHAGEAL HIATAL HERNIA,LAPAROTOMY, W MESH      BIOPSY VULVA N/A 12/10/2021    Procedure: Vaginal cuff biopsy;  Surgeon: Leyda Guzman DO;  Location: [facility]    COLONOSCOPY N/A 6/10/2021    Procedure: COLONOSCOPY, WITH POLYPECTOMY;  Surgeon: Willi Galeas DO;  Location: [facility]    COLONOSCOPY N/A 12/1/2022    Procedure: COLONOSCOPY;  Surgeon: Willi Galeas DO;  Location: [facility]    COLONOSCOPY N/A 12/4/2023    Procedure: Colonoscopy;  Surgeon: Willi Galeas DO;  Location: [facility]    ESOPHAGOSCOPY, GASTROSCOPY, DUODENOSCOPY (EGD), COMBINED N/A 12/1/2022    Procedure: ESOPHAGOGASTRODUODENOSCOPY (EGD) with biopsy;  Surgeon: Willi Galeas DO;  Location: [facility]    EXAM UNDER ANESTHESIA PELVIC N/A 12/10/2021    Procedure: EXAM UNDER ANESTHESIA, PELVIS;  Surgeon: Leyda Guzman DO;  Location: [facility]    LAPAROSCOPIC HYSTERECTOMY TOTAL, BILATERAL SALPINGO-OOPHORECTOMY, COMBINED N/A 2/24/2021    Procedure: HYSTERECTOMY, TOTAL, LAPAROSCOPIC, WITH SALPINGO-OOPHORECTOMY;  Surgeon: Dylon Alcantar MD;  Location: [facility]     Family History   Problem Relation Age of Onset    Fibroids Mother     Reed Syndrome Mother         PMS2+    Colon Polyps Mother          removed 5 feet of colon for unmanageable polyps;  in her sleep at 67, took a nape and never woke up,    Reed Syndrome Brother         PMS2+    Bladder Cancer Maternal Grandfather 70        lived to be 94    Kidney Cancer Maternal Grandfather     Breast Cancer Paternal Grandmother         postmenopausal    Ovarian Cancer Maternal Aunt 40        possible uterine cancer also; hx of fibroids    Ovarian Cancer Maternal Aunt 40        possible uterine cancer also; hx of fibroids    Breast Cancer Paternal Aunt 54     Social History     Socioeconomic History    Marital status:      Spouse name: Thai    Number of children: 6    Years of education: Not on file    Highest education level: Not on file   Occupational History    Occupation: Working on CNA training     Employer: [employer]     Comment: Works in several roles: security, 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 2 a month    Drug use: No    Sexual activity: Yes     Partners: Male     Birth control/protection: Female Surgical   Other Topics Concern    Parent/sibling w/ CABG, MI or angioplasty before 65F 55M? Not Asked   Social History Narrative    Not on file     Social Determinants of Health     Financial Resource Strain: High Risk (2022)    [facility]    Financial Resource Strain     Difficulty of Paying Living Expenses: Not on file     Difficulty of Paying Living Expenses: Not on file   Food Insecurity: Not on file   Transportation Needs: Not on file   Physical Activity: Not on file   Stress: Not on file   Social Connections: Unknown (2022)    Received from [facility], [facility], [facility], [facility]     Social Connections     Frequency of Communication with Friends and Family: Not on file   Interpersonal Safety: Not on file   Housing Stability: Not on file       ROS:   CONSTITUTIONAL:  No fevers or chills  EYES: negative for icterus  ENT:  negative for hearing loss, tinnitus and sore throat  RESPIRATORY:  negative for cough, sputum, dyspnea  CARDIOVASCULAR:  negative for chest pain  GASTROINTESTINAL:  negative for nausea, vomiting, diarrhea or constipation  GENITOURINARY:  negative for incontinence, dysuria, bladder emptying problems  HEME:  No easy bruising  INTEGUMENT:  negative for rash and pruritus  NEURO:  Negative for headache, seizure disorder    Allergies:   Allergies   Allergen Reactions    Latex Headache    Other Drug Allergy (See Comments) Hives     Possible Lidocaine family allergy (noticed during procedure--numbing-type medication) HIVES       Medications:  Prescription Medications as of 5/3/2024         Rx Number Disp Refills Start End Last Dispensed Date Next Fill Date Owning Pharmacy    PROGESTERONE 100MG/G CREAM  -- -- 2/28/2023 --       Sig: Apply 0.5 g topically 2 times daily    Class: Historical    Route: Topical          Clinic-Administered Medications as of 5/3/2024         Dose Frequency Start End    iohexol (OMNIPAQUE) 350 MG/ML injectable solution 4 mL (Completed) 4 mL ONCE 5/3/2024 5/3/2024    Route: Intravenous            Exam:   Temp:  [97.4  F (36.3  C)-97.6  F (36.4  C)] 97.4  F (36.3  C)  Pulse:  [65-71] 71  Resp:  [16] 16  BP: (118-127)/(85-87) 119/86  SpO2:  [98 %-99 %] 98 %  Body mass index is 31.49 kg/m .  Temp:  [97.4  F (36.3  C)-97.6  F (36.4  C)] 97.4  F (36.3  C)  Pulse:  [65-71] 71  Resp:  [16] 16  BP: (118-127)/(85-87) 119/86  SpO2:  [98 %-99 %] 98 %  Appearance: in no apparent distress.   Skin: normal  Head and Neck: Normal, no rashes or jaundice  Respiratory: normal respiratory excursions, no audible wheeze  Cardiovascular: RRR  Abdomen: rounded, No distention    Extremeties: Edema, none  Neuro: grossly normal       Diagnostics:   Recent Results (from the past 336 hour(s))   Protein  random urine    Collection Time: 05/03/24  6:35 AM   Result Value Ref Range    Total Protein Urine mg/dL 11.6   mg/dL    Total Protein Urine mg/mg Creat 0.06 0.00 - 0.20 mg/mg Cr    Creatinine Urine mg/dL 186.0 mg/dL   Routine UA with microscopic    Collection Time: 05/03/24  6:35 AM   Result Value Ref Range    Color Urine Yellow Colorless, Straw, Light Yellow, Yellow    Appearance Urine Clear Clear    Glucose Urine Negative Negative mg/dL    Bilirubin Urine Negative Negative    Ketones Urine Negative Negative mg/dL    Specific Gravity Urine 1.030 1.003 - 1.035    Blood Urine Negative Negative    pH Urine 7.0 5.0 - 7.0    Protein Albumin Urine Negative Negative mg/dL    Urobilinogen Urine Normal Normal, 2.0 mg/dL    Nitrite Urine Negative Negative    Leukocyte Esterase Urine Negative Negative    Mucus Urine Present (A) None Seen /LPF    RBC Urine 1 <=2 /HPF    WBC Urine 1 <=5 /HPF    Squamous Epithelials Urine 1 <=1 /HPF   CBC with platelets    Collection Time: 05/03/24  6:35 AM   Result Value Ref Range    WBC Count 5.6 4.0 - 11.0 10e3/uL    RBC Count 5.20 3.80 - 5.20 10e6/uL    Hemoglobin 15.5 11.7 - 15.7 g/dL    Hematocrit 47.3 (H) 35.0 - 47.0 %    MCV 91 78 - 100 fL    MCH 29.8 26.5 - 33.0 pg    MCHC 32.8 31.5 - 36.5 g/dL    RDW 11.9 10.0 - 15.0 %    Platelet Count 225 150 - 450 10e3/uL   Partial thromboplastin time    Collection Time: 05/03/24  6:35 AM   Result Value Ref Range    aPTT 27 22 - 38 Seconds   INR    Collection Time: 05/03/24  6:35 AM   Result Value Ref Range    INR 0.95 0.85 - 1.15   Phosphorus    Collection Time: 05/03/24  6:35 AM   Result Value Ref Range    Phosphorus 3.1 2.5 - 4.5 mg/dL   Uric acid    Collection Time: 05/03/24  6:35 AM   Result Value Ref Range    Uric Acid 4.4 2.4 - 5.7 mg/dL   Lipid Profile    Collection Time: 05/03/24  6:35 AM   Result Value Ref Range     Cholesterol 221 (H) <200 mg/dL    Triglycerides 134 <150 mg/dL    Direct Measure HDL 73 >=50 mg/dL    LDL Cholesterol Calculated 121 (H) <=100 mg/dL    Non HDL Cholesterol 148 (H) <130 mg/dL    Patient Fasting > 8hrs? Yes    Comprehensive metabolic panel    Collection Time: 05/03/24  6:35 AM   Result Value Ref Range    Sodium 141 135 - 145 mmol/L    Potassium 3.7 3.4 - 5.3 mmol/L    Carbon Dioxide (CO2) 26 22 - 29 mmol/L    Anion Gap 12 7 - 15 mmol/L    Urea Nitrogen 17.5 6.0 - 20.0 mg/dL    Creatinine 0.73 0.51 - 0.95 mg/dL    GFR Estimate >90 >60 mL/min/1.73m2    Calcium 9.4 8.6 - 10.0 mg/dL    Chloride 103 98 - 107 mmol/L    Glucose 96 70 - 99 mg/dL    Alkaline Phosphatase 75 40 - 150 U/L    AST 23 0 - 45 U/L    ALT 25 0 - 50 U/L    Protein Total 8.0 6.4 - 8.3 g/dL    Albumin 4.7 3.5 - 5.2 g/dL    Bilirubin Total 0.4 <=1.2 mg/dL

## 2024-05-04 LAB
GAMMA INTERFERON BACKGROUND BLD IA-ACNC: 0.01 IU/ML
M TB IFN-G BLD-IMP: NEGATIVE
M TB IFN-G CD4+ BCKGRND COR BLD-ACNC: 9.99 IU/ML
MITOGEN IGNF BCKGRD COR BLD-ACNC: 0.02 IU/ML
MITOGEN IGNF BCKGRD COR BLD-ACNC: 0.02 IU/ML
QUANTIFERON MITOGEN: 10 IU/ML
QUANTIFERON NIL TUBE: 0.01 IU/ML
QUANTIFERON TB1 TUBE: 0.03 IU/ML
QUANTIFERON TB2 TUBE: 0.03

## 2024-05-05 LAB
WNV IGG SER IA-ACNC: 0.67 IV
WNV IGM SER IA-ACNC: 0.03 IV

## 2024-05-06 LAB
ATRIAL RATE - MUSE: 69 BPM
DIASTOLIC BLOOD PRESSURE - MUSE: NORMAL MMHG
INTERPRETATION ECG - MUSE: NORMAL
P AXIS - MUSE: 9 DEGREES
PR INTERVAL - MUSE: 172 MS
QRS DURATION - MUSE: 84 MS
QT - MUSE: 414 MS
QTC - MUSE: 443 MS
R AXIS - MUSE: 24 DEGREES
SYSTOLIC BLOOD PRESSURE - MUSE: NORMAL MMHG
T AXIS - MUSE: 2 DEGREES
TRYPANOSOMA CRUZI: NORMAL
VENTRICULAR RATE- MUSE: 69 BPM

## 2024-05-07 ENCOUNTER — DOCUMENTATION ONLY (OUTPATIENT)
Dept: TRANSPLANT | Facility: CLINIC | Age: 44
End: 2024-05-07
Payer: COMMERCIAL

## 2024-05-07 LAB
BSA: 2.05 M2
IOHEXOL CL UR+SERPL-VRATE: 3.91 MG/DL
IOHEXOL CL UR+SERPL-VRATE: 3.91 MG/DL
IOHEXOL CL UR+SERPL-VRATE: 78 /1.73 M2
IOHEXOL CL UR+SERPL-VRATE: 8.19 MG/DL
IOHEXOL CL UR+SERPL-VRATE: 93 ML/MIN
STRONGYLOIDES IGG SER IA-ACNC: 0.5 IV

## 2024-05-07 NOTE — PROGRESS NOTES
Image Review Meeting    ATTENDEES: Dr. Samuels, Celia Scherer, Amy Mcmullen, Lian Monzon, Emilie Miranda    IMAGES REVIEWED: CTA renal from 5/3/24    Impression:      1.   1a. The right kidney contains 2 arteries, 1 vein, and 1 ureter.  1b. The right kidney parenchyma is normal The renal volume is 164 cc.     2.   2a. The left kidney contains 1 artery, 1 vein, and 1ureter.  2b. The left kidney parenchyma is normal. The renal volume is 183 cc.     3. Hepatic steatosis.     4. Cholelithiasis.    DECISION: LEFT or choice    INCIDENTALS: Yes:   3. Hepatic steatosis-hepatology, liver ultrasound     4. Cholelithiasis.-okay, no follow up

## 2024-05-08 ENCOUNTER — TELEPHONE (OUTPATIENT)
Dept: TRANSPLANT | Facility: CLINIC | Age: 44
End: 2024-05-08
Payer: COMMERCIAL

## 2024-05-08 ENCOUNTER — LAB (OUTPATIENT)
Dept: LAB | Facility: CLINIC | Age: 44
End: 2024-05-08

## 2024-05-08 ENCOUNTER — COMMITTEE REVIEW (OUTPATIENT)
Dept: TRANSPLANT | Facility: CLINIC | Age: 44
End: 2024-05-08
Payer: COMMERCIAL

## 2024-05-08 DIAGNOSIS — Z00.5 TRANSPLANT DONOR EVALUATION: ICD-10-CM

## 2024-05-08 DIAGNOSIS — Z00.5 TRANSPLANT DONOR EVALUATION: Primary | ICD-10-CM

## 2024-05-08 LAB
A*: NORMAL
A*LOCUS SEROLOGIC EQUIVALENT: 1
A*LOCUS: NORMAL
A*SEROLOGIC EQUIVALENT: 2
ABTEST METHOD: NORMAL
B*: NORMAL
B*LOCUS SEROLOGIC EQUIVALENT: 7
B*LOCUS: NORMAL
B*SEROLOGIC EQUIVALENT: 8
BW-1: NORMAL
C*: NORMAL
C*LOCUS SEROLOGIC EQUIVALENT: 7
C*LOCUS: NORMAL
C*SEROLOGIC EQUIVALENT: 7
DPA1*: NORMAL
DPB1*: NORMAL
DPB1*LOCUS NMDP: NORMAL
DPB1*LOCUS: NORMAL
DPB1*NMDP: NORMAL
DQA1*: NORMAL
DQA1*LOCUS: NORMAL
DQB1*: NORMAL
DQB1*LOCUS SEROLOGIC EQUIVALENT: 2
DQB1*LOCUS: NORMAL
DQB1*SEROLOGIC EQUIVALENT: 6
DRB1*: NORMAL
DRB1*LOCUS SEROLOGIC EQUIVALENT: 17
DRB1*LOCUS: NORMAL
DRB1*SEROLOGIC EQUIVALENT: 15
DRB3*LOCUS SEROLOGIC EQUIVALENT: 52
DRB3*LOCUS: NORMAL
DRB5*: NORMAL
DRB5*SEROLOGIC EQUIVALENT: 51
DRSSO TEST METHOD: NORMAL

## 2024-05-08 PROCEDURE — 82610 CYSTATIN C: CPT

## 2024-05-08 PROCEDURE — 36415 COLL VENOUS BLD VENIPUNCTURE: CPT

## 2024-05-08 NOTE — COMMITTEE REVIEW
Living Donor Committee Review Note Evaluation Date: 5/3/2024  Committee Review Date: 5/8/2024    Donor being evaluated for: Kidney    Transplant Phase: Evaluation  Transplant Status: Active    Transplant Coordinator: Mary Hammer  Transplant Surgeon:        Committee Review Members:  Independent Living Donor Advocate Ghada Arellano, [facility], Ramona Meyers   Nephrology Daniel Tilley MD, Janene Monroy MD, Emily Sheehan MD   Nutrition Argelia Mullen, RD   Pharmacist Celia Rai, [facility]   Transplant JOSEPH Fitzpatrick, Amy Mcmullen, RN, Laura Guo, RN, Anca Dupont, NP, Jessica Love, ROBERTHN, April Zee, MARIAJOSE, Mary Medley RN   Transplant Surgery Monisha Zhou MD       Transplant Eligibility: Acceptable Physical Health, Acceptable Mental Health    Committee Review Decision: Needs Re-presentation    Relative Contraindications: None    Absolute Contraindications: None    Committee Chair Monisha Zhou MD verbally attested to the committee's decision.    Committee Discussion Details:   Needs to continue yearly mammograms, colonoscopy and pap smears with having Reed Syndrome   Need to complete Cystatin C   Need to complete liver MRI and see Hepatology   Team is okay with Christina not doing her MRI with her hip pain.

## 2024-05-08 NOTE — TELEPHONE ENCOUNTER
Spoke to Christina regarding committee review results:    Committee Discussion Details:   Needs to continue yearly mammograms, colonoscopy and pap smears with having Reed Syndrome   Need to complete Cystatin C   Need to complete liver MRI and see Hepatology   Team is okay with Christina not doing her MRI with her hip pain.    Statement Selected

## 2024-05-09 LAB
CYSTATIN C (ROCHE): 0.9 MG/L (ref 0.6–1)
GFR SERPL CREATININE-BSD FRML MDRD: 89 ML/MIN/1.73M2

## 2024-05-10 ENCOUNTER — HOSPITAL ENCOUNTER (OUTPATIENT)
Dept: ULTRASOUND IMAGING | Facility: CLINIC | Age: 44
Discharge: HOME | End: 2024-05-10
Attending: INTERNAL MEDICINE | Admitting: INTERNAL MEDICINE

## 2024-05-10 DIAGNOSIS — Z00.5 TRANSPLANT DONOR EVALUATION: ICD-10-CM

## 2024-05-10 PROCEDURE — 76705 ECHO EXAM OF ABDOMEN: CPT

## 2024-05-13 ENCOUNTER — TELEPHONE (OUTPATIENT)
Dept: TRANSPLANT | Facility: CLINIC | Age: 44
End: 2024-05-13
Payer: COMMERCIAL

## 2024-05-13 NOTE — TELEPHONE ENCOUNTER
Provider Call: General  Route to LPN    Reason for call: Call from lab- received sample in the mail for Iohexal  Has questions to review     Call back needed? Yes    Return Call Needed  Same as documented in contacts section  When to return call?: Same day: Route High Priority

## 2024-05-14 NOTE — CONFIDENTIAL NOTE
DIAGNOSIS  Transplant donor evaluation    Appt Date: 06.17.2024     NOTES STATUS DETAILS   OFFICE NOTE from referring provider Internal 05.08.2024  Daniel Tilley MD    OFFICE NOTES from other specialists     DISCHARGE SUMMARY from hospital     MEDICATION LIST Internal    LIVER BIOSPY (IF APPLICABLE)      PATHOLOGY REPORTS      IMAGING     ENDOSCOPY (IF AVAILABLE) Internal 12.01.2022   COLONOSCOPY (IF AVAILABLE) Internal 12.04.2023   ULTRASOUND LIVER Internal 05.10.2024 US Abd Limited    CT OF ABDOMEN     MRI OF LIVER     FIBROSCAN, US ELASTOGRAPHY, FIBROSIS SCAN, MR ELASTOGRAPHY     LABS     HEPATIC PANEL (LIVER PANEL)     BASIC METABOLIC PANEL Internal 02.21.2024   COMPLETE METABOLIC PANEL Internal 05.03.2024   COMPLETE BLOOD COUNT (CBC) Internal 05.03.2024   INTERNATIONAL NORMALIZED RATIO (INR) Internal 05.03.2024   HEPATITIS C ANTIBODY Internal    HEPATITIS C VIRAL LOAD/PCR     HEPATITIS C GENOTYPE     HEPATITIS B SURFACE ANTIGEN Internal 05.03.2024   HEPATITIS B SURFACE ANTIBODY Internal 05.03.2024   HEPATITIS B DNA QUANT LEVEL     HEPATITIS B CORE ANTIBODY Internal 05.03.2024

## 2024-05-15 ENCOUNTER — COMMITTEE REVIEW (OUTPATIENT)
Dept: TRANSPLANT | Facility: CLINIC | Age: 44
End: 2024-05-15
Payer: COMMERCIAL

## 2024-05-15 ENCOUNTER — TELEPHONE (OUTPATIENT)
Dept: TRANSPLANT | Facility: CLINIC | Age: 44
End: 2024-05-15
Payer: COMMERCIAL

## 2024-05-15 NOTE — TELEPHONE ENCOUNTER
Spoke to Christina regarding committee review results:    Committee Discussion Details:   Reviewed cystatin C-acceptable to move forward as a donor  Reviewed Liver ultrasound-needs to continue to work on losing weight for own health so she doesn't increase her risk of metabolic syndrome or cirrhosis from GIRALDO  Christina has an appointment to see Hepatology on 6/17/24

## 2024-05-15 NOTE — COMMITTEE REVIEW
Living Donor Committee Review Note Evaluation Date: 5/3/2024  Committee Review Date: 5/15/2024    Donor being evaluated for: Kidney    Transplant Phase: Evaluation  Transplant Status: Active    Transplant Coordinator: Mary Hammer  Transplant Surgeon:        Committee Review Members:  Independent Living Donor Advocate Ghada Arellano, [facility], Ramona Meyers   Nephrology Daniel Tilley MD, Janene Monroy MD, Emily Sheehan MD, Carlos Amado MD   Nutrition Argelia Mullen, KARLA   Pharmacist Rocael Paz, [facility]    - Clinical Julia Yuen, [facility]   Transplant Amy Mcmullen, MARIAJOSE, Isabell Hanson APRN CNP, Laura Guo, RN, Anca Dupont, CHRISTOPHER, Jessica Love, ROBERTHN, April Zee, MARIAJOSE, Celia Scherer, RN, Mary Medley, RN   Transplant Surgery Jemal Dsouza MD       Transplant Eligibility: Acceptable Physical Health, Acceptable Mental Health    Committee Review Decision: Needs Re-presentation    Relative Contraindications: None    Absolute Contraindications: None    Committee Chair Jemal Dsouza MD verbally attested to the committee's decision.    Committee Discussion Details:   Reviewed cystatin C-acceptable to move forward as a donor  Reviewed Liver ultrasound-needs to continue to work on losing weight for own health so she doesn't increase her risk of metabolic syndrome or cirrhosis from GIRALDO  Christina has an appointment to see Hepatology on 6/17/24

## 2024-06-04 DIAGNOSIS — K76.0 FATTY LIVER: Primary | ICD-10-CM

## 2024-06-11 ENCOUNTER — LAB (OUTPATIENT)
Dept: LAB | Facility: CLINIC | Age: 44
End: 2024-06-11
Payer: COMMERCIAL

## 2024-06-11 DIAGNOSIS — K76.0 FATTY LIVER: ICD-10-CM

## 2024-06-11 LAB
ALBUMIN SERPL BCG-MCNC: 4.4 G/DL (ref 3.5–5.2)
ALP SERPL-CCNC: 72 U/L (ref 40–150)
ALT SERPL W P-5'-P-CCNC: 27 U/L (ref 0–50)
ANION GAP SERPL CALCULATED.3IONS-SCNC: 13 MMOL/L (ref 7–15)
AST SERPL W P-5'-P-CCNC: 22 U/L (ref 0–45)
BILIRUB DIRECT SERPL-MCNC: <0.2 MG/DL (ref 0–0.3)
BILIRUB SERPL-MCNC: 0.5 MG/DL
BUN SERPL-MCNC: 13.8 MG/DL (ref 6–20)
CALCIUM SERPL-MCNC: 9.4 MG/DL (ref 8.6–10)
CHLORIDE SERPL-SCNC: 105 MMOL/L (ref 98–107)
CREAT SERPL-MCNC: 0.72 MG/DL (ref 0.51–0.95)
DEPRECATED HCO3 PLAS-SCNC: 23 MMOL/L (ref 22–29)
EGFRCR SERPLBLD CKD-EPI 2021: >90 ML/MIN/1.73M2
ERYTHROCYTE [DISTWIDTH] IN BLOOD BY AUTOMATED COUNT: 12.1 % (ref 10–15)
GLUCOSE SERPL-MCNC: 97 MG/DL (ref 70–99)
HCT VFR BLD AUTO: 43.1 % (ref 35–47)
HGB BLD-MCNC: 14.6 G/DL (ref 11.7–15.7)
INR PPP: 0.99 (ref 0.85–1.15)
MCH RBC QN AUTO: 30 PG (ref 26.5–33)
MCHC RBC AUTO-ENTMCNC: 33.9 G/DL (ref 31.5–36.5)
MCV RBC AUTO: 89 FL (ref 78–100)
PLATELET # BLD AUTO: 214 10E3/UL (ref 150–450)
POTASSIUM SERPL-SCNC: 4.3 MMOL/L (ref 3.4–5.3)
PROT SERPL-MCNC: 7.5 G/DL (ref 6.4–8.3)
RBC # BLD AUTO: 4.87 10E6/UL (ref 3.8–5.2)
SODIUM SERPL-SCNC: 141 MMOL/L (ref 135–145)
WBC # BLD AUTO: 5.7 10E3/UL (ref 4–11)

## 2024-06-11 PROCEDURE — 85027 COMPLETE CBC AUTOMATED: CPT

## 2024-06-11 PROCEDURE — 80053 COMPREHEN METABOLIC PANEL: CPT

## 2024-06-11 PROCEDURE — 82248 BILIRUBIN DIRECT: CPT

## 2024-06-11 PROCEDURE — 85610 PROTHROMBIN TIME: CPT

## 2024-06-11 PROCEDURE — 36415 COLL VENOUS BLD VENIPUNCTURE: CPT

## 2024-06-17 ENCOUNTER — VIRTUAL VISIT (OUTPATIENT)
Dept: GASTROENTEROLOGY | Facility: CLINIC | Age: 44
End: 2024-06-17
Attending: INTERNAL MEDICINE

## 2024-06-17 ENCOUNTER — PRE VISIT (OUTPATIENT)
Dept: GASTROENTEROLOGY | Facility: CLINIC | Age: 44
End: 2024-06-17
Payer: COMMERCIAL

## 2024-06-17 DIAGNOSIS — K76.0 HEPATIC STEATOSIS: Primary | ICD-10-CM

## 2024-06-17 DIAGNOSIS — Z00.5 TRANSPLANT DONOR EVALUATION: ICD-10-CM

## 2024-06-17 PROBLEM — R13.10 DYSPHAGIA: Status: ACTIVE | Noted: 2024-04-15

## 2024-06-17 PROCEDURE — 99204 OFFICE O/P NEW MOD 45 MIN: CPT | Mod: 95 | Performed by: PHYSICIAN ASSISTANT

## 2024-06-17 NOTE — LETTER
6/17/2024      Christina Santana  [address]  [facility] [address]      Dear Colleague,    Thank you for referring your patient, Christina Santana, to the [facility] [place]. Please see a copy of my visit note below.    Hepatology Clinic Note  Christina Santana   Date of Birth 1980    REASON FOR CONSULT: Transplant donor evaluation, hepatic steatosis  REFERRING PROVIDER: Dr. Daniel Tilley MD    Virtual Visit Details  Type of service:  Video Visit   Originating Location (pt. Location): Home  Distant Location (provider location):  Off-site  Platform used for Video Visit: Acrinta         Assessment/plan:   Christina Santana is a 44 year old female with PMHx significant for dysphagia, GERD with stricture, myopia, astigmatism and PMS2-related Reed syndrome who presents for concerns regarding hepatic steatosis in the setting of potentially becoming a kidney transplant donor.  Referred by nephrology team.    Hepatic steatosis, suspect metabolic associated  Asymptomatic gallstones and mildly dilated CBD    No known family hx of liver disease/liver cancer; no hx of liver biopsy    During summer months, consumes, on average, 4 alcoholic beverages per month; no hx of alcohol abuse/dependency    Patient's known risk factors for metabolic associated fatty liver disease include prediabetes and dyslipidemia    US abd limited 5/10/2024: Diffuse hepatic steatosis.  Cholelithiasis.  Mild CBD dilation.    No history FibroScan    FIB-4 Calculation: 0.85 at 6/11/2024   SGOT/AST: 22 U/L at 6/11/2024    SGPT/ALT: 27 U/L at 6/11/2024    Platelets: 214 10e3/uL at 6/11/2024   Age: 44 years    Recent labs from 6/11/2024: INR, PLT, T. bili, albumin, alk phos, ALT, AST all within normal limits    PLAN:  -Reviewed recent labs and imaging  -Ordered FibroScan in the setting of hepatic steatosis  -No additional labs at this time with normal liver enzymes  -Discussed metabolic associated fatty liver disease and  recommended the following:    - Exercise regularly: at least 4 times per week, with a minimum of 40-45 minutes per day.               - It has shown that patients who exercise regularly can have improvement of insulin resistance, increase in baseline metabolic activity and resolution of fatty liver disease (even if  appreciable weight loss does not occur)    - Low-carb, low-calorie diet (~1700 calories per day).    - An ideal weight loss plan would be to lose 7-10% of body weight over the next six months.  This has been proven to resolve fat and inflammation in the liver, and can lead to regression of scarring that might be present  - If the patient has diabetes, we recommend assertive management: ideal goal Hgb A1c goal of =/< 7%.     - There are no overt contraindications to medications used in the treatment of diabetes in persons with chronic liver disease  - Management of cholesterol is also very important.    - The use of statins are an effective means of therapy and are not contra-indicated in those with abnormal liver tests OR those with cirrhosis.  The value of these medications in this population far outweigh the minor risks of abnormal liver tests.   - Goal LDL in those with GIRALDO are < 100 mg/dL  - Consider utility of liberalizing coffee consumption as some data that this may slow progression and reverse effects of GIRALDO-related fibrosis.    No Hepatology follow up scheduled at this time; follow up will be determined based on fibroscan results      Shannon Murrieta PA-C  [facility]   -----------------------------------------------------         HPI:     Patient presents today virtually for consult.  Referred by nephrology team.  Patient states she is being worked up for potentially being a kidney transplant donor for a friend.  Patient states she was initially told about fatty liver May 2024.  Has never been diagnosed with any other underlying liver disease or condition in the  past.  Has never had a liver biopsy.  Denies known family history of liver disease or liver cancer.    Denies any recent yellowing of eyes or skin.  No recent/ongoing abdominal pain.  No fevers, chills, nausea or vomiting.  Passing stool daily.  No bright red blood per rectum or melena.  Reports appetite is good/fair.  Overall, reports eating a relatively healthy diet.  No recent significant changes to her diet.  Weight as of late has been stable around 185-190 LBS.  Patient states her heaviest weight (outside of pregnancy) was about 2 years ago when she weighed 193/195 LBS.  Patient states she had a hysterectomy in 2021 and reports weight gradually went up after that.  Lately for exercise has been walking for 30-60 minutes 3 days/week.  With the warmer weather as of late has also been swimming.    Abdominal surgical history includes appendectomy and total hysterectomy.    Denies current/former tobacco use. No hx of IV drug use. No current illicit drug use. Denies hx of alcohol abuse/dependency. As of late with warmer weather, tends to consume 1 alcoholic beverage per week.    PMH:    has a past medical history of ASCUS with positive high risk HPV (12/27/2013), HSIL (high grade squamous intraepithelial lesion) on Pap smear of cervix (04/22/2013), Pap smear of cervix with ASCUS, cannot exclude HGSIL (5/23/11, 8/10/12, ), and PMS2-related Reed syndrome (HNPCC4).     SMH:    has a past surgical history that includes appendectomy; as repair paraesophageal hiatal hernia,laparotomy, w mesh; Laparoscopic hysterectomy total, bilateral salpingo-oophorectomy, combined (N/A, 02/24/2021); Colonoscopy (N/A, 06/10/2021); Exam under anesthesia pelvic (N/A, 12/10/2021); Biopsy vulva (N/A, 12/10/2021); Esophagoscopy, gastroscopy, duodenoscopy (EGD), combined (N/A, 12/01/2022); Colonoscopy (N/A, 12/01/2022); Colonoscopy (N/A, 12/04/2023); and carpal tunnel release rt/lt (Right).     Medications:   Current Outpatient Medications    Medication Sig Dispense Refill     PROGESTERONE 100MG/G CREAM Apply 0.5 g topically 2 times daily       No current facility-administered medications for this visit.     Recent Labs   Lab Test 06/11/24  0800 05/03/24  0635 08/05/21  1515 02/25/20  1426 01/01/19  2158   ALKPHOS 72 75 63 56 58   ALT 27 25 32 22 20   AST 22 23 17 13 25   BILITOTAL 0.5 0.4 0.2 0.2 0.8           Allergies:     Allergies   Allergen Reactions     Latex Headache     Other Drug Allergy (See Comments) Hives     Possible Lidocaine family allergy (noticed during procedure--numbing-type medication) HIVES          Social History:     Social History     Socioeconomic History     Marital status:      Spouse name: Thai     Number of children: 6     Years of education: Not on file     Highest education level: Not on file   Occupational History     Occupation: Working on CNA training     Employer: [employer]     Comment: Works in several roles: security, 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 ~4 per month; no hx of alcohol abuse/dependency     Drug use: No     Comment: no hx IVDU     Sexual activity: Yes     Partners: Male     Birth control/protection: Female Surgical   Other Topics Concern     Parent/sibling w/ CABG, MI or angioplasty before 65F 55M? Not Asked   Social History Narrative     Not on file     Social Determinants of Health     Financial Resource Strain: High Risk (1/1/2022)    Received from [facility], [facility]    Financial Resource Strain      Difficulty of Paying Living Expenses: Not on file      Difficulty of Paying Living Expenses: Not on file   Food Insecurity: Not on file   Transportation Needs: Not on file   Physical Activity: Not on file   Stress: Not  on file   Social Connections: Unknown (2022)    Received from [facility], [facility]    Social Connections      Frequency of Communication with Friends and Family: Not on file   Interpersonal Safety: Not on file   Housing Stability: Not on file            Family History:     Family History   Problem Relation Age of Onset     Diabetes Mother      Fibroids Mother      Reed Syndrome Mother         PMS2+     Colon Polyps Mother         removed 5 feet of colon for unmanageable polyps;  in her sleep at 67, took a nape and never woke up,     Reed Syndrome Brother         PMS2+     Bladder Cancer Maternal Grandfather 70        lived to be 94     Kidney Cancer Maternal Grandfather      Breast Cancer Paternal Grandmother         postmenopausal     Ovarian Cancer Maternal Aunt 40        possible uterine cancer also; hx of fibroids     Ovarian Cancer Maternal Aunt 40        possible uterine cancer also; hx of fibroids     Breast Cancer Paternal Aunt 54     Liver Disease No family hx of      Liver Cancer No family hx of           Review of Systems:   Gen: See HPI          Physical Exam:     Gen: A&Ox3, NAD  HEENT: Sclera anicteric   Skin: No jaundice  Neuro: grossly intact  Psych: appropriate mood and affects         Data:   Reviewed in person and significant for:    Lab Results   Component Value Date     2024     2021      Lab Results   Component Value Date    POTASSIUM 4.3 2024    POTASSIUM 3.8 2021    POTASSIUM 4.8 2021     Lab Results   Component Value Date    CHLORIDE 105 2024    CHLORIDE 107 2021    CHLORIDE 110 2021     Lab Results   Component Value Date    CO2 23 2024    CO2 28 2021    CO2 25 2021     Lab Results   Component Value Date    BUN 13.8 2024    BUN 10 2021    BUN 13 2021     Lab Results   Component Value Date    CR 0.72 2024    CR  "0.62 02/24/2021       Lab Results   Component Value Date    WBC 5.7 06/11/2024    WBC 7.4 02/24/2021     Lab Results   Component Value Date    HGB 14.6 06/11/2024    HGB 13.7 02/24/2021     Lab Results   Component Value Date    HCT 43.1 06/11/2024    HCT 40.5 02/24/2021     Lab Results   Component Value Date    MCV 89 06/11/2024    MCV 93 02/24/2021     Lab Results   Component Value Date     06/11/2024     02/24/2021       Lab Results   Component Value Date    AST 22 06/11/2024    AST 13 02/25/2020     Lab Results   Component Value Date    ALT 27 06/11/2024    ALT 22 02/25/2020     No results found for: \"BILICONJ\"   Lab Results   Component Value Date    BILITOTAL 0.5 06/11/2024    BILITOTAL 0.2 02/25/2020       Lab Results   Component Value Date    ALBUMIN 4.4 06/11/2024    ALBUMIN 3.7 08/05/2021    ALBUMIN 3.7 02/25/2020     Lab Results   Component Value Date    PROTTOTAL 7.5 06/11/2024    PROTTOTAL 7.3 02/25/2020      Lab Results   Component Value Date    ALKPHOS 72 06/11/2024    ALKPHOS 56 02/25/2020       Lab Results   Component Value Date    INR 0.99 06/11/2024         Imaging:      US ABDOMEN LIMITED 5/10/2024 11:21 AM     CLINICAL HISTORY: potential kidney donor. Just need LIVER ultrasound.  fatty liver noted from CTA renal; Transplant donor evaluation  TECHNIQUE: Limited abdominal ultrasound.     COMPARISON: CT abdomen 05/03/2024     FINDINGS:     GALLBLADDER: Cholelithiasis. No gallbladder wall thickening, or  pericholecystic fluid. Negative sonographic Ly's sign.     BILE DUCTS: There is no intrahepatic biliary dilatation. The common  duct measures 8mm.     LIVER: Increased echogenicity consistent with steatosis. No focal  mass.     RIGHT KIDNEY: No hydronephrosis.     PANCREAS: The visualized portions of the pancreas are normal.     No ascites.                                                                  IMPRESSION:  1.  Diffuse hepatic steatosis.  2.  Cholelithiasis.  3.  Mild " common bile duct dilatation. Correlate with liver enzymes.     WINNIE GREENE MD       Again, thank you for allowing me to participate in the care of your patient.        Sincerely,        Shannon Murrieta PA-C

## 2024-06-17 NOTE — NURSING NOTE
Is the patient currently in the state of [place]? YES    Visit mode:VIDEO    If the visit is dropped, the patient can be reconnected by: VIDEO VISIT: Text to cell phone:   Telephone Information:   Mobile [phone]       Will anyone else be joining the visit? NO  (If patient encounters technical issues they should call [phone])    How would you like to obtain your AVS? MyChart    Are changes needed to the allergy or medication list? No    Are refills needed on medications prescribed by this physician? NO    Reason for visit: Consult    Joesph ENNIS

## 2024-06-17 NOTE — PROGRESS NOTES
Hepatology Clinic Note  Christina Santana   Date of Birth 1980    REASON FOR CONSULT: Transplant donor evaluation, hepatic steatosis  REFERRING PROVIDER: Dr. Daniel Tilley MD    Virtual Visit Details  Type of service:  Video Visit   Originating Location (pt. Location): Home  Distant Location (provider location):  Off-site  Platform used for Video Visit: [employer]mi[employer]         Assessment/plan:   Christina Santana is a 44 year old female with PMHx significant for dysphagia, GERD with stricture, myopia, astigmatism and PMS2-related Reed syndrome who presents for concerns regarding hepatic steatosis in the setting of potentially becoming a kidney transplant donor.  Referred by nephrology team.    Hepatic steatosis, suspect metabolic associated  Asymptomatic gallstones and mildly dilated CBD    No known family hx of liver disease/liver cancer; no hx of liver biopsy    During summer months, consumes, on average, 4 alcoholic beverages per month; no hx of alcohol abuse/dependency    Patient's known risk factors for metabolic associated fatty liver disease include prediabetes and dyslipidemia    US abd limited 5/10/2024: Diffuse hepatic steatosis.  Cholelithiasis.  Mild CBD dilation.    No history FibroScan    FIB-4 Calculation: 0.85 at 6/11/2024   SGOT/AST: 22 U/L at 6/11/2024    SGPT/ALT: 27 U/L at 6/11/2024    Platelets: 214 10e3/uL at 6/11/2024   Age: 44 years    Recent labs from 6/11/2024: INR, PLT, T. bili, albumin, alk phos, ALT, AST all within normal limits    PLAN:  -Reviewed recent labs and imaging  -Ordered FibroScan in the setting of hepatic steatosis  -No additional labs at this time with normal liver enzymes  -Discussed metabolic associated fatty liver disease and recommended the following:    - Exercise regularly: at least 4 times per week, with a minimum of 40-45 minutes per day.               - It has shown that patients who exercise regularly can have improvement of insulin resistance, increase in baseline  metabolic activity and resolution of fatty liver disease (even if  appreciable weight loss does not occur)    - Low-carb, low-calorie diet (~1700 calories per day).    - An ideal weight loss plan would be to lose 7-10% of body weight over the next six months.  This has been proven to resolve fat and inflammation in the liver, and can lead to regression of scarring that might be present  - If the patient has diabetes, we recommend assertive management: ideal goal Hgb A1c goal of =/< 7%.     - There are no overt contraindications to medications used in the treatment of diabetes in persons with chronic liver disease  - Management of cholesterol is also very important.    - The use of statins are an effective means of therapy and are not contra-indicated in those with abnormal liver tests OR those with cirrhosis.  The value of these medications in this population far outweigh the minor risks of abnormal liver tests.   - Goal LDL in those with GIRALDO are < 100 mg/dL  - Consider utility of liberalizing coffee consumption as some data that this may slow progression and reverse effects of GIRALDO-related fibrosis.    No Hepatology follow up scheduled at this time; follow up will be determined based on fibroscan results      Shannon Murrieta PA-C  [facility]   -----------------------------------------------------         HPI:     Patient presents today virtually for consult.  Referred by nephrology team.  Patient states she is being worked up for potentially being a kidney transplant donor for a friend.  Patient states she was initially told about fatty liver May 2024.  Has never been diagnosed with any other underlying liver disease or condition in the past.  Has never had a liver biopsy.  Denies known family history of liver disease or liver cancer.    Denies any recent yellowing of eyes or skin.  No recent/ongoing abdominal pain.  No fevers, chills, nausea or vomiting.  Passing stool daily.  No  bright red blood per rectum or melena.  Reports appetite is good/fair.  Overall, reports eating a relatively healthy diet.  No recent significant changes to her diet.  Weight as of late has been stable around 185-190 LBS.  Patient states her heaviest weight (outside of pregnancy) was about 2 years ago when she weighed 193/195 LBS.  Patient states she had a hysterectomy in 2021 and reports weight gradually went up after that.  Lately for exercise has been walking for 30-60 minutes 3 days/week.  With the warmer weather as of late has also been swimming.    Abdominal surgical history includes appendectomy and total hysterectomy.    Denies current/former tobacco use. No hx of IV drug use. No current illicit drug use. Denies hx of alcohol abuse/dependency. As of late with warmer weather, tends to consume 1 alcoholic beverage per week.    PMH:    has a past medical history of ASCUS with positive high risk HPV (12/27/2013), HSIL (high grade squamous intraepithelial lesion) on Pap smear of cervix (04/22/2013), Pap smear of cervix with ASCUS, cannot exclude HGSIL (5/23/11, 8/10/12, ), and PMS2-related Reed syndrome (HNPCC4).     SMH:    has a past surgical history that includes appendectomy; as repair paraesophageal hiatal hernia,laparotomy, w mesh; Laparoscopic hysterectomy total, bilateral salpingo-oophorectomy, combined (N/A, 02/24/2021); Colonoscopy (N/A, 06/10/2021); Exam under anesthesia pelvic (N/A, 12/10/2021); Biopsy vulva (N/A, 12/10/2021); Esophagoscopy, gastroscopy, duodenoscopy (EGD), combined (N/A, 12/01/2022); Colonoscopy (N/A, 12/01/2022); Colonoscopy (N/A, 12/04/2023); and carpal tunnel release rt/lt (Right).     Medications:   Current Outpatient Medications   Medication Sig Dispense Refill    PROGESTERONE 100MG/G CREAM Apply 0.5 g topically 2 times daily       No current facility-administered medications for this visit.     Recent Labs   Lab Test 06/11/24  0800 05/03/24  0635 08/05/21  1515 02/25/20  1426  01/01/19 2158   ALKPHOS 72 75 63 56 58   ALT 27 25 32 22 20   AST 22 23 17 13 25   BILITOTAL 0.5 0.4 0.2 0.2 0.8           Allergies:     Allergies   Allergen Reactions    Latex Headache    Other Drug Allergy (See Comments) Hives     Possible Lidocaine family allergy (noticed during procedure--numbing-type medication) HIVES          Social History:     Social History     Socioeconomic History    Marital status:      Spouse name: Thai    Number of children: 6    Years of education: Not on file    Highest education level: Not on file   Occupational History    Occupation: Working on CNA training     Employer: [employer]     Comment: Works in several roles: security, 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 ~4 per month; no hx of alcohol abuse/dependency    Drug use: No     Comment: no hx IVDU    Sexual activity: Yes     Partners: Male     Birth control/protection: Female Surgical   Other Topics Concern    Parent/sibling w/ CABG, MI or angioplasty before 65F 55M? Not Asked   Social History Narrative    Not on file     Social Determinants of Health     Financial Resource Strain: High Risk (1/1/2022)    [facility]    Financial Resource Strain     Difficulty of Paying Living Expenses: Not on file     Difficulty of Paying Living Expenses: Not on file   Food Insecurity: Not on file   Transportation Needs: Not on file   Physical Activity: Not on file   Stress: Not on file   Social Connections: Unknown (1/1/2022)    [facility], [facility]    Social Connections     Frequency of Communication with Friends and Family: Not on file   Interpersonal Safety:  Not on file   Housing Stability: Not on file            Family History:     Family History   Problem Relation Age of Onset    Diabetes Mother     Fibroids Mother     Reed Syndrome Mother         PMS2+    Colon Polyps Mother         removed 5 feet of colon for unmanageable polyps;  in her sleep at 67, took a nape and never woke up,    Reed Syndrome Brother         PMS2+    Bladder Cancer Maternal Grandfather 70        lived to be 94    Kidney Cancer Maternal Grandfather     Breast Cancer Paternal Grandmother         postmenopausal    Ovarian Cancer Maternal Aunt 40        possible uterine cancer also; hx of fibroids    Ovarian Cancer Maternal Aunt 40        possible uterine cancer also; hx of fibroids    Breast Cancer Paternal Aunt 54    Liver Disease No family hx of     Liver Cancer No family hx of           Review of Systems:   Gen: See HPI          Physical Exam:     Gen: A&Ox3, NAD  HEENT: Sclera anicteric   Skin: No jaundice  Neuro: grossly intact  Psych: appropriate mood and affects         Data:   Reviewed in person and significant for:    Lab Results   Component Value Date     2024     2021      Lab Results   Component Value Date    POTASSIUM 4.3 2024    POTASSIUM 3.8 2021    POTASSIUM 4.8 2021     Lab Results   Component Value Date    CHLORIDE 105 2024    CHLORIDE 107 2021    CHLORIDE 110 2021     Lab Results   Component Value Date    CO2 23 2024    CO2 28 2021    CO2 25 2021     Lab Results   Component Value Date    BUN 13.8 2024    BUN 10 2021    BUN 13 2021     Lab Results   Component Value Date    CR 0.72 2024    CR 0.62 2021       Lab Results   Component Value Date    WBC 5.7 2024    WBC 7.4 2021     Lab Results   Component Value Date    HGB 14.6 2024    HGB 13.7 2021     Lab Results   Component Value Date    HCT 43.1 2024    HCT 40.5 2021     Lab Results  "  Component Value Date    MCV 89 06/11/2024    MCV 93 02/24/2021     Lab Results   Component Value Date     06/11/2024     02/24/2021       Lab Results   Component Value Date    AST 22 06/11/2024    AST 13 02/25/2020     Lab Results   Component Value Date    ALT 27 06/11/2024    ALT 22 02/25/2020     No results found for: \"BILICONJ\"   Lab Results   Component Value Date    BILITOTAL 0.5 06/11/2024    BILITOTAL 0.2 02/25/2020       Lab Results   Component Value Date    ALBUMIN 4.4 06/11/2024    ALBUMIN 3.7 08/05/2021    ALBUMIN 3.7 02/25/2020     Lab Results   Component Value Date    PROTTOTAL 7.5 06/11/2024    PROTTOTAL 7.3 02/25/2020      Lab Results   Component Value Date    ALKPHOS 72 06/11/2024    ALKPHOS 56 02/25/2020       Lab Results   Component Value Date    INR 0.99 06/11/2024         Imaging:      US ABDOMEN LIMITED 5/10/2024 11:21 AM     CLINICAL HISTORY: potential kidney donor. Just need LIVER ultrasound.  fatty liver noted from CTA renal; Transplant donor evaluation  TECHNIQUE: Limited abdominal ultrasound.     COMPARISON: CT abdomen 05/03/2024     FINDINGS:     GALLBLADDER: Cholelithiasis. No gallbladder wall thickening, or  pericholecystic fluid. Negative sonographic Ly's sign.     BILE DUCTS: There is no intrahepatic biliary dilatation. The common  duct measures 8mm.     LIVER: Increased echogenicity consistent with steatosis. No focal  mass.     RIGHT KIDNEY: No hydronephrosis.     PANCREAS: The visualized portions of the pancreas are normal.     No ascites.                                                                  IMPRESSION:  1.  Diffuse hepatic steatosis.  2.  Cholelithiasis.  3.  Mild common bile duct dilatation. Correlate with liver enzymes.     WINNIE GREENE MD   "

## 2024-06-21 ENCOUNTER — TELEPHONE (OUTPATIENT)
Dept: GASTROENTEROLOGY | Facility: CLINIC | Age: 44
End: 2024-06-21
Payer: COMMERCIAL

## 2024-07-10 NOTE — PROGRESS NOTES
Virtual Visit Details    Type of service:  Video Visit     Originating Location (pt. 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 She has mitigated her risk for gynecological cancers by having a THBSO in . All pathology was benign.     Primary Physician: Alphonse Arellano MD     History Of Present Illness:  Ms. Santana is a very pleasant, healthy 44 year old female who presents with PMS2+ associated Reed Syndrome.     Genetic Testin2021-  POSITIVE for a PMS2 likely pathogenic mutation. Specifically her mutation is called p.E705K (also known as c.2113G>A) identified using Cancer Next-Expanded testing from [employer].     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 This variant is called p.A417S (c.1249G>T). No other variants or mutations were found in the STK11 gene. Given the uncertain significance of this result, medical management decisions should NOT be made based on this test result alone.     Christina is negative for known, pathogenic mutations in the AIP, ALK, APC, CARINA, AXIN2, BAP1, BARD1, BLM, BMPR1A, BRCA1, BRCA2, BRIP1, CDC73, CDH1, CDK4, CDKN1B, CDKN2A, CHEK2, CTNNA1, DICER1, EPCAM, EGFR, EGLN1, FANCC, FH, FLCN, GALNT12, GREM1, HOXB13, KIF1B, KIT, LZTR1, MAX, MEN1, MET, MITF, MLH1, MRE11, MSH2, MSH3, MSH6, MUTYH, NBN, NF1, NF2, NTHL1, PALB2, PDGFRA, PHOX2B, POLD1, POLE, POT1, USYSF3N, PTCH1, PTEN, RAD50, RAD51C, RAD51D, RB1, RECQL, RET, SDHA, SDHAF2, SDHB, SDHC, SDHD, SMAD4, SMARCA4, SMARCB1, SMARCE1, STK11, SUFU, UNCJ075, TP53, TSC1, TSC2, VHL, and XRCC2 genes. No pathogenic mutations were found in any  of the other 76 of 77 genes analyzed. This test involved sequencing and deletion/duplication analysis of all genes with the exceptions of VHL and XRCC2 (sequencing and deletion/duplication); EGFR, EGLN1, HOXB13, KIT, MITF, PDGFRA, POLD1 and POLE (sequencing only); EPCAM and GREM1 (deletion/duplication only).     History:    s/p  x6  2021- Total laparoscopic hysterectomy and bilateral salpingo oophorectomy (Dr. Dylon Alcantar), pathology benign     12/10/2021- Vaginal cuff at 9:00, biopsies:  - Benign acutely and chronically inflamed granulation tissue.  - No vaginal tissue identified.     B.  Vaginal cuff at 3:00, biopsy:  - Benign acutely and chronically inflamed granulation tissue.  - A portion of benign vaginal tissue with normal mature squamous epithelium and benign stroma with focal moderate chronic inflammation.      Screening history:  EGD 10/31/2018- Dysphagia by Dr Harris   1. LA Grade A (one or more mucosal breaks less than 5 mm, not extending between tops of 2 mucosal folds) esophagitis with no bleeding was found 34 to 36 cm from the incisors.  ESOPHAGUS, DISTAL, BIOPSY:   1. Esophageal eosinophilia (peak count of 10), see comment : The differential diagnosis primarily includes eosinophilic esophagitis (EoE), proton-pump inhibitor-responsive esophageal eosinophilia (PPI-REE) and gastroesophageal reflux disease (GERD) with more eosinophils than typical. ACG guidelines recommend further endoscopic evaluation with re-biopsy after a two month trial of high dose proton-pump inhibitor (PPI) therapy to further investigate these three possibilities  2. Negative for columnar mucosa  3. A medium-sized hiatal hernia was present. The ampulla, duodenal bulb, first portion of the duodenum and second portion of the duodenum were normal. One benign-appearing, intrinsic mild stenosis was found 36 to 37 cm from the incisors. tenosis measured 1 cm (in length). The stenosis was traversed. A TTS dilator was  passed through the scope. Dilation with a 12-13.5-15 mm balloon dilator was performed to 15 mm. The dilation site was examined and showed moderate mucosal disruption.        6/10/2021- Colonoscopy (Dr. Garcia, [place]), Hemorrhoids found on perianal exam.  One 2 mm hyperplastic polyp in the rectum  12/1/2022- Colonoscopy and EGD combined ([place]):  Colon normal. No specimens taken                Impression: Normal esophagus. Normal duodenum. Normal stomach. Biopsied. Pathology: Stomach, antrum: Biopsy: Antral-type mucosa with reactive gastropathy, see comment. No Helicobacter pylori-like organisms seen on H&E examination  12/4/2023: Colonoscopy (Dr. Garcia, [place]), normal exam. No specimens collected.   4/15/2024: EGD at [facility], Mildly erythematous gastric mucosa without bleeding. Biopsys:  A) STOMACH, BIOPSY:   1. Non-erosive reactive gastropathy (see comment)      a. Sampling: Antral and body mucosae      b. Distribution: Antral mucosa   2. Negative for inflammation, atrophy and Helicobacter     B) GE JUNCTION, POLYP, BIOPSY:   1. Polypoid foveolar hyperplasia of the cardia, see comment   2. Negative for intestinal metaplasia and dysplasia   3. No squamous mucosa is present in this sample         Past Medical/Surgical History:  Past Medical History:   Diagnosis Date    ASCUS with positive high risk HPV 12/27/2013    HSIL (high grade squamous intraepithelial lesion) on Pap smear of cervix 04/22/2013    Pap smear of cervix with ASCUS, cannot exclude HGSIL 5/23/11, 8/10/12,     PMS2-related Reed syndrome (HNPCC4)      Past Surgical History:   Procedure Laterality Date    APPENDECTOMY      AS REPAIR PARAESOPHAGEAL HIATAL HERNIA,LAPAROTOMY, W MESH      BIOPSY VULVA N/A 12/10/2021    Procedure: Vaginal cuff biopsy;  Surgeon: Leyda Guzman DO;  Location: [facility]    CARPAL TUNNEL RELEASE RT/LT Right     COLONOSCOPY N/A 06/10/2021    Procedure: COLONOSCOPY, WITH POLYPECTOMY;  Surgeon: Adal  Willi Brewer DO;  Location: [facility]    COLONOSCOPY N/A 2022    Procedure: COLONOSCOPY;  Surgeon: Willi Galeas DO;  Location: [facility]    COLONOSCOPY N/A 2023    Procedure: Colonoscopy;  Surgeon: Willi Galeas DO;  Location: [facility]    ESOPHAGOSCOPY, GASTROSCOPY, DUODENOSCOPY (EGD), COMBINED N/A 2022    Procedure: ESOPHAGOGASTRODUODENOSCOPY (EGD) with biopsy;  Surgeon: Willi Galeas DO;  Location: [facility]    EXAM UNDER ANESTHESIA PELVIC N/A 12/10/2021    Procedure: EXAM UNDER ANESTHESIA, PELVIS;  Surgeon: Leyda Guzman DO;  Location: [facility]    LAPAROSCOPIC HYSTERECTOMY TOTAL, BILATERAL SALPINGO-OOPHORECTOMY, COMBINED N/A 2021    Procedure: HYSTERECTOMY, TOTAL, LAPAROSCOPIC, WITH SALPINGO-OOPHORECTOMY;  Surgeon: Dylon Alcantar MD;  Location: [facility]       Allergies:  Allergies as of 07/15/2024 - Reviewed 07/15/2024   Allergen Reaction Noted    Latex Headache 2021    Other drug allergy (see comments) Hives 2024       Current Medications:  Current Outpatient Medications   Medication Sig Dispense Refill    PROGESTERONE 100MG/G CREAM Apply 0.5 g topically 2 times daily          Family History:  Family History   Problem Relation Age of Onset    Diabetes Mother     Fibroids Mother     Reed Syndrome Mother         PMS2+    Colon Polyps Mother         removed 5 feet of colon for unmanageable polyps;  in her sleep at 67, took a nape and never woke up,    Reed Syndrome Brother         PMS2+    Bladder Cancer Maternal Grandfather 70        lived to be 94    Kidney Cancer Maternal Grandfather     Breast Cancer Paternal Grandmother         postmenopausal    Ovarian Cancer Maternal Aunt 40        possible uterine cancer also; hx of fibroids    Ovarian Cancer Maternal Aunt 40        possible uterine cancer also; hx of fibroids    Breast Cancer Paternal Aunt 54    Liver Disease No family hx of     Liver Cancer No family hx of        Social History:  Social  "History     Socioeconomic History    Marital status:      Spouse name: Thai    Number of children: 6    Years of education: Not on file    Highest education level: Not on file   Occupational History    Occupation: Working on CNA training     Employer: [employer]     Comment: Works in several roles: security, 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 ~4 per month; no hx of alcohol abuse/dependency    Drug use: No     Comment: no hx IVDU    Sexual activity: Yes     Partners: Male     Birth control/protection: Female Surgical   Other Topics Concern    Parent/sibling w/ CABG, MI or angioplasty before 65F 55M? Not Asked   Social History Narrative    Not on file     Social Determinants of Health     Financial Resource Strain: High Risk (1/1/2022)    [facility] from [facility]    Financial Resource Strain     Difficulty of Paying Living Expenses: Not on file     Difficulty of Paying Living Expenses: Not on file   Food Insecurity: Not on file   Transportation Needs: Not on file   Physical Activity: Not on file   Stress: Not on file   Social Connections: Unknown (1/1/2022)    Received from [facility]    Social Connections     Frequency of Communication with Friends and Family: Not on file   Interpersonal Safety: Not on file   Housing Stability: Not on file       Physical Exam:  Ht 1.67 m (5' 5.75\")   Wt 85.3 kg (188 lb)   LMP 02/03/2021   BMI 30.58 kg/m    GENERAL: alert and no distress  EYES: Eyes grossly normal to inspection.  No discharge or erythema, or obvious scleral/conjunctival abnormalities.  RESP: No audible wheeze, cough, or visible cyanosis.    SKIN: " Visible skin clear. No significant rash, abnormal pigmentation or lesions.  NEURO: Cranial nerves grossly intact.  Mentation and speech appropriate for age.  PSYCH: Appropriate affect, tone, and pace of words      Laboratory/Imaging Studies  No results found for any visits on 07/15/24.    ASSESSMENT    Christina reports that she is doing well a this visit. She has no updates to her family's medical history and no changes or concerns with her health. She has no changes to her bowel or bladder function, no upper GI complaints. She had a recent EGD at [facility], which was positive for a 12mm polyp in her GE junction. The polyp was biopsied, but not removed. I have referred her to Dr. Alexanedr for her next colonoscopy and an EGD to consider removal of this polyp.     We reviewed the screening plan below.  Her urine labs are still in process. We will also repeat these when I see her back in one year. She is in agreement with this plan.       INDIVIDUALIZED CANCER RISK MANAGEMENT PLAN:    Individualized Surveillance Plan for Reed Syndrome   (MSH6+ and PMS2+ mutation carriers)   Based on NCCN Guidelines Version 2.2024   Type of Screening Recommendation Last Done Next Due   Colon Cancer Screening Colonoscopy at age 30-35 years or 2-5 years prior to the earliest colon cancer in the family if it is diagnosed prior to age 30.     Repeat every 1-2 years.    12/4/2023: colonoscopy, normal   Repeat in December 2024   Endometrial and Ovarian Cancer Consider Prophylactic hysterectomy and bilateral salpingo-oophorectomy (BSO) can be considered by women who have completed childbearing.    Insufficient evidence exists to make specific recommendation for RRSO in MSH6+ and PMS2+ carriers.   NA, surgery complete   NA    Screening using  endometrial sampling is an option every 1-2 years; women should be aware that dysfunctional uterine bleeding warrants evaluation.    Data do not support ovarian cancer screening for Reed Syndrome. Annual  transvaginal ultrasound and  tests may be considered at a clinician's discretion.   NA   NA   Gastric and small bowel cancer Upper GI screening every 2-4 years, starting at age 30 for MSH6+ carriers    PMS2+ carriers - Selected individuals or families or those of  descent may consider EGD with extended duodenoscopy every 3-5 years beginning at age 40.   4/15/2024: EGD at [facility], one GE junction biopsy   EGD in December with colonoscopy to consider removal of GE junction polyp   Urothelial cancer Consider annual urinalysis at age 30-35 in MSH6+ families with family history of urothelial cancers 7/12/2024: UA negative, cytology in process Repeat in July 2025   Pancreatic screening for MSH6+ with 1st or 2nd degree relatives with pancreatic cancer on Reed side of family Annual contrast-enhanced MRI/MRCP and/or EUS, with consideration of shorter screening intervals for individuals found to have worrisome abnormalities on screening.     Most small cystic lesions found on screening will not warrant biopsy, surgical resection, or any other intervention.   No history of pancreatic cancer   Review at future visits   Prostate Screening  Annual PSA test with general population screening; may begin earlier if younger prostate cancers in the family   NA   NA   Central Nervous System cancer Annual physical/neurological examinations starting at age 25-30. July 2024 July 2025       There are data to suggest that aspirin may decrease the risk of colon cancer in Reed Syndrome.  However, optimal dose and duration of aspirin therapy is uncertain.    There have been suggestions that there is an increased risk for breast cancer in Reed Syndrome patients; however, there is not enough evidence to support increased screening above average risk breast cancer screening.           I spent a total of 30 minutes on the day of the visit. Please see the note for further information on patient assessment and treatment.     Sabrina  ASAD Wayne, APRN, AGCNS-BC  Clinical Nurse Specialist  Cancer Risk Management Program  [facility]    Cc:  Alphonse Arellano MD

## 2024-07-12 ENCOUNTER — LAB (OUTPATIENT)
Dept: LAB | Facility: CLINIC | Age: 44
End: 2024-07-12
Payer: COMMERCIAL

## 2024-07-12 DIAGNOSIS — Z12.6 SCREENING FOR BLADDER CANCER: ICD-10-CM

## 2024-07-12 DIAGNOSIS — Z15.09 PMS2-RELATED LYNCH SYNDROME (HNPCC4): Primary | ICD-10-CM

## 2024-07-12 DIAGNOSIS — Z15.09 PMS2-RELATED LYNCH SYNDROME (HNPCC4): ICD-10-CM

## 2024-07-12 LAB
ALBUMIN UR-MCNC: NEGATIVE MG/DL
APPEARANCE UR: CLEAR
BACTERIA #/AREA URNS HPF: ABNORMAL /HPF
BILIRUB UR QL STRIP: NEGATIVE
COLOR UR AUTO: YELLOW
GLUCOSE UR STRIP-MCNC: NEGATIVE MG/DL
HGB UR QL STRIP: NEGATIVE
KETONES UR STRIP-MCNC: NEGATIVE MG/DL
LEUKOCYTE ESTERASE UR QL STRIP: NEGATIVE
NITRATE UR QL: NEGATIVE
PH UR STRIP: 6 [PH] (ref 5–7)
RBC URINE: 1 /HPF
SP GR UR STRIP: 1.01 (ref 1–1.03)
SQUAMOUS EPITHELIAL: <1 /HPF
UROBILINOGEN UR STRIP-MCNC: NORMAL MG/DL
WBC URINE: <1 /HPF

## 2024-07-12 PROCEDURE — 81001 URINALYSIS AUTO W/SCOPE: CPT

## 2024-07-12 PROCEDURE — 88112 CYTOPATH CELL ENHANCE TECH: CPT | Performed by: PATHOLOGY

## 2024-07-15 ENCOUNTER — VIRTUAL VISIT (OUTPATIENT)
Dept: ONCOLOGY | Facility: CLINIC | Age: 44
End: 2024-07-15
Payer: COMMERCIAL

## 2024-07-15 VITALS — WEIGHT: 188 LBS | HEIGHT: 66 IN | BODY MASS INDEX: 30.22 KG/M2

## 2024-07-15 DIAGNOSIS — Z15.09 PMS2-RELATED LYNCH SYNDROME (HNPCC4): Primary | ICD-10-CM

## 2024-07-15 DIAGNOSIS — Z12.11 SPECIAL SCREENING FOR MALIGNANT NEOPLASMS, COLON: ICD-10-CM

## 2024-07-15 DIAGNOSIS — Z12.6 SCREENING FOR BLADDER CANCER: ICD-10-CM

## 2024-07-15 PROCEDURE — 99214 OFFICE O/P EST MOD 30 MIN: CPT | Mod: 95

## 2024-07-15 ASSESSMENT — PAIN SCALES - GENERAL: PAINLEVEL: SEVERE PAIN (7)

## 2024-07-15 NOTE — NURSING NOTE
Current patient location: [id number] [facility] [address]    Is the patient currently in the state of [place]? YES    Visit mode:VIDEO    If the visit is dropped, the patient can be reconnected by: VIDEO VISIT: Text to cell phone:   Telephone Information:   Mobile [phone]       Will anyone else be joining the visit? NO  (If patient encounters technical issues they should call [phone]    How would you like to obtain your AVS? MyChart    Are changes needed to the allergy or medication list? Pt stated no changes to allergies and Pt stated no med changes    Are refills needed on medications prescribed by this physician? NO    Reason for visit: RECHMAYRA Childress LPN

## 2024-07-15 NOTE — LETTER
7/15/2024      Christina Santana  [address]  [facility] [address]      Dear Colleague,    Thank you for referring your patient, Christina Santana, to the [facility]. Please see a copy of my visit note below.    Virtual Visit Details    Type of service:  Video Visit     Originating Location (pt. Location): Home  Distant Location (provider location):  Off-site  Platform used for Video Visit: [facility]    Oncology Risk Management Consultation:  Date on this visit: 7/15/2024    Christina Santana requires high risk screening and surveillance to reduce her risk of cancer secondary to  PMS2+ associated Reed Syndrome. She has mitigated her risk for gynecological cancers by having a THBSO in . All pathology was benign.     Primary Physician: Alphonse Arellano MD     History Of Present Illness:  Ms. Santana is a very pleasant, healthy 44 year old female who presents with PMS2+ associated Reed Syndrome.     Genetic Testin2021-  POSITIVE for a PMS2 likely pathogenic mutation. Specifically her mutation is called p.E705K (also known as c.2113G>A) identified using Cancer Next-Expanded testing from [employer].     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 This variant is called p.A417S (c.1249G>T). No other variants or mutations were found in the STK11 gene. Given the uncertain significance of this result, medical management decisions should NOT be made based on this test result alone.     Christina is negative for known, pathogenic mutations in the AIP, ALK, APC, CARINA, AXIN2, BAP1, BARD1, BLM, BMPR1A, BRCA1, BRCA2, BRIP1, CDC73, CDH1, CDK4, CDKN1B, CDKN2A, CHEK2, CTNNA1, DICER1, EPCAM, EGFR, EGLN1, FANCC, FH, FLCN, GALNT12, GREM1, HOXB13, KIF1B, KIT, LZTR1, MAX, MEN1, MET, MITF, MLH1, MRE11, MSH2, MSH3, MSH6, MUTYH, NBN, NF1, NF2, NTHL1, PALB2, PDGFRA, PHOX2B, POLD1,  POLE, POT1, NCBXH8H, PTCH1, PTEN, RAD50, RAD51C, RAD51D, RB1, RECQL, RET, SDHA, SDHAF2, SDHB, SDHC, SDHD, SMAD4, SMARCA4, SMARCB1, SMARCE1, STK11, SUFU, RCUJ375, TP53, TSC1, TSC2, VHL, and XRCC2 genes. No pathogenic mutations were found in any of the other 76 of 77 genes analyzed. This test involved sequencing and deletion/duplication analysis of all genes with the exceptions of VHL and XRCC2 (sequencing and deletion/duplication); EGFR, EGLN1, HOXB13, KIT, MITF, PDGFRA, POLD1 and POLE (sequencing only); EPCAM and GREM1 (deletion/duplication only).     History:    s/p  x6  2021- Total laparoscopic hysterectomy and bilateral salpingo oophorectomy (Dr. Dylon Alcantar), pathology benign     12/10/2021- Vaginal cuff at 9:00, biopsies:  - Benign acutely and chronically inflamed granulation tissue.  - No vaginal tissue identified.     B.  Vaginal cuff at 3:00, biopsy:  - Benign acutely and chronically inflamed granulation tissue.  - A portion of benign vaginal tissue with normal mature squamous epithelium and benign stroma with focal moderate chronic inflammation.      Screening history:  EGD 10/31/2018- Dysphagia by Dr Harris   1. LA Grade A (one or more mucosal breaks less than 5 mm, not extending between tops of 2 mucosal folds) esophagitis with no bleeding was found 34 to 36 cm from the incisors.  ESOPHAGUS, DISTAL, BIOPSY:   1. Esophageal eosinophilia (peak count of 10), see comment : The differential diagnosis primarily includes eosinophilic esophagitis (EoE), proton-pump inhibitor-responsive esophageal eosinophilia (PPI-REE) and gastroesophageal reflux disease (GERD) with more eosinophils than typical. ACG guidelines recommend further endoscopic evaluation with re-biopsy after a two month trial of high dose proton-pump inhibitor (PPI) therapy to further investigate these three possibilities  2. Negative for columnar mucosa  3. A medium-sized hiatal hernia was present. The ampulla, duodenal bulb,  first portion of the duodenum and second portion of the duodenum were normal. One benign-appearing, intrinsic mild stenosis was found 36 to 37 cm from the incisors. tenosis measured 1 cm (in length). The stenosis was traversed. A TTS dilator was passed through the scope. Dilation with a 12-13.5-15 mm balloon dilator was performed to 15 mm. The dilation site was examined and showed moderate mucosal disruption.        6/10/2021- Colonoscopy (Dr. Garcia, [place]), Hemorrhoids found on perianal exam.  One 2 mm hyperplastic polyp in the rectum  12/1/2022- Colonoscopy and EGD combined ([place]):  Colon normal. No specimens taken                Impression: Normal esophagus. Normal duodenum. Normal stomach. Biopsied. Pathology: Stomach, antrum: Biopsy: Antral-type mucosa with reactive gastropathy, see comment. No Helicobacter pylori-like organisms seen on H&E examination  12/4/2023: Colonoscopy (Dr. Garcia, [place]), normal exam. No specimens collected.   4/15/2024: EGD at [facility], Mildly erythematous gastric mucosa without bleeding. Biopsys:  A) STOMACH, BIOPSY:   1. Non-erosive reactive gastropathy (see comment)      a. Sampling: Antral and body mucosae      b. Distribution: Antral mucosa   2. Negative for inflammation, atrophy and Helicobacter     B) GE JUNCTION, POLYP, BIOPSY:   1. Polypoid foveolar hyperplasia of the cardia, see comment   2. Negative for intestinal metaplasia and dysplasia   3. No squamous mucosa is present in this sample         Past Medical/Surgical History:  Past Medical History:   Diagnosis Date     ASCUS with positive high risk HPV 12/27/2013     HSIL (high grade squamous intraepithelial lesion) on Pap smear of cervix 04/22/2013     Pap smear of cervix with ASCUS, cannot exclude HGSIL 5/23/11, 8/10/12,      PMS2-related Reed syndrome (HNPCC4)      Past Surgical History:   Procedure Laterality Date     APPENDECTOMY       AS REPAIR PARAESOPHAGEAL HIATAL HERNIA,LAPAROTOMY, W MESH        BIOPSY VULVA N/A 12/10/2021    Procedure: Vaginal cuff biopsy;  Surgeon: Leyda Guzman DO;  Location: [facility] OR     CARPAL TUNNEL RELEASE RT/LT Right      COLONOSCOPY N/A 06/10/2021    Procedure: COLONOSCOPY, WITH POLYPECTOMY;  Surgeon: Willi Galeas DO;  Location: [facility] GI     COLONOSCOPY N/A 2022    Procedure: COLONOSCOPY;  Surgeon: Willi Galeas DO;  Location: [facility]     COLONOSCOPY N/A 2023    Procedure: Colonoscopy;  Surgeon: Willi Galeas DO;  Location: [facility]     ESOPHAGOSCOPY, GASTROSCOPY, DUODENOSCOPY (EGD), COMBINED N/A 2022    Procedure: ESOPHAGOGASTRODUODENOSCOPY (EGD) with biopsy;  Surgeon: Willi Galeas DO;  Location: [facility]     EXAM UNDER ANESTHESIA PELVIC N/A 12/10/2021    Procedure: EXAM UNDER ANESTHESIA, PELVIS;  Surgeon: Leyda Guzman DO;  Location: [facility] OR     LAPAROSCOPIC HYSTERECTOMY TOTAL, BILATERAL SALPINGO-OOPHORECTOMY, COMBINED N/A 2021    Procedure: HYSTERECTOMY, TOTAL, LAPAROSCOPIC, WITH SALPINGO-OOPHORECTOMY;  Surgeon: Dylon Alcantar MD;  Location: [facility] OR       Allergies:  Allergies as of 07/15/2024 - Reviewed 07/15/2024   Allergen Reaction Noted     Latex Headache 2021     Other drug allergy (see comments) Hives 2024       Current Medications:  Current Outpatient Medications   Medication Sig Dispense Refill     PROGESTERONE 100MG/G CREAM Apply 0.5 g topically 2 times daily          Family History:  Family History   Problem Relation Age of Onset     Diabetes Mother      Fibroids Mother      Reed Syndrome Mother         PMS2+     Colon Polyps Mother         removed 5 feet of colon for unmanageable polyps;  in her sleep at 67, took a nape and never woke up,     Reed Syndrome Brother         PMS2+     Bladder Cancer Maternal Grandfather 70        lived to be 94     Kidney Cancer Maternal Grandfather      Breast Cancer Paternal Grandmother         postmenopausal     Ovarian Cancer Maternal Aunt 40         possible uterine cancer also; hx of fibroids     Ovarian Cancer Maternal Aunt 40        possible uterine cancer also; hx of fibroids     Breast Cancer Paternal Aunt 54     Liver Disease No family hx of      Liver Cancer No family hx of        Social History:  Social History     Socioeconomic History     Marital status:      Spouse name: Thai     Number of children: 6     Years of education: Not on file     Highest education level: Not on file   Occupational History     Occupation: Working on CNA training     Employer: [employer]     Comment: Works in several roles: security, 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 ~4 per month; no hx of alcohol abuse/dependency     Drug use: No     Comment: no hx IVDU     Sexual activity: Yes     Partners: Male     Birth control/protection: Female Surgical   Other Topics Concern     Parent/sibling w/ CABG, MI or angioplasty before 65F 55M? Not Asked   Social History Narrative     Not on file     Social Determinants of Health     Financial Resource Strain: High Risk (1/1/2022)    [facility] [employer] [facility]    Financial Resource Strain      Difficulty of Paying Living Expenses: Not on file      Difficulty of Paying Living Expenses: Not on file   Food Insecurity: Not on file   Transportation Needs: Not on file   Physical Activity: Not on file   Stress: Not on file   Social Connections: Unknown (1/1/2022)    [facility], [employer] [facility]    Social Connections      Frequency of Communication with Friends and Family: Not on file   Interpersonal Safety: Not on file   Housing Stability: Not on file       Physical Exam:  Ht 1.67 m  "(5' 5.75\")   Wt 85.3 kg (188 lb)   LMP 02/03/2021   BMI 30.58 kg/m    GENERAL: alert and no distress  EYES: Eyes grossly normal to inspection.  No discharge or erythema, or obvious scleral/conjunctival abnormalities.  RESP: No audible wheeze, cough, or visible cyanosis.    SKIN: Visible skin clear. No significant rash, abnormal pigmentation or lesions.  NEURO: Cranial nerves grossly intact.  Mentation and speech appropriate for age.  PSYCH: Appropriate affect, tone, and pace of words      Laboratory/Imaging Studies  No results found for any visits on 07/15/24.    ASSESSMENT    Christina reports that she is doing well a this visit. She has no updates to her family's medical history and no changes or concerns with her health. She has no changes to her bowel or bladder function, no upper GI complaints. She had a recent EGD at [facility], which was positive for a 12mm polyp in her GE junction. The polyp was biopsied, but not removed. I have referred her to Dr. Alexander for her next colonoscopy and an EGD to consider removal of this polyp.     We reviewed the screening plan below.  Her urine labs are still in process. We will also repeat these when I see her back in one year. She is in agreement with this plan.       INDIVIDUALIZED CANCER RISK MANAGEMENT PLAN:    Individualized Surveillance Plan for Reed Syndrome   (MSH6+ and PMS2+ mutation carriers)   Based on NCCN Guidelines Version 2.2024   Type of Screening Recommendation Last Done Next Due   Colon Cancer Screening Colonoscopy at age 30-35 years or 2-5 years prior to the earliest colon cancer in the family if it is diagnosed prior to age 30.     Repeat every 1-2 years.    12/4/2023: colonoscopy, normal   Repeat in December 2024   Endometrial and Ovarian Cancer Consider Prophylactic hysterectomy and bilateral salpingo-oophorectomy (BSO) can be considered by women who have completed childbearing.    Insufficient evidence exists to make specific recommendation for RRSO in " MSH6+ and PMS2+ carriers.   NA, surgery complete   NA    Screening using  endometrial sampling is an option every 1-2 years; women should be aware that dysfunctional uterine bleeding warrants evaluation.    Data do not support ovarian cancer screening for Reed Syndrome. Annual transvaginal ultrasound and  tests may be considered at a clinician's discretion.   NA   NA   Gastric and small bowel cancer Upper GI screening every 2-4 years, starting at age 30 for MSH6+ carriers    PMS2+ carriers - Selected individuals or families or those of  descent may consider EGD with extended duodenoscopy every 3-5 years beginning at age 40.   4/15/2024: EGD at [facility], one GE junction biopsy   EGD in December with colonoscopy to consider removal of GE junction polyp   Urothelial cancer Consider annual urinalysis at age 30-35 in MSH6+ families with family history of urothelial cancers 7/12/2024: UA negative, cytology in process Repeat in July 2025   Pancreatic screening for MSH6+ with 1st or 2nd degree relatives with pancreatic cancer on Reed side of family Annual contrast-enhanced MRI/MRCP and/or EUS, with consideration of shorter screening intervals for individuals found to have worrisome abnormalities on screening.     Most small cystic lesions found on screening will not warrant biopsy, surgical resection, or any other intervention.   No history of pancreatic cancer   Review at future visits   Prostate Screening  Annual PSA test with general population screening; may begin earlier if younger prostate cancers in the family   NA   NA   Central Nervous System cancer Annual physical/neurological examinations starting at age 25-30. July 2024 July 2025       There are data to suggest that aspirin may decrease the risk of colon cancer in Reed Syndrome.  However, optimal dose and duration of aspirin therapy is uncertain.    There have been suggestions that there is an increased risk for breast cancer in Reed Syndrome  patients; however, there is not enough evidence to support increased screening above average risk breast cancer screening.           I spent a total of 30 minutes on the day of the visit. Please see the note for further information on patient assessment and treatment.     Sabrina Wayne DNP, SHEYLA, [facility]  Clinical Nurse Specialist  Cancer Risk Management Program  [facility]    Cc:  Alphonse Arellano MD                    Again, thank you for allowing me to participate in the care of your patient.        Sincerely,        SHEYLA Barron CNP

## 2024-07-16 LAB
PATH REPORT.COMMENTS IMP SPEC: NORMAL
PATH REPORT.FINAL DX SPEC: NORMAL
PATH REPORT.GROSS SPEC: NORMAL
PATH REPORT.MICROSCOPIC SPEC OTHER STN: NORMAL

## 2024-07-16 NOTE — PATIENT INSTRUCTIONS
Individualized Surveillance Plan for Reed Syndrome   (MSH6+ and PMS2+ mutation carriers)   Based on NCCN Guidelines Version 2.2024   Type of Screening Recommendation Last Done Next Due   Colon Cancer Screening Colonoscopy at age 30-35 years or 2-5 years prior to the earliest colon cancer in the family if it is diagnosed prior to age 30.     Repeat every 1-2 years.    12/4/2023: colonoscopy, normal   Repeat in December 2024   Endometrial and Ovarian Cancer Consider Prophylactic hysterectomy and bilateral salpingo-oophorectomy (BSO) can be considered by women who have completed childbearing.    Insufficient evidence exists to make specific recommendation for RRSO in MSH6+ and PMS2+ carriers.   NA, surgery complete   NA    Screening using  endometrial sampling is an option every 1-2 years; women should be aware that dysfunctional uterine bleeding warrants evaluation.    Data do not support ovarian cancer screening for Reed Syndrome. Annual transvaginal ultrasound and  tests may be considered at a clinician's discretion.   NA   NA   Gastric and small bowel cancer Upper GI screening every 2-4 years, starting at age 30 for MSH6+ carriers    PMS2+ carriers - Selected individuals or families or those of  descent may consider EGD with extended duodenoscopy every 3-5 years beginning at age 40.   4/15/2024: EGD at [facility], one GE junction biopsy   EGD in December with colonoscopy to consider removal of GE junction polyp   Urothelial cancer Consider annual urinalysis at age 30-35 in MSH6+ families with family history of urothelial cancers 7/12/2024: UA negative, cytology in process Repeat in July 2025   Pancreatic screening for MSH6+ with 1st or 2nd degree relatives with pancreatic cancer on Reed side of family Annual contrast-enhanced MRI/MRCP and/or EUS, with consideration of shorter screening intervals for individuals found to have worrisome abnormalities on screening.     Most small cystic lesions found on  screening will not warrant biopsy, surgical resection, or any other intervention.   No history of pancreatic cancer   Review at future visits   Prostate Screening  Annual PSA test with general population screening; may begin earlier if younger prostate cancers in the family   NA   NA   Central Nervous System cancer Annual physical/neurological examinations starting at age 25-30. July 2024 July 2025       There are data to suggest that aspirin may decrease the risk of colon cancer in Reed Syndrome.  However, optimal dose and duration of aspirin therapy is uncertain.    There have been suggestions that there is an increased risk for breast cancer in Reed Syndrome patients; however, there is not enough evidence to support increased screening above average risk breast cancer screening.

## 2024-08-09 DIAGNOSIS — K76.0 HEPATIC STEATOSIS: ICD-10-CM

## 2024-08-09 PROCEDURE — 91200 LIVER ELASTOGRAPHY: CPT | Mod: 26 | Performed by: PHYSICIAN ASSISTANT

## 2024-08-21 ENCOUNTER — COMMITTEE REVIEW (OUTPATIENT)
Dept: TRANSPLANT | Facility: CLINIC | Age: 44
End: 2024-08-21
Payer: COMMERCIAL

## 2024-08-21 ENCOUNTER — TELEPHONE (OUTPATIENT)
Dept: TRANSPLANT | Facility: CLINIC | Age: 44
End: 2024-08-21
Payer: COMMERCIAL

## 2024-08-21 NOTE — TELEPHONE ENCOUNTER
Spoke to Christina regarding committee review results:    Committee Discussion Details:   Reviewed hepatology & fibrosis scan-need to lose 10-15 lbs for own health and maintain that weight loss. Needs to complete before donation.     Goal weight 173-178 lbs

## 2024-08-21 NOTE — COMMITTEE REVIEW
Living Donor Committee Review Note Evaluation Date: 5/3/2024  Committee Review Date: 8/21/2024    Donor being evaluated for: Kidney    Transplant Phase: Evaluation  Transplant Status: Active    Transplant Coordinator: Mary Hammer  Transplant Surgeon:        Committee Review Members:  Independent Living Donor Advocate Ghada Arellano, [facility], Ramona Meyers   Nephrology Janene Monroy MD, Jesús Mcbride MD, Carlos Amado MD   Nutrition Argelia Mullen, RD   Pharmacist Celia Rai, [facility]   Transplant Amy Mcmullen, MARIAJOSE, Laura Guo, RN, Jessica Love LPN, April Zee, RN, Celia Scherer, RN, Mary Medley, RN   Transplant Surgery Mirza Valera MD       Transplant Eligibility: Acceptable Physical Health, Acceptable Mental Health    Committee Review Decision: Needs Re-presentation    Relative Contraindications: None    Absolute Contraindications: None    Committee Chair Mirza Valera MD verbally attested to the committee's decision.    Committee Discussion Details:   Reviewed hepatology & fibrosis scan-need to lose 10-15 lbs for own health and maintain that weight loss. Needs to complete before donation.

## 2024-08-30 ENCOUNTER — OFFICE VISIT (OUTPATIENT)
Dept: DERMATOLOGY | Facility: CLINIC | Age: 44
End: 2024-08-30
Payer: COMMERCIAL

## 2024-08-30 DIAGNOSIS — M25.50 ARTHRALGIA, UNSPECIFIED JOINT: ICD-10-CM

## 2024-08-30 DIAGNOSIS — L40.8 SEBOPSORIASIS: Primary | ICD-10-CM

## 2024-08-30 PROCEDURE — 99204 OFFICE O/P NEW MOD 45 MIN: CPT | Performed by: STUDENT IN AN ORGANIZED HEALTH CARE EDUCATION/TRAINING PROGRAM

## 2024-08-30 RX ORDER — FLUOCINOLONE ACETONIDE 0.11 MG/ML
OIL TOPICAL
Qty: 118.28 ML | Refills: 3 | Status: SHIPPED | OUTPATIENT
Start: 2024-08-30

## 2024-08-30 RX ORDER — KETOCONAZOLE 20 MG/ML
SHAMPOO TOPICAL
Qty: 120 ML | Refills: 11 | Status: SHIPPED | OUTPATIENT
Start: 2024-08-30

## 2024-08-30 ASSESSMENT — PAIN SCALES - GENERAL: PAINLEVEL: SEVERE PAIN (7)

## 2024-08-30 NOTE — LETTER
8/30/2024      Christina Santana  [address] [facility] [address]      Dear Colleague,    Thank you for referring your patient, Christina Santana, to the [facility]. Please see a copy of my visit note below.    [facility] Dermatology Note  Encounter Date: Aug 30, 2024  Office Visit     Reviewed patients past medical history and pertinent chart review prior to patients visit today.     Dermatology Problem List:  Sebopsoriasis   -ketoconazole shampoo, dermasmoothe scalp oil      ____________________________________________    Assessment & Plan:     # Sebopsoriasis  -Diagnosis discussed  -Patient to begin using ketoconazole shampoo and an over the counter antidandruff shampoo (head and shoulders, selsun blue, neutrogena Tsal) in an alternating fashion.  Patient to let shampoo lather and sit for 5 minutes prior to rinsing  -Patient to begin using dermasmoothe scalp oil to scalp once or twice daily for 3 to 4 weeks at a time.  Thereafter, use as needed for flares.  For maintenance, patient can use dermasmoothe oil once or twice daily on Saturdays and Sundays. Potential side effects from prolonged topical steroid use such as atrophy, striae, and telangiectasias were reviewed.    # joint pain   -Will refer to rheumatology for further evaluation given the severity of joint pain.    Follow up: 3 months     Gauri Smallwood PA-C  [facility]  Dermatology    _______________________________________    CC: No chief complaint on file.    HPI:  Ms. Christina Santana is a(n) 44 year old female who presents today as a new patient for relation of a rash on the scalp.  This rash has been present off and on for several years.  It will wax and wane in severity.  It can be very pruritic for her.  A few years ago, patient was prescribed topical clobetasol solution which she felt made the rash worse.  She is here for further evaluation.  Additionally, patient is wondering if her joint pain  is related to the rash.  The joint pain can become very severe for her.  It will also wax and wane in severity.        Physical Exam:  SKIN: Focused examination of scalp was performed.  - right occipital scalp, scalp, well-demarcated erythematous plaques with overlying greasy scale      - No other lesions of concern on areas examined.     Medications:  Current Outpatient Medications   Medication Sig Dispense Refill     PROGESTERONE 100MG/G CREAM Apply 0.5 g topically 2 times daily       No current facility-administered medications for this visit.      Past Medical History:   Patient Active Problem List   Diagnosis     Transplant donor evaluation     GERD with stricture     Astigmatism     Myopia     Seasonal allergic rhinitis due to pollen     PMS2-related Reed syndrome (HNPCC4)     Dysphagia     Hepatic steatosis     Past Medical History:   Diagnosis Date     ASCUS with positive high risk HPV 12/27/2013     HSIL (high grade squamous intraepithelial lesion) on Pap smear of cervix 04/22/2013     Pap smear of cervix with ASCUS, cannot exclude HGSIL 5/23/11, 8/10/12,      PMS2-related Reed syndrome (HNPCC4)        CC Referred Self, MD  No address on file on close of this encounter.       Again, thank you for allowing me to participate in the care of your patient.        Sincerely,        Gauri Smallwood PA-C

## 2024-08-30 NOTE — PROGRESS NOTES
[facility] Dermatology Note  Encounter Date: Aug 30, 2024  Office Visit     Reviewed patients past medical history and pertinent chart review prior to patients visit today.     Dermatology Problem List:  Sebopsoriasis   -ketoconazole shampoo, dermasmoothe scalp oil      ____________________________________________    Assessment & Plan:     # Sebopsoriasis  -Diagnosis discussed  -Patient to begin using ketoconazole shampoo and an over the counter antidandruff shampoo (head and shoulders, selsun blue, neutrogena Tsal) in an alternating fashion.  Patient to let shampoo lather and sit for 5 minutes prior to rinsing  -Patient to begin using dermasmoothe scalp oil to scalp once or twice daily for 3 to 4 weeks at a time.  Thereafter, use as needed for flares.  For maintenance, patient can use dermasmoothe oil once or twice daily on Saturdays and Sundays. Potential side effects from prolonged topical steroid use such as atrophy, striae, and telangiectasias were reviewed.    # joint pain   -Will refer to rheumatology for further evaluation given the severity of joint pain.    Follow up: 3 months     Gauri Smallwood PA-C  [facility]  Dermatology    _______________________________________    CC: No chief complaint on file.    HPI:  Ms. Christina Santana is a(n) 44 year old female who presents today as a new patient for relation of a rash on the scalp.  This rash has been present off and on for several years.  It will wax and wane in severity.  It can be very pruritic for her.  A few years ago, patient was prescribed topical clobetasol solution which she felt made the rash worse.  She is here for further evaluation.  Additionally, patient is wondering if her joint pain is related to the rash.  The joint pain can become very severe for her.  It will also wax and wane in severity.        Physical Exam:  SKIN: Focused examination of scalp was performed.  - right occipital scalp, scalp, well-demarcated  erythematous plaques with overlying greasy scale      - No other lesions of concern on areas examined.     Medications:  Current Outpatient Medications   Medication Sig Dispense Refill    PROGESTERONE 100MG/G CREAM Apply 0.5 g topically 2 times daily       No current facility-administered medications for this visit.      Past Medical History:   Patient Active Problem List   Diagnosis    Transplant donor evaluation    GERD with stricture    Astigmatism    Myopia    Seasonal allergic rhinitis due to pollen    PMS2-related Reed syndrome (HNPCC4)    Dysphagia    Hepatic steatosis     Past Medical History:   Diagnosis Date    ASCUS with positive high risk HPV 12/27/2013    HSIL (high grade squamous intraepithelial lesion) on Pap smear of cervix 04/22/2013    Pap smear of cervix with ASCUS, cannot exclude HGSIL 5/23/11, 8/10/12,     PMS2-related Reed syndrome (HNPCC4)        CC Referred Self, MD  No address on file on close of this encounter.

## 2024-08-30 NOTE — NURSING NOTE
Christina Santana's chief complaint for this visit includes:  Chief Complaint   Patient presents with    Psoriasis     Comes and goes for the past 5+ years on the scalp. Unsure what the reason for flares up are- food, stress, heat. Was seen at [facility] years ago as she was unsure if she had Psoriasic arthritis, but they did not really go in depth that. Was prescribed a solution that kind of made it worse per patient. She had a flare around middle of July.      PCP: Alphonse Arellano    Referring Provider:  Referred Self, MD  No address on file    LMP 02/03/2021   Severe Pain (7)        Allergies   Allergen Reactions    Latex Headache    Other Drug Allergy (See Comments) Hives     Possible Lidocaine family allergy (noticed during procedure--numbing-type medication) HIVES         Do you need any medication refills at today's visit?   No.       Julia Yang RN on 8/30/2024 at 1:48 PM

## 2024-09-18 ENCOUNTER — HOSPITAL ENCOUNTER (OUTPATIENT)
Dept: MRI IMAGING | Facility: CLINIC | Age: 44
Discharge: HOME | End: 2024-09-18
Attending: ORTHOPAEDIC SURGERY
Payer: COMMERCIAL

## 2024-09-18 ENCOUNTER — HOSPITAL ENCOUNTER (OUTPATIENT)
Dept: GENERAL RADIOLOGY | Facility: CLINIC | Age: 44
Discharge: HOME | End: 2024-09-18
Attending: ORTHOPAEDIC SURGERY
Payer: COMMERCIAL

## 2024-09-18 DIAGNOSIS — M25.552 LEFT HIP PAIN: ICD-10-CM

## 2024-09-18 PROCEDURE — A9585 GADOBUTROL INJECTION: HCPCS | Performed by: RADIOLOGY

## 2024-09-18 PROCEDURE — 96372 THER/PROPH/DIAG INJ SC/IM: CPT | Performed by: RADIOLOGY

## 2024-09-18 PROCEDURE — 250N000011 HC RX IP 250 OP 636: Performed by: RADIOLOGY

## 2024-09-18 PROCEDURE — 250N000009 HC RX 250: Performed by: RADIOLOGY

## 2024-09-18 PROCEDURE — 255N000002 HC RX 255 OP 636: Performed by: RADIOLOGY

## 2024-09-18 PROCEDURE — 73722 MRI JOINT OF LWR EXTR W/DYE: CPT | Mod: LT

## 2024-09-18 PROCEDURE — 27093 INJECTION FOR HIP X-RAY: CPT

## 2024-09-18 RX ORDER — GADOBUTROL 604.72 MG/ML
7.5 INJECTION INTRAVENOUS ONCE
Status: COMPLETED | OUTPATIENT
Start: 2024-09-18 | End: 2024-09-18

## 2024-09-18 RX ORDER — LIDOCAINE HYDROCHLORIDE 10 MG/ML
5 INJECTION, SOLUTION EPIDURAL; INFILTRATION; INTRACAUDAL; PERINEURAL ONCE
Status: COMPLETED | OUTPATIENT
Start: 2024-09-18 | End: 2024-09-18

## 2024-09-18 RX ORDER — IOPAMIDOL 510 MG/ML
100 INJECTION, SOLUTION INTRAVASCULAR ONCE
Status: COMPLETED | OUTPATIENT
Start: 2024-09-18 | End: 2024-09-18

## 2024-09-18 RX ORDER — BUPIVACAINE HYDROCHLORIDE 2.5 MG/ML
10 INJECTION, SOLUTION INFILTRATION; PERINEURAL ONCE
Status: COMPLETED | OUTPATIENT
Start: 2024-09-18 | End: 2024-09-18

## 2024-09-18 RX ORDER — EPINEPHRINE 1 MG/ML
1 INJECTION, SOLUTION, CONCENTRATE INTRAVENOUS ONCE
Status: COMPLETED | OUTPATIENT
Start: 2024-09-18 | End: 2024-09-18

## 2024-09-18 RX ADMIN — BUPIVACAINE HYDROCHLORIDE 1 MG: 2.5 INJECTION, SOLUTION EPIDURAL; INFILTRATION; INTRACAUDAL; PERINEURAL at 08:23

## 2024-09-18 RX ADMIN — GADOBUTROL 0.05 ML: 604.72 INJECTION INTRAVENOUS at 08:23

## 2024-09-18 RX ADMIN — IOPAMIDOL 13 ML: 510 INJECTION, SOLUTION INTRAVASCULAR at 08:22

## 2024-09-18 RX ADMIN — EPINEPHRINE 0.1 ML: 1 INJECTION INTRAMUSCULAR; INTRAVENOUS; SUBCUTANEOUS at 08:23

## 2024-09-18 RX ADMIN — LIDOCAINE HYDROCHLORIDE 5 ML: 10 INJECTION, SOLUTION EPIDURAL; INFILTRATION; INTRACAUDAL; PERINEURAL at 08:20

## 2024-09-19 ENCOUNTER — TELEPHONE (OUTPATIENT)
Dept: ORTHOPEDICS | Facility: CLINIC | Age: 44
End: 2024-09-19
Payer: COMMERCIAL

## 2024-09-19 NOTE — TELEPHONE ENCOUNTER
Reason for Call:  Other appointment    Detailed comments: Patient is wondering if there is any way we can call her with the MRI results done 09/18      Phone Number Patient can be reached at: Home number on file [phone] (home)    Best Time: any    Can we leave a detailed message on this number? YES    Call taken on 9/19/2024 at 11:23 AM by Jerri Helm

## 2024-09-19 NOTE — TELEPHONE ENCOUNTER
Patient declined to schedule an appointment with Dr Fish at this time, she is scheduled to see Dr Arellano on 9/24/24 and will wait until after her appointment to see if she wants to schedule with Dr Fish.

## 2024-10-01 NOTE — PROGRESS NOTES
Rheumatology Clinic Visit  [facility]  Norma ArroyoLONA     Christina Santana MRN# [id number]   YOB: 1980 Age: 44 year old   Date of Visit: 10/4/2024  Primary care provider: Alphonse Arellano          Assessment and Plan:     1.  Polyarthralgia  2.  Psoriasis    Patient presents today for an initial evaluation.  She states that her joint pain started a long time ago.  At the time she would related to certain activities such as too much exercise or not getting enough protein.  Over the last 3 to 4 years she has been working with orthopedics trying to determine the cause of her pain.  The majority of her pain is in her hip's, worse on the left.  She gets tingling sensation in her legs.  She will wake up at night and sometimes gets better if she gets up and moves.  She does have psoriasis of her scalp that is overall controlled with topical agents.  Physical examination today does not show any active synovitis or dactylitis.  Some tenderness to palpation in her feet.  No tenderness over her midline lumbar spine or bilateral SI joints.  Previous laboratory evaluations and imaging studies were reviewed, results below.    Patient with a history of psoriasis and may have had an episode of uveitis in her late teens presents today with joint pain.  I do wonder if this could be a spondyloarthropathy, related to the psoriasis.  Could also be a sacroiliitis/ankylosing spondylitis.  At this time would recommend getting x-rays of her SI joints and lumbar spine.  Will also check some labs including her inflammatory markers and HLA-B27.  We will recheck a CCP antibody and rheumatoid factor although my suspicion for rheumatoid arthritis is lower on my differential.  May need to consider an MRI.  If her HLA-B27 is positive this along with a history of psoriasis and uveitis it may be reasonable to also try immunomodulatory therapy to see if she gets some benefit from that.  Could consider methotrexate or  sulfasalazine.  Patient to follow-up with me in 2 to 4 weeks and we will review and determine further treatment plan at that time.    Plan:     Schedule follow-up with Norma Arroyo PA-C in 2-4 weeks, okay to be a video visit  Imaging: xray SI joint and lumbar spine  Labs: CRP, Sed Rate, CCP antibody, Rheumatoid Factor, HLA-B27     Norma Arroyo, PAC  Rheumatology         History of Present Illness:   Christina Santana presents for evaluation of joint pain.  Pmhx includes psoriasis,     She states that joint pain started a long time ago. She would think it was related to other things such as too much exercise or not enough protein. Over the last 3-4 years she has seen orthopedics. She tried to take more of a holistic approach. Various things have helped short term, but nothing lasting. It started in the left hip and leg. No it's bilateral and runs down to her toes. She will wake up in the middle of the night with the pain. Sometimes it will get better if she gets up and moves. She has seen derm for psoriasis and they recommended she see rheum. Currently, she feels that someone is poking her with needles in her legs. Walking would make it better and then it returns when she sits back down. No joint swelling. She may have had uveitis/iritis in her late teens or early 20's but nothing in the last 10 years. She has never had steroids for her pain. She has taken NSAIDs at night and sometimes it helps. She has fatigue/excessive tiredness at times. She is using a shampoo for her skin, and it seems to be helpful.      Besides psoriasis, no other skin rashes. She has hair loss. No unexplained fevers. No history of pericarditis or pleural effusion. No history of seizures. She had a blood clot thought to be from birth control. She took blood thinners for 6 months. No mouth sores. No history of raynaud's.         Review of Systems:     Constitutional: negative  Skin: negative  Eyes: negative  Ears/Nose/Throat:  negative  Respiratory: No shortness of breath, dyspnea on exertion, cough, or hemoptysis  Cardiovascular: negative  Gastrointestinal: negative  Genitourinary: negative  Musculoskeletal: as above  Neurologic: negative  Psychiatric: negative  Hematologic/Lymphatic/Immunologic: negative  Endocrine: negative         Active Problem List:     Patient Active Problem List    Diagnosis Date Noted    Hepatic steatosis 06/17/2024     Priority: Medium    Dysphagia 04/15/2024     Priority: Medium    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     Priority: Medium     Added automatically from request for surgery [id number]      Seasonal allergic rhinitis due to pollen 03/04/2020     Priority: Medium    GERD with stricture 02/25/2020     Priority: Medium    Transplant donor evaluation 09/13/2018     Priority: Medium    Astigmatism 10/03/2003     Priority: Medium    Myopia 10/03/2003     Priority: Medium            Past Medical History:     Past Medical History:   Diagnosis Date    ASCUS with positive high risk HPV 12/27/2013    HSIL (high grade squamous intraepithelial lesion) on Pap smear of cervix 04/22/2013    Pap smear of cervix with ASCUS, cannot exclude HGSIL 5/23/11, 8/10/12,     PMS2-related Reed syndrome (HNPCC4)      Past Surgical History:   Procedure Laterality Date    APPENDECTOMY      AS REPAIR PARAESOPHAGEAL HIATAL HERNIA,LAPAROTOMY, W MESH      BIOPSY VULVA N/A 12/10/2021    Procedure: Vaginal cuff biopsy;  Surgeon: Leyda Guzman DO;  Location:  OR    CARPAL TUNNEL RELEASE RT/LT Right     COLONOSCOPY N/A 06/10/2021    Procedure: COLONOSCOPY, WITH POLYPECTOMY;  Surgeon: Willi Galeas DO;  Location: [facility]    COLONOSCOPY N/A 12/01/2022    Procedure: COLONOSCOPY;  Surgeon: Willi Galeas DO;  Location: [facility]    COLONOSCOPY N/A 12/04/2023    Procedure: Colonoscopy;  Surgeon: Willi Galeas DO;  Location:  GI    ESOPHAGOSCOPY, GASTROSCOPY, DUODENOSCOPY (EGD), COMBINED N/A  12/01/2022    Procedure: ESOPHAGOGASTRODUODENOSCOPY (EGD) with biopsy;  Surgeon: Willi Galeas DO;  Location: [facility]    EXAM UNDER ANESTHESIA PELVIC N/A 12/10/2021    Procedure: EXAM UNDER ANESTHESIA, PELVIS;  Surgeon: Leyda Guzman DO;  Location: [facility]    LAPAROSCOPIC HYSTERECTOMY TOTAL, BILATERAL SALPINGO-OOPHORECTOMY, COMBINED N/A 02/24/2021    Procedure: HYSTERECTOMY, TOTAL, LAPAROSCOPIC, WITH SALPINGO-OOPHORECTOMY;  Surgeon: Dylon Alcantar MD;  Location: [facility] OR            Social History:     Social History     Socioeconomic History    Marital status:      Spouse name: Thai    Number of children: 6    Years of education: Not on file    Highest education level: Not on file   Occupational History    Occupation: Working on CNA training     Employer: [employer]     Comment: Works in several roles: security, 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 ~4 per month; no hx of alcohol abuse/dependency    Drug use: No     Comment: no hx IVDU    Sexual activity: Yes     Partners: Male     Birth control/protection: Female Surgical   Other Topics Concern    Parent/sibling w/ CABG, MI or angioplasty before 65F 55M? Not Asked   Social History Narrative    Not on file     Social Determinants of Health     Financial Resource Strain: High Risk (1/1/2022)    Received from [facility]    Financial Resource Strain     Difficulty of Paying Living Expenses: Not on file     Difficulty of Paying Living Expenses: Not on file   Food Insecurity: Not on file   Transportation Needs: Not on file   Physical Activity: Not on file   Stress: Not on file   Social Connections: Unknown (1/1/2022)    [facility] from [facility]    Social Connections     Frequency of Communication with Friends and Family: Not on file   Interpersonal Safety: Not on file   Housing Stability: Not on file          Family History:     Family History   Problem Relation Age of Onset    Diabetes Mother     Fibroids Mother     Reed Syndrome Mother         PMS2+    Colon Polyps Mother         removed 5 feet of colon for unmanageable polyps;  in her sleep at 67, took a nape and never woke up,    Reed Syndrome Brother         PMS2+    Bladder Cancer Maternal Grandfather 70        lived to be 94    Kidney Cancer Maternal Grandfather     Breast Cancer Paternal Grandmother         postmenopausal    Macular Degeneration Paternal Grandfather     Ovarian Cancer Maternal Aunt 40        possible uterine cancer also; hx of fibroids    Ovarian Cancer Maternal Aunt 40        possible uterine cancer also; hx of fibroids    Breast Cancer Paternal Aunt 54    Heart Defect Son     Liver Disease No family hx of     Liver Cancer No family hx of             Allergies:     Allergies   Allergen Reactions    Latex Headache    Other Drug Allergy (See Comments) Hives     Possible Lidocaine family allergy (noticed during procedure--numbing-type medication) HIVES            Medications:     Current Outpatient Medications   Medication Sig Dispense Refill    Fluocinolone Acetonide Scalp (DERMA-SMOOTHE/FS SCALP) 0.01 % OIL oil Apply to affected area on scalp twice daily for 2-3 weeks. There after, use as needed for flares. For maintenance apply to scalp one daily on Saturday and . 118.28 mL 3    ketoconazole (NIZORAL) 2 % external shampoo Use every other day. Let lather and sit for 5 mintues prior to rinsing 120 mL 11    PROGESTERONE 100MG/G CREAM Apply 0.5 g topically 2 times daily              Physical Exam:   Blood pressure 118/86, pulse 68, temperature 97.8  F (36.6  C), temperature source Tympanic, resp. rate 16, height 1.67  "m (5' 5.75\"), weight 86.2 kg (190 lb), last menstrual period 02/03/2021, SpO2 97%, currently breastfeeding.  Wt Readings from Last 6 Encounters:   07/15/24 85.3 kg (188 lb)   05/03/24 87.8 kg (193 lb 9.6 oz)   05/03/24 87.8 kg (193 lb 9.6 oz)   01/21/24 86.6 kg (191 lb)   11/08/23 85.2 kg (187 lb 12.8 oz)   11/06/23 85.3 kg (188 lb)     Constitutional: well-developed, appearing stated age; cooperative  Eyes: nl conjunctiva, sclera  ENT: nl external ears, nose, hearing, lips, teeth  Neck: no mass or thyroid enlargement  Resp: no shortness of breath with normal conversation  MS:  No active synovitis or altered joint anatomy. Full joint ROM. Normal  strength. No dactylitis,  tenosynovitis, enthesopathy.  Some tenderness to palpation over her feet.  Skin: no nail pitting, alopecia, rash, nodules or lesions.   Psych: nl judgement, orientation, memory, affect.           Data:   Imaging:  XR SHOULDER LEFT G/E 3 VIEWS  DATE: 1/21/2024                                                               IMPRESSION: Normal joint spaces and alignment. No acute displaced fracture identified. No glenohumeral dislocation.    Xray pelvis and hip 04/05/2024  IMPRESSION: Normal joint spaces and alignment. No definite fracture.     MRI hip arthrogram 09/18/2024  IMPRESSION:  1. There is impingement morphology, but there is no evidence of labral  tear or chondromalacia.  2. Partial-thickness tear of the gluteus minimus tendon, similar to  the MRI from 10/21/2021.  3. Fluid in the greater trochanteric bursa, increased since the prior  study and evidence of bursitis in the appropriate clinical setting.    Laboratory:  2021  CCP antibody 7  Rheumatoid factor negative    "

## 2024-10-04 ENCOUNTER — ANCILLARY PROCEDURE (OUTPATIENT)
Dept: GENERAL RADIOLOGY | Facility: CLINIC | Age: 44
End: 2024-10-04
Attending: PHYSICIAN ASSISTANT
Payer: COMMERCIAL

## 2024-10-04 ENCOUNTER — OFFICE VISIT (OUTPATIENT)
Dept: RHEUMATOLOGY | Facility: CLINIC | Age: 44
End: 2024-10-04
Payer: COMMERCIAL

## 2024-10-04 VITALS
TEMPERATURE: 97.8 F | BODY MASS INDEX: 30.53 KG/M2 | DIASTOLIC BLOOD PRESSURE: 86 MMHG | RESPIRATION RATE: 16 BRPM | HEIGHT: 66 IN | OXYGEN SATURATION: 97 % | HEART RATE: 68 BPM | SYSTOLIC BLOOD PRESSURE: 118 MMHG | WEIGHT: 190 LBS

## 2024-10-04 DIAGNOSIS — M25.50 POLYARTHRALGIA: ICD-10-CM

## 2024-10-04 DIAGNOSIS — L40.9 PSORIASIS: ICD-10-CM

## 2024-10-04 DIAGNOSIS — M25.50 POLYARTHRALGIA: Primary | ICD-10-CM

## 2024-10-04 LAB
CRP SERPL-MCNC: 3.02 MG/L
ERYTHROCYTE [SEDIMENTATION RATE] IN BLOOD BY WESTERGREN METHOD: 8 MM/HR (ref 0–20)
RHEUMATOID FACT SERPL-ACNC: <10 IU/ML

## 2024-10-04 PROCEDURE — 72200 X-RAY EXAM SI JOINTS: CPT | Mod: TC | Performed by: RADIOLOGY

## 2024-10-04 PROCEDURE — 36415 COLL VENOUS BLD VENIPUNCTURE: CPT | Performed by: PHYSICIAN ASSISTANT

## 2024-10-04 PROCEDURE — 72100 X-RAY EXAM L-S SPINE 2/3 VWS: CPT | Mod: TC | Performed by: RADIOLOGY

## 2024-10-04 PROCEDURE — 85652 RBC SED RATE AUTOMATED: CPT | Performed by: PHYSICIAN ASSISTANT

## 2024-10-04 PROCEDURE — 86431 RHEUMATOID FACTOR QUANT: CPT | Performed by: PHYSICIAN ASSISTANT

## 2024-10-04 PROCEDURE — 86140 C-REACTIVE PROTEIN: CPT | Performed by: PHYSICIAN ASSISTANT

## 2024-10-04 PROCEDURE — 86200 CCP ANTIBODY: CPT | Performed by: PHYSICIAN ASSISTANT

## 2024-10-04 PROCEDURE — 99204 OFFICE O/P NEW MOD 45 MIN: CPT | Performed by: PHYSICIAN ASSISTANT

## 2024-10-04 ASSESSMENT — PAIN SCALES - GENERAL: PAINLEVEL: EXTREME PAIN (8)

## 2024-10-07 LAB — CCP AB SER IA-ACNC: 3.9 U/ML

## 2024-10-08 ENCOUNTER — LAB REQUISITION (OUTPATIENT)
Dept: LAB | Facility: CLINIC | Age: 44
End: 2024-10-08
Payer: COMMERCIAL

## 2024-10-08 LAB
B LOCUS: NORMAL
B27TEST METHOD: NORMAL

## 2024-10-08 PROCEDURE — 81374 HLA I TYPING 1 ANTIGEN LR: CPT | Performed by: PHYSICIAN ASSISTANT

## 2024-10-14 DIAGNOSIS — E89.40 SURGICAL MENOPAUSE: Primary | ICD-10-CM

## 2024-10-14 DIAGNOSIS — N39.46 MIXED INCONTINENCE URGE AND STRESS (MALE)(FEMALE): ICD-10-CM

## 2024-10-21 NOTE — PROGRESS NOTES
Christina Santana is a 44 year old who is being evaluated via a billable telephone visit.      How would you like to obtain your AVS? MyChart  Will anyone else be joining your virtual visit? No  Will you be in [place] for the visit?  Yes  How would you like to enter the visit? Call mobile    Telephone Information:   Mobile [phone]          Telephone Visit Details  Visit start time: 1:50pm  Visit end time: 1:55pm     Type of service:  Telephone Visit   Originating Location (pt. Location):   Distant Location (provider location):  On-site      Rheumatology Clinic Visit  [facility]  [facility] Tay     Christina Santana MRN# [id number]   YOB: 1980 Age: 44 year old   Date of Visit: 10/23/2024  Primary care provider: Alphonse Arellano          Assessment and Plan:     1.  Polyarthralgia  2.  Psoriasis    Patient presents today for follow-up.  We discussed the results of her labs and imaging.  Her x-ray is showing some sclerosis on the right SI joint.  This could be mechanical/degenerative in nature however given the symptoms of her pain I would recommend getting an MRI as this is more sensitive at picking up sacroiliitis.  I have placed that order and I will let the patient know the results once it is complete.      Plan:   Schedule follow-up with Norma Arroyo PA-C depending on results of MRI  Imaging: MRI SI joints    LONA Tay  Rheumatology         History of Present Illness:   Christina Santana presents for evaluation of joint pain.  Pmhx includes psoriasis,     Interval history October 23, 2024  Here to discuss results of work up.     HPI from consult of October 4, 2024:  She states that joint pain started a long time ago. She would think it was related to other things such as too much exercise or not enough protein. Over the last 3-4 years she has seen orthopedics. She tried to take more of a holistic approach. Various things have helped short term, but nothing lasting. It  started in the left hip and leg. No it's bilateral and runs down to her toes. She will wake up in the middle of the night with the pain. Sometimes it will get better if she gets up and moves. She has seen derm for psoriasis and they recommended she see rheum. Currently, she feels that someone is poking her with needles in her legs. Walking would make it better and then it returns when she sits back down. No joint swelling. She may have had uveitis/iritis in her late teens or early 20's but nothing in the last 10 years. She has never had steroids for her pain. She has taken NSAIDs at night and sometimes it helps. She has fatigue/excessive tiredness at times. She is using a shampoo for her skin, and it seems to be helpful.      Besides psoriasis, no other skin rashes. She has hair loss. No unexplained fevers. No history of pericarditis or pleural effusion. No history of seizures. She had a blood clot thought to be from birth control. She took blood thinners for 6 months. No mouth sores. No history of raynaud's.         Review of Systems:     Constitutional: negative  Skin: negative  Eyes: negative  Ears/Nose/Throat: negative  Respiratory: No shortness of breath, dyspnea on exertion, cough, or hemoptysis  Cardiovascular: negative  Gastrointestinal: negative  Genitourinary: negative  Musculoskeletal: as above  Neurologic: negative  Psychiatric: negative  Hematologic/Lymphatic/Immunologic: negative  Endocrine: negative         Active Problem List:     Patient Active Problem List    Diagnosis Date Noted    Hepatic steatosis 06/17/2024     Priority: Medium    Dysphagia 04/15/2024     Priority: Medium    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     Priority: Medium     Added automatically from request for surgery [id number]      Seasonal allergic rhinitis due to pollen 03/04/2020     Priority: Medium    GERD with stricture 02/25/2020     Priority: Medium    Transplant donor evaluation 09/13/2018     Priority: Medium     Astigmatism 10/03/2003     Priority: Medium    Myopia 10/03/2003     Priority: Medium            Past Medical History:     Past Medical History:   Diagnosis Date    ASCUS with positive high risk HPV 12/27/2013    HSIL (high grade squamous intraepithelial lesion) on Pap smear of cervix 04/22/2013    Pap smear of cervix with ASCUS, cannot exclude HGSIL 5/23/11, 8/10/12,     PMS2-related Reed syndrome (HNPCC4)      Past Surgical History:   Procedure Laterality Date    APPENDECTOMY      AS REPAIR PARAESOPHAGEAL HIATAL HERNIA,LAPAROTOMY, W MESH      BIOPSY VULVA N/A 12/10/2021    Procedure: Vaginal cuff biopsy;  Surgeon: Leyda Guzman DO;  Location: [facility]    CARPAL TUNNEL RELEASE RT/LT Right     COLONOSCOPY N/A 06/10/2021    Procedure: COLONOSCOPY, WITH POLYPECTOMY;  Surgeon: Willi Galeas DO;  Location: [facility]    COLONOSCOPY N/A 12/01/2022    Procedure: COLONOSCOPY;  Surgeon: Willi Galeas DO;  [facility]: [facility]    COLONOSCOPY N/A 12/04/2023    Procedure: Colonoscopy;  Surgeon: Willi Galeas DO;  Location[facility]    ESOPHAGOSCOPY, GASTROSCOPY, DUODENOSCOPY (EGD), COMBINED N/A 12/01/2022    Procedure: ESOPHAGOGASTRODUODENOSCOPY (EGD) with biopsy;  Surgeon: Willi Galeas DO;  Location[facility]    EXAM UNDER ANESTHESIA PELVIC N/A 12/10/2021    Procedure: EXAM UNDER ANESTHESIA, PELVIS;  Surgeon: Leyda Guzman DO;  Location[facility]    LAPAROSCOPIC HYSTERECTOMY TOTAL, BILATERAL SALPINGO-OOPHORECTOMY, COMBINED N/A 02/24/2021    Procedure: HYSTERECTOMY, TOTAL, LAPAROSCOPIC, WITH SALPINGO-OOPHORECTOMY;  Surgeon: Dylon Alcantar MD;  Location: [facility]            Social History:     Social History     Socioeconomic History    Marital status:      Spouse name: Thai    Number of children: 6    Years of education: Not on file    Highest education level: Not on file   Occupational History    Occupation: Working on CNA training     Employer: [employer]     Comment:  Works in several roles: security, 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 ~4 per month; no hx of alcohol abuse/dependency    Drug use: No     Comment: no hx IVDU    Sexual activity: Yes     Partners: Male     Birth control/protection: Female Surgical   Other Topics Concern    Parent/sibling w/ CABG, MI or angioplasty before 65F 55M? Not Asked   Social History Narrative    Not on file     Social Determinants of Health     Financial Resource Strain: High Risk (2022)    Received from [employer]    Financial Resource Strain     Difficulty of Paying Living Expenses: Not on file     Difficulty of Paying Living Expenses: Not on file   Food Insecurity: Not on file   Transportation Needs: Not on file   Physical Activity: Not on file   Stress: Not on file   Social Connections: Unknown (2022)    Received from [employer]    Social Connections     Frequency of Communication with Friends and Family: Not on file   Interpersonal Safety: Not on file   Housing Stability: Not on file          Family History:     Family History   Problem Relation Age of Onset    Diabetes Mother     Fibroids Mother     Reed Syndrome Mother         PMS2+    Colon Polyps Mother         removed 5 feet of colon for unmanageable polyps;  in her sleep at 67, took a nape and never woke up,    Reed Syndrome Brother         PMS2+    Bladder Cancer Maternal Grandfather 70        lived to be 94    Kidney Cancer Maternal Grandfather     Breast Cancer Paternal Grandmother         postmenopausal    Macular Degeneration Paternal Grandfather     Ovarian Cancer Maternal Aunt 40        possible uterine cancer  "also; hx of fibroids    Ovarian Cancer Maternal Aunt 40        possible uterine cancer also; hx of fibroids    Breast Cancer Paternal Aunt 54    Heart Defect Son     Liver Disease No family hx of     Liver Cancer No family hx of             Allergies:     Allergies   Allergen Reactions    Latex Headache    Other Drug Allergy (See Comments) Hives     Possible Lidocaine family allergy (noticed during procedure--numbing-type medication) HIVES            Medications:     Current Outpatient Medications   Medication Sig Dispense Refill    Estradiol 1 MG/GM GEL Place 1 mg onto the skin daily.      Fluocinolone Acetonide Scalp (DERMA-SMOOTHE/FS SCALP) 0.01 % OIL oil Apply to affected area on scalp twice daily for 2-3 weeks. There after, use as needed for flares. For maintenance apply to scalp one daily on Saturday and Sunday. 118.28 mL 3    ketoconazole (NIZORAL) 2 % external shampoo Use every other day. Let lather and sit for 5 mintues prior to rinsing 120 mL 11    progesterone (PROMETRIUM) 100 MG capsule Take 100 mg by mouth daily.              Physical Exam:   Height 1.67 m (5' 5.75\"), weight 86.2 kg (190 lb), last menstrual period 02/03/2021, currently breastfeeding.  Wt Readings from Last 6 Encounters:   10/18/24 86.2 kg (190 lb)   10/04/24 86.2 kg (190 lb)   07/15/24 85.3 kg (188 lb)   05/03/24 87.8 kg (193 lb 9.6 oz)   05/03/24 87.8 kg (193 lb 9.6 oz)   01/21/24 86.6 kg (191 lb)       Psych: nl judgement, orientation, memory, affect.           Data:   Imaging:  XR SHOULDER LEFT G/E 3 VIEWS  DATE: 1/21/2024                                                               IMPRESSION: Normal joint spaces and alignment. No acute displaced fracture identified. No glenohumeral dislocation.    Xray pelvis and hip 04/05/2024  IMPRESSION: Normal joint spaces and alignment. No definite fracture.     MRI hip arthrogram 09/18/2024  IMPRESSION:  1. There is impingement morphology, but there is no evidence of labral  tear or " chondromalacia.  2. Partial-thickness tear of the gluteus minimus tendon, similar to  the MRI from 10/21/2021.  3. Fluid in the greater trochanteric bursa, increased since the prior  study and evidence of bursitis in the appropriate clinical setting.    LUMBAR SPINE TWO TO THREE VIEWS October 4, 2024   IMPRESSION: Five lumbar type vertebrae. Normal alignment. Vertebral  body heights normal. No fractures. Probable facet arthropathy at L4-L5  and L5-S1. No other significant degenerative change. The sacroiliac  joints bilaterally appear patent and normally aligned.    XR SACROILIAC JOINT 1/2 VIEWS   10/4/2024 9:52 AM   IMPRESSION:   There is some subarticular sclerosis and marginal osteophyte formation  at the right sacroiliac joint which is more likely  degenerative/mechanical in etiology. No definite osseous erosions are  identified on this single AP view. MRI would be more sensitive for  sacroiliitis and can be performed if there is persistent clinical  concern.     There are degenerative changes at the pubic symphysis.    Laboratory:  2021  CCP antibody 7  Rheumatoid factor negative    10/4/2024  CRP 3.02  CCP 3.9  Rheumatoid factor <10  Sed Rate 8  HLA-B27 negative

## 2024-10-22 ENCOUNTER — PATIENT OUTREACH (OUTPATIENT)
Dept: FAMILY MEDICINE | Facility: OTHER | Age: 44
End: 2024-10-22
Payer: COMMERCIAL

## 2024-10-22 ENCOUNTER — TELEPHONE (OUTPATIENT)
Dept: GASTROENTEROLOGY | Facility: CLINIC | Age: 44
End: 2024-10-22
Payer: COMMERCIAL

## 2024-10-22 NOTE — TELEPHONE ENCOUNTER
"Endoscopy Scheduling Screen    Have you had any respiratory illness or flu-like symptoms in the last 10 days?  No    What is your communication preference for Instructions and/or Bowel Prep?   MyChart    What insurance is in the chart?  Other:  BCBS    Ordering/Referring Provider:     Sabrina Wayne APRN CNP in [facility]      (If ordering provider performs procedure, schedule with ordering provider unless otherwise instructed. )    BMI: Estimated body mass index is 30.9 kg/m  as calculated from the following:    Height as of 10/18/24: 1.67 m (5' 5.75\").    Weight as of 10/18/24: 86.2 kg (190 lb).     Sedation Ordered  MAC/deep sedation.   BMI<= 45 45 < BMI <= 48 48 < BMI < = 50  BMI > 50   No Restrictions No [facility]  No [facility]  [facility] with exceptions [facility] Only OR Only       Do you have a history of malignant hyperthermia?  No    (Females) Are you currently pregnant?   No     Have you been diagnosed or told you have pulmonary hypertension?   No    Do you have an LVAD?  No    Have you been told you have moderate to severe sleep apnea?  No.    Have you been told you have COPD, asthma, or any other lung disease?  No    Do you have any heart conditions?  No     Have you ever had or are you waiting for an organ transplant?  No. Continue scheduling, no site restrictions.    Have you had a stroke or transient ischemic attack (TIA aka \"mini stroke\" in the last 6 months?   No    Have you been diagnosed with or been told you have cirrhosis of the liver?   No.    Are you currently on dialysis?   No    Do you need assistance transferring?   No    BMI: Estimated body mass index is 30.9 kg/m  as calculated from the following:    Height as of 10/18/24: 1.67 m (5' 5.75\").    Weight as of 10/18/24: 86.2 kg (190 lb).     Is patients BMI > 40 and scheduling location [facility]?  No    Do you take an injectable or oral medication for weight loss or diabetes (excluding insulin)?  No    Do you take the medication " Naltrexone?  No    Do you take blood thinners?  No       Prep   Are you currently on dialysis or do you have chronic kidney disease?  No    Do you have a diagnosis of diabetes?  No    Do you have a diagnosis of cystic fibrosis (CF)?  No    On a regular basis do you go 3 -5 days between bowel movements?  No    BMI > 40?  No    [facility] Pharm[facility]    [facility]  BASILIO Martino - [address] [place] [address]  Phone: [phone] Fax: [phone]      Final Scheduling Details     Procedure scheduled  Colonoscopy    Surgeon:  Benjamin      Date of procedure:  01/09/2025     Pre-OP / PAC:   No - Not required for this site.    Location  [facility] - Per order.    Sedation   MAC/Deep Sedation - Per order.      Patient Reminders:   You will receive a call from a Nurse to review instructions and health history.  This assessment must be completed prior to your procedure.  Failure to complete the Nurse assessment may result in the procedure being cancelled.      On the day of your procedure, please designate an adult(s) who can drive you home stay with you for the next 24 hours. The medicines used in the exam will make you sleepy. You will not be able to drive.      You cannot take public transportation, ride share services, or non-medical taxi service without a responsible caregiver.  Medical transport services are allowed with the requirement that a responsible caregiver will receive you at your destination.  We require that drivers and caregivers are confirmed prior to your procedure.

## 2024-10-23 ENCOUNTER — VIRTUAL VISIT (OUTPATIENT)
Dept: RHEUMATOLOGY | Facility: CLINIC | Age: 44
End: 2024-10-23
Payer: COMMERCIAL

## 2024-10-23 VITALS — BODY MASS INDEX: 30.53 KG/M2 | WEIGHT: 190 LBS | HEIGHT: 66 IN

## 2024-10-23 DIAGNOSIS — M53.3 CHRONIC SI JOINT PAIN: ICD-10-CM

## 2024-10-23 DIAGNOSIS — G89.29 CHRONIC SI JOINT PAIN: ICD-10-CM

## 2024-10-23 DIAGNOSIS — L40.9 PSORIASIS: Primary | ICD-10-CM

## 2024-10-23 PROCEDURE — 99441 PR PHYSICIAN TELEPHONE EVALUATION 5-10 MIN: CPT | Mod: 93 | Performed by: PHYSICIAN ASSISTANT

## 2024-10-23 RX ORDER — ESTRADIOL 1 MG/G
1 GEL TOPICAL DAILY
COMMUNITY
Start: 2024-10-14

## 2024-10-23 RX ORDER — PROGESTERONE 100 MG/1
100 CAPSULE ORAL DAILY
COMMUNITY
Start: 2024-10-14

## 2024-10-23 ASSESSMENT — PAIN SCALES - GENERAL: PAINLEVEL_OUTOF10: MODERATE PAIN (5)

## 2024-10-23 NOTE — PATIENT INSTRUCTIONS
After Visit Instructions:     Thank you for coming to [facility] for your care. It is my goal to partner with you to help you reach your optimal state of health.       Plan:     Schedule follow-up with Norma Arroyo PA-C depending on results of MRI  Imaging: MRI SI joints        Norma Arroyo PA-C  [facility]  [facility]    Contact information: [facility]  Clinic Number:  [phone]  Please call or send a [facility] message with any questions about your care

## 2024-11-01 ENCOUNTER — HOSPITAL ENCOUNTER (OUTPATIENT)
Dept: MRI IMAGING | Facility: CLINIC | Age: 44
Discharge: HOME | End: 2024-11-01
Attending: PHYSICIAN ASSISTANT | Admitting: PHYSICIAN ASSISTANT
Payer: COMMERCIAL

## 2024-11-01 DIAGNOSIS — L40.9 PSORIASIS: ICD-10-CM

## 2024-11-01 DIAGNOSIS — G89.29 CHRONIC SI JOINT PAIN: ICD-10-CM

## 2024-11-01 DIAGNOSIS — M53.3 CHRONIC SI JOINT PAIN: ICD-10-CM

## 2024-11-01 PROCEDURE — 255N000002 HC RX 255 OP 636: Performed by: PHYSICIAN ASSISTANT

## 2024-11-01 PROCEDURE — A9585 GADOBUTROL INJECTION: HCPCS | Performed by: PHYSICIAN ASSISTANT

## 2024-11-01 PROCEDURE — 72197 MRI PELVIS W/O & W/DYE: CPT | Mod: 26 | Performed by: RADIOLOGY

## 2024-11-01 PROCEDURE — 72197 MRI PELVIS W/O & W/DYE: CPT

## 2024-11-01 RX ORDER — GADOBUTROL 604.72 MG/ML
8.5 INJECTION INTRAVENOUS ONCE
Status: COMPLETED | OUTPATIENT
Start: 2024-11-01 | End: 2024-11-01

## 2024-11-01 RX ADMIN — GADOBUTROL 8.5 ML: 604.72 INJECTION INTRAVENOUS at 08:35

## 2024-11-19 DIAGNOSIS — E89.40 SURGICAL MENOPAUSE: Primary | ICD-10-CM

## 2024-11-20 ENCOUNTER — LAB (OUTPATIENT)
Dept: LAB | Facility: CLINIC | Age: 44
End: 2024-11-20
Payer: COMMERCIAL

## 2024-11-20 DIAGNOSIS — E89.40 SURGICAL MENOPAUSE: ICD-10-CM

## 2024-11-20 LAB — ESTRADIOL SERPL-MCNC: 115 PG/ML

## 2024-12-10 ENCOUNTER — TELEPHONE (OUTPATIENT)
Dept: GASTROENTEROLOGY | Facility: CLINIC | Age: 44
End: 2024-12-10
Payer: COMMERCIAL

## 2024-12-10 NOTE — TELEPHONE ENCOUNTER
Caller: Writer to pt    Reason for Reschedule/Cancellation   (please be detailed, any staff messages or encounters to note?): [place] OOO      Prior to reschedule please review:  Ordering Provider: Sabrina Wayne APRN CNP  Sedation Determined: MAC  Does patient have any ASC Exclusions, please identify?: Yes, per Benjamin      Notes on Cancelled Procedure:  Procedure: Upper and Lower Endoscopy [EGD and Colonoscopy]   Date: 01/09/2025  Location: [facility]; [address] [place] [address], [address] Floor, [place] [id number]   Surgeon: BENJAMIN      Rescheduled: No, LVM - CASE IN DEPOT       Did you cancel or rescheduled an EUS procedure? No.

## 2024-12-21 ENCOUNTER — HEALTH MAINTENANCE LETTER (OUTPATIENT)
Age: 44
End: 2024-12-21

## 2025-01-07 ENCOUNTER — TELEPHONE (OUTPATIENT)
Dept: GASTROENTEROLOGY | Facility: CLINIC | Age: 45
End: 2025-01-07
Payer: COMMERCIAL

## 2025-01-07 NOTE — TELEPHONE ENCOUNTER
Caller: Christina Santana      Reason for Reschedule/Cancellation   (please be detailed, any staff messages or encounters to note?): Steevens OOO        Prior to reschedule please review:  Ordering Provider: Sabrina Wayne APRN CNP  Sedation Determined: MAC  Does patient have any ASC Exclusions, please identify?: Yes, per Steisiah        Notes on Cancelled Procedure:  Procedure: Upper and Lower Endoscopy [EGD and Colonoscopy]   Date: 01/09/2025  Location: [facility]; [address], [address] [place] [address]   Surgeon: MIGUEL          Rescheduled: Yes   Procedure: Lower Endoscopy [Colonoscopy]  Date: 01/09/2025  Location: [facility]; [address] [place] [address]   Surgeon: Miguel per order   Sedation Level Scheduled  MAC          Reason for Sedation Level per order   Prep/Instructions updated and sent: mychart     Does patient need PAC or Pre -Op Rescheduled? : n       Send In - basket message to Panc - Avelino Pool if EUS procedure is canceled or rescheduled: [ N/A, YES or NO] n

## 2025-01-14 ENCOUNTER — APPOINTMENT (OUTPATIENT)
Dept: GENERAL RADIOLOGY | Facility: CLINIC | Age: 45
End: 2025-01-14
Attending: EMERGENCY MEDICINE
Payer: COMMERCIAL

## 2025-01-14 ENCOUNTER — HOSPITAL ENCOUNTER (EMERGENCY)
Facility: CLINIC | Age: 45
Discharge: HOME | End: 2025-01-14
Payer: COMMERCIAL

## 2025-01-14 VITALS
BODY MASS INDEX: 31.34 KG/M2 | OXYGEN SATURATION: 100 % | WEIGHT: 195 LBS | RESPIRATION RATE: 13 BRPM | TEMPERATURE: 98.2 F | DIASTOLIC BLOOD PRESSURE: 93 MMHG | HEART RATE: 78 BPM | HEIGHT: 66 IN | SYSTOLIC BLOOD PRESSURE: 134 MMHG

## 2025-01-14 DIAGNOSIS — R07.89 CHEST WALL PAIN: ICD-10-CM

## 2025-01-14 LAB
ALBUMIN SERPL BCG-MCNC: 4.4 G/DL (ref 3.5–5.2)
ALP SERPL-CCNC: 71 U/L (ref 40–150)
ALT SERPL W P-5'-P-CCNC: 28 U/L (ref 0–50)
ANION GAP SERPL CALCULATED.3IONS-SCNC: 11 MMOL/L (ref 7–15)
AST SERPL W P-5'-P-CCNC: 28 U/L (ref 0–45)
ATRIAL RATE - MUSE: 78 BPM
BASOPHILS # BLD AUTO: 0 10E3/UL (ref 0–0.2)
BASOPHILS NFR BLD AUTO: 1 %
BILIRUB SERPL-MCNC: 0.2 MG/DL
BUN SERPL-MCNC: 16.1 MG/DL (ref 6–20)
CALCIUM SERPL-MCNC: 8.9 MG/DL (ref 8.8–10.4)
CHLORIDE SERPL-SCNC: 104 MMOL/L (ref 98–107)
CREAT SERPL-MCNC: 0.75 MG/DL (ref 0.51–0.95)
D DIMER PPP FEU-MCNC: 0.32 UG/ML FEU (ref 0–0.5)
DIASTOLIC BLOOD PRESSURE - MUSE: NORMAL MMHG
EGFRCR SERPLBLD CKD-EPI 2021: >90 ML/MIN/1.73M2
EOSINOPHIL # BLD AUTO: 0.2 10E3/UL (ref 0–0.7)
EOSINOPHIL NFR BLD AUTO: 4 %
ERYTHROCYTE [DISTWIDTH] IN BLOOD BY AUTOMATED COUNT: 12.2 % (ref 10–15)
FLUAV RNA SPEC QL NAA+PROBE: NEGATIVE
FLUBV RNA RESP QL NAA+PROBE: NEGATIVE
GLUCOSE SERPL-MCNC: 99 MG/DL (ref 70–99)
HCO3 SERPL-SCNC: 25 MMOL/L (ref 22–29)
HCT VFR BLD AUTO: 41.8 % (ref 35–47)
HGB BLD-MCNC: 14 G/DL (ref 11.7–15.7)
IMM GRANULOCYTES # BLD: 0 10E3/UL
IMM GRANULOCYTES NFR BLD: 0 %
INTERPRETATION ECG - MUSE: NORMAL
LYMPHOCYTES # BLD AUTO: 2.2 10E3/UL (ref 0.8–5.3)
LYMPHOCYTES NFR BLD AUTO: 36 %
MCH RBC QN AUTO: 30.2 PG (ref 26.5–33)
MCHC RBC AUTO-ENTMCNC: 33.5 G/DL (ref 31.5–36.5)
MCV RBC AUTO: 90 FL (ref 78–100)
MONOCYTES # BLD AUTO: 0.4 10E3/UL (ref 0–1.3)
MONOCYTES NFR BLD AUTO: 6 %
NEUTROPHILS # BLD AUTO: 3.3 10E3/UL (ref 1.6–8.3)
NEUTROPHILS NFR BLD AUTO: 53 %
NRBC # BLD AUTO: 0 10E3/UL
NRBC BLD AUTO-RTO: 0 /100
P AXIS - MUSE: 7 DEGREES
PLATELET # BLD AUTO: 219 10E3/UL (ref 150–450)
POTASSIUM SERPL-SCNC: 4.2 MMOL/L (ref 3.4–5.3)
PR INTERVAL - MUSE: 152 MS
PROT SERPL-MCNC: 7.2 G/DL (ref 6.4–8.3)
QRS DURATION - MUSE: 94 MS
QT - MUSE: 414 MS
QTC - MUSE: 471 MS
R AXIS - MUSE: 44 DEGREES
RBC # BLD AUTO: 4.64 10E6/UL (ref 3.8–5.2)
RSV RNA SPEC NAA+PROBE: NEGATIVE
SARS-COV-2 RNA RESP QL NAA+PROBE: NEGATIVE
SODIUM SERPL-SCNC: 140 MMOL/L (ref 135–145)
SYSTOLIC BLOOD PRESSURE - MUSE: NORMAL MMHG
T AXIS - MUSE: 28 DEGREES
TROPONIN T SERPL HS-MCNC: <6 NG/L
VENTRICULAR RATE- MUSE: 78 BPM
WBC # BLD AUTO: 6.1 10E3/UL (ref 4–11)

## 2025-01-14 PROCEDURE — 250N000011 HC RX IP 250 OP 636

## 2025-01-14 PROCEDURE — 93010 ELECTROCARDIOGRAM REPORT: CPT

## 2025-01-14 PROCEDURE — 99285 EMERGENCY DEPT VISIT HI MDM: CPT | Mod: 25

## 2025-01-14 PROCEDURE — 96372 THER/PROPH/DIAG INJ SC/IM: CPT

## 2025-01-14 PROCEDURE — 85004 AUTOMATED DIFF WBC COUNT: CPT

## 2025-01-14 PROCEDURE — 84155 ASSAY OF PROTEIN SERUM: CPT

## 2025-01-14 PROCEDURE — 84460 ALANINE AMINO (ALT) (SGPT): CPT

## 2025-01-14 PROCEDURE — 85041 AUTOMATED RBC COUNT: CPT

## 2025-01-14 PROCEDURE — 87637 SARSCOV2&INF A&B&RSV AMP PRB: CPT | Performed by: EMERGENCY MEDICINE

## 2025-01-14 PROCEDURE — 71046 X-RAY EXAM CHEST 2 VIEWS: CPT

## 2025-01-14 PROCEDURE — 85379 FIBRIN DEGRADATION QUANT: CPT

## 2025-01-14 PROCEDURE — 93005 ELECTROCARDIOGRAM TRACING: CPT

## 2025-01-14 PROCEDURE — 36415 COLL VENOUS BLD VENIPUNCTURE: CPT

## 2025-01-14 PROCEDURE — 84484 ASSAY OF TROPONIN QUANT: CPT

## 2025-01-14 PROCEDURE — 99284 EMERGENCY DEPT VISIT MOD MDM: CPT

## 2025-01-14 PROCEDURE — 87637 SARSCOV2&INF A&B&RSV AMP PRB: CPT

## 2025-01-14 RX ORDER — METHYLPREDNISOLONE 4 MG/1
TABLET ORAL
Qty: 21 TABLET | Refills: 0 | Status: SHIPPED | OUTPATIENT
Start: 2025-01-14

## 2025-01-14 RX ORDER — KETOROLAC TROMETHAMINE 30 MG/ML
30 INJECTION, SOLUTION INTRAMUSCULAR; INTRAVENOUS ONCE
Status: COMPLETED | OUTPATIENT
Start: 2025-01-14 | End: 2025-01-14

## 2025-01-14 RX ADMIN — KETOROLAC TROMETHAMINE 30 MG: 30 INJECTION, SOLUTION INTRAMUSCULAR at 12:47

## 2025-01-14 ASSESSMENT — ENCOUNTER SYMPTOMS
RHINORRHEA: 0
HEMATOLOGIC/LYMPHATIC NEGATIVE: 1
ABDOMINAL PAIN: 0
MUSCULOSKELETAL NEGATIVE: 1
COUGH: 1
WHEEZING: 0
APNEA: 0
PALPITATIONS: 0
CHEST TIGHTNESS: 1
STRIDOR: 0
HEADACHES: 0
GASTROINTESTINAL NEGATIVE: 1
SORE THROAT: 0
PSYCHIATRIC NEGATIVE: 1
SINUS PAIN: 0
SINUS PRESSURE: 0
SHORTNESS OF BREATH: 0
CHOKING: 0
FEVER: 0
NEUROLOGICAL NEGATIVE: 1

## 2025-01-14 ASSESSMENT — ACTIVITIES OF DAILY LIVING (ADL)
ADLS_ACUITY_SCORE: 41

## 2025-01-14 ASSESSMENT — COLUMBIA-SUICIDE SEVERITY RATING SCALE - C-SSRS
6. HAVE YOU EVER DONE ANYTHING, STARTED TO DO ANYTHING, OR PREPARED TO DO ANYTHING TO END YOUR LIFE?: NO
2. HAVE YOU ACTUALLY HAD ANY THOUGHTS OF KILLING YOURSELF IN THE PAST MONTH?: NO
1. IN THE PAST MONTH, HAVE YOU WISHED YOU WERE DEAD OR WISHED YOU COULD GO TO SLEEP AND NOT WAKE UP?: NO

## 2025-01-14 NOTE — ED NOTES
Patient verified her work note that it was for the correct date. Discharge reviewed and she will return as needed. Oralia Jarrett RN on 1/14/2025 at 2:24 PM

## 2025-01-14 NOTE — ED PROVIDER NOTES
History     Chief Complaint   Patient presents with    Shortness of Breath     HPI  Christina Santana is a 44 year old female who presents to the emergency department with concerns for chest pain and cough.  Patient endorses that yesterday she started having substernal chest pain that resulted in her coughing.  She denies any fever, congestion, sore throat, headache or upper respiratory symptoms.  She states that every time she takes a deep breath she has pain in this area and needs to cough.  She does not bring up any phlegm and does not feel ill otherwise but is concerned about the cough and sudden onset of pain.    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     Priority: Medium     Added automatically from request for surgery [id number]      Seasonal allergic rhinitis due to pollen 03/04/2020     Priority: Medium    GERD with stricture 02/25/2020     Priority: Medium    Transplant donor evaluation 09/13/2018     Priority: Medium    BREEZY III (cervical intraepithelial neoplasia grade III) with severe dysplasia 01/01/2013     Priority: Medium     5/2011- ASC-H  6/2011- colposcopy- suspicious for HPV  8/2012- ASC-H  4/2013- HSIL  5/1/2013- colposcopy- BREEZY 2-3  5/13/2013 LEEP: BREEZY II-III, Positive Margins  8/2013- ECC- cervicitis  12/2013 ASCUS, HPV +  1/2014- colposcopy- negative  8/2014- NIL  7/2015- NIL/HPV negative   3/2017- NIL/HPV +  4/2017- colposcopy- suggestive of BREEZY I  4/2018- NIL/HPV negative  9/10/19 NIL/HPV negative   2/24/21 TLH/BSO-benign pathology  10/8/21 NIL pap Neg HPV of vaginal cuff. Colpo completed for vaginal bleeding. Plan:  Given discomfort during exam and biopsy attempt despite lidocaine, plan to perform EUA and biopsy under anesthesia in the OR. 12/10/21 Vaginal cuff biopsies- negative. Plan: cotest in 3 years.   1/19/22 Post op appt- cancelled   2/15/22 Appt- Pap due 10/8/22  7/27/23 Visit Oncology  9/19/23 NIL pap, + HR HPV (not 16 or 18). Plan: colp bef 12/19/23 11/8/23 Vaginal Lillian - no biopsies, visually normal. Plan cotest in 1 year due 11/8/2024  10/22/24 Reminder mychart  11/25/24 Reminder call - pt notified   12/18/24 Lost to follow-up for pap tracking       Astigmatism 10/03/2003     Priority: Medium    Myopia 10/03/2003     Priority: Medium        Past Medical History:    Past Medical History:   Diagnosis Date    ASCUS with positive high risk HPV 12/27/2013    HSIL (high grade squamous intraepithelial lesion) on Pap smear of cervix 04/22/2013    Pap smear of cervix with ASCUS, cannot exclude HGSIL 5/23/11, 8/10/12,     PMS2-related Reed syndrome (HNPCC4)        Past Surgical History:    Past Surgical History:   Procedure Laterality Date    APPENDECTOMY      AS REPAIR PARAESOPHAGEAL HIATAL HERNIA,LAPAROTOMY, W MESH      BIOPSY VULVA N/A 12/10/2021    Procedure: Vaginal cuff biopsy;  Surgeon: Leyda Guzman DO;  Location:  OR    CARPAL TUNNEL RELEASE RT/LT Right     COLONOSCOPY N/A 06/10/2021    Procedure: COLONOSCOPY, WITH POLYPECTOMY;  Surgeon: Willi Galeas DO;  Location: [facility]    COLONOSCOPY N/A 12/01/2022    Procedure: COLONOSCOPY;  Surgeon: Willi Galeas DO;  Location: [facility]    COLONOSCOPY N/A 12/04/2023    Procedure: Colonoscopy;  Surgeon: Willi Galeas DO;  Location: [facility]    ESOPHAGOSCOPY, GASTROSCOPY, DUODENOSCOPY (EGD), COMBINED N/A 12/01/2022    Procedure: ESOPHAGOGASTRODUODENOSCOPY (EGD) with biopsy;  Surgeon: Willi Galeas DO;  Location: [facility]    EXAM UNDER ANESTHESIA PELVIC N/A 12/10/2021    Procedure: EXAM UNDER ANESTHESIA, PELVIS;  Surgeon: Megan  DO Leyda;  Location: [facility]    LAPAROSCOPIC HYSTERECTOMY TOTAL, BILATERAL SALPINGO-OOPHORECTOMY, COMBINED N/A 2021    Procedure: HYSTERECTOMY, TOTAL, LAPAROSCOPIC, WITH SALPINGO-OOPHORECTOMY;  Surgeon: Dylon Alcantar MD;  Location: [facility]       Family History:    Family History   Problem Relation Age of Onset    Diabetes Mother     Fibroids Mother     Reed Syndrome Mother         PMS2+    Colon Polyps Mother         removed 5 feet of colon for unmanageable polyps;  in her sleep at 67, took a nape and never woke up,    Reed Syndrome Brother         PMS2+    Bladder Cancer Maternal Grandfather 70        lived to be 94    Kidney Cancer Maternal Grandfather     Breast Cancer Paternal Grandmother         postmenopausal    Macular Degeneration Paternal Grandfather     Ovarian Cancer Maternal Aunt 40        possible uterine cancer also; hx of fibroids    Ovarian Cancer Maternal Aunt 40        possible uterine cancer also; hx of fibroids    Breast Cancer Paternal Aunt 54    Heart Defect Son     Liver Disease No family hx of     Liver Cancer No family hx of        Social History:  Marital Status:   [2]  Social History     Tobacco Use    Smoking status: Never    Smokeless tobacco: Never   Vaping Use    Vaping status: Never Used   Substance Use Topics    Alcohol use: Yes     Comment: occassional. ~4 per month; no hx of alcohol abuse/dependency    Drug use: No     Comment: no hx IVDU        Medications:    methylPREDNISolone (MEDROL DOSEPAK) 4 MG tablet therapy pack  Estradiol 1 MG/GM GEL  Fluocinolone Acetonide Scalp (DERMA-SMOOTHE/FS SCALP) 0.01 % OIL oil  ketoconazole (NIZORAL) 2 % external shampoo  progesterone (PROMETRIUM) 100 MG capsule          Review of Systems   Constitutional:  Negative for fever.   HENT: Negative.  Negative for congestion, rhinorrhea, sinus pressure, sinus pain and sore throat.    Respiratory:  Positive for cough and chest tightness. Negative for apnea, choking, shortness  "of breath, wheezing and stridor.    Cardiovascular:  Positive for chest pain. Negative for palpitations and leg swelling.   Gastrointestinal: Negative.  Negative for abdominal pain.   Genitourinary: Negative.    Musculoskeletal: Negative.    Skin:  Negative for rash.   Neurological: Negative.  Negative for headaches.   Hematological: Negative.    Psychiatric/Behavioral: Negative.     All other systems reviewed and are negative.      Physical Exam   BP: (!) 138/94  Pulse: 80  Temp: 98.2  F (36.8  C)  Resp: 16  Height: 167.6 cm (5' 6\")  Weight: 88.5 kg (195 lb)  SpO2: 100 %      Physical Exam  Constitutional:       General: She is not in acute distress.     Appearance: Normal appearance. She is not diaphoretic.   HENT:      Head: Atraumatic.      Mouth/Throat:      Mouth: Mucous membranes are moist.   Eyes:      General: No scleral icterus.     Conjunctiva/sclera: Conjunctivae normal.   Cardiovascular:      Rate and Rhythm: Normal rate.      Heart sounds: Normal heart sounds.   Pulmonary:      Effort: No respiratory distress.      Breath sounds: Normal breath sounds. No decreased breath sounds, wheezing, rhonchi or rales.   Chest:      Chest wall: Tenderness present.   Abdominal:      General: Abdomen is flat.   Musculoskeletal:      Cervical back: Neck supple.   Skin:     General: Skin is warm.      Findings: No rash.   Neurological:      Mental Status: She is alert.         ED Course        Procedures           Results for orders placed or performed during the hospital encounter of 01/14/25 (from the past 24 hours)   Influenza A/B, RSV and SARS-CoV2 PCR (COVID-19) Nose    Specimen: Nose; Swab   Result Value Ref Range    Influenza A PCR Negative Negative    Influenza B PCR Negative Negative    RSV PCR Negative Negative    SARS CoV2 PCR Negative Negative    Narrative    Testing was performed using the Xpert Xpress CoV2/Flu/RSV Assay on the Cepheid GeneXpert Instrument. This test should be ordered for the detection of " SARS-CoV2, influenza, and RSV viruses in individuals with signs and symptoms of respiratory tract infection. This test is for in vitro diagnostic use under the [place] FDA for laboratories certified under CLIA to perform high or moderate complexity testing. This test has been [place] FDA cleared. A negative result does not rule out the presence of PCR inhibitors in the specimen or target RNA in concentration below the limit of detection for the assay. If only one viral target is positive but coinfection with multiple targets is suspected, the sample should be re-tested with another FDA cleared, approved, or authorized test, if coninfection would change clinical management. This test was validated by the [facility]. These laboratories are certified under the Clinical Laboratory Improvement Amendments of 1988 (CLIA-88) as qualified to perfom high complexity laboratory testing.   EKG 12-lead, tracing only   Result Value Ref Range    Systolic Blood Pressure  mmHg    Diastolic Blood Pressure  mmHg    Ventricular Rate 78 BPM    Atrial Rate 78 BPM    VA Interval 152 ms    QRS Duration 94 ms     ms    QTc 471 ms    P Axis 7 degrees    R AXIS 44 degrees    T Axis 28 degrees    Interpretation ECG       Sinus rhythm  Normal ECG  When compared with ECG of 03-May-2024 11:54,  Nonspecific T wave abnormality, worse in Anterior leads  Confirmed by SEE ED PROVIDER NOTE FOR, ECG INTERPRETATION ([id number]),  Sondra Dasilva ([id number]) on 1/14/2025 10:04:28 AM     Chest XR,  PA & LAT    Narrative    EXAM: XR CHEST 2 VIEWS  LOCATION: [facility]  DATE: 1/14/2025    INDICATION: short of breath  COMPARISON: 5/3/2024      Impression    IMPRESSION: No pleural fluid or pneumothorax. Reticular interstitial opacities likely represent mild edema. Normal size of the heart.   CBC with platelets differential    Narrative    The following orders were created for panel order CBC with platelets  differential.  Procedure                               Abnormality         Status                     ---------                               -----------         ------                     CBC with platelets and d...[[id number]]                      Final result                 Please view results for these tests on the individual orders.   D dimer quantitative   Result Value Ref Range    D-Dimer Quantitative 0.32 0.00 - 0.50 ug/mL FEU    Narrative    This D-dimer assay is intended for use in conjunction with a clinical pretest probability assessment model to exclude pulmonary embolism (PE) and deep venous thrombosis (DVT) in outpatients suspected of PE or DVT. The cut-off value is 0.50 ug/mL FEU.   Comprehensive metabolic panel   Result Value Ref Range    Sodium 140 135 - 145 mmol/L    Potassium 4.2 3.4 - 5.3 mmol/L    Carbon Dioxide (CO2) 25 22 - 29 mmol/L    Anion Gap 11 7 - 15 mmol/L    Urea Nitrogen 16.1 6.0 - 20.0 mg/dL    Creatinine 0.75 0.51 - 0.95 mg/dL    GFR Estimate >90 >60 mL/min/1.73m2    Calcium 8.9 8.8 - 10.4 mg/dL    Chloride 104 98 - 107 mmol/L    Glucose 99 70 - 99 mg/dL    Alkaline Phosphatase 71 40 - 150 U/L    AST 28 0 - 45 U/L    ALT 28 0 - 50 U/L    Protein Total 7.2 6.4 - 8.3 g/dL    Albumin 4.4 3.5 - 5.2 g/dL    Bilirubin Total 0.2 <=1.2 mg/dL   Troponin T, High Sensitivity   Result Value Ref Range    Troponin T, High Sensitivity <6 <=14 ng/L   CBC with platelets and differential   Result Value Ref Range    WBC Count 6.1 4.0 - 11.0 10e3/uL    RBC Count 4.64 3.80 - 5.20 10e6/uL    Hemoglobin 14.0 11.7 - 15.7 g/dL    Hematocrit 41.8 35.0 - 47.0 %    MCV 90 78 - 100 fL    MCH 30.2 26.5 - 33.0 pg    MCHC 33.5 31.5 - 36.5 g/dL    RDW 12.2 10.0 - 15.0 %    Platelet Count 219 150 - 450 10e3/uL    % Neutrophils 53 %    % Lymphocytes 36 %    % Monocytes 6 %    % Eosinophils 4 %    % Basophils 1 %    % Immature Granulocytes 0 %    NRBCs per 100 WBC 0 <1 /100    Absolute Neutrophils 3.3 1.6 - 8.3  10e3/uL    Absolute Lymphocytes 2.2 0.8 - 5.3 10e3/uL    Absolute Monocytes 0.4 0.0 - 1.3 10e3/uL    Absolute Eosinophils 0.2 0.0 - 0.7 10e3/uL    Absolute Basophils 0.0 0.0 - 0.2 10e3/uL    Absolute Immature Granulocytes 0.0 <=0.4 10e3/uL    Absolute NRBCs 0.0 10e3/uL       Medications   ketorolac (TORADOL) injection 30 mg (30 mg Intramuscular $Given 1/14/25 7441)       Assessments & Plan (with Medical Decision Making)  Christina Santana is a 44 year old female who presents to the emergency department with concerns for chest pain and cough.  Patient endorses that yesterday she started having substernal chest pain that resulted in her coughing.  She denies any fever, congestion, sore throat, headache or upper respiratory symptoms.  She states that every time she takes a deep breath she has pain in this area and needs to cough.  She does not bring up any phlegm and does not feel ill otherwise but is concerned about the cough and sudden onset of pain.  On exam patient does not appear to be in acute distress, lungs are clear and no respiratory distress there is point tenderness over the left long and pulmonary region of the heart more so than on the right.  No signs are stable at 132/91 temperature of 98.2 pulse of 73 respiratory rate of 21 and oxygen of 99%.Exam without evidence of volume overload so doubt heart failure. EKG without signs of active ischemia. Given the timing of pain to ER presentation, single troponin <6 so doubtful NSTEMI. Presentation not consistent with acute PE d-dimer 0.32,pneumothorax not visualized on chest xr, thoracic aortic dissection, pericarditis, tamponade, pneumonia -no infectious symptoms, clear chest xr-is a dental finding of reticular interstitial opacities likely represent mild edema , negative for myocarditis-no recent illness and neg trop.  Patient negative for influenza A, influenza B, RSV and COVID.  White blood cell count at 6.1.  Based on physical assessment and lab workup I feel  patient symptoms are more related to chest wall pain or an acute respiratory illness.  Patient given an IM dose of Toradol in the emergency department which did relieve the pain.  Based on patient's stable vital signs and unremarkable lab workup I feel it is appropriate for her to be discharged at this time.  If symptoms continue to progress, I did recommend she follow-up with another chest x-ray in a few days.  I did prescribe a Medrol Dosepak to help with inflammation that was seen on the x-ray.  Patient is in agreement with this plan all questions and concerns answered and she has  being discharged in stable condition     I have reviewed the nursing notes.    I have reviewed the findings, diagnosis, plan and need for follow up with the patient.          New Prescriptions    METHYLPREDNISOLONE (MEDROL DOSEPAK) 4 MG TABLET THERAPY PACK    Follow Package Directions       Final diagnoses:   Chest wall pain       1/14/2025   [facility] EMERGENCY DEPT       Mary Shannon APRN CNP  01/14/25 1218

## 2025-01-14 NOTE — LETTER
January 14, 2025      To Whom It May Concern:      Christina Santana was seen in our Emergency Department today, 01/14/25.  I expect her condition to improve over the next 24 hours.  She may return to work/school when improved om 1/15/2025    Sincerely,        SHEYLA Grant CNP  Electronically signed

## 2025-01-14 NOTE — ED TRIAGE NOTES
Patient reports chest tightness, cough, and shortness of breath worsening since yesterday.     Triage Assessment (Adult)       Row Name 01/14/25 0949          Triage Assessment    Airway WDL WDL        Respiratory WDL    Respiratory WDL X;cough     Cough Frequency frequent

## 2025-02-13 ENCOUNTER — TELEPHONE (OUTPATIENT)
Dept: GASTROENTEROLOGY | Facility: CLINIC | Age: 45
End: 2025-02-13
Payer: COMMERCIAL

## 2025-02-13 NOTE — TELEPHONE ENCOUNTER
Pre visit planning completed.      Procedure details:    Patient scheduled for Colonoscopy/Upper endoscopy (EGD) on 2.27.25.     Arrival time: 1300. Procedure time 1430    Facility location: [facility]; [address] [place] [address], [address] Floor, [place] [address]. Check in location: Main entrance at registration desk.  *Disclaimer: Drivers are to check in with patient and stay on campus during procedure.     Sedation type: MAC    Pre op exam needed? No.    Indication for procedure: Known GE junction polyp from last EGD at [facility], consider removal. PMS2+ Reed syndrome       Chart review:     Electronic implanted devices? No    Recent diagnosis of diverticulitis within the last 6 weeks? No      Medication review:    Diabetic? No    Anticoagulants? No    Weight loss medication/injectable? No GLP-1 medication per patient's medication list. Nursing to verify with pre-assessment call.    Other medication HOLDING recommendations:  N/A      Prep for procedure:     Bowel prep recommendation: Standard Miralax.   Due to: standard bowel prep    Procedure information and instructions sent via [employer]         Flor Gastelum RN  Endoscopy Procedure Pre Assessment   [phone] option 2

## 2025-02-13 NOTE — TELEPHONE ENCOUNTER
Pre assessment completed for upcoming procedure.   (Please see previous telephone encounter notes for complete details)      Procedure details:    Arrival time and facility location reviewed.    Pre op exam needed? No.    Designated  policy reviewed. Instructed to have someone stay 24  hours post procedure.       Medication review:    Medications reviewed. Please see supporting documentation below. Holding recommendations discussed (if applicable).       Prep for procedure:     Procedure prep instructions reviewed.        Any additional information needed:  N/A      Patient verbalized understanding and had no questions or concerns at this time.      Sondra Childress LPN  Endoscopy Procedure Pre Assessment   [phone] option 2

## 2025-02-25 ENCOUNTER — TELEPHONE (OUTPATIENT)
Dept: GASTROENTEROLOGY | Facility: CLINIC | Age: 45
End: 2025-02-25
Payer: COMMERCIAL

## 2025-02-25 NOTE — TELEPHONE ENCOUNTER
Caller: Christina    Reason for Reschedule/Cancellation (please be detailed, any staff messages or encounters to note?):   Dr mtz requested patient be moved to 8am opening    Did you cancel or rescheduled an EUS procedure? No.    Is screening questionnaire older than 3 months from the reschedule date.   If Yes, please complete screening questionnaire. No    Prior to reschedule please review:  Ordering Provider: Sabrina Wayne APRN CNP    Sedation Determined: mac  Does patient have any ASC Exclusions, please identify?: no    Rescheduled: Yes,   Procedure: Upper and Lower Endoscopy [EGD and Colonoscopy]    Date: 2/27   Location: [facility]; [address], [address], [place] [address]    Surgeon: smith   Sedation Level Scheduled  mac ,  Reason for Sedation Level per order   Instructions updated and sent: yes     Does patient need PAC or Pre -Op Rescheduled? : no

## 2025-02-27 ENCOUNTER — ANESTHESIA (OUTPATIENT)
Dept: GASTROENTEROLOGY | Facility: CLINIC | Age: 45
End: 2025-02-27
Payer: COMMERCIAL

## 2025-02-27 ENCOUNTER — HOSPITAL ENCOUNTER (OUTPATIENT)
Facility: CLINIC | Age: 45
Discharge: HOME | End: 2025-02-27
Attending: INTERNAL MEDICINE | Admitting: INTERNAL MEDICINE
Payer: COMMERCIAL

## 2025-02-27 ENCOUNTER — ANESTHESIA EVENT (OUTPATIENT)
Dept: GASTROENTEROLOGY | Facility: CLINIC | Age: 45
End: 2025-02-27
Payer: COMMERCIAL

## 2025-02-27 VITALS
DIASTOLIC BLOOD PRESSURE: 92 MMHG | OXYGEN SATURATION: 100 % | RESPIRATION RATE: 16 BRPM | SYSTOLIC BLOOD PRESSURE: 122 MMHG

## 2025-02-27 LAB
COLONOSCOPY: NORMAL
UPPER GI ENDOSCOPY: NORMAL

## 2025-02-27 PROCEDURE — 258N000003 HC RX IP 258 OP 636: Performed by: NURSE ANESTHETIST, CERTIFIED REGISTERED

## 2025-02-27 PROCEDURE — 43251 EGD REMOVE LESION SNARE: CPT | Performed by: INTERNAL MEDICINE

## 2025-02-27 PROCEDURE — 250N000009 HC RX 250: Performed by: NURSE ANESTHETIST, CERTIFIED REGISTERED

## 2025-02-27 PROCEDURE — 88305 TISSUE EXAM BY PATHOLOGIST: CPT | Mod: TC | Performed by: INTERNAL MEDICINE

## 2025-02-27 PROCEDURE — 88305 TISSUE EXAM BY PATHOLOGIST: CPT | Mod: 26 | Performed by: PATHOLOGY

## 2025-02-27 PROCEDURE — 370N000017 HC ANESTHESIA TECHNICAL FEE, PER MIN: Performed by: INTERNAL MEDICINE

## 2025-02-27 PROCEDURE — G0105 COLORECTAL SCRN; HI RISK IND: HCPCS | Performed by: INTERNAL MEDICINE

## 2025-02-27 PROCEDURE — 45378 DIAGNOSTIC COLONOSCOPY: CPT | Performed by: INTERNAL MEDICINE

## 2025-02-27 PROCEDURE — 250N000011 HC RX IP 250 OP 636: Performed by: NURSE ANESTHETIST, CERTIFIED REGISTERED

## 2025-02-27 RX ORDER — FLUMAZENIL 0.1 MG/ML
0.2 INJECTION, SOLUTION INTRAVENOUS
Status: DISCONTINUED | OUTPATIENT
Start: 2025-02-27 | End: 2025-02-27 | Stop reason: HOSPADM

## 2025-02-27 RX ORDER — PROPOFOL 10 MG/ML
INJECTION, EMULSION INTRAVENOUS PRN
Status: DISCONTINUED | OUTPATIENT
Start: 2025-02-27 | End: 2025-02-27

## 2025-02-27 RX ORDER — SODIUM CHLORIDE, SODIUM LACTATE, POTASSIUM CHLORIDE, CALCIUM CHLORIDE 600; 310; 30; 20 MG/100ML; MG/100ML; MG/100ML; MG/100ML
INJECTION, SOLUTION INTRAVENOUS CONTINUOUS
Status: DISCONTINUED | OUTPATIENT
Start: 2025-02-27 | End: 2025-02-27 | Stop reason: HOSPADM

## 2025-02-27 RX ORDER — ONDANSETRON 4 MG/1
4 TABLET, ORALLY DISINTEGRATING ORAL EVERY 30 MIN PRN
Status: DISCONTINUED | OUTPATIENT
Start: 2025-02-27 | End: 2025-02-27 | Stop reason: HOSPADM

## 2025-02-27 RX ORDER — ONDANSETRON 2 MG/ML
4 INJECTION INTRAMUSCULAR; INTRAVENOUS EVERY 6 HOURS PRN
Status: DISCONTINUED | OUTPATIENT
Start: 2025-02-27 | End: 2025-02-27 | Stop reason: HOSPADM

## 2025-02-27 RX ORDER — PROPOFOL 10 MG/ML
INJECTION, EMULSION INTRAVENOUS CONTINUOUS PRN
Status: DISCONTINUED | OUTPATIENT
Start: 2025-02-27 | End: 2025-02-27

## 2025-02-27 RX ORDER — ONDANSETRON 4 MG/1
4 TABLET, ORALLY DISINTEGRATING ORAL EVERY 6 HOURS PRN
Status: DISCONTINUED | OUTPATIENT
Start: 2025-02-27 | End: 2025-02-27 | Stop reason: HOSPADM

## 2025-02-27 RX ORDER — NALOXONE HYDROCHLORIDE 0.4 MG/ML
0.4 INJECTION, SOLUTION INTRAMUSCULAR; INTRAVENOUS; SUBCUTANEOUS
Status: DISCONTINUED | OUTPATIENT
Start: 2025-02-27 | End: 2025-02-27 | Stop reason: HOSPADM

## 2025-02-27 RX ORDER — LIDOCAINE HYDROCHLORIDE 20 MG/ML
INJECTION, SOLUTION INFILTRATION; PERINEURAL PRN
Status: DISCONTINUED | OUTPATIENT
Start: 2025-02-27 | End: 2025-02-27

## 2025-02-27 RX ORDER — NALOXONE HYDROCHLORIDE 0.4 MG/ML
0.1 INJECTION, SOLUTION INTRAMUSCULAR; INTRAVENOUS; SUBCUTANEOUS
Status: DISCONTINUED | OUTPATIENT
Start: 2025-02-27 | End: 2025-02-27 | Stop reason: HOSPADM

## 2025-02-27 RX ORDER — NALOXONE HYDROCHLORIDE 0.4 MG/ML
0.2 INJECTION, SOLUTION INTRAMUSCULAR; INTRAVENOUS; SUBCUTANEOUS
Status: DISCONTINUED | OUTPATIENT
Start: 2025-02-27 | End: 2025-02-27 | Stop reason: HOSPADM

## 2025-02-27 RX ORDER — SODIUM CHLORIDE, SODIUM LACTATE, POTASSIUM CHLORIDE, CALCIUM CHLORIDE 600; 310; 30; 20 MG/100ML; MG/100ML; MG/100ML; MG/100ML
INJECTION, SOLUTION INTRAVENOUS CONTINUOUS PRN
Status: DISCONTINUED | OUTPATIENT
Start: 2025-02-27 | End: 2025-02-27

## 2025-02-27 RX ORDER — OXYCODONE HYDROCHLORIDE 10 MG/1
10 TABLET ORAL
Status: DISCONTINUED | OUTPATIENT
Start: 2025-02-27 | End: 2025-02-27 | Stop reason: HOSPADM

## 2025-02-27 RX ORDER — LIDOCAINE 40 MG/G
CREAM TOPICAL
Status: DISCONTINUED | OUTPATIENT
Start: 2025-02-27 | End: 2025-02-27 | Stop reason: HOSPADM

## 2025-02-27 RX ORDER — PROCHLORPERAZINE MALEATE 5 MG/1
10 TABLET ORAL EVERY 6 HOURS PRN
Status: DISCONTINUED | OUTPATIENT
Start: 2025-02-27 | End: 2025-02-27 | Stop reason: HOSPADM

## 2025-02-27 RX ORDER — ONDANSETRON 2 MG/ML
4 INJECTION INTRAMUSCULAR; INTRAVENOUS EVERY 30 MIN PRN
Status: DISCONTINUED | OUTPATIENT
Start: 2025-02-27 | End: 2025-02-27 | Stop reason: HOSPADM

## 2025-02-27 RX ORDER — OXYCODONE HYDROCHLORIDE 5 MG/1
5 TABLET ORAL
Status: DISCONTINUED | OUTPATIENT
Start: 2025-02-27 | End: 2025-02-27 | Stop reason: HOSPADM

## 2025-02-27 RX ORDER — ONDANSETRON 2 MG/ML
4 INJECTION INTRAMUSCULAR; INTRAVENOUS
Status: DISCONTINUED | OUTPATIENT
Start: 2025-02-27 | End: 2025-02-27 | Stop reason: HOSPADM

## 2025-02-27 RX ORDER — DEXAMETHASONE SODIUM PHOSPHATE 4 MG/ML
4 INJECTION, SOLUTION INTRA-ARTICULAR; INTRALESIONAL; INTRAMUSCULAR; INTRAVENOUS; SOFT TISSUE
Status: DISCONTINUED | OUTPATIENT
Start: 2025-02-27 | End: 2025-02-27 | Stop reason: HOSPADM

## 2025-02-27 RX ADMIN — PROPOFOL 20 MG: 10 INJECTION, EMULSION INTRAVENOUS at 07:55

## 2025-02-27 RX ADMIN — PROPOFOL 20 MG: 10 INJECTION, EMULSION INTRAVENOUS at 08:03

## 2025-02-27 RX ADMIN — PROPOFOL 20 MG: 10 INJECTION, EMULSION INTRAVENOUS at 07:53

## 2025-02-27 RX ADMIN — PROPOFOL 150 MCG/KG/MIN: 10 INJECTION, EMULSION INTRAVENOUS at 07:49

## 2025-02-27 RX ADMIN — PROPOFOL 60 MG: 10 INJECTION, EMULSION INTRAVENOUS at 07:49

## 2025-02-27 RX ADMIN — SODIUM CHLORIDE, POTASSIUM CHLORIDE, SODIUM LACTATE AND CALCIUM CHLORIDE: 600; 310; 30; 20 INJECTION, SOLUTION INTRAVENOUS at 07:44

## 2025-02-27 RX ADMIN — LIDOCAINE HYDROCHLORIDE 100 MG: 20 INJECTION, SOLUTION INFILTRATION; PERINEURAL at 07:49

## 2025-02-27 ASSESSMENT — ACTIVITIES OF DAILY LIVING (ADL)
ADLS_ACUITY_SCORE: 41

## 2025-02-27 ASSESSMENT — COPD QUESTIONNAIRES: COPD: 0

## 2025-02-27 ASSESSMENT — LIFESTYLE VARIABLES: TOBACCO_USE: 0

## 2025-02-27 NOTE — ANESTHESIA CARE TRANSFER NOTE
Patient: Christina Santana    Procedure: Procedure(s):  Colonoscopy  ESOPHAGOGASTRODUODENOSCOPY, WITH LESION REMOVAL USING SNARE       Diagnosis: PMS2-related Reed syndrome (HNPCC4) [Z15.09]  Special screening for malignant neoplasms, colon [Z12.11]  Diagnosis Additional Information: No value filed.    Anesthesia Type:   MAC     Note:    Oropharynx: oropharynx clear of all foreign objects and spontaneously breathing  Level of Consciousness: awake  Oxygen Supplementation: room air    Independent Airway: airway patency satisfactory and stable  Dentition: dentition unchanged  Vital Signs Stable: post-procedure vital signs reviewed and stable  Report to RN Given: handoff report given  Patient transferred to: Phase II  Comments: Pt to Phase II with monitors/O2, airway/IV patent, reports pain<3/10, resting comfortably.    Handoff Report: Identifed the Patient, Identified the Reponsible Provider, Reviewed the pertinent medical history, Discussed the surgical course, Reviewed Intra-OP anesthesia mangement and issues during anesthesia, Set expectations for post-procedure period and Allowed opportunity for questions and acknowledgement of understanding      Vitals:  Vitals Value Taken Time   /55 02/27/25 0830   Temp 36.4    Pulse 72    Resp 15    SpO2 96 % 02/27/25 0832   Vitals shown include unfiled device data.    Electronically Signed By: SHEYLA Morales CRNA  February 27, 2025  8:32 AM

## 2025-02-27 NOTE — H&P
I have seen and evaluated the patient today.  There are no significant changes in the patient's history or physical exam from the prior date of service.  The patient is stable and appropriate to undergo the proposed endoscopic procedure today.  Gerald Alexander MD

## 2025-02-27 NOTE — OR NURSING
EGD with polypectomy X 1 and colonoscopy with no interventions performed under MAC, patient tolerated well. Transferred to  and report given to recovery RN.

## 2025-02-27 NOTE — ANESTHESIA PREPROCEDURE EVALUATION
Anesthesia Pre-Procedure Evaluation    Patient: Christina Santana   MRN: [id number] : 1980        Procedure : Procedure(s):  Colonoscopy  Esophagoscopy, gastroscopy, duodenoscopy (EGD), combined          Past Medical History:   Diagnosis Date    ASCUS with positive high risk HPV 2013    HSIL (high grade squamous intraepithelial lesion) on Pap smear of cervix 2013    Pap smear of cervix with ASCUS, cannot exclude HGSIL 11, 8/10/12,     PMS2-related Reed syndrome (HNPCC4)       Past Surgical History:   Procedure Laterality Date    APPENDECTOMY      AS REPAIR PARAESOPHAGEAL HIATAL HERNIA,LAPAROTOMY, W MESH      BIOPSY VULVA N/A 12/10/2021    Procedure: Vaginal cuff biopsy;  Surgeon: Leyda Guzman DO;  Location: [facility]    CARPAL TUNNEL RELEASE RT/LT Right     COLONOSCOPY N/A 06/10/2021    Procedure: COLONOSCOPY, WITH POLYPECTOMY;  Surgeon: Willi Galeas DO;  Location: [facility]    COLONOSCOPY N/A 2022    Procedure: COLONOSCOPY;  Surgeon: Willi Galeas DO;  Location: [facility]    COLONOSCOPY N/A 2023    Procedure: Colonoscopy;  Surgeon: Willi Galeas DO;  Location: [facility]    ESOPHAGOSCOPY, GASTROSCOPY, DUODENOSCOPY (EGD), COMBINED N/A 2022    Procedure: ESOPHAGOGASTRODUODENOSCOPY (EGD) with biopsy;  Surgeon: Willi Galeas DO;  Location: [facility]    EXAM UNDER ANESTHESIA PELVIC N/A 12/10/2021    Procedure: EXAM UNDER ANESTHESIA, PELVIS;  Surgeon: Leyda Guzman DO;  Location: [facility]    LAPAROSCOPIC HYSTERECTOMY TOTAL, BILATERAL SALPINGO-OOPHORECTOMY, COMBINED N/A 2021    Procedure: HYSTERECTOMY, TOTAL, LAPAROSCOPIC, WITH SALPINGO-OOPHORECTOMY;  Surgeon: Dylon Alcantar MD;  Location: [facility]      Allergies   Allergen Reactions    Latex Headache    Other Drug Allergy (See Comments) Hives     Possible Lidocaine family allergy (noticed during procedure--numbing-type medication) HIVES      Social History     Tobacco Use    Smoking  status: Never    Smokeless tobacco: Never   Substance Use Topics    Alcohol use: Yes     Comment: occassional. ~4 per month; no hx of alcohol abuse/dependency      Wt Readings from Last 1 Encounters:   01/14/25 88.5 kg (195 lb)        Anesthesia Evaluation   Pt has had prior anesthetic.     No history of anesthetic complications       ROS/MED HX  ENT/Pulmonary:     (+)           allergic rhinitis,                          (-) tobacco use, asthma and COPD   Neurologic:       Cardiovascular:  - neg cardiovascular ROS     METS/Exercise Tolerance:     Hematologic:       Musculoskeletal:       GI/Hepatic: Comment: PMS2-related Reed syndrome   Hepatic steatosis        (+) GERD,            liver disease,       Renal/Genitourinary:       Endo:       Psychiatric/Substance Use:       Infectious Disease:       Malignancy:       Other:            Physical Exam    Airway        Mallampati: III   TM distance: > 3 FB   Neck ROM: full   Mouth opening: > 3 cm    Respiratory Devices and Support         Dental       (+) Completely normal teeth      Cardiovascular   cardiovascular exam normal          Pulmonary   pulmonary exam normal                OUTSIDE LABS:  CBC:   Lab Results   Component Value Date    WBC 6.1 01/14/2025    WBC 5.7 06/11/2024    HGB 14.0 01/14/2025    HGB 14.6 06/11/2024    HCT 41.8 01/14/2025    HCT 43.1 06/11/2024     01/14/2025     06/11/2024     BMP:   Lab Results   Component Value Date     01/14/2025     06/11/2024    POTASSIUM 4.2 01/14/2025    POTASSIUM 4.3 06/11/2024    CHLORIDE 104 01/14/2025    CHLORIDE 105 06/11/2024    CO2 25 01/14/2025    CO2 23 06/11/2024    BUN 16.1 01/14/2025    BUN 13.8 06/11/2024    CR 0.75 01/14/2025    CR 0.72 06/11/2024    GLC 99 01/14/2025    GLC 97 06/11/2024     COAGS:   Lab Results   Component Value Date    PTT 27 05/03/2024    INR 0.99 06/11/2024     POC:   Lab Results   Component Value Date    HCG Negative 02/24/2021     HEPATIC:   Lab Results    Component Value Date    ALBUMIN 4.4 01/14/2025    PROTTOTAL 7.2 01/14/2025    ALT 28 01/14/2025    AST 28 01/14/2025    ALKPHOS 71 01/14/2025    BILITOTAL 0.2 01/14/2025     OTHER:   Lab Results   Component Value Date    LACT 2.7 (H) 01/01/2019    A1C 5.7 (H) 05/03/2024    NERISSA 8.9 01/14/2025    PHOS 3.1 05/03/2024    MAG 1.9 02/21/2024    LIPASE 212 08/05/2021    TSH 0.86 02/21/2024    T4 1.36 06/16/2021    CRP <2.9 08/05/2021    SED 8 10/04/2024       Anesthesia Plan    ASA Status:  2       Anesthesia Type: MAC.     - Reason for MAC: straight local not clinically adequate   Induction: Intravenous.   Maintenance: TIVA.        Consents    Anesthesia Plan(s) and associated risks, benefits, and realistic alternatives discussed. Questions answered and patient/representative(s) expressed understanding.     - Discussed: Risks, Benefits and Alternatives for BOTH SEDATION and the PROCEDURE were discussed     - Discussed with:  Patient            Postoperative Care    Pain management: Multi-modal analgesia.   PONV prophylaxis: Ondansetron (or other 5HT-3)     Comments:               Malu Phelan MD    I have reviewed the pertinent notes and labs in the chart from the past 30 days and (re)examined the patient.  Any updates or changes from those notes are reflected in this note.    Clinically Significant Risk Factors Present on Admission

## 2025-02-27 NOTE — ANESTHESIA POSTPROCEDURE EVALUATION
Patient: Christina Santana    Procedure: Procedure(s):  Colonoscopy  ESOPHAGOGASTRODUODENOSCOPY, WITH LESION REMOVAL USING SNARE       Anesthesia Type:  MAC    Note:  Disposition: Outpatient   Postop Pain Control: Uneventful            Sign Out: Well controlled pain   PONV: No   Neuro/Psych: Uneventful            Sign Out: Acceptable/Baseline neuro status   Airway/Respiratory: Uneventful            Sign Out: Acceptable/Baseline resp. status   CV/Hemodynamics: Uneventful            Sign Out: Acceptable CV status; No obvious hypovolemia; No obvious fluid overload   Other NRE: NONE   DID A NON-ROUTINE EVENT OCCUR? No           Last vitals:  Vitals Value Taken Time   /69 02/27/25 0845   Temp     Pulse     Resp     SpO2 100 % 02/27/25 0850   Vitals shown include unfiled device data.    Electronically Signed By: Malu Phelan MD  February 27, 2025  8:51 AM

## 2025-04-16 NOTE — PROGRESS NOTES
[facility]      OUTPATIENT PHYSICAL THERAPY ORTHOPEDIC EVALUATION  PLAN OF TREATMENT FOR OUTPATIENT REHABILITATION  (COMPLETE FOR INITIAL CLAIMS ONLY)  Patient's Last Name, First Name, Christina Sanders    Provider s Name:  [facility]   Medical Record No.  [id number]   Start of Care Date:  06/13/22   Onset Date:  02/15/22   Type:     _X__PT   ___OT   ___SLP Medical Diagnosis:     Pelvic pain in female (R10.2)   PT Diagnosis:  Reduced soft tissue flexibility trigger points, reduced sacral and coccyx mobility, reduced pelvic muscle coordination and strength, myofascial mobility restrictions of the lower abdomen and pelvis   Visits from SOC:  1   ______________________________________________________  Plan of Treatment/Functional Goals:  joint mobilization, stretching, strengthening, ROM, neuromuscular re-education, manual therapy   Biofeedback      Goals  Goal Identifier: Pelvic pain  Goal Description: Patient will have greater than 50% improvement in post intercourse related pelvic pain in 8 weeks  Target Date: 08/08/22    Goal Identifier: Strength  Goal Description: Patient improved pelvic muscle activation via PERF 3+/5/5/10 to better engage mms needed to support pelvis during exercise in 8 weeks  Target Date: 08/08/22    Goal Identifier: Coordination  Goal Description: Patient will demonstrate normal muscle coordination via biofeedback short and long holds, and elimination sequencing is demonstration of improved pelvic muscle coordination in 7 weeks  Target Date: 08/01/22            Therapy Frequency:  2 times/Week (1-2x/weel)  Predicted Duration of Therapy Intervention:  10 weeks    Jerri Dockery, PT                 I CERTIFY THE NEED FOR THESE SERVICES FURNISHED UNDER        THIS PLAN OF TREATMENT AND WHILE UNDER MY CARE     (Physician co-signature of this document indicates review and certification of the therapy plan).                        Certification Date  From:  06/13/22   Certification Date To:  08/22/22    Referring Provider:  Dr Guzman    Initial Assessment        See Epic Evaluation Start of Care Date: 06/13/22           Jerri Dockery................... PT, DPT, CLT   6/13/2022, 6:11 PM  [phone]             [FreeTextEntry1] : 67 YO M YO M seen on 10/18/2019 to follow up on an elevated PSA test and a normal 4K score. PSA was 4.7 (23%), 4K 1% on 2/9/18 and 6.41 (19%)  with 4K of 3% on 10/18/19.  He had a negative PNB 11/8/11 when his PSA was 4.9. He is here to repeat the PA and GARY. Patient also suffers from fibromyalgia. UA small WBC only. He gives no history of any LUTS since last visit. PSA 6.68 (18%), stable.   Patient seen  3/3/2022 to reassess. He told me that he has had frequency and urgency over the last few months. Rare dysuria and nocturia x 1. He contracted COVID 2 months ago. He is on a new medication for his fibromyalgia and this is helping. UA small WBC IPSS 11 NICOL 19 DELVIS 1 PVR 62 cc Prostate 72 gm HE is no longer taking the FLomax. NKMA.    The patient denies fevers, chills, nausea and or vomiting and no unexplained weight loss.  We discussed the patient's mild urinary tract symptoms and I offered to reinstitute tamsulosin therapy but patient declined at this time since he does not believe that his symptoms merit reinstitution of medication. We also discussed the size of his prostate being enlarged and his normal residual bladder volume. I am fine with him maintaining as is off of medication as long as he does not retain urine and does not have other symptoms of lower urinary tract or UTI. Urine was sent for culture today. As for the elevated PSA, we sent blood today for 4K score. We will review this result when it returns. If it remains stable follow-up in 6 months  C+S negative 4K score = 1 (PSA 6.05, 23% free PSA).pat 5/24/2023 to reassess.  Patient seen 5/24/2023 to reassess, He told me that while his urination is normal during the day, he has nocturia x 2 - 3. He describes his urinary stream as normal to weak. He denies any gross hematuria or dysuria. He started saw palmetto 1 month ago and feels that it has improved his LUTS. He continues to be treated for fibromyalgia, HTN, DM. NKMA. UA small bilirubin, trace ketone, +1 protein. IPSS 19 DELVIS 2 NICOL 19  PVR 5 All pertinent parts of the patient PFSH (past medical, family and social histories), laboratory, radiological studies and physician notes were reviewed prior to starting the face to face portion of the  visit. Questionnaire results were discussed with patient. We discussed the patient's stable urinary symptoms on the saw palmetto and I recommended that he continue on this current regimen. PSA was sent today because of his elevation in the previous PSA value with a normal 4K score of 1% in 2022. If the PSA remains stable or improved, then we will follow-up in 6 months. Consider 4K score repeat at that time. PSA 5.58 (23%) stable.  Patient seen 11/8/2023 to reassess. He told me that he is on Saw Palmetto and is urinating well usually. IF he drinks a lot of water in the evening, he gets up a few times that night to urinate. Otherwise not. He denies taking any other prostate medication at this time. UA trace protein, small WBC IPSS 4 NICOL 18 PVR 1 ml We discussed today the patient's stable lower urinary tract symptoms and some behavior modification to minimize his intermittent nocturia. We discussed his use of saw palmetto and I recommended that he continue as is since his lower urinary tract symptoms and PVR have both improved. Blood was sent today for a 4K score and I will call him with the results. If this remains stable, we will follow-up in 6 months. At that time we will perform PSA, GARY (with lidocaine gel because of his fibromyalgia), uroflowmetry and pelvic ultrasound. 4K 5.8 PSA 10.86 (16%). Results to patient by phone, recommend he return for GARY and repeat PSA now after 3 days abstinence. Staff to call and schedule.    Patient seen 11/30/2023 to reassess with repeat PSA and GARY. He has had no issues with urination, but has noted mild and intermittent discomfort in the scrotum. He is NOT taking tamsulosin. Last ejaculation 2 days ago. PSA total and free repeated today. GARY negative. We will assess neck steps based on the results of this PSA. If stable, then proceed with follow-up in 6 months.  PSA 7.22 (20%), down from 10.86 with 4K score from11/8/2023 and closer to his usual range. In view of this and his normal 4K score of 5.6, will recommend repeat PSA n 6 months, as planned, repeat 4K in 1 year if all stable. Results and plan reviewed with patient by phone.  Patient cancelled 5/2/2024 to reassess with PSA.   Patient seen 10/10/2024 to reassess with 4K score. He told me that he is urinating well with some urgency only. He remains off  medication. UA small WBC IPSS 11 Pelvic US: PVR 5 ml, Prostate 83gm. Findings today are consistent with mild lower urinary tract symptoms and good bladder emptying cough medication and in the face of an enlarged prostate gland. As for the elevated PSA, blood sent today for annual 4K score and digital exam repeated again with lidocaine gel due to the patient's significant fibromyalgia. If this remains stable, then follow-up in 6 months for PSA and PVR. As for the small white cells in the urine today, urine sent for culture and cytology and we will await the results. 4K score 2.1, Total PSA 6.54, Free PSA 23%, all significantly improved from 11/8/2023. Results to patient by phone, FU as planned 6 months for PSA and PVR.  Patient seen TODAY 4/16/2025 to reassess. He told me that his HTN acted up a few weeks ago but is now under control. He takes Lisinopril and HCTZ for this. He is also taking Metformin for his DM which appears to be under good control, and Colchicine for gout. As for his LUTS, he has noted some urgency over the last 2 months but denies any gross hematuria or dysuria. He is getting over a recent viral URI 2 weeks ago, UA moderate WBC IPSS 11 stable PELVIC US stable: PVR 6ml, Prostate 86gm.

## 2025-05-17 ENCOUNTER — APPOINTMENT (OUTPATIENT)
Dept: ULTRASOUND IMAGING | Facility: CLINIC | Age: 45
End: 2025-05-17
Attending: FAMILY MEDICINE
Payer: COMMERCIAL

## 2025-05-17 ENCOUNTER — HOSPITAL ENCOUNTER (OUTPATIENT)
Facility: CLINIC | Age: 45
Discharge: HOME | End: 2025-05-18
Attending: FAMILY MEDICINE | Admitting: FAMILY MEDICINE
Payer: COMMERCIAL

## 2025-05-17 DIAGNOSIS — K80.20 SYMPTOMATIC CHOLELITHIASIS: ICD-10-CM

## 2025-05-17 DIAGNOSIS — K81.0 ACUTE CHOLECYSTITIS: ICD-10-CM

## 2025-05-17 DIAGNOSIS — G89.18 POST-OP PAIN: Primary | ICD-10-CM

## 2025-05-17 LAB
ALBUMIN SERPL BCG-MCNC: 4.3 G/DL (ref 3.5–5.2)
ALBUMIN UR-MCNC: NEGATIVE MG/DL
ALP SERPL-CCNC: 72 U/L (ref 40–150)
ALT SERPL W P-5'-P-CCNC: 25 U/L (ref 0–50)
ANION GAP SERPL CALCULATED.3IONS-SCNC: 13 MMOL/L (ref 7–15)
APPEARANCE UR: CLEAR
AST SERPL W P-5'-P-CCNC: 29 U/L (ref 0–45)
BASOPHILS # BLD AUTO: 0 10E3/UL (ref 0–0.2)
BASOPHILS NFR BLD AUTO: 0 %
BILIRUB SERPL-MCNC: 0.2 MG/DL
BILIRUB UR QL STRIP: NEGATIVE
BUN SERPL-MCNC: 15.7 MG/DL (ref 6–20)
CALCIUM SERPL-MCNC: 8.9 MG/DL (ref 8.8–10.4)
CHLORIDE SERPL-SCNC: 103 MMOL/L (ref 98–107)
COLOR UR AUTO: ABNORMAL
CREAT SERPL-MCNC: 0.71 MG/DL (ref 0.51–0.95)
EGFRCR SERPLBLD CKD-EPI 2021: >90 ML/MIN/1.73M2
EOSINOPHIL # BLD AUTO: 0.2 10E3/UL (ref 0–0.7)
EOSINOPHIL NFR BLD AUTO: 3 %
ERYTHROCYTE [DISTWIDTH] IN BLOOD BY AUTOMATED COUNT: 12 % (ref 10–15)
GLUCOSE SERPL-MCNC: 106 MG/DL (ref 70–99)
GLUCOSE UR STRIP-MCNC: NEGATIVE MG/DL
HCO3 SERPL-SCNC: 23 MMOL/L (ref 22–29)
HCT VFR BLD AUTO: 40.8 % (ref 35–47)
HGB BLD-MCNC: 13.9 G/DL (ref 11.7–15.7)
HGB UR QL STRIP: NEGATIVE
IMM GRANULOCYTES # BLD: 0 10E3/UL
IMM GRANULOCYTES NFR BLD: 0 %
KETONES UR STRIP-MCNC: NEGATIVE MG/DL
LEUKOCYTE ESTERASE UR QL STRIP: NEGATIVE
LIPASE SERPL-CCNC: 30 U/L (ref 13–60)
LYMPHOCYTES # BLD AUTO: 3.2 10E3/UL (ref 0.8–5.3)
LYMPHOCYTES NFR BLD AUTO: 44 %
MCH RBC QN AUTO: 30.3 PG (ref 26.5–33)
MCHC RBC AUTO-ENTMCNC: 34.1 G/DL (ref 31.5–36.5)
MCV RBC AUTO: 89 FL (ref 78–100)
MONOCYTES # BLD AUTO: 0.5 10E3/UL (ref 0–1.3)
MONOCYTES NFR BLD AUTO: 7 %
MUCOUS THREADS #/AREA URNS LPF: PRESENT /LPF
NEUTROPHILS # BLD AUTO: 3.4 10E3/UL (ref 1.6–8.3)
NEUTROPHILS NFR BLD AUTO: 46 %
NITRATE UR QL: NEGATIVE
NRBC # BLD AUTO: 0 10E3/UL
NRBC BLD AUTO-RTO: 0 /100
PH UR STRIP: 6 [PH] (ref 5–7)
PLATELET # BLD AUTO: 213 10E3/UL (ref 150–450)
POTASSIUM SERPL-SCNC: 4.7 MMOL/L (ref 3.4–5.3)
PROT SERPL-MCNC: 7.2 G/DL (ref 6.4–8.3)
RBC # BLD AUTO: 4.58 10E6/UL (ref 3.8–5.2)
RBC URINE: 0 /HPF
SODIUM SERPL-SCNC: 139 MMOL/L (ref 135–145)
SP GR UR STRIP: 1.03 (ref 1–1.03)
SQUAMOUS EPITHELIAL: 1 /HPF
UROBILINOGEN UR STRIP-MCNC: 2 MG/DL
WBC # BLD AUTO: 7.3 10E3/UL (ref 4–11)
WBC URINE: 0 /HPF

## 2025-05-17 PROCEDURE — 96375 TX/PRO/DX INJ NEW DRUG ADDON: CPT | Mod: XU | Performed by: FAMILY MEDICINE

## 2025-05-17 PROCEDURE — 250N000011 HC RX IP 250 OP 636: Mod: JZ | Performed by: FAMILY MEDICINE

## 2025-05-17 PROCEDURE — 85004 AUTOMATED DIFF WBC COUNT: CPT | Performed by: FAMILY MEDICINE

## 2025-05-17 PROCEDURE — 96361 HYDRATE IV INFUSION ADD-ON: CPT | Performed by: FAMILY MEDICINE

## 2025-05-17 PROCEDURE — 36415 COLL VENOUS BLD VENIPUNCTURE: CPT | Performed by: FAMILY MEDICINE

## 2025-05-17 PROCEDURE — 99285 EMERGENCY DEPT VISIT HI MDM: CPT | Mod: 25 | Performed by: FAMILY MEDICINE

## 2025-05-17 PROCEDURE — 81001 URINALYSIS AUTO W/SCOPE: CPT | Performed by: FAMILY MEDICINE

## 2025-05-17 PROCEDURE — 96374 THER/PROPH/DIAG INJ IV PUSH: CPT | Mod: XU | Performed by: FAMILY MEDICINE

## 2025-05-17 PROCEDURE — 96376 TX/PRO/DX INJ SAME DRUG ADON: CPT | Performed by: FAMILY MEDICINE

## 2025-05-17 PROCEDURE — 258N000003 HC RX IP 258 OP 636: Performed by: FAMILY MEDICINE

## 2025-05-17 PROCEDURE — 76705 ECHO EXAM OF ABDOMEN: CPT

## 2025-05-17 PROCEDURE — 82040 ASSAY OF SERUM ALBUMIN: CPT | Performed by: FAMILY MEDICINE

## 2025-05-17 PROCEDURE — 83690 ASSAY OF LIPASE: CPT | Performed by: FAMILY MEDICINE

## 2025-05-17 RX ORDER — ONDANSETRON 2 MG/ML
4 INJECTION INTRAMUSCULAR; INTRAVENOUS EVERY 30 MIN PRN
Status: COMPLETED | OUTPATIENT
Start: 2025-05-17 | End: 2025-05-18

## 2025-05-17 RX ORDER — KETOROLAC TROMETHAMINE 30 MG/ML
30 INJECTION, SOLUTION INTRAMUSCULAR; INTRAVENOUS ONCE
Status: COMPLETED | OUTPATIENT
Start: 2025-05-17 | End: 2025-05-17

## 2025-05-17 RX ORDER — SODIUM CHLORIDE, SODIUM LACTATE, POTASSIUM CHLORIDE, CALCIUM CHLORIDE 600; 310; 30; 20 MG/100ML; MG/100ML; MG/100ML; MG/100ML
INJECTION, SOLUTION INTRAVENOUS CONTINUOUS
Status: DISCONTINUED | OUTPATIENT
Start: 2025-05-17 | End: 2025-05-18 | Stop reason: HOSPADM

## 2025-05-17 RX ORDER — HYDROMORPHONE HYDROCHLORIDE 1 MG/ML
0.5 INJECTION, SOLUTION INTRAMUSCULAR; INTRAVENOUS; SUBCUTANEOUS EVERY 30 MIN PRN
Refills: 0 | Status: DISCONTINUED | OUTPATIENT
Start: 2025-05-17 | End: 2025-05-18 | Stop reason: HOSPADM

## 2025-05-17 RX ADMIN — ONDANSETRON 4 MG: 2 INJECTION, SOLUTION INTRAMUSCULAR; INTRAVENOUS at 22:54

## 2025-05-17 RX ADMIN — KETOROLAC TROMETHAMINE 30 MG: 30 INJECTION, SOLUTION INTRAMUSCULAR at 20:59

## 2025-05-17 RX ADMIN — ONDANSETRON 4 MG: 2 INJECTION, SOLUTION INTRAMUSCULAR; INTRAVENOUS at 20:57

## 2025-05-17 RX ADMIN — SODIUM CHLORIDE 1000 ML: 0.9 INJECTION, SOLUTION INTRAVENOUS at 20:55

## 2025-05-17 RX ADMIN — SODIUM CHLORIDE, SODIUM LACTATE, POTASSIUM CHLORIDE, AND CALCIUM CHLORIDE: .6; .31; .03; .02 INJECTION, SOLUTION INTRAVENOUS at 23:02

## 2025-05-17 ASSESSMENT — COLUMBIA-SUICIDE SEVERITY RATING SCALE - C-SSRS
6. HAVE YOU EVER DONE ANYTHING, STARTED TO DO ANYTHING, OR PREPARED TO DO ANYTHING TO END YOUR LIFE?: NO
1. IN THE PAST MONTH, HAVE YOU WISHED YOU WERE DEAD OR WISHED YOU COULD GO TO SLEEP AND NOT WAKE UP?: NO
2. HAVE YOU ACTUALLY HAD ANY THOUGHTS OF KILLING YOURSELF IN THE PAST MONTH?: NO

## 2025-05-17 ASSESSMENT — ACTIVITIES OF DAILY LIVING (ADL)
ADLS_ACUITY_SCORE: 41

## 2025-05-18 ENCOUNTER — ANESTHESIA EVENT (OUTPATIENT)
Dept: SURGERY | Facility: CLINIC | Age: 45
End: 2025-05-18
Payer: COMMERCIAL

## 2025-05-18 ENCOUNTER — ANESTHESIA (OUTPATIENT)
Dept: SURGERY | Facility: CLINIC | Age: 45
End: 2025-05-18
Payer: COMMERCIAL

## 2025-05-18 VITALS
OXYGEN SATURATION: 100 % | HEIGHT: 66 IN | TEMPERATURE: 98.1 F | SYSTOLIC BLOOD PRESSURE: 132 MMHG | RESPIRATION RATE: 12 BRPM | WEIGHT: 190 LBS | HEART RATE: 74 BPM | DIASTOLIC BLOOD PRESSURE: 89 MMHG | BODY MASS INDEX: 30.53 KG/M2

## 2025-05-18 PROCEDURE — 250N000011 HC RX IP 250 OP 636: Performed by: SPECIALIST

## 2025-05-18 PROCEDURE — 272N000001 HC OR GENERAL SUPPLY STERILE: Performed by: SPECIALIST

## 2025-05-18 PROCEDURE — 250N000013 HC RX MED GY IP 250 OP 250 PS 637: Performed by: NURSE ANESTHETIST, CERTIFIED REGISTERED

## 2025-05-18 PROCEDURE — 96375 TX/PRO/DX INJ NEW DRUG ADDON: CPT | Performed by: FAMILY MEDICINE

## 2025-05-18 PROCEDURE — 710N000010 HC RECOVERY PHASE 1, LEVEL 2, PER MIN: Performed by: SPECIALIST

## 2025-05-18 PROCEDURE — 370N000017 HC ANESTHESIA TECHNICAL FEE, PER MIN: Performed by: SPECIALIST

## 2025-05-18 PROCEDURE — 250N000011 HC RX IP 250 OP 636: Mod: JZ | Performed by: FAMILY MEDICINE

## 2025-05-18 PROCEDURE — 250N000009 HC RX 250: Performed by: SPECIALIST

## 2025-05-18 PROCEDURE — 88304 TISSUE EXAM BY PATHOLOGIST: CPT | Mod: 26 | Performed by: PATHOLOGY

## 2025-05-18 PROCEDURE — 47562 LAPAROSCOPIC CHOLECYSTECTOMY: CPT | Performed by: SPECIALIST

## 2025-05-18 PROCEDURE — 250N000025 HC SEVOFLURANE, PER MIN: Performed by: SPECIALIST

## 2025-05-18 PROCEDURE — 710N000012 HC RECOVERY PHASE 2, PER MINUTE: Performed by: SPECIALIST

## 2025-05-18 PROCEDURE — 99243 OFF/OP CNSLTJ NEW/EST LOW 30: CPT | Mod: 57 | Performed by: SPECIALIST

## 2025-05-18 PROCEDURE — 250N000011 HC RX IP 250 OP 636: Performed by: NURSE ANESTHETIST, CERTIFIED REGISTERED

## 2025-05-18 PROCEDURE — 250N000009 HC RX 250: Performed by: NURSE ANESTHETIST, CERTIFIED REGISTERED

## 2025-05-18 PROCEDURE — 258N000003 HC RX IP 258 OP 636: Performed by: FAMILY MEDICINE

## 2025-05-18 PROCEDURE — 360N000076 HC SURGERY LEVEL 3, PER MIN: Performed by: SPECIALIST

## 2025-05-18 PROCEDURE — 96361 HYDRATE IV INFUSION ADD-ON: CPT | Performed by: FAMILY MEDICINE

## 2025-05-18 PROCEDURE — 88304 TISSUE EXAM BY PATHOLOGIST: CPT | Mod: TC | Performed by: SPECIALIST

## 2025-05-18 PROCEDURE — 250N000011 HC RX IP 250 OP 636: Mod: JZ

## 2025-05-18 RX ORDER — HYDROMORPHONE HYDROCHLORIDE 1 MG/ML
0.25 INJECTION, SOLUTION INTRAMUSCULAR; INTRAVENOUS; SUBCUTANEOUS EVERY 5 MIN PRN
Status: DISCONTINUED | OUTPATIENT
Start: 2025-05-18 | End: 2025-05-18 | Stop reason: HOSPADM

## 2025-05-18 RX ORDER — NALOXONE HYDROCHLORIDE 0.4 MG/ML
0.1 INJECTION, SOLUTION INTRAMUSCULAR; INTRAVENOUS; SUBCUTANEOUS
Status: DISCONTINUED | OUTPATIENT
Start: 2025-05-18 | End: 2025-05-18 | Stop reason: HOSPADM

## 2025-05-18 RX ORDER — CEFAZOLIN SODIUM 1 G/3ML
INJECTION, POWDER, FOR SOLUTION INTRAMUSCULAR; INTRAVENOUS PRN
Status: DISCONTINUED | OUTPATIENT
Start: 2025-05-18 | End: 2025-05-18

## 2025-05-18 RX ORDER — PROPOFOL 10 MG/ML
INJECTION, EMULSION INTRAVENOUS CONTINUOUS PRN
Status: DISCONTINUED | OUTPATIENT
Start: 2025-05-18 | End: 2025-05-18

## 2025-05-18 RX ORDER — HYDROMORPHONE HYDROCHLORIDE 1 MG/ML
INJECTION, SOLUTION INTRAMUSCULAR; INTRAVENOUS; SUBCUTANEOUS
Status: COMPLETED
Start: 2025-05-18 | End: 2025-05-18

## 2025-05-18 RX ORDER — HYDROXYZINE HYDROCHLORIDE 50 MG/ML
25 INJECTION, SOLUTION INTRAMUSCULAR EVERY 6 HOURS PRN
Status: DISCONTINUED | OUTPATIENT
Start: 2025-05-18 | End: 2025-05-18 | Stop reason: HOSPADM

## 2025-05-18 RX ORDER — OXYCODONE AND ACETAMINOPHEN 5; 325 MG/1; MG/1
2 TABLET ORAL
Status: DISCONTINUED | OUTPATIENT
Start: 2025-05-18 | End: 2025-05-18 | Stop reason: HOSPADM

## 2025-05-18 RX ORDER — FENTANYL CITRATE 50 UG/ML
50 INJECTION, SOLUTION INTRAMUSCULAR; INTRAVENOUS
Status: DISCONTINUED | OUTPATIENT
Start: 2025-05-18 | End: 2025-05-18 | Stop reason: HOSPADM

## 2025-05-18 RX ORDER — DIMENHYDRINATE 50 MG/ML
25 INJECTION, SOLUTION INTRAMUSCULAR; INTRAVENOUS
Status: DISCONTINUED | OUTPATIENT
Start: 2025-05-18 | End: 2025-05-18 | Stop reason: HOSPADM

## 2025-05-18 RX ORDER — MEPERIDINE HYDROCHLORIDE 25 MG/ML
12.5 INJECTION INTRAMUSCULAR; INTRAVENOUS; SUBCUTANEOUS EVERY 5 MIN PRN
Status: DISCONTINUED | OUTPATIENT
Start: 2025-05-18 | End: 2025-05-18 | Stop reason: HOSPADM

## 2025-05-18 RX ORDER — OXYCODONE HYDROCHLORIDE 5 MG/1
10 TABLET ORAL
Status: COMPLETED | OUTPATIENT
Start: 2025-05-18 | End: 2025-05-18

## 2025-05-18 RX ORDER — OXYCODONE HYDROCHLORIDE 5 MG/1
5 TABLET ORAL
Status: DISCONTINUED | OUTPATIENT
Start: 2025-05-18 | End: 2025-05-18 | Stop reason: HOSPADM

## 2025-05-18 RX ORDER — FENTANYL CITRATE 50 UG/ML
25 INJECTION, SOLUTION INTRAMUSCULAR; INTRAVENOUS EVERY 5 MIN PRN
Status: DISCONTINUED | OUTPATIENT
Start: 2025-05-18 | End: 2025-05-18 | Stop reason: HOSPADM

## 2025-05-18 RX ORDER — HYDROXYZINE HYDROCHLORIDE 25 MG/1
25 TABLET, FILM COATED ORAL EVERY 6 HOURS PRN
Status: DISCONTINUED | OUTPATIENT
Start: 2025-05-18 | End: 2025-05-18 | Stop reason: HOSPADM

## 2025-05-18 RX ORDER — OXYCODONE HYDROCHLORIDE 5 MG/1
5-10 TABLET ORAL EVERY 6 HOURS PRN
Qty: 10 TABLET | Refills: 0 | Status: SHIPPED | OUTPATIENT
Start: 2025-05-18

## 2025-05-18 RX ORDER — DIMENHYDRINATE 50 MG/ML
INJECTION, SOLUTION INTRAMUSCULAR; INTRAVENOUS PRN
Status: DISCONTINUED | OUTPATIENT
Start: 2025-05-18 | End: 2025-05-18

## 2025-05-18 RX ORDER — ONDANSETRON 4 MG/1
4 TABLET, ORALLY DISINTEGRATING ORAL EVERY 30 MIN PRN
Status: DISCONTINUED | OUTPATIENT
Start: 2025-05-18 | End: 2025-05-18 | Stop reason: HOSPADM

## 2025-05-18 RX ORDER — FENTANYL CITRATE 50 UG/ML
INJECTION, SOLUTION INTRAMUSCULAR; INTRAVENOUS PRN
Status: DISCONTINUED | OUTPATIENT
Start: 2025-05-18 | End: 2025-05-18

## 2025-05-18 RX ORDER — ONDANSETRON 2 MG/ML
4 INJECTION INTRAMUSCULAR; INTRAVENOUS EVERY 30 MIN PRN
Status: DISCONTINUED | OUTPATIENT
Start: 2025-05-18 | End: 2025-05-18 | Stop reason: HOSPADM

## 2025-05-18 RX ORDER — KETOROLAC TROMETHAMINE 30 MG/ML
INJECTION, SOLUTION INTRAMUSCULAR; INTRAVENOUS PRN
Status: DISCONTINUED | OUTPATIENT
Start: 2025-05-18 | End: 2025-05-18

## 2025-05-18 RX ORDER — INDOCYANINE GREEN AND WATER 25 MG
KIT INJECTION PRN
Status: DISCONTINUED | OUTPATIENT
Start: 2025-05-18 | End: 2025-05-18

## 2025-05-18 RX ORDER — SODIUM CHLORIDE, SODIUM LACTATE, POTASSIUM CHLORIDE, CALCIUM CHLORIDE 600; 310; 30; 20 MG/100ML; MG/100ML; MG/100ML; MG/100ML
INJECTION, SOLUTION INTRAVENOUS CONTINUOUS
Status: DISCONTINUED | OUTPATIENT
Start: 2025-05-18 | End: 2025-05-18 | Stop reason: HOSPADM

## 2025-05-18 RX ORDER — PROPOFOL 10 MG/ML
INJECTION, EMULSION INTRAVENOUS PRN
Status: DISCONTINUED | OUTPATIENT
Start: 2025-05-18 | End: 2025-05-18

## 2025-05-18 RX ORDER — DEXAMETHASONE SODIUM PHOSPHATE 4 MG/ML
INJECTION, SOLUTION INTRA-ARTICULAR; INTRALESIONAL; INTRAMUSCULAR; INTRAVENOUS; SOFT TISSUE PRN
Status: DISCONTINUED | OUTPATIENT
Start: 2025-05-18 | End: 2025-05-18

## 2025-05-18 RX ORDER — METHOCARBAMOL 100 MG/ML
INJECTION, SOLUTION INTRAMUSCULAR; INTRAVENOUS PRN
Status: DISCONTINUED | OUTPATIENT
Start: 2025-05-18 | End: 2025-05-18

## 2025-05-18 RX ORDER — ONDANSETRON 2 MG/ML
INJECTION INTRAMUSCULAR; INTRAVENOUS PRN
Status: DISCONTINUED | OUTPATIENT
Start: 2025-05-18 | End: 2025-05-18

## 2025-05-18 RX ORDER — HYDROMORPHONE HYDROCHLORIDE 1 MG/ML
0.5 INJECTION, SOLUTION INTRAMUSCULAR; INTRAVENOUS; SUBCUTANEOUS EVERY 5 MIN PRN
Status: DISCONTINUED | OUTPATIENT
Start: 2025-05-18 | End: 2025-05-18 | Stop reason: HOSPADM

## 2025-05-18 RX ORDER — CEFAZOLIN SODIUM 2 G/50ML
2 SOLUTION INTRAVENOUS SEE ADMIN INSTRUCTIONS
Status: CANCELLED | OUTPATIENT
Start: 2025-05-18

## 2025-05-18 RX ORDER — BUPIVACAINE HCL/EPINEPHRINE 0.25-.0005
VIAL (ML) INJECTION PRN
Status: DISCONTINUED | OUTPATIENT
Start: 2025-05-18 | End: 2025-05-18 | Stop reason: HOSPADM

## 2025-05-18 RX ORDER — MEPERIDINE HYDROCHLORIDE 25 MG/ML
25 INJECTION INTRAMUSCULAR; INTRAVENOUS; SUBCUTANEOUS ONCE
Status: COMPLETED | OUTPATIENT
Start: 2025-05-18 | End: 2025-05-18

## 2025-05-18 RX ORDER — FENTANYL CITRATE 50 UG/ML
50 INJECTION, SOLUTION INTRAMUSCULAR; INTRAVENOUS EVERY 5 MIN PRN
Status: DISCONTINUED | OUTPATIENT
Start: 2025-05-18 | End: 2025-05-18 | Stop reason: HOSPADM

## 2025-05-18 RX ORDER — CEFAZOLIN SODIUM 2 G/50ML
2 SOLUTION INTRAVENOUS
Status: CANCELLED | OUTPATIENT
Start: 2025-05-18

## 2025-05-18 RX ORDER — INDOCYANINE GREEN AND WATER 25 MG
2.5 KIT INJECTION ONCE
Status: CANCELLED | OUTPATIENT
Start: 2025-05-18 | End: 2025-05-18

## 2025-05-18 RX ORDER — CHLOROPROCAINE HYDROCHLORIDE 30 MG/ML
INJECTION, SOLUTION EPIDURAL; INFILTRATION; INTRACAUDAL; PERINEURAL PRN
Status: DISCONTINUED | OUTPATIENT
Start: 2025-05-18 | End: 2025-05-18 | Stop reason: HOSPADM

## 2025-05-18 RX ADMIN — DIMENHYDRINATE 25 MG: 50 INJECTION, SOLUTION INTRAMUSCULAR; INTRAVENOUS at 08:54

## 2025-05-18 RX ADMIN — FENTANYL CITRATE 50 MCG: 50 INJECTION INTRAMUSCULAR; INTRAVENOUS at 07:24

## 2025-05-18 RX ADMIN — PROPOFOL 200 MG: 10 INJECTION, EMULSION INTRAVENOUS at 07:35

## 2025-05-18 RX ADMIN — METHOCARBAMOL 300 MG: 100 INJECTION INTRAMUSCULAR; INTRAVENOUS at 08:47

## 2025-05-18 RX ADMIN — KETOROLAC TROMETHAMINE 30 MG: 30 INJECTION, SOLUTION INTRAMUSCULAR at 08:37

## 2025-05-18 RX ADMIN — PROPOFOL 200 MCG/KG/MIN: 10 INJECTION, EMULSION INTRAVENOUS at 07:38

## 2025-05-18 RX ADMIN — INDOCYANINE GREEN AND WATER 2.5 MG: KIT at 07:24

## 2025-05-18 RX ADMIN — ROCURONIUM BROMIDE 70 MG: 50 INJECTION, SOLUTION INTRAVENOUS at 07:36

## 2025-05-18 RX ADMIN — OXYCODONE HYDROCHLORIDE 10 MG: 5 TABLET ORAL at 11:12

## 2025-05-18 RX ADMIN — HYDROMORPHONE HYDROCHLORIDE 0.5 MG: 1 INJECTION, SOLUTION INTRAMUSCULAR; INTRAVENOUS; SUBCUTANEOUS at 09:06

## 2025-05-18 RX ADMIN — HYDROMORPHONE HYDROCHLORIDE 0.5 MG: 1 INJECTION, SOLUTION INTRAMUSCULAR; INTRAVENOUS; SUBCUTANEOUS at 03:16

## 2025-05-18 RX ADMIN — METHOCARBAMOL 200 MG: 100 INJECTION INTRAMUSCULAR; INTRAVENOUS at 09:04

## 2025-05-18 RX ADMIN — SODIUM CHLORIDE, SODIUM LACTATE, POTASSIUM CHLORIDE, AND CALCIUM CHLORIDE: .6; .31; .03; .02 INJECTION, SOLUTION INTRAVENOUS at 08:55

## 2025-05-18 RX ADMIN — DIMENHYDRINATE 20 MG: 50 INJECTION, SOLUTION INTRAMUSCULAR; INTRAVENOUS at 07:43

## 2025-05-18 RX ADMIN — SODIUM CHLORIDE, SODIUM LACTATE, POTASSIUM CHLORIDE, AND CALCIUM CHLORIDE: .6; .31; .03; .02 INJECTION, SOLUTION INTRAVENOUS at 07:01

## 2025-05-18 RX ADMIN — Medication 200 MG: at 08:24

## 2025-05-18 RX ADMIN — HYDROXYZINE HYDROCHLORIDE 25 MG: 50 INJECTION, SOLUTION INTRAMUSCULAR at 10:16

## 2025-05-18 RX ADMIN — MEPERIDINE HYDROCHLORIDE 25 MG: 25 INJECTION INTRAMUSCULAR; INTRAVENOUS; SUBCUTANEOUS at 10:17

## 2025-05-18 RX ADMIN — CEFAZOLIN 2 G: 1 INJECTION, POWDER, FOR SOLUTION INTRAMUSCULAR; INTRAVENOUS at 07:24

## 2025-05-18 RX ADMIN — PROPOFOL 10 MG: 10 INJECTION, EMULSION INTRAVENOUS at 09:04

## 2025-05-18 RX ADMIN — PROPOFOL 20 MG: 10 INJECTION, EMULSION INTRAVENOUS at 08:54

## 2025-05-18 RX ADMIN — MIDAZOLAM 2 MG: 1 INJECTION INTRAMUSCULAR; INTRAVENOUS at 07:24

## 2025-05-18 RX ADMIN — ONDANSETRON 4 MG: 2 INJECTION INTRAMUSCULAR; INTRAVENOUS at 09:52

## 2025-05-18 RX ADMIN — FENTANYL CITRATE 50 MCG: 50 INJECTION INTRAMUSCULAR; INTRAVENOUS at 07:52

## 2025-05-18 RX ADMIN — HYDROMORPHONE HYDROCHLORIDE 0.5 MG: 1 INJECTION, SOLUTION INTRAMUSCULAR; INTRAVENOUS; SUBCUTANEOUS at 09:19

## 2025-05-18 RX ADMIN — METHOCARBAMOL 300 MG: 100 INJECTION INTRAMUSCULAR; INTRAVENOUS at 08:40

## 2025-05-18 RX ADMIN — ONDANSETRON 4 MG: 2 INJECTION INTRAMUSCULAR; INTRAVENOUS at 08:12

## 2025-05-18 RX ADMIN — ONDANSETRON 4 MG: 2 INJECTION, SOLUTION INTRAMUSCULAR; INTRAVENOUS at 03:27

## 2025-05-18 RX ADMIN — SODIUM CHLORIDE, SODIUM LACTATE, POTASSIUM CHLORIDE, AND CALCIUM CHLORIDE: .6; .31; .03; .02 INJECTION, SOLUTION INTRAVENOUS at 07:28

## 2025-05-18 RX ADMIN — METHOCARBAMOL 200 MG: 100 INJECTION INTRAMUSCULAR; INTRAVENOUS at 08:52

## 2025-05-18 RX ADMIN — DEXAMETHASONE SODIUM PHOSPHATE 4 MG: 4 INJECTION, SOLUTION INTRA-ARTICULAR; INTRALESIONAL; INTRAMUSCULAR; INTRAVENOUS; SOFT TISSUE at 07:41

## 2025-05-18 ASSESSMENT — ACTIVITIES OF DAILY LIVING (ADL)
ADLS_ACUITY_SCORE: 41
ADLS_ACUITY_SCORE: 43
ADLS_ACUITY_SCORE: 41
ADLS_ACUITY_SCORE: 42
ADLS_ACUITY_SCORE: 41
ADLS_ACUITY_SCORE: 41
ADLS_ACUITY_SCORE: 43
ADLS_ACUITY_SCORE: 41

## 2025-05-18 ASSESSMENT — COPD QUESTIONNAIRES: COPD: 0

## 2025-05-18 ASSESSMENT — LIFESTYLE VARIABLES: TOBACCO_USE: 0

## 2025-05-18 NOTE — ANESTHESIA CARE TRANSFER NOTE
Patient: Christina Santana    Procedure: Procedure(s):  CHOLECYSTECTOMY, LAPAROSCOPIC       Diagnosis: Acute cholecystitis [K81.0]  Diagnosis Additional Information: No value filed.    Anesthesia Type:   General     Note:    Oropharynx: oropharynx clear of all foreign objects and spontaneously breathing  Level of Consciousness: drowsy  Oxygen Supplementation: nasal cannula    Independent Airway: airway patency satisfactory and stable  Dentition: dentition unchanged  Vital Signs Stable: post-procedure vital signs reviewed and stable  Report to RN Given: handoff report given  Patient transferred to: PACU    Handoff Report: Identifed the Patient, Identified the Reponsible Provider, Reviewed the pertinent medical history, Discussed the surgical course, Reviewed Intra-OP anesthesia mangement and issues during anesthesia, Set expectations for post-procedure period and Allowed opportunity for questions and acknowledgement of understanding      Vitals:  Vitals Value Taken Time   /86 05/18/25 09:01   Temp     Pulse 73 05/18/25 09:10   Resp     SpO2 100 % 05/18/25 09:10   Vitals shown include unfiled device data.    Electronically Signed By: SHEYLA Ferreira CRNA  May 18, 2025  9:11 AM

## 2025-05-18 NOTE — ED PROVIDER NOTES
History     Chief Complaint   Patient presents with    Abdominal Pain     HPI  Christina Santana is a 45 year old female who presents to the ED with right upper quadrant and right CVA pain.  Started yesterday around 4 PM when she was driving.  Worsened later that evening after she ate some macaroni and cheese.  Took some ibuprofen and used a heating pad and got the pain to subside a bit and was able to get a little bit of sleep but the pain was still present when she woke up this morning.  It once again escalated after she ate breakfast consisting of some scrambled eggs, cinnamon sugar toast and coffee.  She has had nausea after eating but no vomiting.  No fevers chills or sweats.  Had a hiatal hernia repair back in 2018 and was told she had some gallbladder issues at that time but has been essentially asymptomatic until now.  She is status post appendectomy and complete hysterectomy.  Lots of gallbladder problems in the family.  No personal or family history of kidney stones.    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     Priority: Medium     Added automatically from request for surgery [id number]      Seasonal allergic rhinitis due to pollen 03/04/2020     Priority: Medium    GERD with stricture 02/25/2020     Priority: Medium    Transplant donor evaluation 09/13/2018     Priority: Medium    BREEZY III (cervical intraepithelial neoplasia grade III) with severe dysplasia 01/01/2013     Priority: Medium     5/2011- ASC-H  6/2011- colposcopy- suspicious for HPV  8/2012- ASC-H  4/2013-  HSIL  5/1/2013- colposcopy- BREEZY 2-3  5/13/2013 LEEP: BREEZY II-III, Positive Margins  8/2013- ECC- cervicitis  12/2013 ASCUS, HPV +  1/2014- colposcopy- negative  8/2014- NIL  7/2015- NIL/HPV negative   3/2017- NIL/HPV +  4/2017- colposcopy- suggestive of BREEZY I  4/2018- NIL/HPV negative  9/10/19 NIL/HPV negative   2/24/21 TLH/BSO-benign pathology  10/8/21 NIL pap Neg HPV of vaginal cuff. Colpo completed for vaginal bleeding. Plan: Given discomfort during exam and biopsy attempt despite lidocaine, plan to perform EUA and biopsy under anesthesia in the OR. 12/10/21 Vaginal cuff biopsies- negative. Plan: cotest in 3 years.   1/19/22 Post op appt- cancelled   2/15/22 Appt- Pap due 10/8/22  7/27/23 Visit Oncology  9/19/23 NIL pap, + HR HPV (not 16 or 18). Plan: colp bef 12/19/23 11/8/23 Vaginal Normal - no biopsies, visually normal. Plan cotest in 1 year due 11/8/2024  10/22/24 Reminder mychart  11/25/24 Reminder call - pt notified   12/18/24 Lost to follow-up for pap tracking       Astigmatism 10/03/2003     Priority: Medium    Myopia 10/03/2003     Priority: Medium        Past Medical History:    Past Medical History:   Diagnosis Date    ASCUS with positive high risk HPV 12/27/2013    HSIL (high grade squamous intraepithelial lesion) on Pap smear of cervix 04/22/2013    Pap smear of cervix with ASCUS, cannot exclude HGSIL 5/23/11, 8/10/12,     PMS2-related Reed syndrome (HNPCC4)        Past Surgical History:    Past Surgical History:   Procedure Laterality Date    APPENDECTOMY      AS REPAIR PARAESOPHAGEAL HIATAL HERNIA,LAPAROTOMY, W MESH      BIOPSY VULVA N/A 12/10/2021    Procedure: Vaginal cuff biopsy;  Surgeon: Leyda Guzman DO;  Location:  OR    CARPAL TUNNEL RELEASE RT/LT Right     COLONOSCOPY N/A 06/10/2021    Procedure: COLONOSCOPY, WITH POLYPECTOMY;  Surgeon: Willi Galeas DO;  Location: [facility]    COLONOSCOPY N/A 12/01/2022    Procedure: COLONOSCOPY;  Surgeon: Willi Galeas DO;   Location: [facility]    COLONOSCOPY N/A 2023    Procedure: Colonoscopy;  Surgeon: Willi Galeas DO;  Location: [facility]    COLONOSCOPY N/A 2025    Procedure: Colonoscopy;  Surgeon: Gerald Alexander MD;  Location: [facility]    ESOPHAGOSCOPY, GASTROSCOPY, DUODENOSCOPY (EGD), COMBINED N/A 2022    Procedure: ESOPHAGOGASTRODUODENOSCOPY (EGD) with biopsy;  Surgeon: Willi Galeas DO;  Location: [facility]    EXAM UNDER ANESTHESIA PELVIC N/A 12/10/2021    Procedure: EXAM UNDER ANESTHESIA, PELVIS;  Surgeon: Leyda Guzman DO;  Location: [facility]    LAPAROSCOPIC HYSTERECTOMY TOTAL, BILATERAL SALPINGO-OOPHORECTOMY, COMBINED N/A 2021    Procedure: HYSTERECTOMY, TOTAL, LAPAROSCOPIC, WITH SALPINGO-OOPHORECTOMY;  Surgeon: Dylon Alcantar MD;  Location: [facility]       Family History:    Family History   Problem Relation Age of Onset    Diabetes Mother     Fibroids Mother     Reed Syndrome Mother         PMS2+    Colon Polyps Mother         removed 5 feet of colon for unmanageable polyps;  in her sleep at 67, took a nape and never woke up,    Reed Syndrome Brother         PMS2+    Bladder Cancer Maternal Grandfather 70        lived to be 94    Kidney Cancer Maternal Grandfather     Breast Cancer Paternal Grandmother         postmenopausal    Macular Degeneration Paternal Grandfather     Ovarian Cancer Maternal Aunt 40        possible uterine cancer also; hx of fibroids    Ovarian Cancer Maternal Aunt 40        possible uterine cancer also; hx of fibroids    Breast Cancer Paternal Aunt 54    Heart Defect Son     Liver Disease No family hx of     Liver Cancer No family hx of        Social History:  Marital Status:   [2]  Social History     Tobacco Use    Smoking status: Never    Smokeless tobacco: Never   Vaping Use    Vaping status: Never Used   Substance Use Topics    Alcohol use: Yes     Comment: occassional. ~4 per month; no hx of alcohol abuse/dependency    Drug use: No      "Comment: no hx IVDU        Medications:    Estradiol 1 MG/GM GEL  Fluocinolone Acetonide Scalp (DERMA-SMOOTHE/FS SCALP) 0.01 % OIL oil  ketoconazole (NIZORAL) 2 % external shampoo  methylPREDNISolone (MEDROL DOSEPAK) 4 MG tablet therapy pack  progesterone (PROMETRIUM) 100 MG capsule          Review of Systems   All other systems reviewed and are negative.      Physical Exam   BP: (!) 140/101  Pulse: 83  Temp: 97.8  F (36.6  C)  Resp: 18  Height: 167.6 cm (5' 6\")  Weight: 86.2 kg (190 lb)  SpO2: 100 %      Physical Exam  Constitutional:       General: She is in acute distress (mild).      Appearance: She is well-developed.   HENT:      Mouth/Throat:      Mouth: Mucous membranes are moist.      Pharynx: Oropharynx is clear.   Cardiovascular:      Rate and Rhythm: Normal rate and regular rhythm.   Pulmonary:      Effort: Pulmonary effort is normal. No respiratory distress.   Abdominal:      Tenderness: There is abdominal tenderness in the right upper quadrant and epigastric area. There is right CVA tenderness.   Neurological:      Mental Status: She is alert.         ED Course        Procedures              Critical Care time:  none     None         Results for orders placed or performed during the hospital encounter of 05/17/25 (from the past 24 hours)   UA with Microscopic reflex to Culture    Specimen: Urine, Clean Catch   Result Value Ref Range    Color Urine Light Yellow Colorless, Straw, Light Yellow, Yellow    Appearance Urine Clear Clear    Glucose Urine Negative Negative mg/dL    Bilirubin Urine Negative Negative    Ketones Urine Negative Negative mg/dL    Specific Gravity Urine 1.032 1.003 - 1.035    Blood Urine Negative Negative    pH Urine 6.0 5.0 - 7.0    Protein Albumin Urine Negative Negative mg/dL    Urobilinogen Urine 2.0 (A) Normal mg/dL    Nitrite Urine Negative Negative    Leukocyte Esterase Urine Negative Negative    Mucus Urine Present (A) None Seen /LPF    RBC Urine 0 <=2 /HPF    WBC Urine 0 <=5 " /HPF    Squamous Epithelials Urine 1 <=1 /HPF    Narrative    Urine Culture not indicated   CBC with platelets differential    Narrative    The following orders were created for panel order CBC with platelets differential.  Procedure                               Abnormality         Status                     ---------                               -----------         ------                     CBC with platelets and ...[[id number]]                      Final result                 Please view results for these tests on the individual orders.   Comprehensive metabolic panel   Result Value Ref Range    Sodium 139 135 - 145 mmol/L    Potassium 4.7 3.4 - 5.3 mmol/L    Carbon Dioxide (CO2) 23 22 - 29 mmol/L    Anion Gap 13 7 - 15 mmol/L    Urea Nitrogen 15.7 6.0 - 20.0 mg/dL    Creatinine 0.71 0.51 - 0.95 mg/dL    GFR Estimate >90 >60 mL/min/1.73m2    Calcium 8.9 8.8 - 10.4 mg/dL    Chloride 103 98 - 107 mmol/L    Glucose 106 (H) 70 - 99 mg/dL    Alkaline Phosphatase 72 40 - 150 U/L    AST 29 0 - 45 U/L    ALT 25 0 - 50 U/L    Protein Total 7.2 6.4 - 8.3 g/dL    Albumin 4.3 3.5 - 5.2 g/dL    Bilirubin Total 0.2 <=1.2 mg/dL   Lipase   Result Value Ref Range    Lipase 30 13 - 60 U/L   CBC with platelets and differential   Result Value Ref Range    WBC Count 7.3 4.0 - 11.0 10e3/uL    RBC Count 4.58 3.80 - 5.20 10e6/uL    Hemoglobin 13.9 11.7 - 15.7 g/dL    Hematocrit 40.8 35.0 - 47.0 %    MCV 89 78 - 100 fL    MCH 30.3 26.5 - 33.0 pg    MCHC 34.1 31.5 - 36.5 g/dL    RDW 12.0 10.0 - 15.0 %    Platelet Count 213 150 - 450 10e3/uL    % Neutrophils 46 %    % Lymphocytes 44 %    % Monocytes 7 %    % Eosinophils 3 %    % Basophils 0 %    % Immature Granulocytes 0 %    NRBCs per 100 WBC 0 <1 /100    Absolute Neutrophils 3.4 1.6 - 8.3 10e3/uL    Absolute Lymphocytes 3.2 0.8 - 5.3 10e3/uL    Absolute Monocytes 0.5 0.0 - 1.3 10e3/uL    Absolute Eosinophils 0.2 0.0 - 0.7 10e3/uL    Absolute Basophils 0.0 0.0 - 0.2 10e3/uL    Absolute  Immature Granulocytes 0.0 <=0.4 10e3/uL    Absolute NRBCs 0.0 10e3/uL   US Abdomen Limited (RUQ)    Narrative    EXAM: US ABDOMEN LIMITED  LOCATION: [facility]  DATE: 5/17/2025    INDICATION: Postprandial RUQ abdominal pain  COMPARISON: Ultrasound 05/10/2024  TECHNIQUE: Limited abdominal ultrasound.    FINDINGS:    GALLBLADDER: The gallbladder is packed with shadowing stones and sludge, limiting sonographic evaluation. The wall is measured at 5 mm. No pericholecystic fluid. Negative sonographic Ly's sign.    BILE DUCTS: No biliary dilatation. The common duct measures 5 mm.    LIVER: Increased echogenicity, most compatible with diffuse fatty infiltration. No focal mass. The portal vein is patent with flow in the normal direction.    RIGHT KIDNEY: Length 11.1 cm. No hydronephrosis.    PANCREAS: The visualized portions are normal.    No ascites.      Impression    IMPRESSION:  1.  The gallbladder is packed with shadowing stones and sludge, limiting sonographic evaluation. The gallbladder wall appears thickened, however there is a negative sonographic Ly's sign. The findings are equivocal for acute cholecystitis. Consider   nuclear medicine HIDA scan if clinically warranted.  2.  Hepatic steatosis.  3.  No abnormal biliary ductal dilation.           Medications   lactated ringers infusion ( Intravenous $New Bag 5/18/25 0701)   HYDROmorphone (PF) (DILAUDID) injection 0.5 mg (0.5 mg Intravenous $Given 5/18/25 0316)   sodium chloride 0.9% BOLUS 1,000 mL (0 mLs Intravenous Stopped 5/17/25 2155)   ondansetron (ZOFRAN) injection 4 mg (4 mg Intravenous $Given 5/18/25 0327)   ketorolac (TORADOL) injection 30 mg (30 mg Intravenous $Given 5/17/25 2059)       Assessments & Plan (with Medical Decision Making)  45-year-old female with over 24-hour history of right upper quadrant and right CVA pain that worsens after eating.  Some nausea especially after eating but no vomiting.  No fevers  chills or sweats.  She has moderate severe tenderness in the right upper quadrant, epigastric and right CVA region.  Suspect gallbladder etiology but also consider pyelonephritis/ureteral calculus although seems less likely.  Pain much improved after Toradol and Zofran.  White count normal at 7.3.  LFTs and lipase are normal.  Right upper quadrant ultrasound shows the gallbladder to be packed with shadowing stones and sludge.  Gallbladder wall appears thickened however there is a negative sonographic Ly's signs.  Findings are equivocal for acute cholecystitis.  No biliary ductal dilatation.  2243: I spoke with Dr. Barber from general surgery.  He will plan on taking her to the operating room at 7 AM for laparoscopic cholecystectomy.  Will run maintenance fluids through the night and keep her n.p.o.  Parenteral pain medication as needed to keep her comfortable.  Toradol is starting to wear off a little bit now but she does not feel like she needs any more pain meds at this time.  She will let us know.       I have reviewed the nursing notes.    I have reviewed the findings, diagnosis, plan and need for follow up with the patient.           Medical Decision Making  The patient's presentation was of moderate complexity (an undiagnosed new problem with uncertain prognosis).    The patient's evaluation involved:  ordering and/or review of 3+ test(s) in this encounter (see separate area of note for details)  discussion of management or test interpretation with another health professional (see separate area of note for details)    The patient's management involved high risk (a decision regarding emergency major procedure (went to the OR with operative service)).          New Prescriptions    No medications on file       Final diagnoses:   Symptomatic cholelithiasis   Acute cholecystitis - suspect early with mild GB wall thickening       5/17/2025   [facility] EMERGENCY DEPT       Jeremiah Benitez  MD Zaheer  05/18/25 0715

## 2025-05-18 NOTE — DISCHARGE INSTRUCTIONS
[facility] Same-Day Surgery   Adult Discharge Orders & Instructions     For 24 hours after surgery    Get plenty of rest.  A responsible adult must stay with you for at least 24 hours after you leave the hospital.   Do not drive or use heavy equipment.  If you have weakness or tingling, don't drive or use heavy equipment until this feeling goes away.  Do not drink alcohol.  Avoid strenuous or risky activities.  Ask for help when climbing stairs.   You may feel lightheaded.  If so, sit for a few minutes before standing.  Have someone help you get up.   You may have a slight fever. Call the doctor if your fever is over 100 F (37.7 C) (taken under the tongue) or lasts longer than 24 hours.  You may have a dry mouth, a sore throat, muscle aches or trouble sleeping.  These should go away after 24 hours.  Do not make important or legal decisions.  We don t expect you to have any problems from the surgery or treatment you had today. Just in case, here s what to do if you have pain, upset stomach (nausea), bleeding or infection:  Pain:  Take medicines your doctor has prescribed or over-the-counter medicine they have suggested. Resting and using ice packs can help, too. For surgery on an arm or leg, raise it on a pillow to ease swelling. Call your doctor if these methods don t work.  Copyright [employer], Licensed under [employer]  Upset stomach (nausea):  Take anti-nausea medicine approved by your doctor. Drink clear liquids like apple juice, ginger ale, broth or 7-Up. Be sure to drink enough fluids. Rest can help, too. Move to normal foods when you re ready.   Bleeding:  In the first 24 hours, you may see a little blood on your dressing, about the size of a quarter. You don t need to worry about this much blood, but if the blood spot keeps getting bigger:  Put pressure on the wound if you can, AND  Call your doctor.  Copyright [employer], Licensed under [employer]  Fever/Infection: Please call  your doctor if you have any of these signs:  Redness  Swelling  Wound feels warm  Pain gets worse  Bad-smelling fluid leaks from wound  Fever or chills  Call your doctor for any of the followin.  It has been over 8 to 10 hours since surgery and you are still not able to urinate (pass water).    2.  Headache for over 24 hours.    3.  Numbness, tingling or weakness in your legs the day after surgery (if you had spinal anesthesia).    For patient questions :  General Surgery: [phone]

## 2025-05-18 NOTE — OP NOTE
[facility] Operative Note    Pre-operative diagnosis: Acute cholecystitis [K81.0]   Post-operative diagnosis: Same   Procedure: Procedure(s):  CHOLECYSTECTOMY, LAPAROSCOPIC   Surgeon: Carlos Barber MD, FACS   Assistant(s): None   Anesthesia: General endotracheal anesthesia   Estimated blood loss: Less than 10 ml   Total IV fluids: (See anesthesia record)   Blood transfusion: No transfusion was given during surgery   Total urine output: (See anesthesia record)   Drains: None   Specimens: Gallbladder   Implants:    Findings: Gallbladder with multiple omental and duodenal adhesions   Complications: None   Condition: Stable   Comments: Indications for procedure: This a 45-year-old is a known history of gallstones.  They are asymptomatic up until Friday.  She ate some mac & cheese and developed right sided abdominal pain radiating to her right flank.  It did not resolve over the course of the night she presented to ER last night subsequent imaging was consistent with acute cholecystitis and for this reason elected And for laparoscopic cholecystectomy.     Details procedure:  Patient was taken the operating placed the table in supine position.  After induction of general esthesia the abdomen prepped and draped sterile fashion.  A supraumbilical incision was then made.  Blunt dissection was carried down to the fascia.  The fascia was grasped using Kocher clamps and the small incision was made using 11 blade.  An 11 mm Visiport was inserted to the abdomen to direct visualization.  The abs insufflated to 15 mL mercury pressure.  A generalized inspection the ab was then carried out there is no evidence of injury from insertion of the Visiport.  2 right upper quadrant 5 mm trocar an 11 mm subxiphoid trocar placed.  We are able to lift the gallbladder and multiple adhesions were seen and these were taken down as a combination of [place] dissector ConMed dissector and Kitner dissector.  The duodenum was gently peeled  off the gallbladder using a Kitner dissector.  The base of the gallbladder was grasped.  The peritoneum over the medial and lateral aspects of the gallbladder were taken down using a ConMed dissector.  During this dissection the cystic artery was cauterized.  After creating a large window under the base the gallbladder firefly was used CARRINGTON identified the cystic duct and the common duct.  The cystic duct was then clipped and transected and the gallbladder was removed from the liver bed using cautery and the abdomen using an Endo Catch bag.  The areas copiously irrigated all fluid was suctioned out.  The liver bed was inspected there was nones of any bleeding.  The subxiphoid trocar is removed and the fascia was closing 8: PMI needle #1 PDS.  All skin incision was then closing 3-0 Vicryl and Dermabond glue.  Patient was taken from the op room to recovery in stable condition to be sent home.    Carlos Barber MD, FACS

## 2025-05-18 NOTE — ANESTHESIA POSTPROCEDURE EVALUATION
Patient: Christina Santana    Procedure: Procedure(s):  CHOLECYSTECTOMY, LAPAROSCOPIC       Anesthesia Type:  General    Note:  Disposition: Outpatient   Postop Pain Control: Uneventful            Sign Out: Well controlled pain   PONV: No   Neuro/Psych: Uneventful            Sign Out: Acceptable/Baseline neuro status   Airway/Respiratory: Uneventful            Sign Out: Acceptable/Baseline resp. status   CV/Hemodynamics: Uneventful            Sign Out: Acceptable CV status   Other NRE: NONE   DID A NON-ROUTINE EVENT OCCUR? No    Event details/Postop Comments:  Pt was happy with anesthesia care.  No complications.  Pt having a lot of cramping and moderate discomfort, nausea is better and VS are stable.  Plan on discharging home today and will order some more pain medications. I will follow up with the pt if needed.           Last vitals:  Vitals Value Taken Time   /87 05/18/25 10:05   Temp 97.8  F (36.6  C) 05/18/25 09:25   Pulse 73 05/18/25 10:08   Resp 12 05/18/25 09:35   SpO2 100 % 05/18/25 10:08   Vitals shown include unfiled device data.    Electronically Signed By: SHEYLA Ferreira CRNA  May 18, 2025  10:09 AM

## 2025-05-18 NOTE — ED TRIAGE NOTES
Pt comes in today with right sided abdominal pain which radiates into her right flank at times. Pain worsens after eating meals. No pharmacological intervention.      Triage Assessment (Adult)       Row Name 05/17/25 2020          Triage Assessment    Airway WDL WDL        Respiratory WDL    Respiratory WDL WDL        Skin Circulation/Temperature WDL    Skin Circulation/Temperature WDL WDL        Cardiac WDL    Cardiac WDL WDL        Peripheral/Neurovascular WDL    Peripheral Neurovascular WDL WDL        Cognitive/Neuro/Behavioral WDL    Cognitive/Neuro/Behavioral WDL WDL

## 2025-05-18 NOTE — ANESTHESIA PROCEDURE NOTES
Airway       Patient location during procedure: OR       Procedure Start/Stop Times: 5/18/2025 7:37 AM  Staff -        CRNA: Eulogio Torres APRN CRNA       Performed By: CRNA  Consent for Airway        Urgency: elective  Indications and Patient Condition       Indications for airway management: pasquale-procedural       Induction type:intravenous       Mask difficulty assessment: 1 - vent by mask    Final Airway Details       Final airway type: endotracheal airway       Successful airway: ETT - single  Endotracheal Airway Details        ETT size (mm): 7.0       Cuffed: yes       Cuff volume (mL): 10       Successful intubation technique: direct laryngoscopy and video laryngoscopy       VL Blade Size: Glidescope 4       Grade View of Cords: 1       Adjucts: stylet       Position: Right       Measured from: lips       Secured at (cm): 21       Bite block used: Oral Airway    Post intubation assessment        Placement verified by: capnometry, equal breath sounds and chest rise        Number of attempts at approach: 1       Number of other approaches attempted: 0       Secured with: plastic tape       Ease of procedure: easy       Dentition: Intact and Unchanged    Medication(s) Administered   Medication Administration Time: 5/18/2025 7:37 AM

## 2025-05-18 NOTE — CONSULTS
[facility] Emergency Department Surgery Consult    Christina Santana MRN# [id number]   Age: 45 year old YOB: 1980     Date of Admission:  5/17/2025    Reason for consult: Acute cholecystitis [K81.0]       Requesting physician: Dr. Benitez       Level of consult: Consult, follow and place orders           Impression and Plan:   Impression:   Acute cholecystitis [K81.0]        Plan:   Laparoscopic cholecystectomy.    The procedure, risks, benefits, and alternatives were discussed and the patient agrees to proceed.             Chief Complaint:   Abdominal pain, right upper quadrant     History is obtained from the patient         History of Present Illness:   This 45 year old female who on Friday evening after eating mac & cheese developed right upper quadrant and flank pain.  It did resolve slightly over the course of the night.  She then had eggs and toast yesterday the pain returned but more severe.  Denies any nausea vomiting fevers or chills.  As the pain did not resolve so she presented to the ER.  Subsequent imaging in the ER revealed multiple gallstones and a thickened gallbladder wall.  She does have a longstanding history of stones diagnosed during a workup for a hiatal hernia.  They are asymptomatic up until Friday.  I am now asked to see her for possible early acute cholecystitis.           Past Medical History:     Past Medical History:   Diagnosis Date    ASCUS with positive high risk HPV 12/27/2013    HSIL (high grade squamous intraepithelial lesion) on Pap smear of cervix 04/22/2013    Pap smear of cervix with ASCUS, cannot exclude HGSIL 5/23/11, 8/10/12,     PMS2-related Reed syndrome (HNPCC4)              Past Surgical History:     Past Surgical History:   Procedure Laterality Date    APPENDECTOMY      AS REPAIR PARAESOPHAGEAL HIATAL HERNIA,LAPAROTOMY, W MESH      BIOPSY VULVA N/A 12/10/2021    Procedure: Vaginal cuff biopsy;  Surgeon: Leyda Guzman DO;  Location:  OR     CARPAL TUNNEL RELEASE RT/LT Right     COLONOSCOPY N/A 06/10/2021    Procedure: COLONOSCOPY, WITH POLYPECTOMY;  Surgeon: Willi Galeas DO;  [facility]    COLONOSCOPY N/A 2022    Procedure: COLONOSCOPY;  Surgeon: Willi Galeas DO;  [facility]    COLONOSCOPY N/A 2023    Procedure: Colonoscopy;  Surgeon: Willi Galeas DO;  [facility]    COLONOSCOPY N/A 2025    Procedure: Colonoscopy;  Surgeon: Gerald Alexander MD;  [facility]: [facility]    ESOPHAGOSCOPY, GASTROSCOPY, DUODENOSCOPY (EGD), COMBINED N/A 2022    Procedure: ESOPHAGOGASTRODUODENOSCOPY (EGD) with biopsy;  Surgeon: Willi Galeas DO;  [facility]    EXAM UNDER ANESTHESIA PELVIC N/A 12/10/2021    Procedure: EXAM UNDER ANESTHESIA, PELVIS;  Surgeon: Leyda Guzman DO;  [facility]    LAPAROSCOPIC HYSTERECTOMY TOTAL, BILATERAL SALPINGO-OOPHORECTOMY, COMBINED N/A 2021    Procedure: HYSTERECTOMY, TOTAL, LAPAROSCOPIC, WITH SALPINGO-OOPHORECTOMY;  Surgeon: Dylon Alcantar MD;  Location: [facility]             Social History:     Social History     Tobacco Use    Smoking status: Never    Smokeless tobacco: Never   Substance Use Topics    Alcohol use: Yes     Comment: occassional. ~4 per month; no hx of alcohol abuse/dependency             Family History:     Family History   Problem Relation Age of Onset    Diabetes Mother     Fibroids Mother     Reed Syndrome Mother         PMS2+    Colon Polyps Mother         removed 5 feet of colon for unmanageable polyps;  in her sleep at 67, took a nape and never woke up,    Reed Syndrome Brother         PMS2+    Bladder Cancer Maternal Grandfather 70        lived to be 94    Kidney Cancer Maternal Grandfather     Breast Cancer Paternal Grandmother         postmenopausal    Macular Degeneration Paternal Grandfather     Ovarian Cancer Maternal Aunt 40        possible uterine cancer also; hx of fibroids    Ovarian Cancer  Maternal Aunt 40        possible uterine cancer also; hx of fibroids    Breast Cancer Paternal Aunt 54    Heart Defect Son     Liver Disease No family hx of     Liver Cancer No family hx of               Allergies:     Allergies   Allergen Reactions    Latex Headache    Other Drug Allergy (See Comments) Hives     Possible Lidocaine family allergy (noticed during procedure--numbing-type medication) HIVES             Medications:     Current Facility-Administered Medications   Medication Dose Route Frequency Provider Last Rate Last Admin    HYDROmorphone (PF) (DILAUDID) injection 0.5 mg  0.5 mg Intravenous Q30 Min PRN Jeremiah Benitez MD   0.5 mg at 05/18/25 0316    lactated ringers infusion   Intravenous Continuous Jeremiah Benitez  mL/hr at 05/18/25 0701 New Bag at 05/18/25 0701     Current Outpatient Medications   Medication Sig Dispense Refill    Estradiol 1 MG/GM GEL Place 1 mg onto the skin daily.      Fluocinolone Acetonide Scalp (DERMA-SMOOTHE/FS SCALP) 0.01 % OIL oil Apply to affected area on scalp twice daily for 2-3 weeks. There after, use as needed for flares. For maintenance apply to scalp one daily on Saturday and Sunday. 118.28 mL 3    ketoconazole (NIZORAL) 2 % external shampoo Use every other day. Let lather and sit for 5 mintues prior to rinsing 120 mL 11    methylPREDNISolone (MEDROL DOSEPAK) 4 MG tablet therapy pack Follow Package Directions 21 tablet 0    progesterone (PROMETRIUM) 100 MG capsule Take 100 mg by mouth daily.               Review of Systems:   The review of systems was positive for the following findings.  None.  The remainder of the review of systems was unremarkable.          Physical Exam:   PE:  B/P: 133/87, T: 97.8, P: 74, R: 18  General: well developed, well nourished WF who appears their stated age  HEENT: NC/AT, EOMI, (-)icterus, (-)injection  Neck: Supple, No JVD  Chest: CTA  Heart: S1, S2, (-)m/r/g  Abd: Soft, right upper quadrant tenderness with  guarding., non distended,  no masses  Ext; Warm, no edema  Psych: AAOx3  Neuro: No focal deficits            Data:   All laboratory data reviewed  Results for orders placed or performed during the hospital encounter of 05/17/25 (from the past 24 hours)   UA with Microscopic reflex to Culture    Specimen: Urine, Clean Catch   Result Value Ref Range    Color Urine Light Yellow Colorless, Straw, Light Yellow, Yellow    Appearance Urine Clear Clear    Glucose Urine Negative Negative mg/dL    Bilirubin Urine Negative Negative    Ketones Urine Negative Negative mg/dL    Specific Gravity Urine 1.032 1.003 - 1.035    Blood Urine Negative Negative    pH Urine 6.0 5.0 - 7.0    Protein Albumin Urine Negative Negative mg/dL    Urobilinogen Urine 2.0 (A) Normal mg/dL    Nitrite Urine Negative Negative    Leukocyte Esterase Urine Negative Negative    Mucus Urine Present (A) None Seen /LPF    RBC Urine 0 <=2 /HPF    WBC Urine 0 <=5 /HPF    Squamous Epithelials Urine 1 <=1 /HPF    Narrative    Urine Culture not indicated   CBC with platelets differential    Narrative    The following orders were created for panel order CBC with platelets differential.  Procedure                               Abnormality         Status                     ---------                               -----------         ------                     CBC with platelets and ...[[id number]]                      Final result                 Please view results for these tests on the individual orders.   Comprehensive metabolic panel   Result Value Ref Range    Sodium 139 135 - 145 mmol/L    Potassium 4.7 3.4 - 5.3 mmol/L    Carbon Dioxide (CO2) 23 22 - 29 mmol/L    Anion Gap 13 7 - 15 mmol/L    Urea Nitrogen 15.7 6.0 - 20.0 mg/dL    Creatinine 0.71 0.51 - 0.95 mg/dL    GFR Estimate >90 >60 mL/min/1.73m2    Calcium 8.9 8.8 - 10.4 mg/dL    Chloride 103 98 - 107 mmol/L    Glucose 106 (H) 70 - 99 mg/dL    Alkaline Phosphatase 72 40 - 150 U/L    AST 29 0 - 45 U/L     ALT 25 0 - 50 U/L    Protein Total 7.2 6.4 - 8.3 g/dL    Albumin 4.3 3.5 - 5.2 g/dL    Bilirubin Total 0.2 <=1.2 mg/dL   Lipase   Result Value Ref Range    Lipase 30 13 - 60 U/L   CBC with platelets and differential   Result Value Ref Range    WBC Count 7.3 4.0 - 11.0 10e3/uL    RBC Count 4.58 3.80 - 5.20 10e6/uL    Hemoglobin 13.9 11.7 - 15.7 g/dL    Hematocrit 40.8 35.0 - 47.0 %    MCV 89 78 - 100 fL    MCH 30.3 26.5 - 33.0 pg    MCHC 34.1 31.5 - 36.5 g/dL    RDW 12.0 10.0 - 15.0 %    Platelet Count 213 150 - 450 10e3/uL    % Neutrophils 46 %    % Lymphocytes 44 %    % Monocytes 7 %    % Eosinophils 3 %    % Basophils 0 %    % Immature Granulocytes 0 %    NRBCs per 100 WBC 0 <1 /100    Absolute Neutrophils 3.4 1.6 - 8.3 10e3/uL    Absolute Lymphocytes 3.2 0.8 - 5.3 10e3/uL    Absolute Monocytes 0.5 0.0 - 1.3 10e3/uL    Absolute Eosinophils 0.2 0.0 - 0.7 10e3/uL    Absolute Basophils 0.0 0.0 - 0.2 10e3/uL    Absolute Immature Granulocytes 0.0 <=0.4 10e3/uL    Absolute NRBCs 0.0 10e3/uL   US Abdomen Limited (RUQ)    Narrative    EXAM: US ABDOMEN LIMITED  LOCATION: [facility]  DATE: 5/17/2025    INDICATION: Postprandial RUQ abdominal pain  COMPARISON: Ultrasound 05/10/2024  TECHNIQUE: Limited abdominal ultrasound.    FINDINGS:    GALLBLADDER: The gallbladder is packed with shadowing stones and sludge, limiting sonographic evaluation. The wall is measured at 5 mm. No pericholecystic fluid. Negative sonographic Ly's sign.    BILE DUCTS: No biliary dilatation. The common duct measures 5 mm.    LIVER: Increased echogenicity, most compatible with diffuse fatty infiltration. No focal mass. The portal vein is patent with flow in the normal direction.    RIGHT KIDNEY: Length 11.1 cm. No hydronephrosis.    PANCREAS: The visualized portions are normal.    No ascites.      Impression    IMPRESSION:  1.  The gallbladder is packed with shadowing stones and sludge, limiting sonographic  evaluation. The gallbladder wall appears thickened, however there is a negative sonographic Ly's sign. The findings are equivocal for acute cholecystitis. Consider   nuclear medicine HIDA scan if clinically warranted.  2.  Hepatic steatosis.  3.  No abnormal biliary ductal dilation.         All imaging studies reviewed by me.     Carlos Barber MD, FACS

## 2025-05-18 NOTE — ANESTHESIA PREPROCEDURE EVALUATION
Anesthesia Pre-Procedure Evaluation    Patient: Christina Santana   MRN: [id number] : 1980          Procedure :          Past Medical History:   Diagnosis Date    ASCUS with positive high risk HPV 2013    HSIL (high grade squamous intraepithelial lesion) on Pap smear of cervix 2013    Pap smear of cervix with ASCUS, cannot exclude HGSIL 11, 8/10/12,     PMS2-related Reed syndrome (HNPCC4)       Past Surgical History:   Procedure Laterality Date    APPENDECTOMY      AS REPAIR PARAESOPHAGEAL HIATAL HERNIA,LAPAROTOMY, W MESH      BIOPSY VULVA N/A 12/10/2021    Procedure: Vaginal cuff biopsy;  Surgeon: Leyda Guzman DO;  [facility]    CARPAL TUNNEL RELEASE RT/LT Right     COLONOSCOPY N/A 06/10/2021    Procedure: COLONOSCOPY, WITH POLYPECTOMY;  Surgeon: Willi Galeas DO;  [facility]    COLONOSCOPY N/A 2022    Procedure: COLONOSCOPY;  Surgeon: Willi Galeas DO;  [facility]    COLONOSCOPY N/A 2023    Procedure: Colonoscopy;  Surgeon: Willi Galeas DO;  [facility]    COLONOSCOPY N/A 2025    Procedure: Colonoscopy;  Surgeon: Gerald Alexander MD;  [facility]    ESOPHAGOSCOPY, GASTROSCOPY, DUODENOSCOPY (EGD), COMBINED N/A 2022    Procedure: ESOPHAGOGASTRODUODENOSCOPY (EGD) with biopsy;  Surgeon: Willi Galeas DO;  [facility]    EXAM UNDER ANESTHESIA PELVIC N/A 12/10/2021    Procedure: EXAM UNDER ANESTHESIA, PELVIS;  Surgeon: Leyda Guzman DO;  [facility]    LAPAROSCOPIC HYSTERECTOMY TOTAL, BILATERAL SALPINGO-OOPHORECTOMY, COMBINED N/A 2021    Procedure: HYSTERECTOMY, TOTAL, LAPAROSCOPIC, WITH SALPINGO-OOPHORECTOMY;  Surgeon: Dylon Alcantar MD;  [facility]      Allergies   Allergen Reactions    Latex Headache    Other Drug Allergy (See Comments) Hives     Possible Lidocaine family allergy (noticed during procedure--numbing-type medication) HIVES      Social History      Tobacco Use    Smoking status: Never    Smokeless tobacco: Never   Substance Use Topics    Alcohol use: Yes     Comment: occassional. ~4 per month; no hx of alcohol abuse/dependency      Wt Readings from Last 1 Encounters:   05/17/25 86.2 kg (190 lb)        Anesthesia Evaluation   Pt has had prior anesthetic. Type: General and MAC.    No history of anesthetic complications       ROS/MED HX  ENT/Pulmonary:     (+)           allergic rhinitis,                          (-) tobacco use, asthma and COPD   Neurologic:  - neg neurologic ROS     Cardiovascular:  - neg cardiovascular ROS   (+)  - -   -  - -                                 Previous cardiac testing   Echo: Date: Results:    Stress Test:  Date: Results:    ECG Reviewed:  Date: 8/5/21 Results:  SR  Cath:  Date: Results:      METS/Exercise Tolerance:     Hematologic:  - neg hematologic  ROS     Musculoskeletal:  - neg musculoskeletal ROS     GI/Hepatic: Comment: PMS2-related Reed syndrome   Hepatic steatosis        (+) GERD, Asymptomatic on medication,           liver disease,       Renal/Genitourinary:  - neg Renal ROS     Endo:  - neg endo ROS     Psychiatric/Substance Use:  - neg psychiatric ROS     Infectious Disease:  - neg infectious disease ROS     Malignancy:  - neg malignancy ROS     Other:  - neg other ROS            Physical Exam  Airway  Mallampati: II  TM distance: >3 FB  Neck ROM: full  Upper bite lip test: II  Mouth opening: >= 4 cm    Cardiovascular - normal exam  Rhythm: regular  Rate: normal rate     Dental   (+) Minor Abnormalities - some fillings, tiny chips      Pulmonary - normal examBreath sounds clear to auscultation        Neurological - normal exam  She appears awake, alert and oriented x3.    Other Findings       OUTSIDE LABS:  CBC:   Lab Results   Component Value Date    WBC 7.3 05/17/2025    WBC 6.1 01/14/2025    HGB 13.9 05/17/2025    HGB 14.0 01/14/2025    HCT 40.8 05/17/2025    HCT 41.8 01/14/2025     05/17/2025    PLT  219 01/14/2025     BMP:   Lab Results   Component Value Date     05/17/2025     01/14/2025    POTASSIUM 4.7 05/17/2025    POTASSIUM 4.2 01/14/2025    CHLORIDE 103 05/17/2025    CHLORIDE 104 01/14/2025    CO2 23 05/17/2025    CO2 25 01/14/2025    BUN 15.7 05/17/2025    BUN 16.1 01/14/2025    CR 0.71 05/17/2025    CR 0.75 01/14/2025     (H) 05/17/2025    GLC 99 01/14/2025     COAGS:   Lab Results   Component Value Date    PTT 27 05/03/2024    INR 0.99 06/11/2024     POC:   Lab Results   Component Value Date    HCG Negative 02/24/2021     HEPATIC:   Lab Results   Component Value Date    ALBUMIN 4.3 05/17/2025    PROTTOTAL 7.2 05/17/2025    ALT 25 05/17/2025    AST 29 05/17/2025    ALKPHOS 72 05/17/2025    BILITOTAL 0.2 05/17/2025     OTHER:   Lab Results   Component Value Date    LACT 2.7 (H) 01/01/2019    A1C 5.7 (H) 05/03/2024    NERISSA 8.9 05/17/2025    PHOS 3.1 05/03/2024    MAG 1.9 02/21/2024    LIPASE 30 05/17/2025    TSH 0.86 02/21/2024    T4 1.36 06/16/2021    CRP <2.9 08/05/2021    SED 8 10/04/2024       Anesthesia Plan    ASA Status:  2      NPO Status: NPO Appropriate   Anesthesia Type: General.  Airway: oral.  Induction: intravenous.  Maintenance: Balanced.   Techniques and Equipment:       - Monitoring Plan: standard ASA monitoring, train of four monitoring     Consents    Anesthesia Plan(s) and associated risks, benefits, and realistic alternatives discussed. Questions answered and patient/representative(s) expressed understanding.     - Discussed: CRNA     - Discussed with:  Patient        - Pt is DNR/DNI Status: no DNR          Postoperative Care    Pain management: plan for postoperative opioid use, multimodal analgesia.     Comments:                   SHEYLA Ferreira CRNA    I have reviewed the pertinent notes and labs in the chart from the past 30 days and (re)examined the patient.  Any updates or changes from those notes are reflected in this note.    Clinically Significant Risk  "Factors Present on Admission                             # Obesity: Estimated body mass index is 30.67 kg/m  as calculated from the following:    Height as of this encounter: 1.676 m (5' 6\").    Weight as of this encounter: 86.2 kg (190 lb).                    "

## 2025-05-19 ENCOUNTER — TELEPHONE (OUTPATIENT)
Dept: SURGERY | Facility: CLINIC | Age: 45
End: 2025-05-19
Payer: COMMERCIAL

## 2025-05-19 NOTE — TELEPHONE ENCOUNTER
Type of surgery: CHOLECYSTECTOMY, LAPAROSCOPIC (N/A)   Location of surgery: [facility]  Date and time of surgery: 05/18/2025  Surgeon: VITO  Pre-Op Appt Date: Seen on call   Post-Op Appt Date: tbd   Packet sent out: No  Pre-cert/Authorization completed:  No  Date:

## 2025-05-21 ENCOUNTER — NURSE TRIAGE (OUTPATIENT)
Dept: NURSING | Facility: CLINIC | Age: 45
End: 2025-05-21
Payer: COMMERCIAL

## 2025-05-21 LAB
PATH REPORT.COMMENTS IMP SPEC: NORMAL
PATH REPORT.COMMENTS IMP SPEC: NORMAL
PATH REPORT.FINAL DX SPEC: NORMAL
PATH REPORT.GROSS SPEC: NORMAL
PATH REPORT.MICROSCOPIC SPEC OTHER STN: NORMAL
PATH REPORT.RELEVANT HX SPEC: NORMAL
PHOTO IMAGE: NORMAL

## 2025-05-21 NOTE — TELEPHONE ENCOUNTER
"Nurse Triage SBAR    Is this a 2nd Level Triage? YES, LICENSED PRACTITIONER REVIEW IS REQUIRED    Situation: Patient had galbladder surgery 72 hours ago, Has had clear drainage from incision sites since last night    Background:   05/18   CHOLECYSTECTOMY, LAPAROSCOPIC       Assessment: Onset last night Clear drainage from one incision site, this am having drainage from 2 more sites, is getting everything wet. Wears a \"binder\"when getting up to ambulate, otherwise does not have a dressing on. No gaping to incisions. No increased swelling or reddness to baseline since surgery. No increased to tenderness to baseline since discharge. Afebrile. No increased activity level has occurred. Drainage is clear, no foul smell.     Protocol Recommended Disposition:   See in Office Today    Recommendation: Please call regarding work in appt     Routed to provider    Does the patient meet one of the following criteria for ADS visit consideration? No      Reason for Disposition   Clear or blood-tinged fluid draining from wound and no fever    Additional Information   Negative: Major abdominal surgical incision and wound gaping open with visible internal organs   Negative: Sounds like a life-threatening emergency to the triager   Negative: Bleeding from incision and won't stop after 10 minutes of direct pressure   Negative: Bleeding (more than a few drops) from incision and after tracheostomy or blood vessel surgery (e.g., carotid endarterectomy, femoral bypass graft, kidney dialysis fistula)   Negative: Bright red, wide-spread, sunburn-like rash   Negative: SEVERE pain in the incision   Negative: Incision gaping open and < 2 days (48 hours) since wound re-opened   Negative: Incision gaping open and length of opening > 2 inches (5 cm)   Negative: Patient sounds very sick or weak to the triager   Negative: Sounds like a serious complication to the triager   Negative: Fever > 100.4 F (38.0 C)   Negative: Incision looks infected (e.g., " spreading redness, pus)   Negative: Red streak runs from the incision and longer than 1 inch (2.5 cm)   Negative: Pus or bad-smelling fluid draining from incision   Negative: Dressing soaked with blood or body fluid (e.g., drainage)   Negative: Raised bruise and size > 2 inches (5 cm) and expanding   Negative: Scant bleeding (e.g., few drops) from incision and after tracheostomy or blood vessel surgery (e.g., carotid endarterectomy, femoral bypass graft, kidney dialysis fistula   Negative: Caller has URGENT question and triager unable to answer question   Negative: Incision on the face gaping open and length of opening > 1/4 inch (6 mm), and over 2 days since wound re-opened   Negative: Incision gaping open and length of opening > 1/2 inch (1 cm) and over 2 days since wound re-opened    Protocols used: Post-Op Incision Symptoms and [place]-[facility]-CEANDRES PRESTON RN  [facility]/Red Flag Triage & Med Refill Team

## 2025-05-21 NOTE — TELEPHONE ENCOUNTER
Relayed message to patient. She is okay with treatment plan. She will wait to see Dr. Barber next week at her scheduled post-op appointment.     Letty Santamaria RN on 5/21/2025 at 1:59 PM

## 2025-05-21 NOTE — TELEPHONE ENCOUNTER
Carlos Barber MD to Me  (Selected Message)      5/21/25  1:56 PM  It does not look bad, it looks like her glue came off early.  Just have her wash it daily with soap and water and cover with a Band-Aid.  She can see me tomorrow if she wishes.

## 2025-05-21 NOTE — TELEPHONE ENCOUNTER
"Patient reiterated this started last night from one of her incisions. Clear drainage. Noticed it started coming from two other incision sites today. The 2 incisions on her side and one above her belly button. The clear drainage is \"seeping out\". She states she has to wipe it every hour.     She's not having increased pain, swelling, fever, chills or night sweats. When asked if she's having redness she stated she would like \"redness\" defined because she does have some redness. Advised patient to send a picture through [employer] for visual assessment.     She states incisions are closed.     Awaiting pictures.     Letty Santamaria RN on 5/21/2025 at 1:43 PM    "

## 2025-05-29 ENCOUNTER — OFFICE VISIT (OUTPATIENT)
Dept: SURGERY | Facility: CLINIC | Age: 45
End: 2025-05-29
Payer: COMMERCIAL

## 2025-05-29 VITALS
DIASTOLIC BLOOD PRESSURE: 70 MMHG | WEIGHT: 190 LBS | BODY MASS INDEX: 30.67 KG/M2 | TEMPERATURE: 96.1 F | SYSTOLIC BLOOD PRESSURE: 116 MMHG

## 2025-05-29 DIAGNOSIS — Z09 POSTOP CHECK: Primary | ICD-10-CM

## 2025-05-29 ASSESSMENT — PAIN SCALES - GENERAL: PAINLEVEL_OUTOF10: NO PAIN (0)

## 2025-05-29 NOTE — PROGRESS NOTES
For laparoscopic cholecystectomy      Subjective:  Patient is status post laparoscopic cholecystectomy for acute cholecystitis.  Preop symptoms have resolved.  Does have some concerns about the incisions with irritation around them.  She does have a known reaction to adhesives in the past.  Only intermittently using hydrocortisone cream      Objective:  B/P: 116/70, T: 96.1, P: Data Unavailable, R: Data Unavailable  Abdomen: Incisions healing well.  Glue still in place.  Some irritation around the glue suggestive of a contact dermatitis.  No signs of infection.    Path:  A(1). Gallbladder, cholecystectomy:  -Chronic cholecystitis, and cholelithiasis.  -Negative for dysplasia or malignancy.      Assessment/plan:  Patient status post laparoscopic cholecystectomy.  Preop symptoms have resolved.  I suspect she does have a contact dermatitis.  I recommended she remove the glue in the shower and then continue to apply hydrocortisone.  If this does not resolve with glue removal she will follow-up with me otherwise as necessary.    Carlos Barber MD, FACS

## 2025-05-29 NOTE — LETTER
5/29/2025      Christina Santana  [address] [facility] [address]      Dear Colleague,    Thank you for referring your patient, Christina Santana, to the [facility]. Please see a copy of my visit note below.    For laparoscopic cholecystectomy      Subjective:  Patient is status post laparoscopic cholecystectomy for acute cholecystitis.  Preop symptoms have resolved.  Does have some concerns about the incisions with irritation around them.  She does have a known reaction to adhesives in the past.  Only intermittently using hydrocortisone cream      Objective:  B/P: 116/70, T: 96.1, P: Data Unavailable, R: Data Unavailable  Abdomen: Incisions healing well.  Glue still in place.  Some irritation around the glue suggestive of a contact dermatitis.  No signs of infection.    Path:  A(1). Gallbladder, cholecystectomy:  -Chronic cholecystitis, and cholelithiasis.  -Negative for dysplasia or malignancy.      Assessment/plan:  Patient status post laparoscopic cholecystectomy.  Preop symptoms have resolved.  I suspect she does have a contact dermatitis.  I recommended she remove the glue in the shower and then continue to apply hydrocortisone.  If this does not resolve with glue removal she will follow-up with me otherwise as necessary.    Carlos Barber MD, FACS          Again, thank you for allowing me to participate in the care of your patient.        Sincerely,        Carlos Barber MD    Electronically signed

## 2025-06-14 ENCOUNTER — HOSPITAL ENCOUNTER (EMERGENCY)
Facility: CLINIC | Age: 45
Discharge: HOME | End: 2025-06-14
Attending: EMERGENCY MEDICINE | Admitting: EMERGENCY MEDICINE
Payer: COMMERCIAL

## 2025-06-14 ENCOUNTER — APPOINTMENT (OUTPATIENT)
Dept: CT IMAGING | Facility: CLINIC | Age: 45
End: 2025-06-14
Attending: EMERGENCY MEDICINE
Payer: COMMERCIAL

## 2025-06-14 ENCOUNTER — NURSE TRIAGE (OUTPATIENT)
Dept: NURSING | Facility: CLINIC | Age: 45
End: 2025-06-14
Payer: COMMERCIAL

## 2025-06-14 VITALS
RESPIRATION RATE: 16 BRPM | SYSTOLIC BLOOD PRESSURE: 118 MMHG | OXYGEN SATURATION: 99 % | BODY MASS INDEX: 32.79 KG/M2 | DIASTOLIC BLOOD PRESSURE: 74 MMHG | HEIGHT: 65 IN | TEMPERATURE: 97.9 F | WEIGHT: 196.8 LBS | HEART RATE: 72 BPM

## 2025-06-14 DIAGNOSIS — R10.11 ABDOMINAL PAIN, RIGHT UPPER QUADRANT: ICD-10-CM

## 2025-06-14 DIAGNOSIS — K44.9 HIATAL HERNIA: ICD-10-CM

## 2025-06-14 LAB
ALBUMIN SERPL BCG-MCNC: 4 G/DL (ref 3.5–5.2)
ALP SERPL-CCNC: 70 U/L (ref 40–150)
ALT SERPL W P-5'-P-CCNC: 28 U/L (ref 0–50)
ANION GAP SERPL CALCULATED.3IONS-SCNC: 11 MMOL/L (ref 7–15)
AST SERPL W P-5'-P-CCNC: 23 U/L (ref 0–45)
ATRIAL RATE - MUSE: 58 BPM
BASOPHILS # BLD AUTO: 0 10E3/UL (ref 0–0.2)
BASOPHILS NFR BLD AUTO: 0 %
BILIRUB SERPL-MCNC: 0.5 MG/DL
BUN SERPL-MCNC: 11.9 MG/DL (ref 6–20)
CALCIUM SERPL-MCNC: 9 MG/DL (ref 8.8–10.4)
CHLORIDE SERPL-SCNC: 105 MMOL/L (ref 98–107)
CREAT SERPL-MCNC: 0.76 MG/DL (ref 0.51–0.95)
DIASTOLIC BLOOD PRESSURE - MUSE: NORMAL MMHG
EGFRCR SERPLBLD CKD-EPI 2021: >90 ML/MIN/1.73M2
EOSINOPHIL # BLD AUTO: 0.2 10E3/UL (ref 0–0.7)
EOSINOPHIL NFR BLD AUTO: 4 %
ERYTHROCYTE [DISTWIDTH] IN BLOOD BY AUTOMATED COUNT: 12 % (ref 10–15)
GLUCOSE SERPL-MCNC: 89 MG/DL (ref 70–99)
HCO3 SERPL-SCNC: 23 MMOL/L (ref 22–29)
HCT VFR BLD AUTO: 41.1 % (ref 35–47)
HGB BLD-MCNC: 13.7 G/DL (ref 11.7–15.7)
IMM GRANULOCYTES # BLD: 0 10E3/UL
IMM GRANULOCYTES NFR BLD: 0 %
INTERPRETATION ECG - MUSE: NORMAL
LIPASE SERPL-CCNC: 20 U/L (ref 13–60)
LYMPHOCYTES # BLD AUTO: 2 10E3/UL (ref 0.8–5.3)
LYMPHOCYTES NFR BLD AUTO: 36 %
MCH RBC QN AUTO: 29.8 PG (ref 26.5–33)
MCHC RBC AUTO-ENTMCNC: 33.3 G/DL (ref 31.5–36.5)
MCV RBC AUTO: 90 FL (ref 78–100)
MONOCYTES # BLD AUTO: 0.4 10E3/UL (ref 0–1.3)
MONOCYTES NFR BLD AUTO: 7 %
NEUTROPHILS # BLD AUTO: 2.9 10E3/UL (ref 1.6–8.3)
NEUTROPHILS NFR BLD AUTO: 52 %
NRBC # BLD AUTO: 0 10E3/UL
NRBC BLD AUTO-RTO: 0 /100
P AXIS - MUSE: 4 DEGREES
PLATELET # BLD AUTO: 202 10E3/UL (ref 150–450)
POTASSIUM SERPL-SCNC: 4.2 MMOL/L (ref 3.4–5.3)
PR INTERVAL - MUSE: 162 MS
PROT SERPL-MCNC: 7 G/DL (ref 6.4–8.3)
QRS DURATION - MUSE: 100 MS
QT - MUSE: 442 MS
QTC - MUSE: 433 MS
R AXIS - MUSE: 35 DEGREES
RBC # BLD AUTO: 4.59 10E6/UL (ref 3.8–5.2)
SODIUM SERPL-SCNC: 139 MMOL/L (ref 135–145)
SYSTOLIC BLOOD PRESSURE - MUSE: NORMAL MMHG
T AXIS - MUSE: 32 DEGREES
TROPONIN T SERPL HS-MCNC: <6 NG/L
VENTRICULAR RATE- MUSE: 58 BPM
WBC # BLD AUTO: 5.5 10E3/UL (ref 4–11)

## 2025-06-14 PROCEDURE — 36415 COLL VENOUS BLD VENIPUNCTURE: CPT | Performed by: EMERGENCY MEDICINE

## 2025-06-14 PROCEDURE — 74177 CT ABD & PELVIS W/CONTRAST: CPT

## 2025-06-14 PROCEDURE — 84484 ASSAY OF TROPONIN QUANT: CPT | Performed by: EMERGENCY MEDICINE

## 2025-06-14 PROCEDURE — 250N000009 HC RX 250: Performed by: EMERGENCY MEDICINE

## 2025-06-14 PROCEDURE — 85025 COMPLETE CBC W/AUTO DIFF WBC: CPT | Performed by: EMERGENCY MEDICINE

## 2025-06-14 PROCEDURE — 258N000003 HC RX IP 258 OP 636: Performed by: EMERGENCY MEDICINE

## 2025-06-14 PROCEDURE — 96374 THER/PROPH/DIAG INJ IV PUSH: CPT | Mod: 59 | Performed by: EMERGENCY MEDICINE

## 2025-06-14 PROCEDURE — 96376 TX/PRO/DX INJ SAME DRUG ADON: CPT | Performed by: EMERGENCY MEDICINE

## 2025-06-14 PROCEDURE — 93005 ELECTROCARDIOGRAM TRACING: CPT | Performed by: EMERGENCY MEDICINE

## 2025-06-14 PROCEDURE — 96361 HYDRATE IV INFUSION ADD-ON: CPT | Performed by: EMERGENCY MEDICINE

## 2025-06-14 PROCEDURE — 250N000011 HC RX IP 250 OP 636: Mod: JZ | Performed by: EMERGENCY MEDICINE

## 2025-06-14 PROCEDURE — 99285 EMERGENCY DEPT VISIT HI MDM: CPT | Mod: 25 | Performed by: EMERGENCY MEDICINE

## 2025-06-14 PROCEDURE — 82310 ASSAY OF CALCIUM: CPT | Performed by: EMERGENCY MEDICINE

## 2025-06-14 PROCEDURE — 93010 ELECTROCARDIOGRAM REPORT: CPT | Performed by: EMERGENCY MEDICINE

## 2025-06-14 PROCEDURE — 36593 DECLOT VASCULAR DEVICE: CPT

## 2025-06-14 PROCEDURE — 96375 TX/PRO/DX INJ NEW DRUG ADDON: CPT | Performed by: EMERGENCY MEDICINE

## 2025-06-14 PROCEDURE — 250N000011 HC RX IP 250 OP 636: Performed by: EMERGENCY MEDICINE

## 2025-06-14 PROCEDURE — 99284 EMERGENCY DEPT VISIT MOD MDM: CPT | Performed by: EMERGENCY MEDICINE

## 2025-06-14 PROCEDURE — 83690 ASSAY OF LIPASE: CPT | Performed by: EMERGENCY MEDICINE

## 2025-06-14 RX ORDER — ONDANSETRON 2 MG/ML
4 INJECTION INTRAMUSCULAR; INTRAVENOUS EVERY 30 MIN PRN
Status: DISCONTINUED | OUTPATIENT
Start: 2025-06-14 | End: 2025-06-14 | Stop reason: HOSPADM

## 2025-06-14 RX ORDER — HYDROMORPHONE HYDROCHLORIDE 1 MG/ML
0.5 INJECTION, SOLUTION INTRAMUSCULAR; INTRAVENOUS; SUBCUTANEOUS EVERY 30 MIN PRN
Refills: 0 | Status: DISCONTINUED | OUTPATIENT
Start: 2025-06-14 | End: 2025-06-14 | Stop reason: HOSPADM

## 2025-06-14 RX ORDER — ONDANSETRON 4 MG/1
4 TABLET, ORALLY DISINTEGRATING ORAL EVERY 6 HOURS PRN
Qty: 20 TABLET | Refills: 0 | Status: SHIPPED | OUTPATIENT
Start: 2025-06-14 | End: 2025-06-17

## 2025-06-14 RX ORDER — OMEPRAZOLE 20 MG/1
40 CAPSULE, DELAYED RELEASE ORAL DAILY
Qty: 30 CAPSULE | Refills: 0 | Status: SHIPPED | OUTPATIENT
Start: 2025-06-14 | End: 2025-06-29

## 2025-06-14 RX ORDER — KETOROLAC TROMETHAMINE 30 MG/ML
30 INJECTION, SOLUTION INTRAMUSCULAR; INTRAVENOUS ONCE
Status: COMPLETED | OUTPATIENT
Start: 2025-06-14 | End: 2025-06-14

## 2025-06-14 RX ORDER — IOPAMIDOL 755 MG/ML
500 INJECTION, SOLUTION INTRAVASCULAR ONCE
Status: COMPLETED | OUTPATIENT
Start: 2025-06-14 | End: 2025-06-14

## 2025-06-14 RX ADMIN — IOPAMIDOL 96 ML: 755 INJECTION, SOLUTION INTRAVENOUS at 11:28

## 2025-06-14 RX ADMIN — ONDANSETRON 4 MG: 2 INJECTION, SOLUTION INTRAMUSCULAR; INTRAVENOUS at 11:07

## 2025-06-14 RX ADMIN — SODIUM CHLORIDE 60 ML: 9 INJECTION, SOLUTION INTRAVENOUS at 11:27

## 2025-06-14 RX ADMIN — ONDANSETRON 4 MG: 2 INJECTION, SOLUTION INTRAMUSCULAR; INTRAVENOUS at 11:51

## 2025-06-14 RX ADMIN — SODIUM CHLORIDE 1000 ML: 0.9 INJECTION, SOLUTION INTRAVENOUS at 11:10

## 2025-06-14 RX ADMIN — HYDROMORPHONE HYDROCHLORIDE 0.5 MG: 1 INJECTION, SOLUTION INTRAMUSCULAR; INTRAVENOUS; SUBCUTANEOUS at 11:10

## 2025-06-14 RX ADMIN — KETOROLAC TROMETHAMINE 30 MG: 30 INJECTION, SOLUTION INTRAMUSCULAR at 11:08

## 2025-06-14 ASSESSMENT — ACTIVITIES OF DAILY LIVING (ADL)
ADLS_ACUITY_SCORE: 42

## 2025-06-14 NOTE — ED PROVIDER NOTES
History     Chief Complaint   Patient presents with    Post-op Problem    Abdominal Pain     HPI  Christina Santana is a 45 year old female who presents with right upper quadrant abdominal pain.  Pain started last night and has gotten worse today.  She is status post cholecystectomy, underwent procedure on 5/18/2025.  Had been doing well postoperatively until last night.  Said that she was rolling over in bed and she felt something pop.  Has had pain that has been increasing since.  Is nauseated but has not been vomiting.  Has not been running a fever.  She tried taking some ibuprofen but that helped minimally.  She did not take any of the medication that was prescribed to her postoperatively for pain as she really does not like to take medication and had been doing well to this point.    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     Priority: Medium     Added automatically from request for surgery [id number]      Seasonal allergic rhinitis due to pollen 03/04/2020     Priority: Medium    GERD with stricture 02/25/2020     Priority: Medium    Transplant donor evaluation 09/13/2018     Priority: Medium    BREEZY III (cervical intraepithelial neoplasia grade III) with severe dysplasia 01/01/2013     Priority: Medium     5/2011- ASC-H  6/2011- colposcopy- suspicious for HPV  8/2012- ASC-H  4/2013- HSIL  5/1/2013- colposcopy- BREEZY 2-3  5/13/2013 LEEP: BREEZY II-III, Positive Margins  8/2013- ECC- cervicitis  12/2013 ASCUS, HPV +  1/2014- colposcopy- negative  8/2014- NIL  7/2015- NIL/HPV negative   3/2017- NIL/HPV +  4/2017- colposcopy- suggestive of BREEZY  I  4/2018- NIL/HPV negative  9/10/19 NIL/HPV negative   2/24/21 TLH/BSO-benign pathology  10/8/21 NIL pap Neg HPV of vaginal cuff. Colpo completed for vaginal bleeding. Plan: Given discomfort during exam and biopsy attempt despite lidocaine, plan to perform EUA and biopsy under anesthesia in the OR. 12/10/21 Vaginal cuff biopsies- negative. Plan: cotest in 3 years.   1/19/22 Post op appt- cancelled   2/15/22 Appt- Pap due 10/8/22  7/27/23 Visit Oncology  9/19/23 NIL pap, + HR HPV (not 16 or 18). Plan: colp bef 12/19/23 11/8/23 Vaginal [place] - no biopsies, visually normal. Plan cotest in 1 year due 11/8/2024  10/22/24 Reminder mychart  11/25/24 Reminder call - pt notified   12/18/24 Lost to follow-up for pap tracking       Astigmatism 10/03/2003     Priority: Medium    Myopia 10/03/2003     Priority: Medium        Past Medical History:    Past Medical History:   Diagnosis Date    ASCUS with positive high risk HPV 12/27/2013    HSIL (high grade squamous intraepithelial lesion) on Pap smear of cervix 04/22/2013    Pap smear of cervix with ASCUS, cannot exclude HGSIL 5/23/11, 8/10/12,     PMS2-related Reed syndrome (HNPCC4)        Past Surgical History:    Past Surgical History:   Procedure Laterality Date    APPENDECTOMY      AS REPAIR PARAESOPHAGEAL HIATAL HERNIA,LAPAROTOMY, W MESH      BIOPSY VULVA N/A 12/10/2021    Procedure: Vaginal cuff biopsy;  Surgeon: Leyda Guzman DO;  Location:  OR    CARPAL TUNNEL RELEASE RT/LT Right     COLONOSCOPY N/A 06/10/2021    Procedure: COLONOSCOPY, WITH POLYPECTOMY;  Surgeon: Willi Galeas DO;  Location: [facility]    COLONOSCOPY N/A 12/01/2022    Procedure: COLONOSCOPY;  Surgeon: Willi Galeas DO;  Location:  GI    COLONOSCOPY N/A 12/04/2023    Procedure: Colonoscopy;  Surgeon: Willi Galeas DO;  Location:  GI    COLONOSCOPY N/A 2/27/2025    Procedure: Colonoscopy;  Surgeon: Gerald Alexander MD;  Location:  GI    ESOPHAGOSCOPY,  GASTROSCOPY, DUODENOSCOPY (EGD), COMBINED N/A 2022    Procedure: ESOPHAGOGASTRODUODENOSCOPY (EGD) with biopsy;  Surgeon: Willi Galeas DO;  Location: [facility]    EXAM UNDER ANESTHESIA PELVIC N/A 12/10/2021    Procedure: EXAM UNDER ANESTHESIA, PELVIS;  Surgeon: Leyda Guzman DO;  Location: [facility]    LAPAROSCOPIC HYSTERECTOMY TOTAL, BILATERAL SALPINGO-OOPHORECTOMY, COMBINED N/A 2021    Procedure: HYSTERECTOMY, TOTAL, LAPAROSCOPIC, WITH SALPINGO-OOPHORECTOMY;  Surgeon: Dylon Alcantar MD;  Location: [facility]       Family History:    Family History   Problem Relation Age of Onset    Diabetes Mother     Fibroids Mother     Reed Syndrome Mother         PMS2+    Colon Polyps Mother         removed 5 feet of colon for unmanageable polyps;  in her sleep at 67, took a nape and never woke up,    Reed Syndrome Brother         PMS2+    Bladder Cancer Maternal Grandfather 70        lived to be 94    Kidney Cancer Maternal Grandfather     Breast Cancer Paternal Grandmother         postmenopausal    Macular Degeneration Paternal Grandfather     Ovarian Cancer Maternal Aunt 40        possible uterine cancer also; hx of fibroids    Ovarian Cancer Maternal Aunt 40        possible uterine cancer also; hx of fibroids    Breast Cancer Paternal Aunt 54    Heart Defect Son     Liver Disease No family hx of     Liver Cancer No family hx of        Social History:  Marital Status:   [2]  Social History     Tobacco Use    Smoking status: Never    Smokeless tobacco: Never   Vaping Use    Vaping status: Never Used   Substance Use Topics    Alcohol use: Yes     Comment: occassional. ~4 per month; no hx of alcohol abuse/dependency    Drug use: No     Comment: no hx IVDU        Medications:    Estradiol 1 MG/GM GEL  omeprazole (PRILOSEC) 20 MG DR capsule  ondansetron (ZOFRAN ODT) 4 MG ODT tab  progesterone (PROMETRIUM) 100 MG capsule          Review of Systems   All other systems reviewed and are  "negative.      Physical Exam   BP: (!) 146/101  Pulse: 79  Temp: 97.9  F (36.6  C)  Resp: 23  Height: 165.1 cm (5' 5\")  Weight: 89.3 kg (196 lb 12.8 oz)  SpO2: 96 %      Physical Exam  Vitals and nursing note reviewed.   Constitutional:       General: She is not in acute distress.     Appearance: She is not toxic-appearing.   Cardiovascular:      Rate and Rhythm: Normal rate and regular rhythm.   Abdominal:      General: Bowel sounds are normal.      Palpations: Abdomen is soft.      Tenderness: There is abdominal tenderness in the right upper quadrant and epigastric area. There is guarding. There is no rebound.      Hernia: No hernia is present.   Skin:     General: Skin is warm and dry.      Comments: Incisions clean, dry, intact.  No weeping, no surrounding erythema or evidence of infection   Neurological:      Mental Status: She is alert.         ED Course        Procedures              EKG Interpretation:      Interpreted by Nicolette Salas DO  Time reviewed:1220   Symptoms at time of EKG: Chest pain  Rhythm: Normal sinus  and Sinus bradycardia  Rate: 58 bpm  Axis: Normal  Ectopy: None  Conduction: Normal  ST Segments/ T Waves: No acute ischemic changes  Q Waves: None  Comparison to prior: Previously sinus rhythm and sinus rhythm with nonspecific ST changes in anterior leads.  No acute changes noted today    Clinical Impression: non-specific EKG        Critical Care time:  none              Results for orders placed or performed during the hospital encounter of 06/14/25 (from the past 24 hours)   CBC with platelets differential    Narrative    The following orders were created for panel order CBC with platelets differential.  Procedure                               Abnormality         Status                     ---------                               -----------         ------                     CBC with platelets and ...[[id number]]                      Final result                 Please view results " for these tests on the individual orders.   Comprehensive metabolic panel   Result Value Ref Range    Sodium 139 135 - 145 mmol/L    Potassium 4.2 3.4 - 5.3 mmol/L    Carbon Dioxide (CO2) 23 22 - 29 mmol/L    Anion Gap 11 7 - 15 mmol/L    Urea Nitrogen 11.9 6.0 - 20.0 mg/dL    Creatinine 0.76 0.51 - 0.95 mg/dL    GFR Estimate >90 >60 mL/min/1.73m2    Calcium 9.0 8.8 - 10.4 mg/dL    Chloride 105 98 - 107 mmol/L    Glucose 89 70 - 99 mg/dL    Alkaline Phosphatase 70 40 - 150 U/L    AST 23 0 - 45 U/L    ALT 28 0 - 50 U/L    Protein Total 7.0 6.4 - 8.3 g/dL    Albumin 4.0 3.5 - 5.2 g/dL    Bilirubin Total 0.5 <=1.2 mg/dL   Lipase   Result Value Ref Range    Lipase 20 13 - 60 U/L   CBC with platelets and differential   Result Value Ref Range    WBC Count 5.5 4.0 - 11.0 10e3/uL    RBC Count 4.59 3.80 - 5.20 10e6/uL    Hemoglobin 13.7 11.7 - 15.7 g/dL    Hematocrit 41.1 35.0 - 47.0 %    MCV 90 78 - 100 fL    MCH 29.8 26.5 - 33.0 pg    MCHC 33.3 31.5 - 36.5 g/dL    RDW 12.0 10.0 - 15.0 %    Platelet Count 202 150 - 450 10e3/uL    % Neutrophils 52 %    % Lymphocytes 36 %    % Monocytes 7 %    % Eosinophils 4 %    % Basophils 0 %    % Immature Granulocytes 0 %    NRBCs per 100 WBC 0 <1 /100    Absolute Neutrophils 2.9 1.6 - 8.3 10e3/uL    Absolute Lymphocytes 2.0 0.8 - 5.3 10e3/uL    Absolute Monocytes 0.4 0.0 - 1.3 10e3/uL    Absolute Eosinophils 0.2 0.0 - 0.7 10e3/uL    Absolute Basophils 0.0 0.0 - 0.2 10e3/uL    Absolute Immature Granulocytes 0.0 <=0.4 10e3/uL    Absolute NRBCs 0.0 10e3/uL   Troponin T, High Sensitivity   Result Value Ref Range    Troponin T, High Sensitivity <6 <=14 ng/L   CT Abdomen Pelvis w Contrast    Narrative    EXAM: CT ABDOMEN PELVIS W CONTRAST  LOCATION: [facility]  DATE: 6/14/2025    INDICATION: Upper quadrant abdominal pain, status post cholecystectomy  COMPARISON: 8/2/2022  TECHNIQUE: CT scan of the abdomen and pelvis was performed following injection of IV  contrast. Multiplanar reformats were obtained. Dose reduction techniques were used.  CONTRAST: Isovue 370, 96mL    FINDINGS:   LOWER CHEST: No basilar infiltrates. Small hiatal hernia. HEPATOBILIARY: Hepatic steatosis. No biliary dilatation status post cholecystectomy.    PANCREAS: Unremarkable    SPLEEN: Splenic granulomas.    ADRENAL GLANDS: Unremarkable    KIDNEYS/BLADDER: No hydronephrosis. Normal bladder contour.    BOWEL: Prior appendectomy. No small bowel obstruction. Colonic diverticulosis.    LYMPH NODES: No significant retroperitoneal adenopathy.    VASCULATURE: No abdominal aortic aneurysm. Patent portal vein.    PELVIC ORGANS: Hysterectomy. No free fluid.    MUSCULOSKELETAL: Subcutaneous stranding in the upper abdomen, right mid abdomen, and umbilicus compatible with port sites. No drainable collections.      Impression    IMPRESSION:   1.  Status post cholecystectomy without intraabdominal fluid collections.  2.  Small hiatal hernia.  3.  Hepatic steatosis.   EKG 12-lead, tracing only   Result Value Ref Range    Systolic Blood Pressure  mmHg    Diastolic Blood Pressure  mmHg    Ventricular Rate 58 BPM    Atrial Rate 58 BPM    NH Interval 162 ms    QRS Duration 100 ms     ms    QTc 433 ms    P Axis 4 degrees    R AXIS 35 degrees    T Axis 32 degrees    Interpretation ECG       Sinus bradycardia  Otherwise normal ECG  When compared with ECG of 14-Jan-2025 10:00,  Nonspecific T wave abnormality no longer evident in Anterior leads  Confirmed by SEE ED PROVIDER NOTE FOR, ECG INTERPRETATION ([id number]),  Alvin Dailey ([id number]) on 6/14/2025 1:11:10 PM         Medications   ondansetron (ZOFRAN) injection 4 mg (4 mg Intravenous $Given 6/14/25 1151)   HYDROmorphone (PF) (DILAUDID) injection 0.5 mg (0.5 mg Intravenous $Given 6/14/25 1110)   sodium chloride 0.9% BOLUS 1,000 mL (0 mLs Intravenous Stopped 6/14/25 1151)   ketorolac (TORADOL) injection 30 mg (30 mg Intravenous $Given 6/14/25 1108)   iopamidol  (ISOVUE-370) solution 500 mL (96 mLs Intravenous $Given 6/14/25 8578)   sodium chloride 0.9 % bag for CT scan flush (60 mLs As instructed $Given 6/14/25 4531)       Assessments & Plan (with Medical Decision Making)  Christina is a 45-year-old female presenting with concern of right upper quadrant abdominal pain after feeling a pop in her abdomen last night.  She is status post laparoscopic cholecystectomy 5/18/2025.  See history and physical exam as above  45-year-old female in no acute distress, is hypertensive with blood pressure of 146/101 but otherwise vitally stable and afebrile.  See that she had called her surgeon's office due to some weeping from her incisions postoperatively, after evaluation it was determined that she had a reaction to adhesive.  Today, incisions appear clean, dry, intact, without any evidence of erythema, weeping, surrounding cellulitis, or other concerns.  She does have some tenderness and increasing pain with palpation in the right upper quadrant and epigastric regions.  Abdomen is soft, normal bowel sounds throughout.  Will plan to get a CT for evaluation, will give patient some medication for pain and IV fluids  Labs and imaging as above.  Labs are within normal limits.  CT did not reveal any acute process related to postoperative state or anything to explain her right upper quadrant abdominal pain.  There is no evidence of abscess, bleeding, no evidence of intestinal obstruction.  She does have hiatal hernia, which could be contributing to some of her symptoms, but likely does not reflect the Pap as she felt that started her pain.  While in the department, she began complaining of pain in her chest.  Troponin was added to her lab work which was negative.  EKG was obtained, shows normal sinus rhythm without acute ST elevation, ectopy, or arrhythmia.  Patient was reassured with the workup.  She has pain medication from the surgery still leftover and declined a new prescription.  However,  I will provide her with a new prescription for Zofran so that she does not get nauseated with taking the pain medication.  Advised that she can still take Tylenol or ibuprofen per bottle instructions as needed for mild to moderate pain.  However, we will try to start her on PPI therapy to help with the symptoms related to hiatal hernia.  Advised to follow with her primary provider and surgeon in clinic and ED return precautions were discussed.  All questions answered discharged in stable condition     I have reviewed the nursing notes.    I have reviewed the findings, diagnosis, plan and need for follow up with the patient.           Medical Decision Making  The patient's presentation was of low complexity (an acute and uncomplicated illness or injury).    The patient's evaluation involved:  ordering and/or review of 3+ test(s) in this encounter (see separate area of note for details)    The patient's management necessitated moderate risk (prescription drug management including medications given in the ED).        Discharge Medication List as of 6/14/2025  1:03 PM        START taking these medications    Details   omeprazole (PRILOSEC) 20 MG DR capsule Take 2 capsules (40 mg) by mouth daily for 15 days., Disp-30 capsule, R-0, E-Prescribe      ondansetron (ZOFRAN ODT) 4 MG ODT tab Take 1 tablet (4 mg) by mouth every 6 hours as needed., Disp-20 tablet, R-0, E-Prescribe             Final diagnoses:   Abdominal pain, right upper quadrant   Hiatal hernia       6/14/2025   [facility] EMERGENCY DEPT       Nicolette Salas DO  06/14/25 [id number]

## 2025-06-14 NOTE — Clinical Note
Christina Santana was seen and treated in our emergency department on 6/14/2025.  She may return to work on 06/15/2025.       If you have any questions or concerns, please don't hesitate to call.      Nicolette Salas, DO

## 2025-06-14 NOTE — MEDICATION SCRIBE - ADMISSION MEDICATION HISTORY
Medication Scribe Admission Medication History    Admission medication history is complete. The information provided in this note is only as accurate as the sources available at the time of the update.    Information Source(s): Patient and CareEverywhere/SureScripts via in-person    Pertinent Information: Verified no use of pain pump, inhalers or prescription eye drops currently.      Changes made to PTA medication list:  Added: None  Deleted: None  Changed: prometrium to bedtime    Allergies reviewed with patient and updates made in EHR: yes    Medication History Completed By: JENNY SALAS 6/14/2025 12:41 PM    PTA Med List   Medication Sig Last Dose/Taking    Estradiol 1 MG/GM GEL Place 1 mg onto the skin daily. 6/14/2025 at  8:30 AM    progesterone (PROMETRIUM) 100 MG capsule Take 100 mg by mouth at bedtime. 6/13/2025 at  9:00 PM

## 2025-06-14 NOTE — ED TRIAGE NOTES
Presents for concerns of RUQ abdominal pain that started last night. PT states that when she was trying to go to bed last night she felt something popped inside her abdomen. PT status post CHOLECYSTECTOMY, LAPAROSCOPIC on 05/18/2025 in this facility. Denies any other concerns.

## 2025-06-14 NOTE — DISCHARGE INSTRUCTIONS
Your CT scan did not show any worrisome findings.  We noticed postoperative changes, but there is no evidence of bleeding, infection, abscess, or other acute worrisome finding    Your lab work all looked normal.  EKG also looks normal    You do have evidence of a hiatal hernia.  This could be causing some of your chest pain, but would not favor this to be the cause of your right upper quadrant pain especially postoperatively.  You can start the omeprazole once daily to help with symptoms that may be related to the hiatal hernia    You can also use the previously prescribed pain medication you were given postoperatively.  Zofran was sent to the pharmacy for use for any nausea or vomiting.  May be helpful to take the Zofran 20 to 30 minutes before taking the pain medication    Is okay to continue using over-the-counter Tylenol or ibuprofen per bottle instructions as needed for mild to moderate pain    Follow-up with your primary doctor and with general surgery as needed    If any new or worsening symptoms develop do not hesitate to return to the emergency room for evaluation

## 2025-06-14 NOTE — TELEPHONE ENCOUNTER
"Nurse Triage SBAR    Is this a 2nd Level Triage? NO    Situation:   Spoke with 45 yr old Christina who c/o:    Severe upper R abdominal pain this morning.      Background:   Gallbladder removal 4 weeks ago.    This morning, patient was sitting in bed reading a book, patient put the book on the night stand and turned a bit on her side to lay down.  Patient felt a \"pop\" in the incisional area.  Doesn't see any wounds or bulging in the abdomen.  Rates abdominal pain a 4 when not moving. Rates pain a 10 when trying any movement.  Feels difficulty breathing at times.  Reports sweat dripping down her face.    Assessment: evaluation needed.    Protocol Recommended Disposition:   Call  Now    Recommendation:   Advised calling 911 now.   Patient voiced understanding.    Karolina Wilcox RN  [facility]    Reason for Disposition   Visible sweat on face or sweat dripping down face    Additional Information   Negative: SEVERE difficulty breathing (e.g., struggling for each breath, speaks in single words)     Patient states at times, but able to speak in sentences currently at the time of triage.   Negative: Shock suspected (e.g., cold/pale/clammy skin, too weak to stand, low BP, rapid pulse)   Negative: Difficult to awaken or acting confused (e.g., disoriented, slurred speech)   Negative: Passed out (i.e., lost consciousness, collapsed and was not responding)    Protocols used: Abdominal Pain - Upper-A-AH    "

## 2025-07-11 NOTE — PROGRESS NOTES
Virtual Visit Details    Type of service:  Video Visit     Originating Location (pt. 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 She has mitigated her risk for gynecological cancers by having a THBSO in . All pathology was benign.     Primary Physician: Alphonse Arellano MD     History Of Present Illness:  Ms. Santana is a very pleasant, healthy 45 year old female who presents with PMS2+ associated Reed Syndrome.     Genetic Testin2021-  POSITIVE for a PMS2 likely pathogenic mutation. Specifically her mutation is called [id number] (also known as c.[id number]) identified using Cancer Next-Expanded testing from [facility].      Christina is negative for known, pathogenic mutations in the AIP, ALK, APC, CARINA, AXIN2, BAP1, BARD1, BLM, BMPR1A, BRCA1, BRCA2, BRIP1, CDC73, CDH1, CDK4, CDKN1B, CDKN2A, CHEK2, CTNNA1, DICER1, EPCAM, EGFR, EGLN1, FANCC, FH, FLCN, GALNT12, GREM1, HOXB13, KIF1B, KIT, LZTR1, MAX, MEN1, MET, MITF, MLH1, MRE11, MSH2, MSH3, MSH6, MUTYH, NBN, NF1, NF2, NTHL1, PALB2, PDGFRA, PHOX2B, POLD1, POLE, POT1, KYVDJ0F, PTCH1, PTEN, RAD50, RAD51C, RAD51D, RB1, RECQL, RET, SDHA, SDHAF2, SDHB, SDHC, SDHD, SMAD4, SMARCA4, SMARCB1, SMARCE1, STK11, SUFU, QXTE305, TP53, TSC1, TSC2, VHL, and XRCC2 genes. No pathogenic mutations were found in any of the other 76 of 77 genes analyzed. This test involved sequencing and deletion/duplication analysis of all genes with the exceptions of VHL and XRCC2 (sequencing and deletion/duplication); EGFR, EGLN1, HOXB13, KIT, MITF, PDGFRA, POLD1 and POLE (sequencing only); EPCAM and GREM1 (deletion/duplication only).    A copy of the test report can be found in the Laboratory tab, dated 2021, and named  "\"SEND OUTS MISC TEST\". The report is scanned in as a linked document.      History:    s/p  x6  2021- Total laparoscopic hysterectomy and bilateral salpingo oophorectomy (Dr. Dylon Alcantar), pathology benign     12/10/2021- Vaginal cuff at 9:00, biopsies:  - Benign acutely and chronically inflamed granulation tissue.  - No vaginal tissue identified.     B.  Vaginal cuff at 3:00, biopsy:  - Benign acutely and chronically inflamed granulation tissue.  - A portion of benign vaginal tissue with normal mature squamous epithelium and benign stroma with focal moderate chronic inflammation.      Screening history:  EGD 10/31/2018- Dysphagia by Dr Harris   1. LA Grade A (one or more mucosal breaks less than 5 mm, not extending between tops of 2 mucosal folds) esophagitis with no bleeding was found 34 to 36 cm from the incisors.  ESOPHAGUS, DISTAL, BIOPSY:   1. Esophageal eosinophilia (peak count of 10), see comment : The differential diagnosis primarily includes eosinophilic esophagitis (EoE), proton-pump inhibitor-responsive esophageal eosinophilia (PPI-REE) and gastroesophageal reflux disease (GERD) with more eosinophils than typical. ACG guidelines recommend further endoscopic evaluation with re-biopsy after a two month trial of high dose proton-pump inhibitor (PPI) therapy to further investigate these three possibilities  2. Negative for columnar mucosa  3. A medium-sized hiatal hernia was present. The ampulla, duodenal bulb, first portion of the duodenum and second portion of the duodenum were normal. One benign-appearing, intrinsic mild stenosis was found 36 to 37 cm from the incisors. tenosis measured 1 cm (in length). The stenosis was traversed. A TTS dilator was passed through the scope. Dilation with a 12-13.5-15 mm balloon dilator was performed to 15 mm. The dilation site was examined and showed moderate mucosal disruption.        6/10/2021- Colonoscopy (Dr. Garcia, [place]), Hemorrhoids " found on perianal exam.  One 2 mm hyperplastic polyp in the rectum  12/1/2022- Colonoscopy and EGD combined ([place]):  Colon normal. No specimens taken                Impression: Normal esophagus. Normal duodenum. Normal stomach. Biopsied. Pathology: Stomach, antrum: Biopsy: Antral-type mucosa with reactive gastropathy, see comment. No Helicobacter pylori-like organisms seen on H&E examination  12/4/2023: Colonoscopy (Dr. Garcia, [place]), normal exam. No specimens collected.   4/15/2024: EGD at [facility], Mildly erythematous gastric mucosa without bleeding. Biopsys:  A) STOMACH, BIOPSY:   1. Non-erosive reactive gastropathy (see comment)      a. Sampling: Antral and body mucosae      b. Distribution: Antral mucosa   2. Negative for inflammation, atrophy and Helicobacter     B) GE JUNCTION, POLYP, BIOPSY:   1. Polypoid foveolar hyperplasia of the cardia, see comment   2. Negative for intestinal metaplasia and dysplasia   3. No squamous mucosa is present in this sample      2/27/2025: Colonoscopy and EGD (Dr. Alexander): Colon, entirely normal. Normal esophagus. 2 cm hiatal hernia. 7mm esophageal polyp just inferior to the GEJ, inflammatory and benign appearing in the area of the prior hernia repair. Resected and retrieved. Mild benign-appearing esophageal stenosis, not dilated today given the polypectomy. Intact fundoplicatoin. Normal stomach. Normal duodenal bulb, first portion of the duodenum, second portion of the duodenum and area of the papilla.       Past Medical/Surgical History:  Past Medical History:   Diagnosis Date    ASCUS with positive high risk HPV 12/27/2013    HSIL (high grade squamous intraepithelial lesion) on Pap smear of cervix 04/22/2013    Pap smear of cervix with ASCUS, cannot exclude HGSIL 5/23/11, 8/10/12,     PMS2-related Reed syndrome (HNPCC4)      Past Surgical History:   Procedure Laterality Date    APPENDECTOMY      AS REPAIR PARAESOPHAGEAL HIATAL HERNIA,LAPAROTOMY, W MESH       BIOPSY VULVA N/A 12/10/2021    Procedure: Vaginal cuff biopsy;  Surgeon: Leyda Guzman DO;  Location: [facility] OR    CARPAL TUNNEL RELEASE RT/LT Right     COLONOSCOPY N/A 06/10/2021    Procedure: COLONOSCOPY, WITH POLYPECTOMY;  Surgeon: Willi Galeas DO;  Location: [facility]    COLONOSCOPY N/A 2022    Procedure: COLONOSCOPY;  Surgeon: Willi Galeas DO;  Location: [facility]    COLONOSCOPY N/A 2023    Procedure: Colonoscopy;  Surgeon: Willi Galeas DO;  Location: [facility]    COLONOSCOPY N/A 2025    Procedure: Colonoscopy;  Surgeon: Gerald Alexander MD;  Location: [facility]    ESOPHAGOSCOPY, GASTROSCOPY, DUODENOSCOPY (EGD), COMBINED N/A 2022    Procedure: ESOPHAGOGASTRODUODENOSCOPY (EGD) with biopsy;  Surgeon: Willi Galeas DO;  Location: [facility]    EXAM UNDER ANESTHESIA PELVIC N/A 12/10/2021    Procedure: EXAM UNDER ANESTHESIA, PELVIS;  Surgeon: Leyda Guzman DO;  Location: [facility]    LAPAROSCOPIC HYSTERECTOMY TOTAL, BILATERAL SALPINGO-OOPHORECTOMY, COMBINED N/A 2021    Procedure: HYSTERECTOMY, TOTAL, LAPAROSCOPIC, WITH SALPINGO-OOPHORECTOMY;  Surgeon: Dylon Alcantar MD;  Location: [facility]       Allergies:  Allergies as of 07/15/2025 - Reviewed 07/15/2025   Allergen Reaction Noted    Latex Headache 2021    Other drug allergy (see comments) Hives 2024       Current Medications:  Current Outpatient Medications   Medication Sig Dispense Refill    Estradiol 1 MG/GM GEL Place 1 mg onto the skin daily.      progesterone (PROMETRIUM) 100 MG capsule Take 100 mg by mouth at bedtime.          Family History:  Family History   Problem Relation Age of Onset    Diabetes Mother     Fibroids Mother     Reed Syndrome Mother         PMS2+    Colon Polyps Mother         removed 5 feet of colon for unmanageable polyps;  in her sleep at 67, took a nape and never woke up,    Reed Syndrome Brother         PMS2+    Bladder Cancer Maternal  Grandfather 70        lived to be 94    Kidney Cancer Maternal Grandfather     Breast Cancer Paternal Grandmother         postmenopausal    Macular Degeneration Paternal Grandfather     Ovarian Cancer Maternal Aunt 40        possible uterine cancer also; hx of fibroids    Ovarian Cancer Maternal Aunt 40        possible uterine cancer also; hx of fibroids    Breast Cancer Paternal Aunt 54    Heart Defect Son     Liver Disease No family hx of     Liver Cancer No family hx of        Social History:  Social History     Socioeconomic History    Marital status:      Spouse name: Thai    Number of children: 6    Years of education: Not on file    Highest education level: Not on file   Occupational History    Occupation: Working on CNA training     Employer: [employer]     Comment: Works in several roles: security, 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 ~4 per month; no hx of alcohol abuse/dependency    Drug use: No     Comment: no hx IVDU    Sexual activity: Yes     Partners: Male     Birth control/protection: Female Surgical   Other Topics Concern    Parent/sibling w/ CABG, MI or angioplasty before 65F 55M? Not Asked   Social History Narrative    Not on file     Social Drivers of Health     Financial Resource Strain: High Risk (1/1/2022)    Received from [employer]    Financial Resource Strain     Difficulty of Paying Living Expenses: Not on file     Difficulty of Paying Living Expenses: Not on file   Food Insecurity: Not on file   Transportation Needs: Not on file   Physical Activity: Not on file   Stress: Not on file   Social Connections: Unknown (1/1/2022)    Received from [employer]    Social Connections     Frequency of Communication with Friends  "and Family: Not on file   Interpersonal Safety: Not on file   Housing Stability: Not on file       Physical Exam:  Ht 1.676 m (5' 6\")   Wt 86.2 kg (190 lb)   LMP 02/03/2021   BMI 30.67 kg/m    GENERAL: alert and no distress  EYES: Eyes grossly normal to inspection.  No discharge or erythema, or obvious scleral/conjunctival abnormalities.  RESP: No audible wheeze, cough, or visible cyanosis.    SKIN: Visible skin clear. No significant rash, abnormal pigmentation or lesions.  NEURO: Cranial nerves grossly intact.  Mentation and speech appropriate for age.  PSYCH: Appropriate affect, tone, and pace of words      Laboratory/Imaging Studies  No results found for any visits on 07/15/25.    ASSESSMENT    Christina reports that she is doing well at this visit. She has no concerns with her health. She has no updates to her family's medical history.     We discussed the screening plan below. We discussed the recommendation for a colonoscopy every 1-3 years. Using shared decision making, decided to repeat in 18 months (July 2026). She understands that we would repeat this earlier if she developed any symptoms, such as unintentional weight loss, changes to her bowel habits or blood in her stool. We will continue to screen her for urothelial cancer, due to her grandfather's history of bladder cancer, with an annual UA. I would like to see her back in one year.       INDIVIDUALIZED CANCER RISK MANAGEMENT PLAN:      Individualized Surveillance Plan for Reed Syndrome   (MSH6+ and PMS2+ mutation carriers)   Based on NCCN Guidelines Version 4.2024   Type of Screening Recommendation Last Done Next Due   Colon Cancer Screening Colonoscopy at age 30-35 years or 2-5 years prior to the earliest colon cancer in the family if it is diagnosed prior to age 30.     Repeat every 1-3 years.    2/27/2025: Colonoscopy and EGD (Dr. Alexander)   Colonoscopy in 2026   Endometrial and Ovarian Cancer Monitor for signs of endometrial cancer, including " irregular menstrual bleeding or postmenopausal bleeding.     Screening with an endometrial biopsy can be considered every 1-2 years starting between ages 30-35    MSH6: hysterectomy with opportunistic bilateral salpingectomy may be   considered starting at age 40, with delayed bilateral oophorectomy starting at age 50    PMS2: hysterectomy with BSO may be considered starting at age 50     NA, surgery complete    Gastric and small bowel cancer MSH6: Upper GI screening with an EGD, every 2-4 years, starting at age 30-40.     2/27/2025: Colonoscopy and EGD (Dr. Alexander)   Discuss repeating at next visit   Urothelial cancer MSH6: Surveillance with an annual urinalysis may be considered in selected individuals such as  those with a family history of urothelial cancer, starting at age 30-35 y.   Maternal grandfather with bladder cancer     7/11/2025: UA, negative for blood   UA in one year   Skin Manifestations Consider skin exam every 1-2 y with a health care provider skilled in identifying LS-associated skin manifestations. Age to start surveillance is uncertain and can be individualized.    Reed-associated skin manifestations include sebaceous adenocarcinomas, sebaceous adenomas, and keratoacanthomas.   8/30/2024: Last derm appointment   Dermatology visit due this year or next   Pancreatic screening for MSH6+  (may consider for PMS2) Annual contrast-enhanced MRI/MRCP and/or EUS in individuals with 1st or 2nd degree relatives with pancreatic cancer on Reed side of family   No family history of pancreatic cancer   Review at future visits   Prostate Screening  MSH6: Consider beginning shared decision-making about prostate cancer screening   at age 40 y and to consider screening at annual intervals rather than every other year   NA   NA   Central Nervous System cancer Patients should be educated regarding signs and symptoms of neurologic cancer and the importance of prompt reporting of abnormal symptoms to their  physicians: Headaches, especially associated with nausea or vomiting, visual problems, new extremity weakness, loss of coordination, seizures.   July 2025 July 2026   Data do not support ovarian cancer screening for Reed Syndrome. Annual transvaginal ultrasound and  tests may be considered at a clinician's discretion. It is not recommended in premenopausal women due to the wide variation in the endometrial stripe.    PMS2 PV carriers appear to be at no greater than average risk for ovarian cancer, and may consider deferring surveillance and may reasonably elect not to have oophorectomy.     There are data to suggest that aspirin may decrease the risk of colon cancer in Reed Syndrome.  However, optimal dose and duration of aspirin therapy is uncertain.    There have been suggestions that there is an increased risk for breast cancer in Reed Syndrome patients; however, there is not enough evidence to support increased screening above average risk breast cancer screening.       I spent a total of 22 minutes on the day of the visit. Please see the note for further information on patient assessment and treatment.     Sabrina Wayne, ASAD, APRN, AGCNS-BC  Clinical Nurse Specialist  Cancer Risk Management Program  [facility]    Cc:  Alphonse Arellano MD

## 2025-07-14 ENCOUNTER — LAB (OUTPATIENT)
Dept: LAB | Facility: CLINIC | Age: 45
End: 2025-07-14
Payer: COMMERCIAL

## 2025-07-14 DIAGNOSIS — Z15.09 PMS2-RELATED LYNCH SYNDROME (HNPCC4): ICD-10-CM

## 2025-07-14 DIAGNOSIS — Z12.6 SCREENING FOR BLADDER CANCER: ICD-10-CM

## 2025-07-14 DIAGNOSIS — Z15.09 PMS2-RELATED LYNCH SYNDROME (HNPCC4): Primary | ICD-10-CM

## 2025-07-14 LAB
ALBUMIN UR-MCNC: NEGATIVE MG/DL
APPEARANCE UR: CLEAR
BILIRUB UR QL STRIP: NEGATIVE
COLOR UR AUTO: ABNORMAL
GLUCOSE UR STRIP-MCNC: NEGATIVE MG/DL
HGB UR QL STRIP: NEGATIVE
KETONES UR STRIP-MCNC: NEGATIVE MG/DL
LEUKOCYTE ESTERASE UR QL STRIP: NEGATIVE
MUCOUS THREADS #/AREA URNS LPF: PRESENT /LPF
NITRATE UR QL: NEGATIVE
PH UR STRIP: 6.5 [PH] (ref 5–7)
RBC URINE: 0 /HPF
SP GR UR STRIP: 1.02 (ref 1–1.03)
UROBILINOGEN UR STRIP-MCNC: NORMAL MG/DL
WBC URINE: 0 /HPF

## 2025-07-14 PROCEDURE — 81001 URINALYSIS AUTO W/SCOPE: CPT

## 2025-07-14 PROCEDURE — 88112 CYTOPATH CELL ENHANCE TECH: CPT | Performed by: PATHOLOGY

## 2025-07-15 ENCOUNTER — VIRTUAL VISIT (OUTPATIENT)
Dept: ONCOLOGY | Facility: CLINIC | Age: 45
End: 2025-07-15
Payer: COMMERCIAL

## 2025-07-15 VITALS — BODY MASS INDEX: 30.53 KG/M2 | HEIGHT: 66 IN | WEIGHT: 190 LBS

## 2025-07-15 DIAGNOSIS — Z15.09 PMS2-RELATED LYNCH SYNDROME (HNPCC4): Primary | ICD-10-CM

## 2025-07-15 PROCEDURE — 98005 SYNCH AUDIO-VIDEO EST LOW 20: CPT | Mod: 24

## 2025-07-15 PROCEDURE — 1126F AMNT PAIN NOTED NONE PRSNT: CPT

## 2025-07-15 ASSESSMENT — PAIN SCALES - GENERAL: PAINLEVEL_OUTOF10: NO PAIN (0)

## 2025-07-15 NOTE — PATIENT INSTRUCTIONS
Individualized Surveillance Plan for Reed Syndrome   (MSH6+ and PMS2+ mutation carriers)   Based on NCCN Guidelines Version 4.2024   Type of Screening Recommendation Last Done Next Due   Colon Cancer Screening Colonoscopy at age 30-35 years or 2-5 years prior to the earliest colon cancer in the family if it is diagnosed prior to age 30.     Repeat every 1-3 years.    2/27/2025: Colonoscopy and EGD (Dr. Alexander)   Colonoscopy in 2026   Endometrial and Ovarian Cancer Monitor for signs of endometrial cancer, including irregular menstrual bleeding or postmenopausal bleeding.     Screening with an endometrial biopsy can be considered every 1-2 years starting between ages 30-35    MSH6: hysterectomy with opportunistic bilateral salpingectomy may be   considered starting at age 40, with delayed bilateral oophorectomy starting at age 50    PMS2: hysterectomy with BSO may be considered starting at age 50     NA, surgery complete    Gastric and small bowel cancer MSH6: Upper GI screening with an EGD, every 2-4 years, starting at age 30-40.     2/27/2025: Colonoscopy and EGD (Dr. Alexander)   Discuss repeating at next visit   Urothelial cancer MSH6: Surveillance with an annual urinalysis may be considered in selected individuals such as  those with a family history of urothelial cancer, starting at age 30-35 y.   Maternal grandfather with bladder cancer     7/11/2025: UA, negative for blood   UA in one year   Skin Manifestations Consider skin exam every 1-2 y with a health care provider skilled in identifying LS-associated skin manifestations. Age to start surveillance is uncertain and can be individualized.    Reed-associated skin manifestations include sebaceous adenocarcinomas, sebaceous adenomas, and keratoacanthomas.   8/30/2024: Last derm appointment   Dermatology visit due this year or next   Pancreatic screening for MSH6+  (may consider for PMS2) Annual contrast-enhanced MRI/MRCP and/or EUS in individuals with 1st or  2nd degree relatives with pancreatic cancer on Reed side of family   No family history of pancreatic cancer   Review at future visits   Prostate Screening  MSH6: Consider beginning shared decision-making about prostate cancer screening   at age 40 y and to consider screening at annual intervals rather than every other year   NA   NA   Central Nervous System cancer Patients should be educated regarding signs and symptoms of neurologic cancer and the importance of prompt reporting of abnormal symptoms to their physicians: Headaches, especially associated with nausea or vomiting, visual problems, new extremity weakness, loss of coordination, seizures.   July 2025 July 2026   Data do not support ovarian cancer screening for Reed Syndrome. Annual transvaginal ultrasound and  tests may be considered at a clinician's discretion. It is not recommended in premenopausal women due to the wide variation in the endometrial stripe.    PMS2 PV carriers appear to be at no greater than average risk for ovarian cancer, and may consider deferring surveillance and may reasonably elect not to have oophorectomy.     There are data to suggest that aspirin may decrease the risk of colon cancer in Reed Syndrome.  However, optimal dose and duration of aspirin therapy is uncertain.    There have been suggestions that there is an increased risk for breast cancer in Reed Syndrome patients; however, there is not enough evidence to support increased screening above average risk breast cancer screening.

## 2025-07-15 NOTE — LETTER
7/15/2025      Christina Santana  [address]  [facility] [address]      Dear Colleague,    Thank you for referring your patient, Christina Santana, to the [facility]. Please see a copy of my visit note below.    Virtual Visit Details    Type of service:  Video Visit     Originating Location (pt. Location): Other Work  Distant Location (provider location):  On-site  Platform used for Video Visit: [facility]    Oncology Risk Management Consultation:  Date on this visit: 7/15/2025    Christina Santana requires high risk screening and surveillance to reduce her risk of cancer secondary to  PMS2+ associated Reed Syndrome. She has mitigated her risk for gynecological cancers by having a THBSO in . All pathology was benign.     Primary Physician: Alphonse Arellano MD     History Of Present Illness:  Ms. Santana is a very pleasant, healthy 45 year old female who presents with PMS2+ associated Reed Syndrome.     Genetic Testin2021-  POSITIVE for a PMS2 likely pathogenic mutation. Specifically her mutation is called [id number] (also known as c.2113G>A) identified using Cancer Next-Expanded testing from [employer].      Christina is negative for known, pathogenic mutations in the AIP, ALK, APC, CARINA, AXIN2, BAP1, BARD1, BLM, BMPR1A, BRCA1, BRCA2, BRIP1, CDC73, CDH1, CDK4, CDKN1B, CDKN2A, CHEK2, CTNNA1, DICER1, EPCAM, EGFR, EGLN1, FANCC, FH, FLCN, GALNT12, GREM1, HOXB13, KIF1B, KIT, LZTR1, MAX, MEN1, MET, MITF, MLH1, MRE11, MSH2, MSH3, MSH6, MUTYH, NBN, NF1, NF2, NTHL1, PALB2, PDGFRA, PHOX2B, POLD1, POLE, POT1, NZLXN8N, PTCH1, PTEN, RAD50, RAD51C, RAD51D, RB1, RECQL, RET, SDHA, SDHAF2, SDHB, SDHC, SDHD, SMAD4, SMARCA4, SMARCB1, SMARCE1, STK11, SUFU, IART654, TP53, TSC1, TSC2, VHL, and XRCC2 genes. No pathogenic mutations were found in any of the other 76 of 77 genes analyzed. This test involved sequencing and deletion/duplication analysis of all genes with the exceptions of VHL and XRCC2  "(sequencing and deletion/duplication); EGFR, EGLN1, HOXB13, KIT, MITF, PDGFRA, POLD1 and POLE (sequencing only); EPCAM and GREM1 (deletion/duplication only).    A copy of the test report can be found in the Laboratory tab, dated 2021, and named \"[facility]". The report is scanned in as a linked document.      History:    s/p  x6  2021- Total laparoscopic hysterectomy and bilateral salpingo oophorectomy (Dr. Dylon Alcantar), pathology benign     12/10/2021- Vaginal cuff at 9:00, biopsies:  - Benign acutely and chronically inflamed granulation tissue.  - No vaginal tissue identified.     B.  Vaginal cuff at 3:00, biopsy:  - Benign acutely and chronically inflamed granulation tissue.  - A portion of benign vaginal tissue with normal mature squamous epithelium and benign stroma with focal moderate chronic inflammation.      Screening history:  EGD 10/31/2018- Dysphagia by Dr Harris   1. LA Grade A (one or more mucosal breaks less than 5 mm, not extending between tops of 2 mucosal folds) esophagitis with no bleeding was found 34 to 36 cm from the incisors.  ESOPHAGUS, DISTAL, BIOPSY:   1. Esophageal eosinophilia (peak count of 10), see comment : The differential diagnosis primarily includes eosinophilic esophagitis (EoE), proton-pump inhibitor-responsive esophageal eosinophilia (PPI-REE) and gastroesophageal reflux disease (GERD) with more eosinophils than typical. ACG guidelines recommend further endoscopic evaluation with re-biopsy after a two month trial of high dose proton-pump inhibitor (PPI) therapy to further investigate these three possibilities  2. Negative for columnar mucosa  3. A medium-sized hiatal hernia was present. The ampulla, duodenal bulb, first portion of the duodenum and second portion of the duodenum were normal. One benign-appearing, intrinsic mild stenosis was found 36 to 37 cm from the incisors. tenosis measured 1 cm (in length). The stenosis was traversed. A TTS " dilator was passed through the scope. Dilation with a 12-13.5-15 mm balloon dilator was performed to 15 mm. The dilation site was examined and showed moderate mucosal disruption.        6/10/2021- Colonoscopy (Dr. Garcia Butler), Hemorrhoids found on perianal exam.  One 2 mm hyperplastic polyp in the rectum  12/1/2022- Colonoscopy and EGD combined (Butler):  Colon normal. No specimens taken                Impression: Normal esophagus. Normal duodenum. Normal stomach. Biopsied. Pathology: Stomach, antrum: Biopsy: Antral-type mucosa with reactive gastropathy, see comment. No Helicobacter pylori-like organisms seen on H&E examination  12/4/2023: Colonoscopy (Dr. Garcia Butler), normal exam. No specimens collected.   4/15/2024: EGD at [facility], Mildly erythematous gastric mucosa without bleeding. Biopsys:  A) STOMACH, BIOPSY:   1. Non-erosive reactive gastropathy (see comment)      a. Sampling: Antral and body mucosae      b. Distribution: Antral mucosa   2. Negative for inflammation, atrophy and Helicobacter     B) GE JUNCTION, POLYP, BIOPSY:   1. Polypoid foveolar hyperplasia of the cardia, see comment   2. Negative for intestinal metaplasia and dysplasia   3. No squamous mucosa is present in this sample      2/27/2025: Colonoscopy and EGD (Dr. Alexander): Colon, entirely normal. Normal esophagus. 2 cm hiatal hernia. 7mm esophageal polyp just inferior to the GEJ, inflammatory and benign appearing in the area of the prior hernia repair. Resected and retrieved. Mild benign-appearing esophageal stenosis, not dilated today given the polypectomy. Intact fundoplicatoin. Normal stomach. Normal duodenal bulb, first portion of the duodenum, second portion of the duodenum and area of the papilla.       Past Medical/Surgical History:  Past Medical History:   Diagnosis Date     ASCUS with positive high risk HPV 12/27/2013     HSIL (high grade squamous intraepithelial lesion) on Pap smear of cervix 04/22/2013      Pap smear of cervix with ASCUS, cannot exclude HGSIL 5/23/11, 8/10/12,      PMS2-related Reed syndrome (HNPCC4)      Past Surgical History:   Procedure Laterality Date     APPENDECTOMY       AS REPAIR PARAESOPHAGEAL HIATAL HERNIA,LAPAROTOMY, W MESH       BIOPSY VULVA N/A 12/10/2021    Procedure: Vaginal cuff biopsy;  Surgeon: Leyda Guzman DO;  Location: [facility] OR     CARPAL TUNNEL RELEASE RT/LT Right      COLONOSCOPY N/A 06/10/2021    Procedure: COLONOSCOPY, WITH POLYPECTOMY;  Surgeon: Willi Galeas DO;  Location: [facility]     COLONOSCOPY N/A 12/01/2022    Procedure: COLONOSCOPY;  Surgeon: Willi Galeas DO;  Location: [facility]     COLONOSCOPY N/A 12/04/2023    Procedure: Colonoscopy;  Surgeon: Willi Galeas DO;  Location: [facility]     COLONOSCOPY N/A 2/27/2025    Procedure: Colonoscopy;  Surgeon: Gerald Alexander MD;  Location: [facility]     ESOPHAGOSCOPY, GASTROSCOPY, DUODENOSCOPY (EGD), COMBINED N/A 12/01/2022    Procedure: ESOPHAGOGASTRODUODENOSCOPY (EGD) with biopsy;  Surgeon: Willi Galeas DO;  Location: [facility]     EXAM UNDER ANESTHESIA PELVIC N/A 12/10/2021    Procedure: EXAM UNDER ANESTHESIA, PELVIS;  Surgeon: Leyda Guzman DO;  Location: [facility]     LAPAROSCOPIC HYSTERECTOMY TOTAL, BILATERAL SALPINGO-OOPHORECTOMY, COMBINED N/A 02/24/2021    Procedure: HYSTERECTOMY, TOTAL, LAPAROSCOPIC, WITH SALPINGO-OOPHORECTOMY;  Surgeon: Dylon Alcantar MD;  Location: [facility] OR       Allergies:  Allergies as of 07/15/2025 - Reviewed 07/15/2025   Allergen Reaction Noted     Latex Headache 05/19/2021     Other drug allergy (see comments) Hives 05/03/2024       Current Medications:  Current Outpatient Medications   Medication Sig Dispense Refill     Estradiol 1 MG/GM GEL Place 1 mg onto the skin daily.       progesterone (PROMETRIUM) 100 MG capsule Take 100 mg by mouth at bedtime.          Family History:  Family History   Problem Relation Age of Onset     Diabetes Mother       Fibroids Mother      Reed Syndrome Mother         PMS2+     Colon Polyps Mother         removed 5 feet of colon for unmanageable polyps;  in her sleep at 67, took a nape and never woke up,     Reed Syndrome Brother         PMS2+     Bladder Cancer Maternal Grandfather 70        lived to be 94     Kidney Cancer Maternal Grandfather      Breast Cancer Paternal Grandmother         postmenopausal     Macular Degeneration Paternal Grandfather      Ovarian Cancer Maternal Aunt 40        possible uterine cancer also; hx of fibroids     Ovarian Cancer Maternal Aunt 40        possible uterine cancer also; hx of fibroids     Breast Cancer Paternal Aunt 54     Heart Defect Son      Liver Disease No family hx of      Liver Cancer No family hx of        Social History:  Social History     Socioeconomic History     Marital status:      Spouse name: Thai     Number of children: 6     Years of education: Not on file     Highest education level: Not on file   Occupational History     Occupation: Working on CNA training     Employer: [employer]     Comment: Works in several roles: security, 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 ~4 per month; no hx of alcohol abuse/dependency     Drug use: No     Comment: no hx IVDU     Sexual activity: Yes     Partners: Male     Birth control/protection: Female Surgical   Other Topics Concern     Parent/sibling w/ CABG, MI or angioplasty before 65F 55M? Not Asked   Social History Narrative     Not on file     Social Drivers of Health     Financial Resource Strain: High Risk (2022)    [facility]    Financial Resource Strain      Difficulty of Paying Living Expenses: Not on file      Difficulty of Paying Living Expenses: Not on file  "  Food Insecurity: Not on file   Transportation Needs: Not on file   Physical Activity: Not on file   Stress: Not on file   Social Connections: Unknown (1/1/2022)    Received from [facility]    Social Connections      Frequency of Communication with Friends and Family: Not on file   Interpersonal Safety: Not on file   Housing Stability: Not on file       Physical Exam:  Ht 1.676 m (5' 6\")   Wt 86.2 kg (190 lb)   LMP 02/03/2021   BMI 30.67 kg/m    GENERAL: alert and no distress  EYES: Eyes grossly normal to inspection.  No discharge or erythema, or obvious scleral/conjunctival abnormalities.  RESP: No audible wheeze, cough, or visible cyanosis.    SKIN: Visible skin clear. No significant rash, abnormal pigmentation or lesions.  NEURO: Cranial nerves grossly intact.  Mentation and speech appropriate for age.  PSYCH: Appropriate affect, tone, and pace of words      Laboratory/Imaging Studies  No results found for any visits on 07/15/25.    ASSESSMENT    Christina reports that she is doing well at this visit. She has no concerns with her health. She has no updates to her family's medical history.     We discussed the screening plan below. We discussed the recommendation for a colonoscopy every 1-3 years. Using shared decision making, decided to repeat in 18 months (July 2026). She understands that we would repeat this earlier if she developed any symptoms, such as unintentional weight loss, changes to her bowel habits or blood in her stool. We will continue to screen her for urothelial cancer, due to her grandfather's history of bladder cancer, with an annual UA. I would like to see her back in one year.       INDIVIDUALIZED CANCER RISK MANAGEMENT PLAN:      Individualized Surveillance Plan for Reed Syndrome   (MSH6+ and PMS2+ mutation carriers)   Based on NCCN Guidelines Version 4.2024   Type of Screening Recommendation Last Done Next Due   Colon Cancer Screening Colonoscopy at age " 30-35 years or 2-5 years prior to the earliest colon cancer in the family if it is diagnosed prior to age 30.     Repeat every 1-3 years.    2/27/2025: Colonoscopy and EGD (Dr. Alexander)   Colonoscopy in 2026   Endometrial and Ovarian Cancer Monitor for signs of endometrial cancer, including irregular menstrual bleeding or postmenopausal bleeding.     Screening with an endometrial biopsy can be considered every 1-2 years starting between ages 30-35    MSH6: hysterectomy with opportunistic bilateral salpingectomy may be   considered starting at age 40, with delayed bilateral oophorectomy starting at age 50    PMS2: hysterectomy with BSO may be considered starting at age 50     NA, surgery complete    Gastric and small bowel cancer MSH6: Upper GI screening with an EGD, every 2-4 years, starting at age 30-40.     2/27/2025: Colonoscopy and EGD (Dr. Alexander)   Discuss repeating at next visit   Urothelial cancer MSH6: Surveillance with an annual urinalysis may be considered in selected individuals such as  those with a family history of urothelial cancer, starting at age 30-35 y.   Maternal grandfather with bladder cancer     7/11/2025: UA, negative for blood   UA in one year   Skin Manifestations Consider skin exam every 1-2 y with a health care provider skilled in identifying LS-associated skin manifestations. Age to start surveillance is uncertain and can be individualized.    Reed-associated skin manifestations include sebaceous adenocarcinomas, sebaceous adenomas, and keratoacanthomas.   8/30/2024: Last derm appointment   Dermatology visit due this year or next   Pancreatic screening for MSH6+  (may consider for PMS2) Annual contrast-enhanced MRI/MRCP and/or EUS in individuals with 1st or 2nd degree relatives with pancreatic cancer on Reed side of family   No family history of pancreatic cancer   Review at future visits   Prostate Screening  MSH6: Consider beginning shared decision-making about prostate cancer  screening   at age 40 y and to consider screening at annual intervals rather than every other year   NA   NA   Central Nervous System cancer Patients should be educated regarding signs and symptoms of neurologic cancer and the importance of prompt reporting of abnormal symptoms to their physicians: Headaches, especially associated with nausea or vomiting, visual problems, new extremity weakness, loss of coordination, seizures.   July 2025 July 2026   Data do not support ovarian cancer screening for Reed Syndrome. Annual transvaginal ultrasound and  tests may be considered at a clinician's discretion. It is not recommended in premenopausal women due to the wide variation in the endometrial stripe.    PMS2 PV carriers appear to be at no greater than average risk for ovarian cancer, and may consider deferring surveillance and may reasonably elect not to have oophorectomy.     There are data to suggest that aspirin may decrease the risk of colon cancer in Reed Syndrome.  However, optimal dose and duration of aspirin therapy is uncertain.    There have been suggestions that there is an increased risk for breast cancer in Reed Syndrome patients; however, there is not enough evidence to support increased screening above average risk breast cancer screening.       I spent a total of 22 minutes on the day of the visit. Please see the note for further information on patient assessment and treatment.     Sabrina Wayne DNP, SHEYLA, [facility]  Clinical Nurse Specialist  Cancer Risk Management Program  [facility]    Cc:  Alphonse Arellano MD              Again, thank you for allowing me to participate in the care of your patient.        Sincerely,        SHEYLA Barron CNP    Electronically signed

## 2025-07-15 NOTE — NURSING NOTE
Current patient location: [facility]     Is the patient currently in the state of [place]? YES    Visit mode: VIDEO    If the visit is dropped, the patient can be reconnected by:VIDEO VISIT: Text to cell phone:   Telephone Information:   Mobile [phone]       Will anyone else be joining the visit? NO  (If patient encounters technical issues they should call [phone]    Are changes needed to the allergy or medication list? Pt stated no changes to allergies and Pt stated no med changes    Are refills needed on medications prescribed by this physician? NO    Rooming Documentation:  Questionnaire(s) completed    Reason for visit: NIURKA ENNIS

## 2025-07-17 LAB
PATH REPORT.COMMENTS IMP SPEC: NORMAL
PATH REPORT.FINAL DX SPEC: NORMAL
PATH REPORT.GROSS SPEC: NORMAL
PATH REPORT.MICROSCOPIC SPEC OTHER STN: NORMAL

## (undated) DEVICE — ESU HOOK TIP 5MM CONMED

## (undated) DEVICE — PREP POVIDONE IODINE SCRUB 7.5% 120ML

## (undated) DEVICE — SOL WATER IRRIG 1000ML BOTTLE 07139-09

## (undated) DEVICE — ESU CORD MONOPOLAR HIGH FREQUENCY 26006M-D/10

## (undated) DEVICE — GLOVE EXAM NITRILE LG

## (undated) DEVICE — SPONGE RAY-TEC 4X8" 7318

## (undated) DEVICE — ENDO TROCAR SLEEVE KII Z-THREADED 05X100MM CTS02

## (undated) DEVICE — ESU ENDO SCISSORS 5MM CVD 5DCS

## (undated) DEVICE — KIT ENDO TURNOVER/PROCEDURE CARRY-ON 101822

## (undated) DEVICE — DEVICE SUTURE GRASPER TROCAR CLOSURE 14GA PMITCSG

## (undated) DEVICE — SU DERMABOND ADVANCED .7ML DNX12

## (undated) DEVICE — DRAPE POUCH INSTRUMENT 3 POCKET 1018L

## (undated) DEVICE — GLOVE ESTEEM BLUE W/NEU-THERA 8.0  2D73PB80

## (undated) DEVICE — CATH TRAY FOLEY 16FR DRAINAGE BAG STATLOCK 899916

## (undated) DEVICE — DRAPE GYN/UROLOGY FLUID POUCH TUR 29455

## (undated) DEVICE — SUCTION STRYKERFLOW II 250-070-500

## (undated) DEVICE — SU WND CLOSURE RELOAD VLOC 180 ABS 0 GREEN 8" VLOCA008L

## (undated) DEVICE — EVAC SYSTEM CLEAR FLOW SC082500

## (undated) DEVICE — RETR ELEV / UTERINE MANIPULATOR V-CARE LG CUP 60-6085-202A

## (undated) DEVICE — PREP POVIDONE IODINE SOLUTION 10% 120ML

## (undated) DEVICE — GLOVE ESTEEM POWDER FREE 8.0  2D72PL80

## (undated) DEVICE — PACK AB HYST/LITHOTOMY CUSTOM NORTHLAND

## (undated) DEVICE — GOWN XLG DISP 9545

## (undated) DEVICE — SYR 10ML FINGER CONTROL W/O NDL 309695

## (undated) DEVICE — TUBING INSUFFLATION W/FILTER 10FT GS1016

## (undated) DEVICE — PEN MARKING SKIN

## (undated) DEVICE — SUCTION MANIFOLD NEPTUNE 2 SYS 1 PORT 702-025-000

## (undated) DEVICE — LUBRICATING JELLY 4.25OZ

## (undated) DEVICE — NDL SPINAL 22GA 3.5" QUINCKE 405181

## (undated) DEVICE — KIT PATIENT POSITIONING PIGAZZI LATEX FREE 40580

## (undated) DEVICE — ESU SUCTION/IRRIGATION SYSTEM PISTOL GRIP

## (undated) DEVICE — SOL NACL 0.9% INJ 1000ML BAG 07983-09

## (undated) DEVICE — PREP SKIN SCRUB TRAY 4461A

## (undated) DEVICE — PACK GENERAL LAPAOSCOPY

## (undated) DEVICE — SU VICRYL+ 3-0 27IN SH UND VCP416H

## (undated) DEVICE — ENDO TROCAR SLEEVE KII ADV FIXATION 05X100MM CFS02

## (undated) DEVICE — TUBING SUCTION 12"X1/4" N612

## (undated) DEVICE — GLOVE BIOGEL PI ULTRATOUCH G SZ 7.5 42175

## (undated) DEVICE — DEVICE ENDO STITCH APPLIER 10MM 173016

## (undated) DEVICE — NDL ECLIPSE 25GA 1.5"

## (undated) DEVICE — ENDO TROCAR FIRST ENTRY KII FIOS Z-THRD 11X100MM CTF33

## (undated) DEVICE — PAD PERI INDIV WRAP 11" 2022A

## (undated) DEVICE — SU MONOCRYL 4-0 PS-2 18" UND Y496G

## (undated) DEVICE — GLOVE PROTEXIS W/NEU-THERA 5.5  2D73TE55

## (undated) DEVICE — BLADE KNIFE SURG 11 371111

## (undated) DEVICE — ENDO TROCAR FIRST ENTRY KII FIOS ADV FIX 05X100MM CFF03

## (undated) DEVICE — SYR 20ML LL W/O NDL 302830

## (undated) DEVICE — ENDO TROCAR FIRST ENTRY KII FIOS Z-THRD 05X100MM CTF03

## (undated) DEVICE — SU PDS II 1 CT-1 MONOFIL Z347H

## (undated) DEVICE — ADH SKIN CLOSURE PREMIERPRO EXOFIN 1.0ML 3470

## (undated) DEVICE — ENDO POUCH UNIV RETRIEVAL SYSTEM INZII 10MM CD001

## (undated) DEVICE — ENDO DISSECTOR BLUNT 05MM  BTD05

## (undated) DEVICE — GLOVE PROTEXIS BLUE W/NEU-THERA 6.0  2D73EB60

## (undated) DEVICE — ESU GROUND PAD UNIVERSAL W/O CORD

## (undated) DEVICE — ESU LIGASURE LAPAROSCOPIC BLUNT TIP SEALER 5MMX37CM LF1837

## (undated) DEVICE — ENDO CLIP CARTIRDGE K2 MED/LG 10 1112

## (undated) DEVICE — SU VICRYL 0 UR-6 27" J603H

## (undated) DEVICE — BASIN SET MINOR DISP

## (undated) DEVICE — SU VICRYL 4-0 PS-2 27" UND J426H

## (undated) DEVICE — SU VICRYL 3-0 SH 27" UND J416H

## (undated) DEVICE — PACK SET-UP STD 9102

## (undated) RX ORDER — LIDOCAINE HYDROCHLORIDE 10 MG/ML
INJECTION, SOLUTION EPIDURAL; INFILTRATION; INTRACAUDAL; PERINEURAL
Status: DISPENSED
Start: 2022-12-01

## (undated) RX ORDER — SIMETHICONE 40MG/0.6ML
SUSPENSION, DROPS(FINAL DOSAGE FORM)(ML) ORAL
Status: DISPENSED
Start: 2025-02-27

## (undated) RX ORDER — KETOROLAC TROMETHAMINE 30 MG/ML
INJECTION, SOLUTION INTRAMUSCULAR; INTRAVENOUS
Status: DISPENSED
Start: 2021-12-10

## (undated) RX ORDER — BUPIVACAINE HYDROCHLORIDE 2.5 MG/ML
INJECTION, SOLUTION EPIDURAL; INFILTRATION; INTRACAUDAL
Status: DISPENSED
Start: 2021-12-10

## (undated) RX ORDER — FENTANYL CITRATE-0.9 % NACL/PF 10 MCG/ML
PLASTIC BAG, INJECTION (ML) INTRAVENOUS
Status: DISPENSED
Start: 2021-02-24

## (undated) RX ORDER — FENTANYL CITRATE 50 UG/ML
INJECTION, SOLUTION INTRAMUSCULAR; INTRAVENOUS
Status: DISPENSED
Start: 2025-05-18

## (undated) RX ORDER — KETOROLAC TROMETHAMINE 30 MG/ML
INJECTION, SOLUTION INTRAMUSCULAR; INTRAVENOUS
Status: DISPENSED
Start: 2021-02-24

## (undated) RX ORDER — ONDANSETRON 2 MG/ML
INJECTION INTRAMUSCULAR; INTRAVENOUS
Status: DISPENSED
Start: 2025-05-18

## (undated) RX ORDER — PROPOFOL 10 MG/ML
INJECTION, EMULSION INTRAVENOUS
Status: DISPENSED
Start: 2022-12-01

## (undated) RX ORDER — BUPIVACAINE HYDROCHLORIDE AND EPINEPHRINE 5; 5 MG/ML; UG/ML
INJECTION, SOLUTION EPIDURAL; INTRACAUDAL; PERINEURAL
Status: DISPENSED
Start: 2021-02-24

## (undated) RX ORDER — PROPOFOL 10 MG/ML
INJECTION, EMULSION INTRAVENOUS
Status: DISPENSED
Start: 2025-05-18

## (undated) RX ORDER — FENTANYL CITRATE 50 UG/ML
INJECTION, SOLUTION INTRAMUSCULAR; INTRAVENOUS
Status: DISPENSED
Start: 2021-02-24

## (undated) RX ORDER — KETOROLAC TROMETHAMINE 30 MG/ML
INJECTION, SOLUTION INTRAMUSCULAR; INTRAVENOUS
Status: DISPENSED
Start: 2025-05-18

## (undated) RX ORDER — LIDOCAINE HYDROCHLORIDE 20 MG/ML
INJECTION, SOLUTION EPIDURAL; INFILTRATION; INTRACAUDAL; PERINEURAL
Status: DISPENSED
Start: 2021-02-24

## (undated) RX ORDER — METHOCARBAMOL 100 MG/ML
INJECTION, SOLUTION INTRAMUSCULAR; INTRAVENOUS
Status: DISPENSED
Start: 2025-05-18

## (undated) RX ORDER — PROPOFOL 10 MG/ML
INJECTION, EMULSION INTRAVENOUS
Status: DISPENSED
Start: 2021-02-24

## (undated) RX ORDER — DIMENHYDRINATE 50 MG/ML
INJECTION, SOLUTION INTRAMUSCULAR; INTRAVENOUS
Status: DISPENSED
Start: 2025-05-18

## (undated) RX ORDER — HYDROMORPHONE HYDROCHLORIDE 1 MG/ML
INJECTION, SOLUTION INTRAMUSCULAR; INTRAVENOUS; SUBCUTANEOUS
Status: DISPENSED
Start: 2021-02-24

## (undated) RX ORDER — CEFAZOLIN SODIUM/WATER 2 G/20 ML
SYRINGE (ML) INTRAVENOUS
Status: DISPENSED
Start: 2025-05-18

## (undated) RX ORDER — PROPOFOL 10 MG/ML
INJECTION, EMULSION INTRAVENOUS
Status: DISPENSED
Start: 2021-06-10

## (undated) RX ORDER — DEXAMETHASONE SODIUM PHOSPHATE 10 MG/ML
INJECTION, SOLUTION INTRAMUSCULAR; INTRAVENOUS
Status: DISPENSED
Start: 2021-02-24

## (undated) RX ORDER — EPINEPHRINE 1 MG/ML
INJECTION, SOLUTION INTRAMUSCULAR; SUBCUTANEOUS
Status: DISPENSED
Start: 2021-12-10

## (undated) RX ORDER — LIDOCAINE HYDROCHLORIDE 20 MG/ML
INJECTION, SOLUTION EPIDURAL; INFILTRATION; INTRACAUDAL; PERINEURAL
Status: DISPENSED
Start: 2021-06-10

## (undated) RX ORDER — DEXAMETHASONE SODIUM PHOSPHATE 10 MG/ML
INJECTION, SOLUTION INTRAMUSCULAR; INTRAVENOUS
Status: DISPENSED
Start: 2025-05-18

## (undated) RX ORDER — ONDANSETRON 2 MG/ML
INJECTION INTRAMUSCULAR; INTRAVENOUS
Status: DISPENSED
Start: 2021-02-24

## (undated) RX ORDER — FENTANYL CITRATE 50 UG/ML
INJECTION, SOLUTION INTRAMUSCULAR; INTRAVENOUS
Status: DISPENSED
Start: 2021-12-10